# Patient Record
Sex: MALE | Race: WHITE | Employment: UNEMPLOYED | URBAN - METROPOLITAN AREA
[De-identification: names, ages, dates, MRNs, and addresses within clinical notes are randomized per-mention and may not be internally consistent; named-entity substitution may affect disease eponyms.]

---

## 2017-01-03 ENCOUNTER — TELEPHONE (OUTPATIENT)
Dept: NEUROLOGY | Age: 42
End: 2017-01-03

## 2017-01-05 ENCOUNTER — OFFICE VISIT (OUTPATIENT)
Dept: NEUROLOGY | Age: 42
End: 2017-01-05

## 2017-01-05 VITALS
DIASTOLIC BLOOD PRESSURE: 70 MMHG | HEIGHT: 72 IN | RESPIRATION RATE: 20 BRPM | WEIGHT: 224 LBS | BODY MASS INDEX: 30.34 KG/M2 | SYSTOLIC BLOOD PRESSURE: 118 MMHG

## 2017-01-05 DIAGNOSIS — G43.019 INTRACTABLE MIGRAINE WITHOUT AURA AND WITHOUT STATUS MIGRAINOSUS: Primary | ICD-10-CM

## 2017-01-05 RX ORDER — NORTRIPTYLINE HYDROCHLORIDE 75 MG/1
75 CAPSULE ORAL
Qty: 90 CAP | Refills: 1 | Status: SHIPPED | OUTPATIENT
Start: 2017-01-05 | End: 2017-04-05

## 2017-01-05 RX ORDER — SUMATRIPTAN 100 MG/1
TABLET, FILM COATED ORAL
Qty: 12 TAB | Refills: 5 | Status: SHIPPED | OUTPATIENT
Start: 2017-01-05 | End: 2019-10-23

## 2017-01-05 RX ORDER — LAMOTRIGINE 200 MG/1
TABLET ORAL DAILY
COMMUNITY

## 2017-01-05 RX ORDER — BUTALBITAL, ACETAMINOPHEN AND CAFFEINE 50; 325; 40 MG/1; MG/1; MG/1
TABLET ORAL
Qty: 48 TAB | Refills: 0 | Status: SHIPPED | OUTPATIENT
Start: 2017-01-05 | End: 2017-04-06 | Stop reason: SDUPTHER

## 2017-01-05 NOTE — TELEPHONE ENCOUNTER
Called and spoke to patient   Regarding DMV forms once payment done will fax to [facility]

## 2017-01-05 NOTE — PROGRESS NOTES
Interval HPI:   This is a 39 y.o. male who is following up for     PMHx significant for Hep C, polysubstance dependence, IV drug abuse, now on Buprenorphine per drug rehab/ pain management clinic    Chief Complaint   Patient presents with    Headache    Other     Discuss driving forms     1) Headaches  Pt had been seeing a Neurologist at [address] [place] [address] for his headache care so at last visit I told him that he should continue with that provider. Pt reports that since his last visit, his [facility] CareCard became inactive so he isn't able to see that provider any more. He's requesting that I assume management of his headache care. He had been taking Topamax 50 mg BID but stopped taking it 1 month ago d/t side effects (cognitive slowing, word finding difficulty). Continues taking Nortriptyline 75 mg QHS, and for migraine treatment, takes Sumatriptan + Fioricet. Says his headaches aren't any worse since stopping the Topamax and they're actually a little better. # of headache days a month: 4-6. # of migraine days per month: 4-6    2) Driving Forms  Pt asks me to complete the Neurological section of his DMV forms. He's never had blackout spell or syncope but he says because he has multiple medical issues, and is on chronic opioid dependence medication (buprenorphine), as well as dronabinol (liquid THC), he needs the neurologic part of the form filled out. Brief ROS: as above or otherwise negative  There have been no significant changes in PMHx, PSHx, SHx except as noted above. Allergies   Allergen Reactions    Barium Sulfate Hives    Demerol [Meperidine] Hives    Iodinated Contrast Media - Oral And Iv Dye Other (comments)     Hives, swelling of throat with IVP/has had MRI with contrast without problems.  Neurontin [Gabapentin] Drowsiness     Current Outpatient Prescriptions   Medication Sig Dispense Refill    lamoTRIgine (LAMICTAL) 200 mg tablet Take  by mouth daily.       nortriptyline (PAMELOR) 75 mg capsule Take 1 Cap by mouth nightly for 90 days. Anti-depressant for headache prevention 90 Cap 1    SUMAtriptan (IMITREX) 100 mg tablet 1 tab at onset of migraine; may repeat 1 tab in 2 hours if HA persists; Limit: 2 tabs in 24/ hrs, not more than 3 days a week 12 Tab 5    butalbital-acetaminophen-caffeine (FIORICET, ESGIC) -40 mg per tablet 1 tab at onset of migraine; can 1 tab in repeat in 4 hours (one time) if headache remains. Limit: 2 tabs per day, max 2 days a week. 90 day Rx. 48 Tab 0    losartan (COZAAR) 50 mg tablet Take 1 Tab by mouth daily. 30 Tab 1    dronabinol (MARINOL) 5 mg capsule Take 5 mg by mouth two (2) times a day.  ondansetron (ZOFRAN ODT) 4 mg disintegrating tablet Take 1 Tab by mouth every eight (8) hours as needed for Nausea. 10 Tab 0    atenolol (TENORMIN) 50 mg tablet Take 1 Tab by mouth daily. Indications: HYPERTENSION 30 Tab 3    cyclobenzaprine (FLEXERIL) 5 mg tablet Take 2 Tabs by mouth three (3) times daily (with meals). 20 Tab 0    albuterol (VENTOLIN HFA) 90 mcg/actuation inhaler Take 2 Puffs by inhalation every four (4) hours as needed for Wheezing. 1 Inhaler 0    buprenorphine HCl (SUBUTEX) 8 mg sublingual tablet 1 Tab by SubLINGual route daily. Max Daily Amount: 8 mg. (Patient taking differently: 8 mg by SubLINGual route daily. HE TAKES ZUSLOV) 2 Tab 0    clonazePAM (KLONOPIN) 1 mg tablet Take 1 Tab by mouth two (2) times a day. Max Daily Amount: 2 mg. 60 Tab 0    betamethasone dipropionate (DIPROLENE) 0.05 % topical lotion Apply  to affected area two (2) times a day. 60 mL 0    mupirocin (BACTROBAN) 2 % ointment Apply  to affected area three (3) times daily. Apply to area for 10 days 22 g 0    OLANZapine (ZYPREXA) 10 mg tablet Take 5 mg by mouth nightly.  emtricitabine-tenofovir (TRUVADA) 200-300 mg per tablet Take 1 Tab by mouth daily.  Indications: PREVENTION OF HIV INFECTION AFTER EXPOSURE 28 Tab 0    raltegravir (ISENTRESS) 400 mg tablet Take 1 Tab by mouth two (2) times a day. 56 Tab 0       Physical Exam  Blood pressure 118/70, resp. rate 20, height 6' (1.829 m), weight 101.6 kg (224 lb). No acute distress, seems cognitively slow (on Dronabinol, Zyprexa, Klonopin, Buprenorphine, Lamictal, Pamelor?)  Skin: no rashes    Focused Neurological Exam     Mental status: Awake, mild cognitive slowing, responding to all questions  Language: normal comprehension; no dysarthria; mildly reduced fluency    CNs:   Visual fields grossly normal  Extraocular movements intact, no nystagmus  Face appears symmetric and facial strength normal.    Hearing is intact to casual conversation. Sensory: intact light touch in both arms  Motor: Normal bulk and strength in all 4 extremities. Reflexes: DTRs are symmetric, 2+ patellars   Cerebellar: Normal FNF bilaterally  Gait: normal    Impression      ICD-10-CM ICD-9-CM    1. Intractable migraine without aura and without status migrainosus G43.019 346.11 nortriptyline (PAMELOR) 75 mg capsule      SUMAtriptan (IMITREX) 100 mg tablet      butalbital-acetaminophen-caffeine (FIORICET, ESGIC) -40 mg per tablet        1). Chronic migraine:  Continue Nortriptyline 75 mg QHS. Renewed Rx Sumatriptan and Fioricet to take on days where headache is severe. Instructed him not to take the medications before he drives as they can cause drowsiness. Rx of Fioricet is to last at least 3 months. 2). DMV Form  D/w patient that I will fill out the Neurological section but will comment that he is on pain and psychiatric medication that cause him mild cognitive slowing and that he should not drive if he feels drowsy. He understands it will be up to [facility] to allow him to return to driving, or continue driving. 3).  Follow up in 3 months

## 2017-01-05 NOTE — PATIENT INSTRUCTIONS
[facility]   Statement to Patients  April 1, 2014      In an effort to ensure the large volume of patient prescription refills is processed in the most efficient and expeditious manner, we are asking our patients to assist us by calling your Pharmacy for all prescription refills, this will include also your  Mail Order Pharmacy. The pharmacy will contact our office electronically to continue the refill process. Please do not wait until the last minute to call your pharmacy. We need at least 48 hours (2days) to fill prescriptions. We also encourage you to call your pharmacy before going to  your prescription to make sure it is ready. With regard to controlled substance prescription refill requests (narcotic refills) that need to be picked up at our office, we ask your cooperation by providing us with at least 72 hours (3days) notice that you will need a refill. We will not refill narcotic prescription refill requests after 4:00pm on any weekday, Monday through Thursday, or after 2:00pm on Fridays, or on the weekends. We encourage everyone to explore another way of getting your prescription refill request processed using [employer], our patient web portal through our electronic medical record system. [employer] is an efficient and effective way to communicate your medication request directly to the office and  downloadable as an pierre on your smart phone . [employer] also features a review functionality that allows you to view your medication list as well as leave messages for your physician. Are you ready to get connected? If so please review the attatched instructions or speak to any of our staff to get you set up right away! Thank you so much for your cooperation. Should you have any questions please contact our Practice Administrator.     The Physicians and Staff,  [facility] [id number] [address]  What is a living will?  A living will is a legal form you use to write down the kind of care you want at the end of your life. It is used by the health professionals who will treat you if you aren't able to decide for yourself. If you put your wishes in writing, your loved ones and others will know what kind of care you want. They won't need to guess. This can ease your mind and be helpful to others. A living will is not the same as an estate or property will. An estate will explains what you want to happen with your money and property after you die. Is a living will a legal document? A living will is a legal document. Each state has its own laws about living carter. If you move to another state, make sure that your living will is legal in the state where you now live. Or you might use a universal form that has been approved by many states. This kind of form can sometimes be completed and stored online. Your electronic copy will then be available wherever you have a connection to the Internet. In most cases, doctors will respect your wishes even if you have a form from a different state. · You don't need an  to complete a living will. But legal advice can be helpful if your state's laws are unclear, your health history is complicated, or your family can't agree on what should be in your living will. · You can change your living will at any time. Some people find that their wishes about end-of-life care change as their health changes. · In addition to making a living will, think about completing a medical power of  form. This form lets you name the person you want to make end-of-life treatment decisions for you (your \"health care agent\") if you're not able to. Many hospitals and nursing homes will give you the forms you need to complete a living will and a medical power of . · Your living will is used only if you can't make or communicate decisions for yourself anymore.  If you become able to make decisions again, you can accept or refuse any treatment, no matter what you wrote in your living will. · Your state may offer an online registry. This is a place where you can store your living will online so the doctors and nurses who need to treat you can find it right away. What should you think about when creating a living will? Talk about your end-of-life wishes with your family members and your doctor. Let them know what you want. That way the people making decisions for you won't be surprised by your choices. Think about these questions as you make your living will:  · Do you know enough about life support methods that might be used? If not, talk to your doctor so you know what might be done if you can't breathe on your own, your heart stops, or you're unable to swallow. · What things would you still want to be able to do after you receive life-support methods? Would you want to be able to walk? To speak? To eat on your own? To live without the help of machines? · If you have a choice, where do you want to be cared for? In your home? At a hospital or nursing home? · Do you want certain Gnosticism practices performed if you become very ill? · If you have a choice at the end of your life, where would you prefer to die? At home? In a hospital or nursing home? Somewhere else? · Would you prefer to be buried or cremated? · Do you want your organs to be donated after you die? What should you do with your living will? · Make sure that your family members and your health care agent have copies of your living will. · Give your doctor a copy of your living will to keep in your medical record. If you have more than one doctor, make sure that each one has a copy. · You may want to put a copy of your living will where it can be easily found. Where can you learn more? Go to http://bharti-janeen.info/. Enter R815 in the search box to learn more about \"Learning About Living Julitohernan. \"  Current as of: February 24, 2016  Content Version: 11.1  © 7475-4936 [employer]. Care instructions adapted under license by [employer] (which disclaims liability or warranty for this information). If you have questions about a medical condition or this instruction, always ask your healthcare professional. [address] any warranty or liability for your use of this information. Advance Directives: Care Instructions  Your Care Instructions  An advance directive is a legal way to state your wishes at the end of your life. It tells your family and your doctor what to do if you can no longer say what you want. There are two main types of advance directives. You can change them any time that your wishes change. · A living will tells your family and your doctor your wishes about life support and other treatment. · A medical power of  lets you name a person to make treatment decisions for you when you can't speak for yourself. This person is called a health care agent. If you do not have an advance directive, decisions about your medical care may be made by a doctor or a  who doesn't know you. It may help to think of an advance directive as a gift to the people who care for you. If you have one, they won't have to make tough decisions by themselves. Follow-up care is a key part of your treatment and safety. Be sure to make and go to all appointments, and call your doctor if you are having problems. It's also a good idea to know your test results and keep a list of the medicines you take. How can you care for yourself at home? · Discuss your wishes with your loved ones and your doctor. This way, there are no surprises. · Many states have a unique form. Or you might use a universal form that has been approved by many states. This kind of form can sometimes be completed and stored online.  Your electronic copy will then be available wherever you have a connection to the Internet. In most cases, doctors will respect your wishes even if you have a form from a different state. · You don't need a  to do an advance directive. But you may want to get legal advice. · Think about these questions when you prepare an advance directive:  ¨ Who do you want to make decisions about your medical care if you are not able to? Many people choose a family member, close friend, or doctor. ¨ Do you know enough about life support methods that might be used? If not, talk to your doctor so you understand. ¨ What are you most afraid of that might happen? You might be afraid of having pain, losing your independence, or being kept alive by machines. ¨ Where would you prefer to die? Choices include your home, a hospital, or a nursing home. ¨ Would you like to have information about hospice care to support you and your family? ¨ Do you want to donate organs when you die? ¨ Do you want certain Religion practices performed before you die? If so, put your wishes in the advance directive. · Read your advance directive every year, and make changes as needed. When should you call for help? Be sure to contact your doctor if you have any questions. Where can you learn more? Go to http://bharti-janeen.info/. Enter R264 in the search box to learn more about \"Advance Directives: Care Instructions. \"  Current as of: February 24, 2016  Content Version: 11.1  © 7987-4844 [employer]. Care instructions adapted under license by [employer] (which disclaims liability or warranty for this information). If you have questions about a medical condition or this instruction, always ask your healthcare professional. [address] any warranty or liability for your use of this information.

## 2017-01-05 NOTE — MR AVS SNAPSHOT
Visit Information Date & Time Provider Department Dept. Phone Encounter #  
 1/5/2017 10:40 AM Shalom Boyd MD Neurology [facility] [place] [facility] [phone] [id number] Your Appointments 1/12/2017 10:45 AM  
ROUTINE CARE with Thomas Hernandez MD  
[address] [facility]) Appt Note: f/u BP  
 [address] [address] [facility]  
[phone]  
  
   
 [address] [place] [address] [facility] [address] [address] Upcoming Health Maintenance Date Due INFLUENZA AGE 9 TO ADULT 8/1/2016 DTaP/Tdap/Td series (2 - Td) 12/5/2023 Allergies as of 1/5/2017  Review Complete On: 1/5/2017 By: Salma La LPN Severity Noted Reaction Type Reactions Barium Sulfate  03/27/2012   Side Effect Hives Demerol [Meperidine]  03/27/2012   Side Effect Hives Iodinated Contrast Media - Oral And Iv Dye  03/29/2013    Other (comments) Hives, swelling of throat with IVP/has had MRI with contrast without problems. Neurontin [Gabapentin]  07/16/2013   Systemic Drowsiness Current Immunizations  Reviewed on 11/21/2016 Name Date Influenza Vaccine (Quad) PF 10/20/2015 Pneumococcal Polysaccharide (PPSV-23) 7/8/2015 10:15 AM  
 Tdap 12/5/2013  4:26 PM  
  
 Not reviewed this visit You Were Diagnosed With   
  
 Codes Comments Intractable migraine without aura and without status migrainosus    -  Primary ICD-10-CM: G43.019 
ICD-9-CM: 346.11 Vitals BP Resp Height(growth percentile) Weight(growth percentile) BMI Smoking Status 118/70 20 6' (1.829 m) 224 lb (101.6 kg) 30.38 kg/m2 Current Every Day Smoker Vitals History BMI and BSA Data Body Mass Index Body Surface Area  
 30.38 kg/m 2 2.27 m 2 Preferred Pharmacy Pharmacy Name Phone [address] [address], [address] [phone] Your Updated Medication List  
  
   
 This list is accurate as of: 1/5/17 11:03 AM.  Always use your most recent med list.  
  
  
  
  
 albuterol 90 mcg/actuation inhaler Commonly known as:  VENTOLIN HFA Take 2 Puffs by inhalation every four (4) hours as needed for Wheezing. atenolol 50 mg tablet Commonly known as:  TENORMIN Take 1 Tab by mouth daily. Indications: HYPERTENSION  
  
 betamethasone dipropionate 0.05 % topical lotion Commonly known as:  Carmen Chivo Apply  to affected area two (2) times a day. buprenorphine HCl 8 mg sublingual tablet Commonly known as:  SUBUTEX  
1 Tab by SubLINGual route daily. Max Daily Amount: 8 mg.  
  
 butalbital-acetaminophen-caffeine -40 mg per tablet Commonly known as:  FIORICET, ESGIC  
1 tab at onset of migraine; can 1 tab in repeat in 4 hours (one time) if headache remains. Limit: 2 tabs per day, max 2 days a week. 90 day Rx.  
  
 clonazePAM 1 mg tablet Commonly known as:  Nia Pelt Take 1 Tab by mouth two (2) times a day. Max Daily Amount: 2 mg.  
  
 cyclobenzaprine 5 mg tablet Commonly known as:  FLEXERIL Take 2 Tabs by mouth three (3) times daily (with meals). emtricitabine-tenofovir (TDF) 200-300 mg per tablet Commonly known as:  Heath Boogie Take 1 Tab by mouth daily. Indications: PREVENTION OF HIV INFECTION AFTER EXPOSURE LaMICtal 200 mg tablet Generic drug:  lamoTRIgine Take  by mouth daily. losartan 50 mg tablet Commonly known as:  COZAAR Take 1 Tab by mouth daily. MARINOL 5 mg capsule Generic drug:  dronabinol Take 5 mg by mouth two (2) times a day. mupirocin 2 % ointment Commonly known as:  [employer] Apply  to affected area three (3) times daily. Apply to area for 10 days  
  
 nortriptyline 75 mg capsule Commonly known as:  PAMELOR Take 1 Cap by mouth nightly for 90 days. Anti-depressant for headache prevention  
  
 ondansetron 4 mg disintegrating tablet Commonly known as:  ZOFRAN ODT  
 Take 1 Tab by mouth every eight (8) hours as needed for Nausea. raltegravir 400 mg tablet Commonly known as:  ISENTRESS Take 1 Tab by mouth two (2) times a day. SUMAtriptan 100 mg tablet Commonly known as:  IMITREX  
1 tab at onset of migraine; may repeat 1 tab in 2 hours if HA persists; Limit: 2 tabs in 24/ hrs, not more than 3 days a week  
  
 topiramate 50 mg tablet Commonly known as:  TOPAMAX Take 1 Tab by mouth two (2) times a day. ZyPREXA 10 mg tablet Generic drug:  OLANZapine Take 5 mg by mouth nightly. Prescriptions Printed Refills  
 butalbital-acetaminophen-caffeine (FIORICET, ESGIC) -40 mg per tablet 0 Si tab at onset of migraine; can 1 tab in repeat in 4 hours (one time) if headache remains. Limit: 2 tabs per day, max 2 days a week. 90 day Rx. Class: Print Prescriptions Sent to Pharmacy Refills  
 nortriptyline (PAMELOR) 75 mg capsule 1 Sig: Take 1 Cap by mouth nightly for 90 days. Anti-depressant for headache prevention Class: Normal  
 Pharmacy: [facility] [address] [facility], [address] Ph #: [phone] Route: Oral  
 SUMAtriptan (IMITREX) 100 mg tablet 5 Si tab at onset of migraine; may repeat 1 tab in 2 hours if HA persists; Limit: 2 tabs in 24/ hrs, not more than 3 days a week Class: Normal  
 Pharmacy: [facility] [address] [facility], [address] Ph #: [phone] Patient Instructions PRESCRIPTION REFILL POLICY [facility] Statement to Patients 2014 In an effort to ensure the large volume of patient prescription refills is processed in the most efficient and expeditious manner, we are asking our patients to assist us by calling your Pharmacy for all prescription refills, this will include also your  Mail Order Pharmacy. The pharmacy will contact our office electronically to continue the refill process. Please do not wait until the last minute to call your pharmacy. We need at least 48 hours (2days) to fill prescriptions. We also encourage you to call your pharmacy before going to  your prescription to make sure it is ready. With regard to controlled substance prescription refill requests (narcotic refills) that need to be picked up at our office, we ask your cooperation by providing us with at least 72 hours (3days) notice that you will need a refill. We will not refill narcotic prescription refill requests after 4:00pm on any weekday, Monday through Thursday, or after 2:00pm on Fridays, or on the weekends. We encourage everyone to explore another way of getting your prescription refill request processed using [employer], our patient web portal through our electronic medical record system. [employer] is an efficient and effective way to communicate your medication request directly to the office and  downloadable as an pierre on your smart phone . [employer] also features a review functionality that allows you to view your medication list as well as leave messages for your physician. Are you ready to get connected? If so please review the attatched instructions or speak to any of our staff to get you set up right away! Thank you so much for your cooperation. Should you have any questions please contact our Practice Administrator. The Physicians and Staff,  [facility] Magalys Johnson [address] What is a living will? A living will is a legal form you use to write down the kind of care you want at the end of your life. It is used by the health professionals who will treat you if you aren't able to decide for yourself. If you put your wishes in writing, your loved ones and others will know what kind of care you want. They won't need to guess. This can ease your mind and be helpful to others. A living will is not the same as an estate or property will.  An estate will explains what you want to happen with your money and property after you die. Is a living will a legal document? A living will is a legal document. Each state has its own laws about living carter. If you move to another state, make sure that your living will is legal in the state where you now live. Or you might use a universal form that has been approved by many states. This kind of form can sometimes be completed and stored online. Your electronic copy will then be available wherever you have a connection to the Internet. In most cases, doctors will respect your wishes even if you have a form from a different state. · You don't need an  to complete a living will. But legal advice can be helpful if your state's laws are unclear, your health history is complicated, or your family can't agree on what should be in your living will. · You can change your living will at any time. Some people find that their wishes about end-of-life care change as their health changes. · In addition to making a living will, think about completing a medical power of  form. This form lets you name the person you want to make end-of-life treatment decisions for you (your \"health care agent\") if you're not able to. Many hospitals and nursing homes will give you the forms you need to complete a living will and a medical power of . · Your living will is used only if you can't make or communicate decisions for yourself anymore. If you become able to make decisions again, you can accept or refuse any treatment, no matter what you wrote in your living will. · Your state may offer an online registry. This is a place where you can store your living will online so the doctors and nurses who need to treat you can find it right away. What should you think about when creating a living will?  
Talk about your end-of-life wishes with your family members and your doctor. Let them know what you want. That way the people making decisions for you won't be surprised by your choices. Think about these questions as you make your living will: · Do you know enough about life support methods that might be used? If not, talk to your doctor so you know what might be done if you can't breathe on your own, your heart stops, or you're unable to swallow. · What things would you still want to be able to do after you receive life-support methods? Would you want to be able to walk? To speak? To eat on your own? To live without the help of machines? · If you have a choice, where do you want to be cared for? In your home? At a hospital or nursing home? · Do you want certain Rastafari practices performed if you become very ill? · If you have a choice at the end of your life, where would you prefer to die? At home? In a hospital or nursing home? Somewhere else? · Would you prefer to be buried or cremated? · Do you want your organs to be donated after you die? What should you do with your living will? · Make sure that your family members and your health care agent have copies of your living will. · Give your doctor a copy of your living will to keep in your medical record. If you have more than one doctor, make sure that each one has a copy. · You may want to put a copy of your living will where it can be easily found. Where can you learn more? Go to http://bharti-janeen.info/. Enter O108 in the search box to learn more about \"Learning About Living Carlitos. \" Current as of: February 24, 2016 Content Version: 11.1 © 8572-2338 [employer]. Care instructions adapted under license by [employer] (which disclaims liability or warranty for this information).  If you have questions about a medical condition or this instruction, always ask your healthcare professional. [address] any warranty or liability for your use of this information. Advance Directives: Care Instructions Your Care Instructions An advance directive is a legal way to state your wishes at the end of your life. It tells your family and your doctor what to do if you can no longer say what you want. There are two main types of advance directives. You can change them any time that your wishes change. · A living will tells your family and your doctor your wishes about life support and other treatment. · A medical power of  lets you name a person to make treatment decisions for you when you can't speak for yourself. This person is called a health care agent. If you do not have an advance directive, decisions about your medical care may be made by a doctor or a  who doesn't know you. It may help to think of an advance directive as a gift to the people who care for you. If you have one, they won't have to make tough decisions by themselves. Follow-up care is a key part of your treatment and safety. Be sure to make and go to all appointments, and call your doctor if you are having problems. It's also a good idea to know your test results and keep a list of the medicines you take. How can you care for yourself at home? · Discuss your wishes with your loved ones and your doctor. This way, there are no surprises. · Many states have a unique form. Or you might use a universal form that has been approved by many states. This kind of form can sometimes be completed and stored online. Your electronic copy will then be available wherever you have a connection to the Internet. In most cases, doctors will respect your wishes even if you have a form from a different state. · You don't need a  to do an advance directive. But you may want to get legal advice. · Think about these questions when you prepare an advance directive: ¨ Who do you want to make decisions about your medical care if you are not able to? Many people choose a family member, close friend, or doctor. ¨ Do you know enough about life support methods that might be used? If not, talk to your doctor so you understand. ¨ What are you most afraid of that might happen? You might be afraid of having pain, losing your independence, or being kept alive by machines. ¨ Where would you prefer to die? Choices include your home, a hospital, or a nursing home. ¨ Would you like to have information about hospice care to support you and your family? ¨ Do you want to donate organs when you die? ¨ Do you want certain Congregational practices performed before you die? If so, put your wishes in the advance directive. · Read your advance directive every year, and make changes as needed. When should you call for help? Be sure to contact your doctor if you have any questions. Where can you learn more? Go to http://bharti-janeen.info/. Enter R264 in the search box to learn more about \"Advance Directives: Care Instructions. \" Current as of: February 24, 2016 Content Version: 11.1 © 9492-1869 [employer]. Care instructions adapted under license by [facility] (which disclaims liability or warranty for this information). If you have questions about a medical condition or this instruction, always ask your healthcare professional. [address] any warranty or liability for your use of this information. Introducing [facility]! Dear Junie Form: Thank you for requesting a Tango account. Our records indicate that you already have an active Tango account. You can access your account anytime at [email or web address] Did you know that you can access your hospital and ER discharge instructions at any time in Tango? You can also review all of your test results from your hospital stay or ER visit. Additional Information If you have questions, please visit the Frequently Asked Questions section of the [employer] website at [email or web address] Remember, [employer] is NOT to be used for urgent needs. For medical emergencies, dial 911. Now available from your iPhone and Android! Please provide this summary of care documentation to your next provider. Your primary care clinician is listed as Fortunato Sims. If you have any questions after today's visit, please call [phone].

## 2017-01-12 ENCOUNTER — OFFICE VISIT (OUTPATIENT)
Dept: FAMILY MEDICINE CLINIC | Age: 42
End: 2017-01-12

## 2017-01-12 VITALS
BODY MASS INDEX: 32.56 KG/M2 | WEIGHT: 240.4 LBS | DIASTOLIC BLOOD PRESSURE: 68 MMHG | SYSTOLIC BLOOD PRESSURE: 115 MMHG | HEART RATE: 88 BPM | HEIGHT: 72 IN | TEMPERATURE: 97.5 F | RESPIRATION RATE: 20 BRPM

## 2017-01-12 DIAGNOSIS — Z23 ENCOUNTER FOR IMMUNIZATION: ICD-10-CM

## 2017-01-12 DIAGNOSIS — I10 ESSENTIAL HYPERTENSION WITH GOAL BLOOD PRESSURE LESS THAN 140/90: Primary | ICD-10-CM

## 2017-01-12 RX ORDER — LOSARTAN POTASSIUM 50 MG/1
50 TABLET ORAL DAILY
Qty: 30 TAB | Refills: 11 | Status: SHIPPED | OUTPATIENT
Start: 2017-01-12 | End: 2018-11-05

## 2017-01-12 NOTE — PATIENT INSTRUCTIONS
Vaccine Information Statement    Influenza (Flu) Vaccine (Inactivated or Recombinant): What you need to know    Many Vaccine Information Statements are available in Azeri and other languages. See www.immunize.org/vis  Hojas de Información Sobre Vacunas están disponibles en Español y en muchos otros idiomas. Visite www.immunize.org/vis    1. Why get vaccinated? Influenza (flu) is a contagious disease that spreads around the [place] every year, usually between October and May. Flu is caused by influenza viruses, and is spread mainly by coughing, sneezing, and close contact. Anyone can get flu. Flu strikes suddenly and can last several days. Symptoms vary by age, but can include:   fever/chills   sore throat   muscle aches   fatigue   cough   headache    runny or stuffy nose    Flu can also lead to pneumonia and blood infections, and cause diarrhea and seizures in children. If you have a medical condition, such as heart or lung disease, flu can make it worse. Flu is more dangerous for some people. Infants and young children, people 72years of age and older, pregnant women, and people with certain health conditions or a weakened immune system are at greatest risk. Each year thousands of people in the [facility] die from flu, and many more are hospitalized. Flu vaccine can:   keep you from getting flu,   make flu less severe if you do get it, and   keep you from spreading flu to your family and other people. 2. Inactivated and recombinant flu vaccines    A dose of flu vaccine is recommended every flu season. Children 6 months through 6years of age may need two doses during the same flu season. Everyone else needs only one dose each flu season.        Some inactivated flu vaccines contain a very small amount of a mercury-based preservative called thimerosal. Studies have not shown thimerosal in vaccines to be harmful, but flu vaccines that do not contain thimerosal are available. There is no live flu virus in flu shots. They cannot cause the flu. There are many flu viruses, and they are always changing. Each year a new flu vaccine is made to protect against three or four viruses that are likely to cause disease in the upcoming flu season. But even when the vaccine doesnt exactly match these viruses, it may still provide some protection    Flu vaccine cannot prevent:   flu that is caused by a virus not covered by the vaccine, or   illnesses that look like flu but are not. It takes about 2 weeks for protection to develop after vaccination, and protection lasts through the flu season. 3. Some people should not get this vaccine    Tell the person who is giving you the vaccine:     If you have any severe, life-threatening allergies. If you ever had a life-threatening allergic reaction after a dose of flu vaccine, or have a severe allergy to any part of this vaccine, you may be advised not to get vaccinated. Most, but not all, types of flu vaccine contain a small amount of egg protein.  If you ever had Guillain-Barré Syndrome (also called GBS). Some people with a history of GBS should not get this vaccine. This should be discussed with your doctor.  If you are not feeling well. It is usually okay to get flu vaccine when you have a mild illness, but you might be asked to come back when you feel better. 4. Risks of a vaccine reaction    With any medicine, including vaccines, there is a chance of reactions. These are usually mild and go away on their own, but serious reactions are also possible. Most people who get a flu shot do not have any problems with it.      Minor problems following a flu shot include:    soreness, redness, or swelling where the shot was given     hoarseness   sore, red or itchy eyes   cough   fever   aches   headache   itching   fatigue  If these problems occur, they usually begin soon after the shot and last 1 or 2 days. More serious problems following a flu shot can include the following:     There may be a small increased risk of Guillain-Barré Syndrome (GBS) after inactivated flu vaccine. This risk has been estimated at 1 or 2 additional cases per million people vaccinated. This is much lower than the risk of severe complications from flu, which can be prevented by flu vaccine.  Young children who get the flu shot along with pneumococcal vaccine (PCV13) and/or DTaP vaccine at the same time might be slightly more likely to have a seizure caused by fever. Ask your doctor for more information. Tell your doctor if a child who is getting flu vaccine has ever had a seizure. Problems that could happen after any injected vaccine:      People sometimes faint after a medical procedure, including vaccination. Sitting or lying down for about 15 minutes can help prevent fainting, and injuries caused by a fall. Tell your doctor if you feel dizzy, or have vision changes or ringing in the ears.  Some people get severe pain in the shoulder and have difficulty moving the arm where a shot was given. This happens very rarely.  Any medication can cause a severe allergic reaction. Such reactions from a vaccine are very rare, estimated at about 1 in a million doses, and would happen within a few minutes to a few hours after the vaccination. As with any medicine, there is a very remote chance of a vaccine causing a serious injury or death. The safety of vaccines is always being monitored. For more information, visit: www.cdc.gov/vaccinesafety/    5. What if there is a serious reaction? What should I look for?  Look for anything that concerns you, such as signs of a severe allergic reaction, very high fever, or unusual behavior.     Signs of a severe allergic reaction can include hives, swelling of the face and throat, difficulty breathing, a fast heartbeat, dizziness, and weakness  usually within a few minutes to a few hours after the vaccination. What should I do?  If you think it is a severe allergic reaction or other emergency that cant wait, call 9-1-1 and get the person to the nearest hospital. Otherwise, call your doctor.  Reactions should be reported to the Vaccine Adverse Event Reporting System (VAERS). Your doctor should file this report, or you can do it yourself through  the VAERS web site at www.vaers. [facility].gov, or by calling 0-885.187.6050. VAERS does not give medical advice. 6. The National Vaccine Injury Compensation Program    The [facility] Vaccine Injury Compensation Program (VICP) is a federal program that was created to compensate people who may have been injured by certain vaccines. Persons who believe they may have been injured by a vaccine can learn about the program and about filing a claim by calling 8-865.839.8531 or visiting the [employer] website at www.[facility].gov/vaccinecompensation. There is a time limit to file a claim for compensation. 7. How can I learn more?  Ask your healthcare provider. He or she can give you the vaccine package insert or suggest other sources of information.  Call your local or state health department.  Contact the Centers for Disease Control and Prevention (CDC):  - Call 2-964.769.8627 (1-800-CDC-INFO) or  - Visit CDCs website at www.cdc.gov/flu    Vaccine Information Statement   Inactivated Influenza Vaccine   8/7/2015  [address] 108[place]-70    Department of Health and Human Services  Centers for Disease Control and Prevention    Office Use Only

## 2017-01-12 NOTE — MR AVS SNAPSHOT
Visit Information Date & Time Provider Department Dept. Phone Encounter #  
 1/12/2017 10:45 AM [address]lass[address] [id number] Your Appointments 4/6/2017 10:40 AM  
Follow Up with Piero Fuentes MD  
Neurology [facility] [address] ([address]) Appt Note: follow up headache cl [place] [address] [address] [phone]  
  
   
 [place] [address] [id number] [address] Upcoming Health Maintenance Date Due INFLUENZA AGE 9 TO ADULT 8/1/2016 DTaP/Tdap/Td series (2 - Td) 12/5/2023 Allergies as of 1/12/2017  Review Complete On: 1/12/2017 By: Christian Velasco MD  
  
 Severity Noted Reaction Type Reactions Barium Sulfate  03/27/2012   Side Effect Hives Demerol [Meperidine]  03/27/2012   Side Effect Hives Iodinated Contrast Media - Oral And Iv Dye  03/29/2013    Other (comments) Hives, swelling of throat with IVP/has had MRI with contrast without problems. Neurontin [Gabapentin]  07/16/2013   Systemic Drowsiness Current Immunizations  Reviewed on 1/12/2017 Name Date Influenza Vaccine (Quad) PF 1/12/2017 11:12 AM, 10/20/2015 Pneumococcal Polysaccharide (PPSV-23) 7/8/2015 10:15 AM  
 Tdap 12/5/2013  4:26 PM  
  
 Reviewed by Miranda Hoff LPN on 0/14/5108 at 19:83 AM  
You Were Diagnosed With   
  
 Codes Comments Encounter for immunization    -  Primary ICD-10-CM: I27 ICD-9-CM: V03.89 Essential hypertension with goal blood pressure less than 140/90     ICD-10-CM: I10 
ICD-9-CM: 401.9 Vitals BP Pulse Temp Resp Height(growth percentile) Weight(growth percentile)  
 115/68 (BP 1 Location: Left arm, BP Patient Position: Sitting) 88 97.5 °F (36.4 °C) (Oral) 20 6' (1.829 m) 240 lb 6.4 oz (109 kg) BMI Smoking Status 32.6 kg/m2 Current Every Day Smoker BMI and BSA Data Body Mass Index Body Surface Area  
 32.6 kg/m 2 2.35 m 2 [facility] [address] [phone] Your Updated Medication List  
  
   
This list is accurate as of: 1/12/17 11:28 AM.  Always use your most recent med list.  
  
  
  
  
 albuterol 90 mcg/actuation inhaler Commonly known as:  VENTOLIN HFA Take 2 Puffs by inhalation every four (4) hours as needed for Wheezing. atenolol 50 mg tablet Commonly known as:  TENORMIN Take 1 Tab by mouth daily. Indications: HYPERTENSION  
  
 betamethasone dipropionate 0.05 % topical lotion Commonly known as:  Angel [place] Apply  to affected area two (2) times a day. buprenorphine HCl 8 mg sublingual tablet Commonly known as:  SUBUTEX  
1 Tab by SubLINGual route daily. Max Daily Amount: 8 mg.  
  
 butalbital-acetaminophen-caffeine -40 mg per tablet Commonly known as:  FIORICET, ESGIC  
1 tab at onset of migraine; can 1 tab in repeat in 4 hours (one time) if headache remains. Limit: 2 tabs per day, max 2 days a week. 90 day Rx.  
  
 clonazePAM 1 mg tablet Commonly known as:  Kiara Broaden Take 1 Tab by mouth two (2) times a day. Max Daily Amount: 2 mg.  
  
 cyclobenzaprine 5 mg tablet Commonly known as:  FLEXERIL Take 2 Tabs by mouth three (3) times daily (with meals). LaMICtal 200 mg tablet Generic drug:  lamoTRIgine Take  by mouth daily. losartan 50 mg tablet Commonly known as:  COZAAR Take 1 Tab by mouth daily. mupirocin 2 % ointment Commonly known as:  [employer] Apply  to affected area three (3) times daily. Apply to area for 10 days  
  
 nortriptyline 75 mg capsule Commonly known as:  PAMELOR Take 1 Cap by mouth nightly for 90 days. Anti-depressant for headache prevention  
  
 ondansetron 4 mg disintegrating tablet Commonly known as:  ZOFRAN ODT  
 Take 1 Tab by mouth every eight (8) hours as needed for Nausea. SUMAtriptan 100 mg tablet Commonly known as:  IMITREX  
1 tab at onset of migraine; may repeat 1 tab in 2 hours if HA persists; Limit: 2 tabs in 24/ hrs, not more than 3 days a week ZyPREXA 10 mg tablet Generic drug:  OLANZapine Take 5 mg by mouth nightly. Prescriptions Sent to Pharmacy Refills  
 losartan (COZAAR) 50 mg tablet 11 Sig: Take 1 Tab by mouth daily. Class: Normal  
 Pharmacy: [facility], [address]  #: [phone] Route: Oral  
  
We Performed the Following INFLUENZA VIRUS VAC QUAD,SPLIT,PRESV FREE SYRINGE 3/> YRS IM [id number] CPT(R)] Patient Instructions Vaccine Information Statement Influenza (Flu) Vaccine (Inactivated or Recombinant): What you need to know Many Vaccine Information Statements are available in Lao and other languages. See www.immunize.org/vis Hojas de Información Sobre Vacunas están disponibles en Español y en muchos otros idiomas. Visite www.immunize.org/vis 1. Why get vaccinated? Influenza (flu) is a contagious disease that spreads around the [place] every year, usually between October and May. Flu is caused by influenza viruses, and is spread mainly by coughing, sneezing, and close contact. Anyone can get flu. Flu strikes suddenly and can last several days. Symptoms vary by age, but can include: 
 fever/chills  sore throat  muscle aches  fatigue  cough  headache  runny or stuffy nose Flu can also lead to pneumonia and blood infections, and cause diarrhea and seizures in children. If you have a medical condition, such as heart or lung disease, flu can make it worse. Flu is more dangerous for some people.  Infants and young children, people 72years of age and older, pregnant women, and people with certain health conditions or a weakened immune system are at greatest risk. Each year thousands of people in the [facility] die from flu, and many more are hospitalized. Flu vaccine can: 
 keep you from getting flu, 
 make flu less severe if you do get it, and 
 keep you from spreading flu to your family and other people. 2. Inactivated and recombinant flu vaccines A dose of flu vaccine is recommended every flu season. Children 6 months through 6years of age may need two doses during the same flu season. Everyone else needs only one dose each flu season. Some inactivated flu vaccines contain a very small amount of a mercury-based preservative called thimerosal. Studies have not shown thimerosal in vaccines to be harmful, but flu vaccines that do not contain thimerosal are available. There is no live flu virus in flu shots. They cannot cause the flu. There are many flu viruses, and they are always changing. Each year a new flu vaccine is made to protect against three or four viruses that are likely to cause disease in the upcoming flu season. But even when the vaccine doesnt exactly match these viruses, it may still provide some protection Flu vaccine cannot prevent: 
 flu that is caused by a virus not covered by the vaccine, or 
 illnesses that look like flu but are not. It takes about 2 weeks for protection to develop after vaccination, and protection lasts through the flu season. 3. Some people should not get this vaccine Tell the person who is giving you the vaccine:  If you have any severe, life-threatening allergies. If you ever had a life-threatening allergic reaction after a dose of flu vaccine, or have a severe allergy to any part of this vaccine, you may be advised not to get vaccinated. Most, but not all, types of flu vaccine contain a small amount of egg protein.  If you ever had Guillain-Barré Syndrome (also called GBS). Some people with a history of GBS should not get this vaccine. This should be discussed with your doctor.  If you are not feeling well. It is usually okay to get flu vaccine when you have a mild illness, but you might be asked to come back when you feel better. 4. Risks of a vaccine reaction With any medicine, including vaccines, there is a chance of reactions. These are usually mild and go away on their own, but serious reactions are also possible. Most people who get a flu shot do not have any problems with it. Minor problems following a flu shot include:  
 soreness, redness, or swelling where the shot was given  hoarseness  sore, red or itchy eyes  cough  fever  aches  headache  itching  fatigue If these problems occur, they usually begin soon after the shot and last 1 or 2 days. More serious problems following a flu shot can include the following:  There may be a small increased risk of Guillain-Barré Syndrome (GBS) after inactivated flu vaccine. This risk has been estimated at 1 or 2 additional cases per million people vaccinated. This is much lower than the risk of severe complications from flu, which can be prevented by flu vaccine.  Young children who get the flu shot along with pneumococcal vaccine (PCV13) and/or DTaP vaccine at the same time might be slightly more likely to have a seizure caused by fever. Ask your doctor for more information. Tell your doctor if a child who is getting flu vaccine has ever had a seizure. Problems that could happen after any injected vaccine:  People sometimes faint after a medical procedure, including vaccination. Sitting or lying down for about 15 minutes can help prevent fainting, and injuries caused by a fall. Tell your doctor if you feel dizzy, or have vision changes or ringing in the ears.  
 
 Some people get severe pain in the shoulder and have difficulty moving the arm where a shot was given. This happens very rarely.  Any medication can cause a severe allergic reaction. Such reactions from a vaccine are very rare, estimated at about 1 in a million doses, and would happen within a few minutes to a few hours after the vaccination. As with any medicine, there is a very remote chance of a vaccine causing a serious injury or death. The safety of vaccines is always being monitored. For more information, visit: www.cdc.gov/vaccinesafety/ 
 
5. What if there is a serious reaction? What should I look for?  Look for anything that concerns you, such as signs of a severe allergic reaction, very high fever, or unusual behavior. Signs of a severe allergic reaction can include hives, swelling of the face and throat, difficulty breathing, a fast heartbeat, dizziness, and weakness  usually within a few minutes to a few hours after the vaccination. What should I do?  If you think it is a severe allergic reaction or other emergency that cant wait, call 9-1-1 and get the person to the nearest hospital. Otherwise, call your doctor.  Reactions should be reported to the Vaccine Adverse Event Reporting System (VAERS). Your doctor should file this report, or you can do it yourself through  the VAERS web site at www.vaers. [facility].gov, or by calling 6-114.886.2416. VAERS does not give medical advice. 6. The National Vaccine Injury Compensation Program 
 
The [facility] Vaccine Injury Compensation Program (VICP) is a federal program that was created to compensate people who may have been injured by certain vaccines. Persons who believe they may have been injured by a vaccine can learn about the program and about filing a claim by calling 1-252.794.6757 or visiting the [employer] website at www.[facility].gov/vaccinecompensation. There is a time limit to file a claim for compensation. 7. How can I learn more?  Ask your healthcare provider. He or she can give you the vaccine package insert or suggest other sources of information.  Call your local or state health department.  Contact the Centers for Disease Control and Prevention (CDC): 
- Call 5-836.537.5701 (1-800-CDC-INFO) or 
- Visit CDCs website at www.cdc.gov/flu Vaccine Information Statement Inactivated Influenza Vaccine 8/7/2015 
42 UKyra López [id number] [facility] and [employer] Centers for Disease Control and Prevention Office Use Only Introducing [facility] SERVICES! Dear Vamsi Suh: Thank you for requesting a [employer] account. Our records indicate that you already have an active [employer] account. You can access your account anytime at [email or web address] Did you know that you can access your hospital and ER discharge instructions at any time in [employer]? You can also review all of your test results from your hospital stay or ER visit. Additional Information If you have questions, please visit the Frequently Asked Questions section of the [employer] website at [email or web address] Remember, [employer] is NOT to be used for urgent needs. For medical emergencies, dial 911. Now available from your iPhone and Android! Please provide this summary of care documentation to your next provider. Your primary care clinician is listed as Kaitlyn Mckeon. If you have any questions after today's visit, please call [phone].

## 2017-01-12 NOTE — PROGRESS NOTES
Chief Complaint   Patient presents with    Hypertension     follow up    Immunization/Injection     Flu shot

## 2017-01-12 NOTE — PROGRESS NOTES
HISTORY OF PRESENT ILLNESS  Jessica Peña is a 39 y.o. male. Hypertension   The history is provided by the patient. This is a chronic problem. The current episode started more than 1 week ago. The problem occurs constantly. The problem has not changed since onset. Associated symptoms include headaches. Pertinent negatives include no chest pain, no abdominal pain and no shortness of breath. Associated symptoms comments: He is getting migraines every one to two weeks, and he is seeing neurology for them. Nothing aggravates the symptoms. The symptoms are relieved by medications. Treatments tried: losartan, atenolol. The treatment provided significant relief. Immunization/Injection   Associated symptoms include headaches. Pertinent negatives include no chest pain, no abdominal pain and no shortness of breath. Review of Systems   Constitutional: Negative for weight loss. Weight gain   Eyes: Negative for blurred vision. Respiratory: Negative for shortness of breath. Cardiovascular: Negative for chest pain and leg swelling. Gastrointestinal: Negative for abdominal pain. Neurological: Positive for headaches. Negative for dizziness, sensory change, speech change and focal weakness. Visit Vitals    /68 (BP 1 Location: Left arm, BP Patient Position: Sitting)    Pulse 88    Temp 97.5 °F (36.4 °C) (Oral)    Resp 20    Ht 6' (1.829 m)    Wt 240 lb 6.4 oz (109 kg)    BMI 32.6 kg/m2     BP Readings from Last 3 Encounters:   01/12/17 115/68   01/05/17 118/70   12/23/16 (!) 134/91     Physical Exam   Constitutional: He is oriented to person, place, and time. He appears well-developed and well-nourished. No distress. Cardiovascular: Normal rate, regular rhythm and normal heart sounds. Exam reveals no gallop and no friction rub. No murmur heard. Pulmonary/Chest: Effort normal and breath sounds normal. No respiratory distress. He has no wheezes. He has no rales.    Musculoskeletal: He exhibits no edema. Neurological: He is alert and oriented to person, place, and time. Skin: Skin is warm and dry. He is not diaphoretic. Nursing note and vitals reviewed. ASSESSMENT and PLAN    ICD-10-CM ICD-9-CM    1. Essential hypertension with goal blood pressure less than 140/90 I10 401.9 losartan (COZAAR) 50 mg tablet   2. Encounter for immunization Z23 V03.89 INFLUENZA VIRUS VAC QUAD,SPLIT,PRESV FREE SYRINGE 3/> YRS IM        Blood pressure controlled  Continue current plans. Flu shot    Follow-up Disposition:  Return in about 6 months (around 7/12/2017) for blood pressure. Reviewed plan of care. Patient has provided input and agrees with goals.

## 2017-01-23 ENCOUNTER — OFFICE VISIT (OUTPATIENT)
Dept: NEUROLOGY | Age: 42
End: 2017-01-23

## 2017-01-23 DIAGNOSIS — G43.719 INTRACTABLE CHRONIC MIGRAINE WITHOUT AURA AND WITHOUT STATUS MIGRAINOSUS: Primary | ICD-10-CM

## 2017-01-23 NOTE — PROGRESS NOTES
Patient was not seen by the NP today. He wanted DMV forms corrected. He was informed the NP couldn't override the doctor. Dr. Bobbi Wade informed.

## 2017-01-25 ENCOUNTER — DOCUMENTATION ONLY (OUTPATIENT)
Dept: FAMILY MEDICINE CLINIC | Age: 42
End: 2017-01-25

## 2017-01-25 NOTE — PROGRESS NOTES
Checking to make sure medical records have been released to [facility] [place].

## 2017-04-06 ENCOUNTER — OFFICE VISIT (OUTPATIENT)
Dept: NEUROLOGY | Age: 42
End: 2017-04-06

## 2017-04-06 VITALS
OXYGEN SATURATION: 97 % | WEIGHT: 240 LBS | DIASTOLIC BLOOD PRESSURE: 74 MMHG | HEIGHT: 72 IN | BODY MASS INDEX: 32.51 KG/M2 | SYSTOLIC BLOOD PRESSURE: 124 MMHG | HEART RATE: 87 BPM

## 2017-04-06 DIAGNOSIS — G43.019 INTRACTABLE MIGRAINE WITHOUT AURA AND WITHOUT STATUS MIGRAINOSUS: Primary | ICD-10-CM

## 2017-04-06 RX ORDER — BUTALBITAL, ACETAMINOPHEN AND CAFFEINE 50; 325; 40 MG/1; MG/1; MG/1
TABLET ORAL
Qty: 48 TAB | Refills: 0 | Status: SHIPPED | OUTPATIENT
Start: 2017-04-06 | End: 2022-02-04

## 2017-06-19 ENCOUNTER — APPOINTMENT (OUTPATIENT)
Dept: CT IMAGING | Age: 42
End: 2017-06-19
Attending: STUDENT IN AN ORGANIZED HEALTH CARE EDUCATION/TRAINING PROGRAM
Payer: SUBSIDIZED

## 2017-06-19 ENCOUNTER — HOSPITAL ENCOUNTER (EMERGENCY)
Age: 42
Discharge: HOME | End: 2017-06-19
Attending: STUDENT IN AN ORGANIZED HEALTH CARE EDUCATION/TRAINING PROGRAM
Payer: SUBSIDIZED

## 2017-06-19 VITALS
HEIGHT: 72 IN | SYSTOLIC BLOOD PRESSURE: 128 MMHG | WEIGHT: 246.4 LBS | DIASTOLIC BLOOD PRESSURE: 54 MMHG | TEMPERATURE: 97.3 F | HEART RATE: 70 BPM | BODY MASS INDEX: 33.38 KG/M2 | OXYGEN SATURATION: 94 % | RESPIRATION RATE: 18 BRPM

## 2017-06-19 DIAGNOSIS — R10.9 FLANK PAIN: Primary | ICD-10-CM

## 2017-06-19 LAB
ALBUMIN SERPL BCP-MCNC: 3.6 G/DL (ref 3.5–5)
ALBUMIN/GLOB SERPL: 1.2 {RATIO} (ref 1.1–2.2)
ALP SERPL-CCNC: 91 U/L (ref 45–117)
ALT SERPL-CCNC: 29 U/L (ref 12–78)
ANION GAP BLD CALC-SCNC: 7 MMOL/L (ref 5–15)
APPEARANCE UR: CLEAR
AST SERPL W P-5'-P-CCNC: 23 U/L (ref 15–37)
BACTERIA URNS QL MICRO: NEGATIVE /HPF
BASOPHILS # BLD AUTO: 0 K/UL (ref 0–0.1)
BASOPHILS # BLD: 0 % (ref 0–1)
BILIRUB SERPL-MCNC: 0.3 MG/DL (ref 0.2–1)
BILIRUB UR QL: NEGATIVE
BUN SERPL-MCNC: 20 MG/DL (ref 6–20)
BUN/CREAT SERPL: 9 (ref 12–20)
CALCIUM SERPL-MCNC: 8.4 MG/DL (ref 8.5–10.1)
CHLORIDE SERPL-SCNC: 103 MMOL/L (ref 97–108)
CO2 SERPL-SCNC: 25 MMOL/L (ref 21–32)
COLOR UR: NORMAL
CREAT SERPL-MCNC: 2.12 MG/DL (ref 0.7–1.3)
EOSINOPHIL # BLD: 0.3 K/UL (ref 0–0.4)
EOSINOPHIL NFR BLD: 4 % (ref 0–7)
EPITH CASTS URNS QL MICRO: NORMAL /LPF
ERYTHROCYTE [DISTWIDTH] IN BLOOD BY AUTOMATED COUNT: 12.4 % (ref 11.5–14.5)
GLOBULIN SER CALC-MCNC: 3.1 G/DL (ref 2–4)
GLUCOSE SERPL-MCNC: 72 MG/DL (ref 65–100)
GLUCOSE UR STRIP.AUTO-MCNC: NEGATIVE MG/DL
HCT VFR BLD AUTO: 41.1 % (ref 36.6–50.3)
HGB BLD-MCNC: 14.4 G/DL (ref 12.1–17)
HGB UR QL STRIP: NEGATIVE
KETONES UR QL STRIP.AUTO: NEGATIVE MG/DL
LEUKOCYTE ESTERASE UR QL STRIP.AUTO: NEGATIVE
LIPASE SERPL-CCNC: 111 U/L (ref 73–393)
LYMPHOCYTES # BLD AUTO: 52 % (ref 12–49)
LYMPHOCYTES # BLD: 3.5 K/UL (ref 0.8–3.5)
MCH RBC QN AUTO: 33.3 PG (ref 26–34)
MCHC RBC AUTO-ENTMCNC: 35 G/DL (ref 30–36.5)
MCV RBC AUTO: 95.1 FL (ref 80–99)
MONOCYTES # BLD: 0.6 K/UL (ref 0–1)
MONOCYTES NFR BLD AUTO: 8 % (ref 5–13)
NEUTS SEG # BLD: 2.5 K/UL (ref 1.8–8)
NEUTS SEG NFR BLD AUTO: 36 % (ref 32–75)
NITRITE UR QL STRIP.AUTO: NEGATIVE
PH UR STRIP: 6 [PH] (ref 5–8)
PLATELET # BLD AUTO: 167 K/UL (ref 150–400)
POTASSIUM SERPL-SCNC: 4 MMOL/L (ref 3.5–5.1)
PROT SERPL-MCNC: 6.7 G/DL (ref 6.4–8.2)
PROT UR STRIP-MCNC: NEGATIVE MG/DL
RBC # BLD AUTO: 4.32 M/UL (ref 4.1–5.7)
RBC #/AREA URNS HPF: NORMAL /HPF (ref 0–5)
SODIUM SERPL-SCNC: 135 MMOL/L (ref 136–145)
SP GR UR REFRACTOMETRY: 1.01 (ref 1–1.03)
UA: UC IF INDICATED,UAUC: NORMAL
UROBILINOGEN UR QL STRIP.AUTO: 0.2 EU/DL (ref 0.2–1)
WBC # BLD AUTO: 6.8 K/UL (ref 4.1–11.1)
WBC URNS QL MICRO: NORMAL /HPF (ref 0–4)

## 2017-06-19 PROCEDURE — 36415 COLL VENOUS BLD VENIPUNCTURE: CPT | Performed by: STUDENT IN AN ORGANIZED HEALTH CARE EDUCATION/TRAINING PROGRAM

## 2017-06-19 PROCEDURE — 81001 URINALYSIS AUTO W/SCOPE: CPT | Performed by: STUDENT IN AN ORGANIZED HEALTH CARE EDUCATION/TRAINING PROGRAM

## 2017-06-19 PROCEDURE — 96361 HYDRATE IV INFUSION ADD-ON: CPT

## 2017-06-19 PROCEDURE — 96374 THER/PROPH/DIAG INJ IV PUSH: CPT

## 2017-06-19 PROCEDURE — 85025 COMPLETE CBC W/AUTO DIFF WBC: CPT | Performed by: STUDENT IN AN ORGANIZED HEALTH CARE EDUCATION/TRAINING PROGRAM

## 2017-06-19 PROCEDURE — 74011250636 HC RX REV CODE- 250/636: Performed by: STUDENT IN AN ORGANIZED HEALTH CARE EDUCATION/TRAINING PROGRAM

## 2017-06-19 PROCEDURE — 74176 CT ABD & PELVIS W/O CONTRAST: CPT

## 2017-06-19 PROCEDURE — 99284 EMERGENCY DEPT VISIT MOD MDM: CPT

## 2017-06-19 PROCEDURE — 83690 ASSAY OF LIPASE: CPT | Performed by: STUDENT IN AN ORGANIZED HEALTH CARE EDUCATION/TRAINING PROGRAM

## 2017-06-19 PROCEDURE — 80053 COMPREHEN METABOLIC PANEL: CPT | Performed by: STUDENT IN AN ORGANIZED HEALTH CARE EDUCATION/TRAINING PROGRAM

## 2017-06-19 RX ORDER — MORPHINE SULFATE 4 MG/ML
4 INJECTION, SOLUTION INTRAMUSCULAR; INTRAVENOUS ONCE
Status: COMPLETED | OUTPATIENT
Start: 2017-06-19 | End: 2017-06-19

## 2017-06-19 RX ADMIN — Medication 4 MG: at 08:52

## 2017-06-19 RX ADMIN — SODIUM CHLORIDE 1000 ML: 900 INJECTION, SOLUTION INTRAVENOUS at 08:52

## 2017-06-19 NOTE — DISCHARGE INSTRUCTIONS
We hope that we have addressed all of your medical concerns. The examination and treatment you received in the Emergency Department were for an emergent problem and were not intended as complete care. It is important that you follow up with your healthcare provider(s) for ongoing care. If your symptoms worsen or do not improve as expected, and you are unable to reach your usual health care provider(s), you should return to the Emergency Department. Today's healthcare is undergoing tremendous change, and patient satisfaction surveys are one of the many tools to assess the quality of medical care. You may receive a survey from the [employer] ([employer]) regarding your experience in the Emergency Department. I hope that your experience has been completely positive, particularly the medical care that I provided. As such, please participate in the survey; anything less than excellent does not meet my expectations or intentions. [facility] and [employer] participate in nationally recognized quality of care measures. If your blood pressure is greater than 120/80, as reported below, we urge that you seek medical care to address the potential of high blood pressure, commonly known as hypertension. Hypertension can be hereditary or can be caused by certain medical conditions, pain, stress, or \"white coat syndrome. \"       Please make an appointment with your health care provider(s) for follow up of your Emergency Department visit. VITALS:   Patient Vitals for the past 8 hrs:   Temp Pulse Resp BP SpO2   06/19/17 0812 97.8 °F (36.6 °C) 71 20 134/78 96 %          Thank you for allowing us to provide you with medical care today. We realize that you have many choices for your emergency care needs. Please choose us in the future for any continued health care needs. Ella Dudley, [address] [place] [address] [address]   Office: [phone]            Recent Results (from the past 24 hour(s))   URINALYSIS W/ REFLEX CULTURE    Collection Time: 06/19/17  8:39 AM   Result Value Ref Range    Color YELLOW/STRAW      Appearance CLEAR CLEAR      Specific gravity 1.007 1.003 - 1.030      pH (UA) 6.0 5.0 - 8.0      Protein NEGATIVE  NEG mg/dL    Glucose NEGATIVE  NEG mg/dL    Ketone NEGATIVE  NEG mg/dL    Bilirubin NEGATIVE  NEG      Blood NEGATIVE  NEG      Urobilinogen 0.2 0.2 - 1.0 EU/dL    Nitrites NEGATIVE  NEG      Leukocyte Esterase NEGATIVE  NEG      WBC 0-4 0 - 4 /hpf    RBC 0-5 0 - 5 /hpf    Epithelial cells FEW FEW /lpf    Bacteria NEGATIVE  NEG /hpf    UA:UC IF INDICATED CULTURE NOT INDICATED BY UA RESULT CNI     CBC WITH AUTOMATED DIFF    Collection Time: 06/19/17  8:39 AM   Result Value Ref Range    WBC 6.8 4.1 - 11.1 K/uL    RBC 4.32 4.10 - 5.70 M/uL    HGB 14.4 12.1 - 17.0 g/dL    HCT 41.1 36.6 - 50.3 %    MCV 95.1 80.0 - 99.0 FL    MCH 33.3 26.0 - 34.0 PG    MCHC 35.0 30.0 - 36.5 g/dL    RDW 12.4 11.5 - 14.5 %    PLATELET 262 456 - 653 K/uL    NEUTROPHILS 36 32 - 75 %    LYMPHOCYTES 52 (H) 12 - 49 %    MONOCYTES 8 5 - 13 %    EOSINOPHILS 4 0 - 7 %    BASOPHILS 0 0 - 1 %    ABS. NEUTROPHILS 2.5 1.8 - 8.0 K/UL    ABS. LYMPHOCYTES 3.5 0.8 - 3.5 K/UL    ABS. MONOCYTES 0.6 0.0 - 1.0 K/UL    ABS. EOSINOPHILS 0.3 0.0 - 0.4 K/UL    ABS.  BASOPHILS 0.0 0.0 - 0.1 K/UL   METABOLIC PANEL, COMPREHENSIVE    Collection Time: 06/19/17  8:39 AM   Result Value Ref Range    Sodium 135 (L) 136 - 145 mmol/L    Potassium 4.0 3.5 - 5.1 mmol/L    Chloride 103 97 - 108 mmol/L    CO2 25 21 - 32 mmol/L    Anion gap 7 5 - 15 mmol/L    Glucose 72 65 - 100 mg/dL    BUN 20 6 - 20 MG/DL    Creatinine 2.12 (H) 0.70 - 1.30 MG/DL    BUN/Creatinine ratio 9 (L) 12 - 20      GFR est AA 42 (L) >60 ml/min/1.73m2    GFR est non-AA 34 (L) >60 ml/min/1.73m2    Calcium 8.4 (L) 8.5 - 10.1 MG/DL    Bilirubin, total 0.3 0.2 - 1.0 MG/DL    ALT (SGPT) 29 12 - 78 U/L    AST (SGOT) 23 15 - 37 U/L    Alk. phosphatase 91 45 - 117 U/L    Protein, total 6.7 6.4 - 8.2 g/dL    Albumin 3.6 3.5 - 5.0 g/dL    Globulin 3.1 2.0 - 4.0 g/dL    A-G Ratio 1.2 1.1 - 2.2     LIPASE    Collection Time: 06/19/17  8:39 AM   Result Value Ref Range    Lipase 111 73 - 393 U/L       Ct Abd Pelv Wo Cont    Result Date: 6/19/2017  EXAM:  CT ABD PELV WO CONT INDICATION:  R flank pain COMPARISON: 9/9/2013. CONTRAST:  None. TECHNIQUE: Unenhanced multislice helical CT was performed from the diaphragm to the symphysis pubis without oral or intravenous contrast administration. Contiguous 5 mm axial images were reconstructed and lung and soft tissue windows were generated. Coronal and sagittal reformations were generated. CT dose reduction was achieved through use of a standardized protocol tailored for this examination and automatic exposure control for dose modulation. FINDINGS: LUNG: The visualized portions of the lung bases are clear. The absence of intravenous contrast material reduces the sensitivity for evaluation of the solid parenchymal organs of the abdomen. LIVER: No focal lesion. GALLBLADDER: Unremarkable. SPLEEN: No focal lesion. PANCREAS: No focal lesion. ADRENALS: No focal lesion. KIDNEYS: No calculus. No hydronephrosis. There is asymmetry of the size of the kidneys with the left kidney smaller than the right kidney possibly with some scarring in the upper pole. This is unchanged from previous study. GI: There is no evidence of bowel obstruction. . APPENDIX: Unremarkable. AORTA: The aorta tapers without aneurysm. RETROPERITONEUM:  There is no retroperitoneal adenopathy or mass. PERITONEUM: There is no ascites or free intraperitoneal air. BLADDER: Unremarkable PELVIS: There is no pelvic mass or adenopathy. BONES: No sclerotic or lytic lesions noted. IMPRESSION:  No evidence of urinary tract calculus or obstruction.

## 2017-06-19 NOTE — ED PROVIDER NOTES
HPI Comments: 43 y.o. male with past medical history significant for chronic back pain, HTN, GERD, herpatitis C, arthritis, IV drug abuse, and kidney stones who presents with chief complaint of flank pain. Pt complains of intermittent R-flank pain w/ associated urinary frequency and chills that have persisted for the past 2-3 days. Pt notes a h/o kidney stones and states that his current sx feel similar. No recent fall or back injury. Pt took Tylenol and ASA PTA with minimal relief. He is unsure of fever and denies any dysuria, hematuria, or vomiting. There are no other acute medical concerns at this time. PCP: Delicia Cross MD    Note written by Dariana Lucero, as dictated by Claudia Wiggins MD 8:44 AM      The history is provided by the patient. No  was used.         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    Hypertension     IV drug abuse     Kidney stone     Liver disease     elevated liver enzymes/hep C    Neuralgia     Other ill-defined conditions     chronic bronchitis    Polysubstance dependence (HCC)     stimulants, MJ, tob, barbs, benzos, opiates    Psychiatric disorder     Bipolar, Anxiety    Unspecified adverse effect of anesthesia     \"was told he woke up during back surgery\" and during nerve root injection    Unspecified sleep apnea     mild - does not use CPAP       Past Surgical History:   Procedure Laterality Date    HX LUMBAR LAMINECTOMY      HX ORTHOPAEDIC Bilateral     back surgery - laminectomy/discectomy    HX ORTHOPAEDIC      nerve root injection in back    HX OTHER SURGICAL      testicular surgery- varicocelectomy, i &d of abscess on right elbow         Family History:   Problem Relation Age of Onset    Heart Disease Maternal Grandfather        Social History     Social History    Marital status:      Spouse name: N/A    Number of children: N/A    Years of education: N/A     Occupational History    Not on file. Social History Main Topics    Smoking status: Current Every Day Smoker     Packs/day: 1.00     Years: 22.00     Types: Cigarettes    Smokeless tobacco: Former User      Comment: cigarettes    Alcohol use No      Comment: no longer drinks    Drug use: No      Comment: per patient no methamphetamines    Sexual activity: Not Currently     Other Topics Concern    Not on file     Social History Narrative    The patient is . The patient lives his parents. The patient has no children. The patient does not have legal issues pending. The patient's source of income comes from family. patient's greatest support comes from his parents and this person will be involved with the treatment. pt has not been a victim of sexual/physical abuse. The highest grade achieved is College         ALLERGIES: Barium sulfate; Demerol [meperidine]; Iodinated contrast- oral and iv dye; and Neurontin [gabapentin]    Review of Systems   Constitutional: Positive for chills. Gastrointestinal: Negative for vomiting. Genitourinary: Positive for flank pain (R) and frequency. Negative for dysuria and hematuria. All other systems reviewed and are negative. Vitals:    06/19/17 0812   BP: 134/78   Pulse: 71   Resp: 20   Temp: 97.8 °F (36.6 °C)   SpO2: 96%   Weight: 111.8 kg (246 lb 6.4 oz)   Height: 6' (1.829 m)            Physical Exam   Constitutional: He is oriented to person, place, and time. He appears well-developed. No distress. HENT:   Head: Normocephalic and atraumatic. Eyes: Conjunctivae and EOM are normal.   Neck: Normal range of motion. Neck supple. Cardiovascular: Normal rate, regular rhythm and normal heart sounds. No murmur heard.   Pulmonary/Chest: Effort normal and breath sounds normal. No respiratory distress. Abdominal: Soft. Bowel sounds are normal. He exhibits no distension. There is no tenderness. There is CVA tenderness (R). There is no rebound. Musculoskeletal: Normal range of motion. He exhibits no edema or tenderness. Neurological: He is alert and oriented to person, place, and time. No cranial nerve deficit. He exhibits normal muscle tone. Coordination normal.   Skin: Skin is warm and dry. Nursing note and vitals reviewed. Note written by Rocio Kelly. Lum Laming, as dictated by Clarence Armstrong MD 8:44 AM         [facility]  ED Course   The patient presented with acute back pain. The patient is now resting comfortably and feels better, is alert, talkative, interactive and in no distress. The repeat examination is unremarkable and benign. The patient is neurologically intact and is ambulatory in the ED. The patient has no fever, no bowel or bladder incontinence, no saddle anesthesia, and is otherwise alert and well-appearing. The history, physical examination, and diagnostics (if any) do not suggest the presence of acute spinal epidural abscess, acute spinal epidural bleed, cauda equina syndrome, abdominal aortic aneurysm, aortic dissection or other process requiring further testing, treatment or consultation in the emergency department. The vital signs have been stable. The patient's condition is stable and appropriate for discharge. The patient will pursue further outpatient evaluation with the primary care physician or other designated or consulting physician as indicated in the discharge instructions.       Procedures

## 2017-06-19 NOTE — ED TRIAGE NOTES
Pt. complains of right lower back pain \"for the past few days\" along with some urinary pain. Hx of kidney stones.

## 2017-07-07 ENCOUNTER — APPOINTMENT (OUTPATIENT)
Dept: GENERAL RADIOLOGY | Age: 42
End: 2017-07-07
Attending: PHYSICIAN ASSISTANT
Payer: SUBSIDIZED

## 2017-07-07 ENCOUNTER — HOSPITAL ENCOUNTER (EMERGENCY)
Age: 42
Discharge: HOME | End: 2017-07-07
Attending: EMERGENCY MEDICINE
Payer: SUBSIDIZED

## 2017-07-07 VITALS
HEIGHT: 72 IN | TEMPERATURE: 97.9 F | RESPIRATION RATE: 16 BRPM | HEART RATE: 71 BPM | DIASTOLIC BLOOD PRESSURE: 65 MMHG | BODY MASS INDEX: 33.41 KG/M2 | OXYGEN SATURATION: 98 % | SYSTOLIC BLOOD PRESSURE: 125 MMHG | WEIGHT: 246.69 LBS

## 2017-07-07 DIAGNOSIS — R07.9 CHEST PAIN, UNSPECIFIED TYPE: Primary | ICD-10-CM

## 2017-07-07 LAB
ALBUMIN SERPL BCP-MCNC: 3.6 G/DL (ref 3.5–5)
ALBUMIN/GLOB SERPL: 1.1 {RATIO} (ref 1.1–2.2)
ALP SERPL-CCNC: 98 U/L (ref 45–117)
ALT SERPL-CCNC: 31 U/L (ref 12–78)
AMPHET UR QL SCN: NEGATIVE
ANION GAP BLD CALC-SCNC: 9 MMOL/L (ref 5–15)
AST SERPL W P-5'-P-CCNC: 16 U/L (ref 15–37)
ATRIAL RATE: 76 BPM
BARBITURATES UR QL SCN: POSITIVE
BASOPHILS # BLD AUTO: 0 K/UL (ref 0–0.1)
BASOPHILS # BLD: 0 % (ref 0–1)
BENZODIAZ UR QL: NEGATIVE
BILIRUB SERPL-MCNC: 0.2 MG/DL (ref 0.2–1)
BUN SERPL-MCNC: 21 MG/DL (ref 6–20)
BUN/CREAT SERPL: 9 (ref 12–20)
CALCIUM SERPL-MCNC: 8.6 MG/DL (ref 8.5–10.1)
CALCULATED P AXIS, ECG09: 45 DEGREES
CALCULATED R AXIS, ECG10: 73 DEGREES
CALCULATED T AXIS, ECG11: 55 DEGREES
CANNABINOIDS UR QL SCN: NEGATIVE
CHLORIDE SERPL-SCNC: 108 MMOL/L (ref 97–108)
CO2 SERPL-SCNC: 24 MMOL/L (ref 21–32)
COCAINE UR QL SCN: NEGATIVE
CREAT SERPL-MCNC: 2.46 MG/DL (ref 0.7–1.3)
DIAGNOSIS, 93000: NORMAL
DRUG SCRN COMMENT,DRGCM: ABNORMAL
EOSINOPHIL # BLD: 0.2 K/UL (ref 0–0.4)
EOSINOPHIL NFR BLD: 3 % (ref 0–7)
ERYTHROCYTE [DISTWIDTH] IN BLOOD BY AUTOMATED COUNT: 12.8 % (ref 11.5–14.5)
GLOBULIN SER CALC-MCNC: 3.3 G/DL (ref 2–4)
GLUCOSE SERPL-MCNC: 108 MG/DL (ref 65–100)
HCT VFR BLD AUTO: 43.2 % (ref 36.6–50.3)
HGB BLD-MCNC: 14.8 G/DL (ref 12.1–17)
LIPASE SERPL-CCNC: 114 U/L (ref 73–393)
LYMPHOCYTES # BLD AUTO: 48 % (ref 12–49)
LYMPHOCYTES # BLD: 3.4 K/UL (ref 0.8–3.5)
MCH RBC QN AUTO: 33.3 PG (ref 26–34)
MCHC RBC AUTO-ENTMCNC: 34.3 G/DL (ref 30–36.5)
MCV RBC AUTO: 97.1 FL (ref 80–99)
METHADONE UR QL: NEGATIVE
MONOCYTES # BLD: 0.7 K/UL (ref 0–1)
MONOCYTES NFR BLD AUTO: 10 % (ref 5–13)
NEUTS SEG # BLD: 2.8 K/UL (ref 1.8–8)
NEUTS SEG NFR BLD AUTO: 39 % (ref 32–75)
OPIATES UR QL: NEGATIVE
P-R INTERVAL, ECG05: 190 MS
PCP UR QL: NEGATIVE
PLATELET # BLD AUTO: 159 K/UL (ref 150–400)
POTASSIUM SERPL-SCNC: 3.9 MMOL/L (ref 3.5–5.1)
PROT SERPL-MCNC: 6.9 G/DL (ref 6.4–8.2)
Q-T INTERVAL, ECG07: 366 MS
QRS DURATION, ECG06: 74 MS
QTC CALCULATION (BEZET), ECG08: 411 MS
RBC # BLD AUTO: 4.45 M/UL (ref 4.1–5.7)
SODIUM SERPL-SCNC: 141 MMOL/L (ref 136–145)
TROPONIN I SERPL-MCNC: <0.04 NG/ML
VENTRICULAR RATE, ECG03: 76 BPM
WBC # BLD AUTO: 7.1 K/UL (ref 4.1–11.1)

## 2017-07-07 PROCEDURE — 80053 COMPREHEN METABOLIC PANEL: CPT | Performed by: PHYSICIAN ASSISTANT

## 2017-07-07 PROCEDURE — 84484 ASSAY OF TROPONIN QUANT: CPT | Performed by: PHYSICIAN ASSISTANT

## 2017-07-07 PROCEDURE — 93005 ELECTROCARDIOGRAM TRACING: CPT

## 2017-07-07 PROCEDURE — 85025 COMPLETE CBC W/AUTO DIFF WBC: CPT | Performed by: PHYSICIAN ASSISTANT

## 2017-07-07 PROCEDURE — 99285 EMERGENCY DEPT VISIT HI MDM: CPT

## 2017-07-07 PROCEDURE — 83690 ASSAY OF LIPASE: CPT | Performed by: PHYSICIAN ASSISTANT

## 2017-07-07 PROCEDURE — 80307 DRUG TEST PRSMV CHEM ANLYZR: CPT | Performed by: PHYSICIAN ASSISTANT

## 2017-07-07 PROCEDURE — 74011250636 HC RX REV CODE- 250/636: Performed by: PHYSICIAN ASSISTANT

## 2017-07-07 PROCEDURE — 36415 COLL VENOUS BLD VENIPUNCTURE: CPT | Performed by: EMERGENCY MEDICINE

## 2017-07-07 PROCEDURE — 96361 HYDRATE IV INFUSION ADD-ON: CPT

## 2017-07-07 PROCEDURE — 71020 XR CHEST PA LAT: CPT

## 2017-07-07 PROCEDURE — 96374 THER/PROPH/DIAG INJ IV PUSH: CPT

## 2017-07-07 RX ORDER — KETOROLAC TROMETHAMINE 30 MG/ML
30 INJECTION, SOLUTION INTRAMUSCULAR; INTRAVENOUS
Status: COMPLETED | OUTPATIENT
Start: 2017-07-07 | End: 2017-07-07

## 2017-07-07 RX ADMIN — SODIUM CHLORIDE 1000 ML: 900 INJECTION, SOLUTION INTRAVENOUS at 04:06

## 2017-07-07 RX ADMIN — KETOROLAC TROMETHAMINE 30 MG: 30 INJECTION, SOLUTION INTRAMUSCULAR at 04:06

## 2017-07-07 NOTE — ED NOTES
Pt resting on stretcher and states that he feels better and is currently not experiencing any pain. Pt states that the pain has been intermittent now. Call bell within reach. Will continue to monitor.

## 2017-07-07 NOTE — ED TRIAGE NOTES
Patient arriving c/o CP x 2 hours. Reports dizziness, however denies SOB, N, V. Reports taking 324 mg ASA prior to arrival to ER.

## 2017-07-07 NOTE — ED PROVIDER NOTES
HPI Comments: 44 yo male with hx of back pain, neuralgia, HTN, chronic pain, PVC's, GERD, Hep C, substance abuse and kidney stone here for evaluation of chest pain. States he noticed a \"discomfort\" on left side of chest around 0130 while lying in bed. Denies SOB. States similar in the past with negative stress test at [facility] approximately 2 years ago. Denies SOB, flank pain, urinary symptoms. +Smoker. Patient is a 43 y.o. male presenting with chest pain. The history is provided by the patient. Chest Pain (Angina)    This is a new problem. Episode onset: 0130. The problem has not changed since onset. The pain is present in the left side. The pain is at a severity of 6/10. The pain is mild. The quality of the pain is described as sharp. Pertinent negatives include no back pain, no cough, no diaphoresis, no dizziness, no exertional chest pressure, no fever, no headaches, no irregular heartbeat, no nausea, no near-syncope, no numbness, no palpitations, no shortness of breath and no weakness. He has tried nothing for the symptoms.         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    Hypertension     IV drug abuse     Kidney stone     Liver disease     elevated liver enzymes/hep C    Neuralgia     Other ill-defined conditions     chronic bronchitis    Polysubstance dependence (HCC)     stimulants, MJ, tob, barbs, benzos, opiates    Psychiatric disorder     Bipolar, Anxiety    Unspecified adverse effect of anesthesia     \"was told he woke up during back surgery\" and during nerve root injection    Unspecified sleep apnea     mild - does not use CPAP       Past Surgical History:   Procedure Laterality Date    HX LUMBAR LAMINECTOMY      HX ORTHOPAEDIC Bilateral     back surgery - laminectomy/discectomy    HX ORTHOPAEDIC      nerve root injection in back    HX OTHER SURGICAL      testicular surgery- varicocelectomy, i &d of abscess on right elbow         Family History:   Problem Relation Age of Onset    Heart Disease Maternal Grandfather        Social History     Social History    Marital status:      Spouse name: N/A    Number of children: N/A    Years of education: N/A     Occupational History    Not on file. Social History Main Topics    Smoking status: Current Every Day Smoker     Packs/day: 1.00     Years: 22.00     Types: Cigarettes    Smokeless tobacco: Former User      Comment: cigarettes    Alcohol use No      Comment: no longer drinks    Drug use: No      Comment: per patient no methamphetamines    Sexual activity: Not Currently     Other Topics Concern    Not on file     Social History Narrative    The patient is . The patient lives his parents. The patient has no children. The patient does not have legal issues pending. The patient's source of income comes from family. patient's greatest support comes from his parents and this person will be involved with the treatment. pt has not been a victim of sexual/physical abuse. The highest grade achieved is College         ALLERGIES: Barium sulfate; Demerol [meperidine]; Iodinated contrast- oral and iv dye; and Neurontin [gabapentin]    Review of Systems   Constitutional: Negative. Negative for diaphoresis and fever. HENT: Negative for ear discharge. Eyes: Negative for photophobia, pain, discharge and visual disturbance. Respiratory: Negative for apnea, cough, chest tightness and shortness of breath. Cardiovascular: Positive for chest pain. Negative for palpitations, leg swelling and near-syncope. Gastrointestinal: Negative for abdominal distention, blood in stool and nausea. Genitourinary: Negative for difficulty urinating, dysuria, flank pain, frequency and hematuria. Musculoskeletal: Negative for back pain, gait problem, joint swelling, myalgias and neck pain. Skin: Negative for color change and pallor. Neurological: Negative for dizziness, syncope, weakness, numbness and headaches. Psychiatric/Behavioral: Negative for behavioral problems and confusion. The patient is not nervous/anxious. Vitals:    07/07/17 0345   Pulse: 80   Resp: 16   Temp: 98.1 °F (36.7 °C)   SpO2: 96%   Weight: 111.9 kg (246 lb 11.1 oz)   Height: 6' (1.829 m)            Physical Exam   Constitutional: He is oriented to person, place, and time. He appears well-developed and well-nourished. HENT:   Head: Normocephalic and atraumatic. Right Ear: External ear normal.   Left Ear: External ear normal.   Nose: Nose normal.   Mouth/Throat: Oropharynx is clear and moist.   Eyes: Conjunctivae and EOM are normal. Pupils are equal, round, and reactive to light. Right eye exhibits no discharge. Left eye exhibits no discharge. Neck: Normal range of motion. Neck supple. Cardiovascular: Normal rate, regular rhythm, normal heart sounds and intact distal pulses. Pulmonary/Chest: Effort normal and breath sounds normal. He exhibits tenderness (Left chest wall). Abdominal: Soft. Bowel sounds are normal. He exhibits no distension. There is no tenderness. There is no rebound and no guarding. Musculoskeletal: Normal range of motion. He exhibits no edema or tenderness. Neurological: He is alert and oriented to person, place, and time. No cranial nerve deficit. Coordination normal.   Skin: Skin is warm and dry. No rash noted. Psychiatric: He has a normal mood and affect. His behavior is normal. Judgment and thought content normal.   Nursing note and vitals reviewed.        MDM  Number of Diagnoses or Management Options  Chest pain, unspecified type:   Diagnosis management comments: 42 yo male with left sided CP that began while lying down; no SOB; hx of negative stress; will check labs and EKG and reassess. VICKI Duncan         Amount and/or Complexity of Data Reviewed  Clinical lab tests: ordered and reviewed  Tests in the radiology section of CPT®: ordered and reviewed  Discuss the patient with other providers: yes  Independent visualization of images, tracings, or specimens: yes      ED Course       Procedures    Pt signed out to Dr Clarence Guo at change or shift.  VICKI Duncan

## 2017-07-07 NOTE — DISCHARGE INSTRUCTIONS
Chest Pain: Care Instructions  Your Care Instructions  There are many things that can cause chest pain. Some are not serious and will get better on their own in a few days. But some kinds of chest pain need more testing and treatment. Your doctor may have recommended a follow-up visit in the next 8 to 12 hours. If you are not getting better, you may need more tests or treatment. Even though your doctor has released you, you still need to watch for any problems. The doctor carefully checked you, but sometimes problems can develop later. If you have new symptoms or if your symptoms do not get better, get medical care right away. If you have worse or different chest pain or pressure that lasts more than 5 minutes or you passed out (lost consciousness), call 911 or seek other emergency help right away. A medical visit is only one step in your treatment. Even if you feel better, you still need to do what your doctor recommends, such as going to all suggested follow-up appointments and taking medicines exactly as directed. This will help you recover and help prevent future problems. How can you care for yourself at home? · Rest until you feel better. · Take your medicine exactly as prescribed. Call your doctor if you think you are having a problem with your medicine. · Do not drive after taking a prescription pain medicine. When should you call for help? Call 911 if:  · You passed out (lost consciousness). · You have severe difficulty breathing. · You have symptoms of a heart attack. These may include:  ¨ Chest pain or pressure, or a strange feeling in your chest.  ¨ Sweating. ¨ Shortness of breath. ¨ Nausea or vomiting. ¨ Pain, pressure, or a strange feeling in your back, neck, jaw, or upper belly or in one or both shoulders or arms. ¨ Lightheadedness or sudden weakness. ¨ A fast or irregular heartbeat.   After you call 911, the  may tell you to chew 1 adult-strength or 2 to 4 low-dose aspirin. Wait for an ambulance. Do not try to drive yourself. Call your doctor today if:  · You have any trouble breathing. · Your chest pain gets worse. · You are dizzy or lightheaded, or you feel like you may faint. · You are not getting better as expected. · You are having new or different chest pain. Where can you learn more? Go to http://bharti-janeen.info/. Enter A120 in the search box to learn more about \"Chest Pain: Care Instructions. \"  Current as of: March 20, 2017  Content Version: 11.3  © 5867-5863 [employer]. Care instructions adapted under license by [employer] (which disclaims liability or warranty for this information). If you have questions about a medical condition or this instruction, always ask your healthcare professional. [address] any warranty or liability for your use of this information. We hope that we have addressed all of your medical concerns. The examination and treatment you received in the Emergency Department were for an emergent problem and were not intended as complete care. It is important that you follow up with your healthcare provider(s) for ongoing care. If your symptoms worsen or do not improve as expected, and you are unable to reach your usual health care provider(s), you should return to the Emergency Department. Today's healthcare is undergoing tremendous change, and patient satisfaction surveys are one of the many tools to assess the quality of medical care. You may receive a survey from the [employer] organization regarding your experience in the Emergency Department. I hope that your experience has been completely positive, particularly the medical care that I provided. As such, please participate in the survey; anything less than excellent does not meet my expectations or intentions.         [facility] and [facility] participate in nationally recognized quality of care measures. If your blood pressure is greater than 120/80, as reported below, we urge that you seek medical care to address the potential of high blood pressure, commonly known as hypertension. Hypertension can be hereditary or can be caused by certain medical conditions, pain, stress, or \"white coat syndrome. \"       Please make an appointment with your health care provider(s) for follow up of your Emergency Department visit. VITALS:   Patient Vitals for the past 8 hrs:   Temp Pulse Resp SpO2   07/07/17 0345 98.1 °F (36.7 °C) 80 16 96 %          Thank you for allowing us to provide you with medical care today. We realize that you have many choices for your emergency care needs. Please choose us in the future for any continued health care needs. Lucille Ramos, [address] [place] [address] 20.   Office: [phone]            Recent Results (from the past 24 hour(s))   EKG, 12 LEAD, INITIAL    Collection Time: 07/07/17  3:48 AM   Result Value Ref Range    Ventricular Rate 76 BPM    Atrial Rate 76 BPM    P-R Interval 190 ms    QRS Duration 74 ms    Q-T Interval 366 ms    QTC Calculation (Bezet) 411 ms    Calculated P Axis 45 degrees    Calculated R Axis 73 degrees    Calculated T Axis 55 degrees    Diagnosis       Normal sinus rhythm  When compared with ECG of 05-OCT-2015 19:10,  No significant change was found         VITALS:   Patient Vitals for the past 8 hrs:   Temp Pulse Resp BP SpO2   07/07/17 0530 - 71 17 109/63 94 %   07/07/17 0500 - 78 18 116/62 94 %   07/07/17 0457 - 79 20 126/60 95 %   07/07/17 0430 - 77 20 122/61 94 %   07/07/17 0406 - - 19 - 95 %   07/07/17 0400 - 77 19 125/73 96 %   07/07/17 0353 - - - 135/70 96 %   07/07/17 0345 98.1 °F (36.7 °C) 80 16 - 96 %             Recent Results (from the past 24 hour(s))   EKG, 12 LEAD, INITIAL    Collection Time: 07/07/17  3:48 AM   Result Value Ref Range    Ventricular Rate 76 BPM Atrial Rate 76 BPM    P-R Interval 190 ms    QRS Duration 74 ms    Q-T Interval 366 ms    QTC Calculation (Bezet) 411 ms    Calculated P Axis 45 degrees    Calculated R Axis 73 degrees    Calculated T Axis 55 degrees    Diagnosis       Normal sinus rhythm  When compared with ECG of 05-OCT-2015 19:10,  No significant change was found     CBC WITH AUTOMATED DIFF    Collection Time: 07/07/17  3:50 AM   Result Value Ref Range    WBC 7.1 4.1 - 11.1 K/uL    RBC 4.45 4.10 - 5.70 M/uL    HGB 14.8 12.1 - 17.0 g/dL    HCT 43.2 36.6 - 50.3 %    MCV 97.1 80.0 - 99.0 FL    MCH 33.3 26.0 - 34.0 PG    MCHC 34.3 30.0 - 36.5 g/dL    RDW 12.8 11.5 - 14.5 %    PLATELET 831 734 - 307 K/uL    NEUTROPHILS 39 32 - 75 %    LYMPHOCYTES 48 12 - 49 %    MONOCYTES 10 5 - 13 %    EOSINOPHILS 3 0 - 7 %    BASOPHILS 0 0 - 1 %    ABS. NEUTROPHILS 2.8 1.8 - 8.0 K/UL    ABS. LYMPHOCYTES 3.4 0.8 - 3.5 K/UL    ABS. MONOCYTES 0.7 0.0 - 1.0 K/UL    ABS. EOSINOPHILS 0.2 0.0 - 0.4 K/UL    ABS. BASOPHILS 0.0 0.0 - 0.1 K/UL   METABOLIC PANEL, COMPREHENSIVE    Collection Time: 07/07/17  3:50 AM   Result Value Ref Range    Sodium 141 136 - 145 mmol/L    Potassium 3.9 3.5 - 5.1 mmol/L    Chloride 108 97 - 108 mmol/L    CO2 24 21 - 32 mmol/L    Anion gap 9 5 - 15 mmol/L    Glucose 108 (H) 65 - 100 mg/dL    BUN 21 (H) 6 - 20 MG/DL    Creatinine 2.46 (H) 0.70 - 1.30 MG/DL    BUN/Creatinine ratio 9 (L) 12 - 20      GFR est AA 35 (L) >60 ml/min/1.73m2    GFR est non-AA 29 (L) >60 ml/min/1.73m2    Calcium 8.6 8.5 - 10.1 MG/DL    Bilirubin, total 0.2 0.2 - 1.0 MG/DL    ALT (SGPT) 31 12 - 78 U/L    AST (SGOT) 16 15 - 37 U/L    Alk.  phosphatase 98 45 - 117 U/L    Protein, total 6.9 6.4 - 8.2 g/dL    Albumin 3.6 3.5 - 5.0 g/dL    Globulin 3.3 2.0 - 4.0 g/dL    A-G Ratio 1.1 1.1 - 2.2     TROPONIN I    Collection Time: 07/07/17  3:50 AM   Result Value Ref Range    Troponin-I, Qt. <0.04 <0.05 ng/mL   LIPASE    Collection Time: 07/07/17  3:50 AM   Result Value Ref Range Lipase 114 73 - 393 U/L   DRUG SCREEN, URINE    Collection Time: 07/07/17  4:10 AM   Result Value Ref Range    AMPHETAMINES NEGATIVE  NEG      BARBITURATES POSITIVE (A) NEG      BENZODIAZEPINE NEGATIVE  NEG      COCAINE NEGATIVE  NEG      METHADONE NEGATIVE  NEG      OPIATES NEGATIVE  NEG      PCP(PHENCYCLIDINE) NEGATIVE  NEG      THC (TH-CANNABINOL) NEGATIVE  NEG      Drug screen comment (NOTE)        Xr Chest Pa Lat    Result Date: 7/7/2017  EXAM:  XR CHEST PA LAT INDICATION:  Acute chest pain. COMPARISON: 10/20/2015 TECHNIQUE: PA and lateral chest views FINDINGS: Cardiac monitoring wires overlie the thorax. The cardiomediastinal contours are stable. The pulmonary vasculature is within normal limits. The lungs and pleural spaces are clear. There is no pneumothorax. The bones and upper abdomen are stable. IMPRESSION: No acute process.  Stable exam.

## 2017-07-07 NOTE — ED NOTES
Assumed care from [address] [place] [address] Rd pending labs and re-eval. Pain free. Well appearing, nad, hd stable, no resp distress. 5:49 AM  Patient's results have been reviewed with them. Patient and/or family have verbally conveyed their understanding and agreement of the patient's signs, symptoms, diagnosis, treatment and prognosis and additionally agree to follow up as recommended or return to the Emergency Room should their condition change prior to follow-up. Discharge instructions have also been provided to the patient with some educational information regarding their diagnosis as well a list of reasons why they would want to return to the ER prior to their follow-up appointment should their condition change.

## 2017-07-21 ENCOUNTER — HOSPITAL ENCOUNTER (EMERGENCY)
Age: 42
Discharge: HOME | End: 2017-07-21
Attending: EMERGENCY MEDICINE | Admitting: EMERGENCY MEDICINE
Payer: SUBSIDIZED

## 2017-07-21 VITALS
WEIGHT: 245 LBS | RESPIRATION RATE: 17 BRPM | BODY MASS INDEX: 33.18 KG/M2 | HEIGHT: 72 IN | HEART RATE: 81 BPM | TEMPERATURE: 97.7 F | SYSTOLIC BLOOD PRESSURE: 138 MMHG | DIASTOLIC BLOOD PRESSURE: 73 MMHG

## 2017-07-21 DIAGNOSIS — L55.1 SUNBURN, BLISTERING: Primary | ICD-10-CM

## 2017-07-21 PROCEDURE — 99282 EMERGENCY DEPT VISIT SF MDM: CPT

## 2017-07-21 RX ORDER — ONDANSETRON 4 MG/1
4 TABLET, ORALLY DISINTEGRATING ORAL
Qty: 10 TAB | Refills: 0 | Status: SHIPPED | OUTPATIENT
Start: 2017-07-21 | End: 2017-11-28

## 2017-07-21 RX ORDER — TRAMADOL HYDROCHLORIDE 50 MG/1
50 TABLET ORAL
Qty: 20 TAB | Refills: 0 | Status: SHIPPED | OUTPATIENT
Start: 2017-07-21 | End: 2017-12-20

## 2017-07-21 NOTE — DISCHARGE INSTRUCTIONS
Pt has had bad cough for 2 weeks. Strained muscle in his chest.   Cough is getting better. Pt was seen by Dr. Ileana Chavez.    His wife spoke to Dr. Curtis Thapa yesterday & recommended pt take Ibuprofen for chest muscle. It's not helping. Please advise. Using a cough surpressant. Billy Antony    Pt ph:   [phone] We hope that we have addressed all of your medical concerns. The examination and treatment you received in the Emergency Department were for an emergent problem and were not intended as complete care. It is important that you follow up with your healthcare provider(s) for ongoing care. If your symptoms worsen or do not improve as expected, and you are unable to reach your usual health care provider(s), you should return to the Emergency Department. Today's healthcare is undergoing tremendous change, and patient satisfaction surveys are one of the many tools to assess the quality of medical care. You may receive a survey from the [employer] regarding your experience in the Emergency Department. I hope that your experience has been completely positive, particularly the medical care that I provided. As such, please participate in the survey; anything less than excellent does not meet my expectations or intentions. [facility] and [facility] participate in nationally recognized quality of care measures. If your blood pressure is greater than 120/80, as reported below, we urge that you seek medical care to address the potential of high blood pressure, commonly known as hypertension. Hypertension can be hereditary or can be caused by certain medical conditions, pain, stress, or \"white coat syndrome. \"       Please make an appointment with your health care provider(s) for follow up of your Emergency Department visit. VITALS:   Patient Vitals for the past 8 hrs:   Temp Pulse Resp BP   07/21/17 1631 97.7 °F (36.5 °C) 81 17 138/73          Thank you for allowing us to provide you with medical care today. We realize that you have many choices for your emergency care needs. Please choose us in the future for any continued health care needs. Curtis Dunn M.D.  [address] [facility],[address], [address] [place], [place] [address]. Office: [phone]            No results found for this or any previous visit (from the past 24 hour(s)). No results found. Learning About Sun Damage and Your Skin  How does the sun affect your skin? Being out in the sun can be good for you. Sunlight can brighten your mood and help you feel healthier. But getting too much of the ultraviolet (UV) rays in sunlight or from a tanning bed can harm your skin. The damage may include:  · Skin changes like early wrinkling, sagging skin, and age spots. · Skin cancer. The lighter your skin is, the more easily it can be damaged by the sun. But everyone can benefit from protecting their skin from the sun, regardless of skin color. The amount of skin damage you can get from sunlight depends on:  · The time of day. You are more likely to get a sunburn in the middle of the day. You might think that exposure to the sun isn't a problem on cloudy days, but the sun's UV rays can still pass through clouds. · Whether you are near reflective surfaces, such as water, white sand, concrete, snow, or ice. All of these reflect the sun's UV rays. · The season of the year. The intense sunlight of summer days can cause more skin damage than the sunlight in other seasons. How can you protect your skin from the sun? Most sunburn and skin cancer can be prevented. Use the following tips to protect your skin. Avoid exposure to the sun  · Try to spend less time in the sun from 10 in the morning to 4 in the afternoon. Find shade if you need to be outdoors. · Wear clothing that blocks the sun. This can be a wide-brimmed hat that covers your neck, ears, eyes, and scalp. It can also include loose-fitting, tightly-woven clothes that cover your arms and legs. · Wear sunglasses that block UV rays. Use sunscreen  · Always wear sunscreen on exposed skin. Make sure to use a broad-spectrum sunscreen that has a sun protection factor (SPF) of 30 or higher.  Use it every day, even when it is cloudy. · Apply sunscreen at least 30 minutes before you go out in the sun. Put on more every 2 to 3 hours while you are in the sun and after you sweat a lot or swim. · Take extra care to protect your skin when you're near water, at higher elevations, or in tropical climates. · Use a broad-spectrum lip balm or cream that has SPF of 30 to protect your lips from getting sunburned. Where can you learn more? Go to http://bharti-janeen.info/. Enter R455 in the search box to learn more about \"Learning About Sun Damage and Your Skin. \"  Current as of: October 13, 2016  Content Version: 11.3  © 0373-9132 [employer]. Care instructions adapted under license by [employer] (which disclaims liability or warranty for this information). If you have questions about a medical condition or this instruction, always ask your healthcare professional. [place] [address] any warranty or liability for your use of this information. Sunburn: Care Instructions  Your Care Instructions  A sunburn is skin damage from the sun's ultraviolet (UV) rays. Most sunburns cause mild pain and redness but affect only the outer layer of skin. These are called first-degree burns. The red skin might hurt when you touch it. These sunburns are mild and can usually be treated at home. Skin that is red and painful and that swells up and blisters may mean that deep skin layers and nerve endings have been damaged. These are second-degree burns. This type of sunburn is usually more painful and takes longer to heal.  Follow-up care is a key part of your treatment and safety. Be sure to make and go to all appointments, and call your doctor if you are having problems. It's also a good idea to know your test results and keep a list of the medicines you take. How can you care for yourself at home? · Use cool cloths on the sunburned areas. · Take cool showers or baths often.   · Apply soothing lotions with aloe vera to sunburned areas. Do not apply lotions to blistered skin. · A sunburn can cause a mild fever and a headache. Lie down in a cool, quiet room to relieve the headache. A headache may be caused by not getting enough fluids, which is called dehydration, so drinking fluids may help. · Take anti-inflammatory medicines to reduce pain, swelling, and fever. These include ibuprofen (Advil, Motrin) and naproxen (Aleve). Read and follow all instructions on the label. · Use lotion to relieve the itching when your skin peels. There is nothing you can do to stop skin from peeling after a sunburn. It is part of the healing process. · Protect your skin from the sun with sunscreen, hats with wide brims, sunglasses, and loose-fitting, tightly woven clothing that covers your arms and legs. Caring for blisters  Small blisters usually heal on their own. · Do not try to break the blisters. Just leave them alone. · Do not cover the blisters unless something such as clothing is rubbing against them. If you do cover them, apply a loose bandage. You can use tape to hold the bandage on, but do not let the tape touch the blisters. · Avoid wearing clothes or shoes or doing activities that rub or irritate the blisters until they have healed. Larger blisters, which are the size of a nickel or larger, usually heal without problems. · If you have a large blister, you can consider draining it, unless your doctor told you not to. Clean a needle with rubbing alcohol or soap and water, then use it to gently puncture the edge of the blister. Press the fluid in the blister toward the hole you made. Wash the blister after you have drained it, and pat it dry with clean gauze. · Do not remove the flap of skin covering the blister unless it tears or gets dirty or pus forms under it. The flap protects the healing skin underneath. · Put a thin layer of petroleum jelly on the bandage before you apply it.  This will keep the bandage from sticking to the blister. Do not use alcohol or iodine on the blister because these may make the blister heal more slowly. · Loosely apply a bandage or gauze. You can use tape to hold the bandage on, but do not let the tape touch the blister. Do not wrap tape completely around a hand, arm, foot, or leg because it could cut off the blood supply if the limb swells. If the tape is too tight, you may develop numbness, tingling, pain, or cool and pale or swollen skin below the tape. · Change the bandage every day and any time it gets wet or dirty. You can soak the bandage in cool water just before removing it to make it less painful to take off. · Avoid wearing clothes or shoes or doing activities that rub or irritate the blisters until they have healed. When should you call for help? Call your doctor now or seek immediate medical care if:  · You have signs of needing more fluids. You have sunken eyes and a dry mouth, and you pass only a little dark urine. · You have signs of infection, such as:  ¨ Increased pain, swelling, warmth, or redness. ¨ Red streaks leading from the area. ¨ Pus draining from the area. ¨ A fever. Watch closely for changes in your health, and be sure to contact your doctor if:  · You do not get better as expected. Where can you learn more? Go to http://bharti-janeen.info/. Enter D215 in the search box to learn more about \"Sunburn: Care Instructions. \"  Current as of: March 20, 2017  Content Version: 11.3  © 6749-6472 [employer]. Care instructions adapted under license by [employer] (which disclaims liability or warranty for this information). If you have questions about a medical condition or this instruction, always ask your healthcare professional. [address] any warranty or liability for your use of this information.
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
## 2017-10-02 ENCOUNTER — HOSPITAL ENCOUNTER (EMERGENCY)
Age: 42
Discharge: LEFT BEFORE DISCHARGE | End: 2017-10-02
Attending: EMERGENCY MEDICINE
Payer: SELF-PAY

## 2017-10-02 VITALS
HEART RATE: 76 BPM | TEMPERATURE: 97.5 F | RESPIRATION RATE: 16 BRPM | SYSTOLIC BLOOD PRESSURE: 147 MMHG | WEIGHT: 244.38 LBS | DIASTOLIC BLOOD PRESSURE: 91 MMHG | HEIGHT: 72 IN | OXYGEN SATURATION: 99 % | BODY MASS INDEX: 33.1 KG/M2

## 2017-10-02 DIAGNOSIS — Z53.21 PATIENT LEFT WITHOUT BEING SEEN: Primary | ICD-10-CM

## 2017-10-02 PROCEDURE — 75810000275 HC EMERGENCY DEPT VISIT NO LEVEL OF CARE

## 2017-10-02 NOTE — CALL BACK NOTE
Pt was contacted by me. He states that he no longer wanted to wait to be seen. He wants to go home and to bed. He will return if no improvement of symptoms.  Service recovery provided and pt instructed to come back if needed and his care will be expediated

## 2017-10-25 ENCOUNTER — TELEPHONE (OUTPATIENT)
Dept: FAMILY MEDICINE CLINIC | Age: 42
End: 2017-10-25

## 2017-10-25 NOTE — TELEPHONE ENCOUNTER
----- Message from Nuzhat Sat sent at 10/25/2017 10:36 AM EDT -----  Regarding: Raquel/telephone  Pt stated he was billed wrong with Brijesh Hurt from the appointment he had on July 2016. He stated Brijesh Hurt told him to call the office. Pts number is [phone].

## 2017-10-26 NOTE — TELEPHONE ENCOUNTER
Called and spoke with pt, and he advised he received a large bill for blood wok. I asked pt who is his insurance carrier, and he states he has the [place] [facility]. When asked if pt had labs drawn inside a [facility] locations and presented his card, he states he had labs drawn at [facility] however did not present has card. Pt advised he may need to come back into this lab crop and present his card so lab [facility]ylon bill can be re submitted his bill, pt states understanding.

## 2017-11-28 ENCOUNTER — HOSPITAL ENCOUNTER (EMERGENCY)
Age: 42
Discharge: HOME | End: 2017-11-28
Attending: EMERGENCY MEDICINE
Payer: SUBSIDIZED

## 2017-11-28 ENCOUNTER — APPOINTMENT (OUTPATIENT)
Dept: GENERAL RADIOLOGY | Age: 42
End: 2017-11-28
Attending: PHYSICIAN ASSISTANT
Payer: SUBSIDIZED

## 2017-11-28 VITALS
SYSTOLIC BLOOD PRESSURE: 120 MMHG | WEIGHT: 245 LBS | HEIGHT: 72 IN | OXYGEN SATURATION: 100 % | HEART RATE: 82 BPM | BODY MASS INDEX: 33.18 KG/M2 | RESPIRATION RATE: 20 BRPM | TEMPERATURE: 98 F | DIASTOLIC BLOOD PRESSURE: 81 MMHG

## 2017-11-28 DIAGNOSIS — R11.2 NON-INTRACTABLE VOMITING WITH NAUSEA, UNSPECIFIED VOMITING TYPE: Primary | ICD-10-CM

## 2017-11-28 LAB
ALBUMIN SERPL-MCNC: 3.7 G/DL (ref 3.5–5)
ALBUMIN/GLOB SERPL: 1 {RATIO} (ref 1.1–2.2)
ALP SERPL-CCNC: 103 U/L (ref 45–117)
ALT SERPL-CCNC: 30 U/L (ref 12–78)
ANION GAP SERPL CALC-SCNC: 8 MMOL/L (ref 5–15)
APPEARANCE UR: CLEAR
AST SERPL-CCNC: 15 U/L (ref 15–37)
BACTERIA URNS QL MICRO: NEGATIVE /HPF
BASOPHILS # BLD: 0.1 K/UL (ref 0–0.1)
BASOPHILS NFR BLD: 1 % (ref 0–1)
BILIRUB SERPL-MCNC: 0.3 MG/DL (ref 0.2–1)
BILIRUB UR QL: NEGATIVE
BUN SERPL-MCNC: 21 MG/DL (ref 6–20)
BUN/CREAT SERPL: 12 (ref 12–20)
CALCIUM SERPL-MCNC: 8.5 MG/DL (ref 8.5–10.1)
CHLORIDE SERPL-SCNC: 104 MMOL/L (ref 97–108)
CK SERPL-CCNC: 132 U/L (ref 39–308)
CO2 SERPL-SCNC: 29 MMOL/L (ref 21–32)
COLOR UR: NORMAL
CREAT SERPL-MCNC: 1.73 MG/DL (ref 0.7–1.3)
DIFFERENTIAL METHOD BLD: ABNORMAL
EOSINOPHIL # BLD: 0.4 K/UL (ref 0–0.4)
EOSINOPHIL NFR BLD: 4 % (ref 0–7)
EPITH CASTS URNS QL MICRO: NORMAL /LPF
ERYTHROCYTE [DISTWIDTH] IN BLOOD BY AUTOMATED COUNT: 13.2 % (ref 11.5–14.5)
GLOBULIN SER CALC-MCNC: 3.7 G/DL (ref 2–4)
GLUCOSE SERPL-MCNC: 86 MG/DL (ref 65–100)
GLUCOSE UR STRIP.AUTO-MCNC: NEGATIVE MG/DL
HCT VFR BLD AUTO: 43.4 % (ref 36.6–50.3)
HGB BLD-MCNC: 15.2 G/DL (ref 12.1–17)
HGB UR QL STRIP: NEGATIVE
HYALINE CASTS URNS QL MICRO: NORMAL /LPF (ref 0–5)
KETONES UR QL STRIP.AUTO: NEGATIVE MG/DL
LEUKOCYTE ESTERASE UR QL STRIP.AUTO: NEGATIVE
LIPASE SERPL-CCNC: 166 U/L (ref 73–393)
LYMPHOCYTES # BLD: 4.3 K/UL (ref 0.8–3.5)
LYMPHOCYTES NFR BLD: 47 % (ref 12–49)
MCH RBC QN AUTO: 35.1 PG (ref 26–34)
MCHC RBC AUTO-ENTMCNC: 35 G/DL (ref 30–36.5)
MCV RBC AUTO: 100.2 FL (ref 80–99)
MONOCYTES # BLD: 0.7 K/UL (ref 0–1)
MONOCYTES NFR BLD: 7 % (ref 5–13)
NEUTS SEG # BLD: 3.8 K/UL (ref 1.8–8)
NEUTS SEG NFR BLD: 41 % (ref 32–75)
NITRITE UR QL STRIP.AUTO: NEGATIVE
PH UR STRIP: 5.5 [PH] (ref 5–8)
PLATELET # BLD AUTO: 206 K/UL (ref 150–400)
POTASSIUM SERPL-SCNC: 4.5 MMOL/L (ref 3.5–5.1)
PROT SERPL-MCNC: 7.4 G/DL (ref 6.4–8.2)
PROT UR STRIP-MCNC: NEGATIVE MG/DL
RBC # BLD AUTO: 4.33 M/UL (ref 4.1–5.7)
RBC #/AREA URNS HPF: NORMAL /HPF (ref 0–5)
RBC MORPH BLD: ABNORMAL
SODIUM SERPL-SCNC: 141 MMOL/L (ref 136–145)
SP GR UR REFRACTOMETRY: 1.02 (ref 1–1.03)
TROPONIN I SERPL-MCNC: <0.04 NG/ML
UROBILINOGEN UR QL STRIP.AUTO: 0.2 EU/DL (ref 0.2–1)
WBC # BLD AUTO: 9.3 K/UL (ref 4.1–11.1)
WBC URNS QL MICRO: NORMAL /HPF (ref 0–4)

## 2017-11-28 PROCEDURE — 99283 EMERGENCY DEPT VISIT LOW MDM: CPT

## 2017-11-28 PROCEDURE — 85025 COMPLETE CBC W/AUTO DIFF WBC: CPT | Performed by: EMERGENCY MEDICINE

## 2017-11-28 PROCEDURE — 74011250636 HC RX REV CODE- 250/636: Performed by: PHYSICIAN ASSISTANT

## 2017-11-28 PROCEDURE — 96374 THER/PROPH/DIAG INJ IV PUSH: CPT

## 2017-11-28 PROCEDURE — 71020 XR CHEST PA LAT: CPT

## 2017-11-28 PROCEDURE — 96375 TX/PRO/DX INJ NEW DRUG ADDON: CPT

## 2017-11-28 PROCEDURE — 96361 HYDRATE IV INFUSION ADD-ON: CPT

## 2017-11-28 PROCEDURE — 80053 COMPREHEN METABOLIC PANEL: CPT | Performed by: EMERGENCY MEDICINE

## 2017-11-28 PROCEDURE — 36415 COLL VENOUS BLD VENIPUNCTURE: CPT | Performed by: EMERGENCY MEDICINE

## 2017-11-28 PROCEDURE — 83690 ASSAY OF LIPASE: CPT | Performed by: EMERGENCY MEDICINE

## 2017-11-28 PROCEDURE — 84484 ASSAY OF TROPONIN QUANT: CPT | Performed by: EMERGENCY MEDICINE

## 2017-11-28 PROCEDURE — 81001 URINALYSIS AUTO W/SCOPE: CPT | Performed by: PHYSICIAN ASSISTANT

## 2017-11-28 PROCEDURE — 82550 ASSAY OF CK (CPK): CPT | Performed by: EMERGENCY MEDICINE

## 2017-11-28 PROCEDURE — 93005 ELECTROCARDIOGRAM TRACING: CPT

## 2017-11-28 RX ORDER — ONDANSETRON 2 MG/ML
4 INJECTION INTRAMUSCULAR; INTRAVENOUS
Status: COMPLETED | OUTPATIENT
Start: 2017-11-28 | End: 2017-11-28

## 2017-11-28 RX ORDER — KETOROLAC TROMETHAMINE 30 MG/ML
30 INJECTION, SOLUTION INTRAMUSCULAR; INTRAVENOUS
Status: COMPLETED | OUTPATIENT
Start: 2017-11-28 | End: 2017-11-28

## 2017-11-28 RX ORDER — ONDANSETRON 4 MG/1
4 TABLET, ORALLY DISINTEGRATING ORAL
Qty: 12 TAB | Refills: 0 | Status: SHIPPED | OUTPATIENT
Start: 2017-11-28 | End: 2019-10-23

## 2017-11-28 RX ORDER — SODIUM CHLORIDE 9 MG/ML
1000 INJECTION, SOLUTION INTRAVENOUS ONCE
Status: COMPLETED | OUTPATIENT
Start: 2017-11-28 | End: 2017-11-28

## 2017-11-28 RX ADMIN — ONDANSETRON 4 MG: 2 INJECTION INTRAMUSCULAR; INTRAVENOUS at 21:36

## 2017-11-28 RX ADMIN — KETOROLAC TROMETHAMINE 30 MG: 30 INJECTION, SOLUTION INTRAMUSCULAR at 21:36

## 2017-11-28 RX ADMIN — SODIUM CHLORIDE 1000 ML/HR: 900 INJECTION, SOLUTION INTRAVENOUS at 21:36

## 2017-11-28 NOTE — Clinical Note
Push fluids [place] diet 
zofran every 8 hrs as needed for nausea and vomiting Return for any new or worsening.  [facility] [place]

## 2017-11-29 LAB
ATRIAL RATE: 77 BPM
CALCULATED P AXIS, ECG09: 72 DEGREES
CALCULATED R AXIS, ECG10: 68 DEGREES
CALCULATED T AXIS, ECG11: 53 DEGREES
DIAGNOSIS, 93000: NORMAL
P-R INTERVAL, ECG05: 172 MS
Q-T INTERVAL, ECG07: 358 MS
QRS DURATION, ECG06: 72 MS
QTC CALCULATION (BEZET), ECG08: 405 MS
VENTRICULAR RATE, ECG03: 77 BPM

## 2017-11-29 NOTE — ED TRIAGE NOTES
Patient presents to the emergency department with multiple complaints: left sided flank pain, left lower abdominal pain, vomiting, headache, and shortness of breath. Patient reports the pain over the holiday weekend, but it went away, and has come back. Patient reports \"a lot\" of vomiting.

## 2017-11-29 NOTE — DISCHARGE INSTRUCTIONS
Nausea and Vomiting: Care Instructions  Your Care Instructions    When you are nauseated, you may feel weak and sweaty and notice a lot of saliva in your mouth. Nausea often leads to vomiting. Most of the time you do not need to worry about nausea and vomiting, but they can be signs of other illnesses. Two common causes of nausea and vomiting are stomach flu and food poisoning. Nausea and vomiting from viral stomach flu will usually start to improve within 24 hours. Nausea and vomiting from food poisoning may last from 12 to 48 hours. The doctor has checked you carefully, but problems can develop later. If you notice any problems or new symptoms, get medical treatment right away. Follow-up care is a key part of your treatment and safety. Be sure to make and go to all appointments, and call your doctor if you are having problems. It's also a good idea to know your test results and keep a list of the medicines you take. How can you care for yourself at home? · To prevent dehydration, drink plenty of fluids, enough so that your urine is light yellow or clear like water. Choose water and other caffeine-free clear liquids until you feel better. If you have kidney, heart, or liver disease and have to limit fluids, talk with your doctor before you increase the amount of fluids you drink. · Rest in bed until you feel better. · When you are able to eat, try clear soups, mild foods, and liquids until all symptoms are gone for 12 to 48 hours. Other good choices include dry toast, crackers, cooked cereal, and gelatin dessert, such as Jell-O. When should you call for help? Call 911 anytime you think you may need emergency care. For example, call if:  ? · You passed out (lost consciousness). ?Call your doctor now or seek immediate medical care if:  ? · You have symptoms of dehydration, such as:  ¨ Dry eyes and a dry mouth. ¨ Passing only a little dark urine.   ¨ Feeling thirstier than usual.   ? · You have new or worsening belly pain. ? · You have a new or higher fever. ? · You vomit blood or what looks like coffee grounds. ? Watch closely for changes in your health, and be sure to contact your doctor if:  ? · You have ongoing nausea and vomiting. ? · Your vomiting is getting worse. ? · Your vomiting lasts longer than 2 days. ? · You are not getting better as expected. Where can you learn more? Go to http://bharti-janeen.info/. Enter 25 [id number] in the search box to learn more about \"Nausea and Vomiting: Care Instructions. \"  Current as of: March 20, 2017  Content Version: 11.4  © 3823-4580 [employer]. Care instructions adapted under license by [employer] (which disclaims liability or warranty for this information). If you have questions about a medical condition or this instruction, always ask your healthcare professional. [address] any warranty or liability for your use of this information.

## 2017-11-29 NOTE — ED PROVIDER NOTES
HPI Comments: 44 yo  male with complaint of N/V about 5 days ago which lasted for about two days and returned with nausea mid afternoon today with associated left flank pain, dull, radiating across the lower abdomen without any modifying factors. Associated episodic mid chest pain, non-exertional and SOB which pt attributes to smoking. No medications PTA. Hx of similar episodes. No concerning dietary intakes, recent travel, ill contacts or ETOH. Normal BM today. No fever, chills, syncope, extremity numbness, extremity weakness, diarrhea, dysuria, urgency or hematuria. Patient is a 43 y.o. male presenting with flank pain and vomiting. The history is provided by the patient. Flank Pain    Associated symptoms include chest pain and abdominal pain. Pertinent negatives include no fever, no numbness, no headaches, no dysuria and no weakness. Vomiting    Associated symptoms include abdominal pain. Pertinent negatives include no chills, no fever, no diarrhea, no headaches, no cough and no headaches.         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    Hypertension     IV drug abuse     Kidney stone     Liver disease     elevated liver enzymes/hep C    Neuralgia     Other ill-defined conditions(689.64)     chronic bronchitis    Polysubstance dependence (HCC)     stimulants, MJ, tob, barbs, benzos, opiates    Psychiatric disorder     Bipolar, Anxiety    Unspecified adverse effect of anesthesia     \"was told he woke up during back surgery\" and during nerve root injection    Unspecified sleep apnea     mild - does not use CPAP       Past Surgical History:   Procedure Laterality Date    HX LUMBAR LAMINECTOMY      HX ORTHOPAEDIC Bilateral     back surgery - laminectomy/discectomy    HX ORTHOPAEDIC      nerve root injection in back    HX OTHER SURGICAL      testicular surgery- varicocelectomy, i &d of abscess on right elbow         Family History:   Problem Relation Age of Onset    Heart Disease Maternal Grandfather        Social History     Social History    Marital status:      Spouse name: N/A    Number of children: N/A    Years of education: N/A     Occupational History    Not on file. Social History Main Topics    Smoking status: Current Every Day Smoker     Packs/day: 1.00     Years: 22.00     Types: Cigarettes    Smokeless tobacco: Former User      Comment: cigarettes    Alcohol use No      Comment: no longer drinks    Drug use: No      Comment: per patient no methamphetamines    Sexual activity: Not Currently     Other Topics Concern    Not on file     Social History Narrative    The patient is . The patient lives his parents. The patient has no children. The patient does not have legal issues pending. The patient's source of income comes from family. patient's greatest support comes from his parents and this person will be involved with the treatment. pt has not been a victim of sexual/physical abuse. The highest grade achieved is College         ALLERGIES: Barium sulfate; Demerol [meperidine]; Iodinated contrast- oral and iv dye; and Neurontin [gabapentin]    Review of Systems   Constitutional: Negative. Negative for chills and fever. HENT: Negative for congestion, ear pain, rhinorrhea, sore throat and voice change. Eyes: Negative. Negative for photophobia, pain and itching. Respiratory: Positive for shortness of breath. Negative for cough and chest tightness. Cardiovascular: Positive for chest pain. Negative for palpitations. Gastrointestinal: Positive for abdominal pain, nausea and vomiting. Negative for abdominal distention, constipation and diarrhea. Genitourinary: Positive for flank pain (left).  Negative for difficulty urinating, dysuria, frequency and urgency. Neurological: Negative for weakness, numbness and headaches. All other systems reviewed and are negative. Vitals:    11/28/17 1956   BP: 125/84   Pulse: 80   Resp: 18   Temp: 97.4 °F (36.3 °C)   SpO2: 98%   Weight: 111.1 kg (245 lb)   Height: 6' (1.829 m)            Physical Exam   Constitutional: He is oriented to person, place, and time. He appears well-developed and well-nourished. No distress.  adult male in NAD   HENT:   Head: Normocephalic and atraumatic. Right Ear: Tympanic membrane, external ear and ear canal normal.   Left Ear: Tympanic membrane, external ear and ear canal normal.   Nose: Nose normal.   Mouth/Throat: Uvula is midline and oropharynx is clear and moist. Mucous membranes are dry. No oropharyngeal exudate. Eyes: Conjunctivae and EOM are normal. Pupils are equal, round, and reactive to light. Right eye exhibits no discharge. Left eye exhibits no discharge. Neck: Normal range of motion. Neck supple. Cardiovascular: Normal rate, regular rhythm and normal heart sounds. Pulmonary/Chest: Effort normal and breath sounds normal. He has no wheezes. He has no rales. Abdominal: Soft. Bowel sounds are normal. He exhibits no distension. There is no tenderness. There is no guarding. Musculoskeletal: Normal range of motion. Lymphadenopathy:     He has no cervical adenopathy. Neurological: He is alert and oriented to person, place, and time. No cranial nerve deficit. Skin: Skin is warm and dry. He is not diaphoretic. Psychiatric: He has a normal mood and affect. His behavior is normal.   Nursing note and vitals reviewed. MDM  Number of Diagnoses or Management Options  Non-intractable vomiting with nausea, unspecified vomiting type:   Diagnosis management comments: 42 yo  male with complaint of nausea, left flank pain and abdominal pain. Pt appears well on exam without e/o serious illness.  Abdomen is soft without TTP or rigidity. Doubt acute intra abdominal pathology like appy or pancreatitis. ? Obstructive uropathy vs gastroenteritis vs UTI amongst others        Plan  CBC  CMP  Lipase  UA  EKG       Amount and/or Complexity of Data Reviewed  Clinical lab tests: ordered and reviewed  Tests in the radiology section of CPT®: ordered and reviewed  Independent visualization of images, tracings, or specimens: yes      ED Course       Procedures      Progress note    EKG interpretation: (Preliminary)  Rhythm: normal sinus rhythm; and regular . Rate (approx.): 75; Axis: normal; P wave: normal; QRS interval: normal ; ST/T wave: normal; in  Leads. Yara Thomas    Labs reviewed. Chronic elevation in Cr but improved from past ED encounters. Pt feeling much better. Tolerating PO and requesting d/c home. Yara Thomas    Patient's results have been reviewed with them. Patient and/or family have verbally conveyed their understanding and agreement of the patient's signs, symptoms, diagnosis, treatment and prognosis and additionally agree to follow up as recommended or return to the Emergency Room should their condition change prior to follow-up. Discharge instructions have also been provided to the patient with some educational information regarding their diagnosis as well a list of reasons why they would want to return to the ER prior to their follow-up appointment should their condition change. Yara Thomas    A/p  Nausea and vomiting: Push fluids  Gregory diet  zofran every 8 hrs as needed for nausea and vomiting  Return for any new or worsening.  Yara Martinez

## 2017-12-20 ENCOUNTER — OFFICE VISIT (OUTPATIENT)
Dept: NEUROLOGY | Age: 42
End: 2017-12-20

## 2017-12-20 ENCOUNTER — DOCUMENTATION ONLY (OUTPATIENT)
Dept: NEUROLOGY | Age: 42
End: 2017-12-20

## 2017-12-20 VITALS
WEIGHT: 240 LBS | HEIGHT: 72 IN | HEART RATE: 79 BPM | OXYGEN SATURATION: 98 % | DIASTOLIC BLOOD PRESSURE: 82 MMHG | BODY MASS INDEX: 32.51 KG/M2 | SYSTOLIC BLOOD PRESSURE: 128 MMHG

## 2017-12-20 DIAGNOSIS — R51.9 HEADACHE, CHRONIC DAILY: Primary | ICD-10-CM

## 2017-12-20 DIAGNOSIS — G43.009 MIGRAINE WITHOUT AURA AND WITHOUT STATUS MIGRAINOSUS, NOT INTRACTABLE: ICD-10-CM

## 2017-12-20 RX ORDER — NORTRIPTYLINE HYDROCHLORIDE 50 MG/1
100 CAPSULE ORAL
Qty: 180 CAP | Refills: 1 | Status: SHIPPED | OUTPATIENT
Start: 2017-12-20 | End: 2018-03-20

## 2017-12-20 RX ORDER — PROPRANOLOL HYDROCHLORIDE 80 MG/1
80 CAPSULE, EXTENDED RELEASE ORAL DAILY
Qty: 30 CAP | Refills: 1 | Status: SHIPPED | OUTPATIENT
Start: 2017-12-20 | End: 2022-02-04

## 2017-12-20 NOTE — MR AVS SNAPSHOT
Visit Information Date & Time Provider Department Dept. Phone Encounter #  
 12/20/2017  3:00 PM Maine Bond MD [facility] Neurology [facility] [phone] [id number] Follow-up Instructions Return in about 3 months (around 3/20/2018). Your Appointments 3/21/2018  3:40 PM  
Follow Up with Maine Bond MD  
[facility] Appt Note: follow up headache [facility] [place] [address] [facility] [place] [address] [phone]  
  
   
 [place] [address] [id number] [address] Upcoming Health Maintenance Date Due Influenza Age 5 to Adult 8/1/2017 DTaP/Tdap/Td series (2 - Td) 12/5/2023 Allergies as of 12/20/2017  Review Complete On: 12/20/2017 By: Pia Harris LPN Severity Noted Reaction Type Reactions Barium Sulfate  03/27/2012   Side Effect Hives Demerol [Meperidine]  03/27/2012   Side Effect Hives Iodinated Contrast- Oral And Iv Dye  03/29/2013    Other (comments) Hives, swelling of throat with IVP/has had MRI with contrast without problems. Neurontin [Gabapentin]  07/16/2013   Systemic Drowsiness Current Immunizations  Reviewed on 1/12/2017 Name Date Influenza Vaccine (Quad) PF 1/12/2017 11:12 AM, 10/20/2015 Pneumococcal Polysaccharide (PPSV-23) 7/8/2015 10:15 AM  
 Tdap 12/5/2013  4:26 PM  
  
 Not reviewed this visit You Were Diagnosed With   
  
 Codes Comments Headache, chronic daily    -  Primary ICD-10-CM: R51 ICD-9-CM: 784.0 Migraine without aura and without status migrainosus, not intractable     ICD-10-CM: B06.048 ICD-9-CM: 346.10 Vitals BP Pulse Height(growth percentile) Weight(growth percentile) SpO2 BMI  
 128/82 (BP 1 Location: Left arm, BP Patient Position: Sitting) 79 6' (1.829 m) 240 lb (108.9 kg) 98% 32.55 kg/m2 Smoking Status Current Every Day Smoker Vitals History BMI and BSA Data Body Mass Index Body Surface Area 32.55 kg/m 2 2.35 m 2 Preferred Pharmacy Pharmacy Name Phone [address] [phone] Your Updated Medication List  
  
   
This list is accurate as of: 12/20/17  4:10 PM.  Always use your most recent med list.  
  
  
  
  
 albuterol 90 mcg/actuation inhaler Commonly known as:  VENTOLIN HFA Take 2 Puffs by inhalation every four (4) hours as needed for Wheezing. atenolol 50 mg tablet Commonly known as:  TENORMIN Take 1 Tab by mouth daily. Indications: HYPERTENSION  
  
 butalbital-acetaminophen-caffeine -40 mg per tablet Commonly known as:  FIORICET, ESGIC  
1 tab at onset of migraine; can 1 tab in repeat in 4 hours (one time) if headache remains. Limit: 2 tabs per day, max 2 days a week. 90 day Rx.  
  
 clonazePAM 1 mg tablet Commonly known as:  Eland Scrape Take 1 Tab by mouth two (2) times a day. Max Daily Amount: 2 mg.  
  
 cyclobenzaprine 5 mg tablet Commonly known as:  FLEXERIL Take 2 Tabs by mouth three (3) times daily (with meals). LaMICtal 200 mg tablet Generic drug:  lamoTRIgine Take  by mouth daily. losartan 50 mg tablet Commonly known as:  COZAAR Take 1 Tab by mouth daily. nortriptyline 50 mg capsule Commonly known as:  PAMELOR Take 2 Caps by mouth nightly for 90 days. Anti-depressant for headache prevention. ondansetron 4 mg disintegrating tablet Commonly known as:  ZOFRAN ODT Take 1 Tab by mouth every eight (8) hours as needed for Nausea. propranolol LA 80 mg SR capsule Commonly known as:  INDERAL LA Take 1 Cap by mouth daily. BP med to help reduce headache frequency. Stop taking Atenolol SUMAtriptan 100 mg tablet Commonly known as:  IMITREX  
1 tab at onset of migraine; may repeat 1 tab in 2 hours if HA persists; Limit: 2 tabs in 24/ hrs, not more than 3 days a week ZyPREXA 10 mg tablet Generic drug:  OLANZapine Take 5 mg by mouth nightly. Prescriptions Printed Refills  
 propranolol LA (INDERAL LA) 80 mg SR capsule 1 Sig: Take 1 Cap by mouth daily. BP med to help reduce headache frequency. Stop taking Atenolol Class: Print Route: Oral  
 nortriptyline (PAMELOR) 50 mg capsule 1 Sig: Take 2 Caps by mouth nightly for 90 days. Anti-depressant for headache prevention. Class: Print Route: Oral  
  
Follow-up Instructions Return in about 3 months (around 3/20/2018). Introducing [facility]! Dear Mario Mcdaniels: Thank you for requesting a [employer] account. Our records indicate that you already have an active [employer] account. You can access your account anytime at [email or web address] Did you know that you can access your hospital and ER discharge instructions at any time in [employer]!? You can also review all of your test results from your hospital stay or ER visit. Additional Information If you have questions, please visit the Frequently Asked Questions section of the [employer] website at [email or web address] Remember, Car[employer]! is NOT to be used for urgent needs. For medical emergencies, dial 911. Now available from your iPhone and Android! Please provide this summary of care documentation to your next provider. Your primary care clinician is listed as Mary Mckee. If you have any questions after today's visit, please call [phone].

## 2017-12-20 NOTE — PROGRESS NOTES
Interval HPI:   This is a 43 y.o. male who is following up for     PMHx significant for Hep C, chronic multifocal pain, chronic headaches. Chief Complaint   Patient presents with    Headache     daily      Following up for headaches and request to have DMV form filled out again. Stopped using Suboxone and Dronabinol since Sept 2017 (no longer in that substance abuse clinic). Has been taking Clonazepam 1 mg one tab up to twice a day for generalized anxiety disorder, for many years (now being prescribed by PCP). 1) Chronic, episodic migraine  Continues taking Nortriptyline 75 mg, but two tabs QHS. He says it was increased by one of his prior doctors for pain control. Reports more frequent headaches since around last few months. He notes that he's been taking Fioricet 5-6 day a weeks for the past 2-3 months, since the headaches increased. Reports having some increased life stressors recently but that was after the headaches picked up in frequency. # of headache days a week: 5-7 (was 4-6 days per month last visit)  # of migraine days per month: 4-5 (was 4-6 days per month last visit)    Acute Migraine relievers: Sumatriptan + Fioricet  Tried/ failed: topamax (cognitive difficulty, increased headaches)    2) DMV forms  Pt requested that I fill out the neurological section of his DMV form back in January 2017. Pt denied every having a seizure, blackout episode, or syncopal spell so I saw no clear neurologic condition that would prohibit him from driving. The only potential thing at the time that could have effected his driving was that he was on buprenorphine and dronabinol (liquid THC) for chronic opioid dependence, and that could have caused cognitive slowing which could affect his driving. He reports that those medications were stopped in September 2017. Brief ROS: as above or otherwise negative  There have been no significant changes in PMHx, PSHx, SHx except as noted above. Allergies   Allergen Reactions    Barium Sulfate Hives    Demerol [Meperidine] Hives    Iodinated Contrast- Oral And Iv Dye Other (comments)     Hives, swelling of throat with IVP/has had MRI with contrast without problems.  Neurontin [Gabapentin] Drowsiness     Current Outpatient Prescriptions   Medication Sig Dispense Refill    propranolol LA (INDERAL LA) 80 mg SR capsule Take 1 Cap by mouth daily. BP med to help reduce headache frequency. Stop taking Atenolol 30 Cap 1    nortriptyline (PAMELOR) 50 mg capsule Take 2 Caps by mouth nightly for 90 days. Anti-depressant for headache prevention. 180 Cap 1    ondansetron (ZOFRAN ODT) 4 mg disintegrating tablet Take 1 Tab by mouth every eight (8) hours as needed for Nausea. 12 Tab 0    butalbital-acetaminophen-caffeine (FIORICET, ESGIC) -40 mg per tablet 1 tab at onset of migraine; can 1 tab in repeat in 4 hours (one time) if headache remains. Limit: 2 tabs per day, max 2 days a week. 90 day Rx. 48 Tab 0    losartan (COZAAR) 50 mg tablet Take 1 Tab by mouth daily. 30 Tab 11    lamoTRIgine (LAMICTAL) 200 mg tablet Take  by mouth daily.  atenolol (TENORMIN) 50 mg tablet Take 1 Tab by mouth daily. Indications: HYPERTENSION 30 Tab 3    albuterol (VENTOLIN HFA) 90 mcg/actuation inhaler Take 2 Puffs by inhalation every four (4) hours as needed for Wheezing. 1 Inhaler 0    clonazePAM (KLONOPIN) 1 mg tablet Take 1 Tab by mouth two (2) times a day. Max Daily Amount: 2 mg. 60 Tab 0    OLANZapine (ZYPREXA) 10 mg tablet Take 5 mg by mouth nightly.  SUMAtriptan (IMITREX) 100 mg tablet 1 tab at onset of migraine; may repeat 1 tab in 2 hours if HA persists; Limit: 2 tabs in 24/ hrs, not more than 3 days a week 12 Tab 5    cyclobenzaprine (FLEXERIL) 5 mg tablet Take 2 Tabs by mouth three (3) times daily (with meals). 20 Tab 0       Physical Exam  Blood pressure 128/82, pulse 79, height 6' (1.829 m), weight 108.9 kg (240 lb), SpO2 98 %.     No acute distress, resting, calm  Awake, alert   Neck: supple  Exts; no edema  Skin: no rashes    Focused Neurological Exam     Mental status: Awake, responding to all questions, following commands  Language: normal comprehension; no dysarthria    CNs:   Visual fields grossly normal  Extraocular movements intact, no nystagmus  Face appears symmetric and facial strength normal.    Hearing is intact to casual conversation. Sensory: intact light touch in both arms  Motor: Normal bulk and strength in all 4 extremities. Reflexes: 1+ patellars  Gait: normal    Impression      ICD-10-CM ICD-9-CM    1. Headache, chronic daily R51 784.0 propranolol LA (INDERAL LA) 80 mg SR capsule      nortriptyline (PAMELOR) 50 mg capsule   2. Migraine without aura and without status migrainosus, not intractable G43.009 346.10 propranolol LA (INDERAL LA) 80 mg SR capsule      nortriptyline (PAMELOR) 50 mg capsule          1) Chronic daily headache, episodic migraine without aura  Advised pt to reduce Nortriptyline from 150 mg QHS to 100 mg QHS. D/w patient that he might have better headache prevention from Propranolol than Atenolol (prescribed by Cardiology). Gave him Rx for Propranolol LA 80 mg/ day with instructions to start only if Cardiology clears him switch from atenolol. Common SEFx discussed. Advised pt to limit all headache pain relievers to no more than 3 days a week in total.     2). DMV forms  Filled out DMV forms but indicated on there that there is no neurologic diagnosis (i.e hx of seizure, syncope, or blackout spells) that would prohibit patient from driving. He has stopped taking the pain medications noted above in September 2017 so that should improve his alertness and appropriateness to drive.      F/u in 3 months for headache management

## 2018-01-03 PROCEDURE — 99284 EMERGENCY DEPT VISIT MOD MDM: CPT

## 2018-01-03 PROCEDURE — 93005 ELECTROCARDIOGRAM TRACING: CPT

## 2018-01-03 PROCEDURE — 96374 THER/PROPH/DIAG INJ IV PUSH: CPT

## 2018-01-03 PROCEDURE — 99285 EMERGENCY DEPT VISIT HI MDM: CPT

## 2018-01-03 PROCEDURE — 96361 HYDRATE IV INFUSION ADD-ON: CPT

## 2018-01-03 PROCEDURE — 77030029684 HC NEB SM VOL KT MONA -A

## 2018-01-04 ENCOUNTER — APPOINTMENT (OUTPATIENT)
Dept: GENERAL RADIOLOGY | Age: 43
End: 2018-01-04
Attending: PHYSICIAN ASSISTANT
Payer: SUBSIDIZED

## 2018-01-04 ENCOUNTER — HOSPITAL ENCOUNTER (EMERGENCY)
Age: 43
Discharge: HOME | End: 2018-01-04
Attending: EMERGENCY MEDICINE
Payer: SUBSIDIZED

## 2018-01-04 VITALS
WEIGHT: 230.2 LBS | BODY MASS INDEX: 31.22 KG/M2 | HEART RATE: 72 BPM | RESPIRATION RATE: 25 BRPM | TEMPERATURE: 97.9 F | DIASTOLIC BLOOD PRESSURE: 74 MMHG | SYSTOLIC BLOOD PRESSURE: 126 MMHG | OXYGEN SATURATION: 95 %

## 2018-01-04 DIAGNOSIS — R05.9 COUGH: Primary | ICD-10-CM

## 2018-01-04 DIAGNOSIS — J06.9 ACUTE UPPER RESPIRATORY INFECTION: ICD-10-CM

## 2018-01-04 LAB
ALBUMIN SERPL-MCNC: 4 G/DL (ref 3.5–5)
ALBUMIN/GLOB SERPL: 1.1 {RATIO} (ref 1.1–2.2)
ALP SERPL-CCNC: 99 U/L (ref 45–117)
ALT SERPL-CCNC: 29 U/L (ref 12–78)
ANION GAP SERPL CALC-SCNC: 7 MMOL/L (ref 5–15)
AST SERPL-CCNC: 18 U/L (ref 15–37)
ATRIAL RATE: 92 BPM
BASOPHILS # BLD: 0 K/UL (ref 0–0.1)
BASOPHILS NFR BLD: 0 % (ref 0–1)
BILIRUB SERPL-MCNC: 0.4 MG/DL (ref 0.2–1)
BUN SERPL-MCNC: 12 MG/DL (ref 6–20)
BUN/CREAT SERPL: 7 (ref 12–20)
CALCIUM SERPL-MCNC: 9.2 MG/DL (ref 8.5–10.1)
CALCULATED P AXIS, ECG09: 82 DEGREES
CALCULATED R AXIS, ECG10: 74 DEGREES
CALCULATED T AXIS, ECG11: 66 DEGREES
CHLORIDE SERPL-SCNC: 107 MMOL/L (ref 97–108)
CO2 SERPL-SCNC: 27 MMOL/L (ref 21–32)
CREAT SERPL-MCNC: 1.66 MG/DL (ref 0.7–1.3)
DIAGNOSIS, 93000: NORMAL
EOSINOPHIL # BLD: 0.2 K/UL (ref 0–0.4)
EOSINOPHIL NFR BLD: 2 % (ref 0–7)
ERYTHROCYTE [DISTWIDTH] IN BLOOD BY AUTOMATED COUNT: 12.3 % (ref 11.5–14.5)
GLOBULIN SER CALC-MCNC: 3.7 G/DL (ref 2–4)
GLUCOSE SERPL-MCNC: 96 MG/DL (ref 65–100)
HCT VFR BLD AUTO: 42.8 % (ref 36.6–50.3)
HGB BLD-MCNC: 15.2 G/DL (ref 12.1–17)
LYMPHOCYTES # BLD: 3.1 K/UL (ref 0.8–3.5)
LYMPHOCYTES NFR BLD: 32 % (ref 12–49)
MCH RBC QN AUTO: 34.1 PG (ref 26–34)
MCHC RBC AUTO-ENTMCNC: 35.5 G/DL (ref 30–36.5)
MCV RBC AUTO: 96 FL (ref 80–99)
MONOCYTES # BLD: 0.7 K/UL (ref 0–1)
MONOCYTES NFR BLD: 8 % (ref 5–13)
NEUTS SEG # BLD: 5.8 K/UL (ref 1.8–8)
NEUTS SEG NFR BLD: 58 % (ref 32–75)
P-R INTERVAL, ECG05: 172 MS
PLATELET # BLD AUTO: 201 K/UL (ref 150–400)
POTASSIUM SERPL-SCNC: 4 MMOL/L (ref 3.5–5.1)
PROT SERPL-MCNC: 7.7 G/DL (ref 6.4–8.2)
Q-T INTERVAL, ECG07: 340 MS
QRS DURATION, ECG06: 78 MS
QTC CALCULATION (BEZET), ECG08: 420 MS
RBC # BLD AUTO: 4.46 M/UL (ref 4.1–5.7)
SODIUM SERPL-SCNC: 141 MMOL/L (ref 136–145)
TROPONIN I SERPL-MCNC: <0.04 NG/ML
VENTRICULAR RATE, ECG03: 92 BPM
WBC # BLD AUTO: 9.9 K/UL (ref 4.1–11.1)

## 2018-01-04 PROCEDURE — 74011250636 HC RX REV CODE- 250/636

## 2018-01-04 PROCEDURE — 80053 COMPREHEN METABOLIC PANEL: CPT | Performed by: EMERGENCY MEDICINE

## 2018-01-04 PROCEDURE — 85025 COMPLETE CBC W/AUTO DIFF WBC: CPT | Performed by: EMERGENCY MEDICINE

## 2018-01-04 PROCEDURE — 84484 ASSAY OF TROPONIN QUANT: CPT | Performed by: EMERGENCY MEDICINE

## 2018-01-04 PROCEDURE — 94640 AIRWAY INHALATION TREATMENT: CPT

## 2018-01-04 PROCEDURE — 94664 DEMO&/EVAL PT USE INHALER: CPT

## 2018-01-04 PROCEDURE — 36415 COLL VENOUS BLD VENIPUNCTURE: CPT | Performed by: EMERGENCY MEDICINE

## 2018-01-04 PROCEDURE — 74011636637 HC RX REV CODE- 636/637: Performed by: PHYSICIAN ASSISTANT

## 2018-01-04 PROCEDURE — 74011250637 HC RX REV CODE- 250/637: Performed by: PHYSICIAN ASSISTANT

## 2018-01-04 PROCEDURE — 74011250636 HC RX REV CODE- 250/636: Performed by: PHYSICIAN ASSISTANT

## 2018-01-04 PROCEDURE — 71046 X-RAY EXAM CHEST 2 VIEWS: CPT

## 2018-01-04 RX ORDER — ALBUTEROL SULFATE 90 UG/1
1 AEROSOL, METERED RESPIRATORY (INHALATION)
Status: DISCONTINUED | OUTPATIENT
Start: 2018-01-04 | End: 2018-01-04 | Stop reason: HOSPADM

## 2018-01-04 RX ORDER — CODEINE PHOSPHATE AND GUAIFENESIN 10; 100 MG/5ML; MG/5ML
5 SOLUTION ORAL
Qty: 180 ML | Refills: 0 | Status: SHIPPED | OUTPATIENT
Start: 2018-01-04 | End: 2019-10-23

## 2018-01-04 RX ORDER — NAPROXEN 500 MG/1
500 TABLET ORAL
Qty: 20 TAB | Refills: 0 | Status: SHIPPED | OUTPATIENT
Start: 2018-01-04 | End: 2019-10-23

## 2018-01-04 RX ORDER — CODEINE PHOSPHATE AND GUAIFENESIN 10; 100 MG/5ML; MG/5ML
10 SOLUTION ORAL
Status: COMPLETED | OUTPATIENT
Start: 2018-01-04 | End: 2018-01-04

## 2018-01-04 RX ORDER — KETOROLAC TROMETHAMINE 30 MG/ML
INJECTION, SOLUTION INTRAMUSCULAR; INTRAVENOUS
Status: COMPLETED
Start: 2018-01-04 | End: 2018-01-04

## 2018-01-04 RX ORDER — AZITHROMYCIN 250 MG/1
TABLET, FILM COATED ORAL
Qty: 6 TAB | Refills: 0 | Status: SHIPPED | OUTPATIENT
Start: 2018-01-04 | End: 2018-11-05

## 2018-01-04 RX ORDER — KETOROLAC TROMETHAMINE 30 MG/ML
30 INJECTION, SOLUTION INTRAMUSCULAR; INTRAVENOUS
Status: COMPLETED | OUTPATIENT
Start: 2018-01-04 | End: 2018-01-04

## 2018-01-04 RX ORDER — PREDNISONE 20 MG/1
60 TABLET ORAL DAILY
Qty: 15 TAB | Refills: 0 | Status: SHIPPED | OUTPATIENT
Start: 2018-01-04 | End: 2018-01-09

## 2018-01-04 RX ORDER — PREDNISONE 20 MG/1
60 TABLET ORAL
Status: COMPLETED | OUTPATIENT
Start: 2018-01-04 | End: 2018-01-04

## 2018-01-04 RX ADMIN — PREDNISONE 60 MG: 20 TABLET ORAL at 03:09

## 2018-01-04 RX ADMIN — KETOROLAC TROMETHAMINE 30 MG: 30 INJECTION, SOLUTION INTRAMUSCULAR at 01:32

## 2018-01-04 RX ADMIN — KETOROLAC TROMETHAMINE 30 MG: 30 INJECTION, SOLUTION INTRAMUSCULAR; INTRAVENOUS at 01:32

## 2018-01-04 RX ADMIN — GUAIFENESIN AND CODEINE PHOSPHATE 10 ML: 100; 10 SOLUTION ORAL at 01:39

## 2018-01-04 RX ADMIN — SODIUM CHLORIDE 1000 ML: 900 INJECTION, SOLUTION INTRAVENOUS at 01:33

## 2018-01-04 RX ADMIN — ALBUTEROL SULFATE 1 PUFF: 90 AEROSOL, METERED RESPIRATORY (INHALATION) at 03:43

## 2018-01-04 NOTE — DISCHARGE INSTRUCTIONS
Cough: Care Instructions  Your Care Instructions    A cough is your body's response to something that bothers your throat or airways. Many things can cause a cough. You might cough because of a cold or the flu, bronchitis, or asthma. Smoking, postnasal drip, allergies, and stomach acid that backs up into your throat also can cause coughs. A cough is a symptom, not a disease. Most coughs stop when the cause, such as a cold, goes away. You can take a few steps at home to cough less and feel better. Follow-up care is a key part of your treatment and safety. Be sure to make and go to all appointments, and call your doctor if you are having problems. It's also a good idea to know your test results and keep a list of the medicines you take. How can you care for yourself at home? · Drink lots of water and other fluids. This helps thin the mucus and soothes a dry or sore throat. Honey or lemon juice in hot water or tea may ease a dry cough. · Take cough medicine as directed by your doctor. · Prop up your head on pillows to help you breathe and ease a dry cough. · Try cough drops to soothe a dry or sore throat. Cough drops don't stop a cough. Medicine-flavored cough drops are no better than candy-flavored drops or hard candy. · Do not smoke. Avoid secondhand smoke. If you need help quitting, talk to your doctor about stop-smoking programs and medicines. These can increase your chances of quitting for good. When should you call for help? Call 911 anytime you think you may need emergency care. For example, call if:  ? · You have severe trouble breathing. ?Call your doctor now or seek immediate medical care if:  ? · You cough up blood. ? · You have new or worse trouble breathing. ? · You have a new or higher fever. ? · You have a new rash. ? Watch closely for changes in your health, and be sure to contact your doctor if:  ? · You cough more deeply or more often, especially if you notice more mucus or a change in the color of your mucus. ? · You have new symptoms, such as a sore throat, an earache, or sinus pain. ? · You do not get better as expected. Where can you learn more? Go to http://bharti-janeen.info/. Enter D279 in the search box to learn more about \"Cough: Care Instructions. \"  Current as of: May 12, 2017  Content Version: 11.4  © 0355-4128 [employer]. Care instructions adapted under license by [employer] (which disclaims liability or warranty for this information). If you have questions about a medical condition or this instruction, always ask your healthcare professional. [place] [address] any warranty or liability for your use of this information. We hope that we have addressed all of your medical concerns. The examination and treatment you received in the Emergency Department were for an emergent problem and were not intended as complete care. It is important that you follow up with your healthcare provider(s) for ongoing care. If your symptoms worsen or do not improve as expected, and you are unable to reach your usual health care provider(s), you should return to the Emergency Department. Today's healthcare is undergoing tremendous change, and patient satisfaction surveys are one of the many tools to assess the quality of medical care. You may receive a survey from the [employer] regarding your experience in the Emergency Department. I hope that your experience has been completely positive, particularly the medical care that I provided. As such, please participate in the survey; anything less than excellent does not meet my expectations or intentions. [facility] and [facility] participate in nationally recognized quality of care measures.   If your blood pressure is greater than 120/80, as reported below, we urge that you seek medical care to address the potential of high blood pressure, commonly known as hypertension. Hypertension can be hereditary or can be caused by certain medical conditions, pain, stress, or \"white coat syndrome. \"       Please make an appointment with your health care provider(s) for follow up of your Emergency Department visit. VITALS:   Patient Vitals for the past 8 hrs:   Temp Pulse Resp BP SpO2   01/03/18 2327 97.9 °F (36.6 °C) 87 18 132/83 98 %          Thank you for allowing us to provide you with medical care today. We realize that you have many choices for your emergency care needs. Please choose us in the future for any continued health care needs. Regan Mariee Tish Dakins, [address] [place] [address].   Office: [phone]            Recent Results (from the past 24 hour(s))   EKG, 12 LEAD, INITIAL    Collection Time: 01/03/18 11:32 PM   Result Value Ref Range    Ventricular Rate 92 BPM    Atrial Rate 92 BPM    P-R Interval 172 ms    QRS Duration 78 ms    Q-T Interval 340 ms    QTC Calculation (Bezet) 420 ms    Calculated P Axis 82 degrees    Calculated R Axis 74 degrees    Calculated T Axis 66 degrees    Diagnosis       Normal sinus rhythm with sinus arrhythmia  When compared with ECG of 28-NOV-2017 20:37,  No significant change was found     CBC WITH AUTOMATED DIFF    Collection Time: 01/04/18  1:06 AM   Result Value Ref Range    WBC 9.9 4.1 - 11.1 K/uL    RBC 4.46 4.10 - 5.70 M/uL    HGB 15.2 12.1 - 17.0 g/dL    HCT 42.8 36.6 - 50.3 %    MCV 96.0 80.0 - 99.0 FL    MCH 34.1 (H) 26.0 - 34.0 PG    MCHC 35.5 30.0 - 36.5 g/dL    RDW 12.3 11.5 - 14.5 %    PLATELET 287 367 - 540 K/uL    NEUTROPHILS 58 32 - 75 %    LYMPHOCYTES 32 12 - 49 %    MONOCYTES 8 5 - 13 %    EOSINOPHILS 2 0 - 7 %    BASOPHILS 0 0 - 1 %    ABS. NEUTROPHILS 5.8 1.8 - 8.0 K/UL    ABS. LYMPHOCYTES 3.1 0.8 - 3.5 K/UL    ABS. MONOCYTES 0.7 0.0 - 1.0 K/UL    ABS. EOSINOPHILS 0.2 0.0 - 0.4 K/UL    ABS.  BASOPHILS 0.0 0.0 - 0.1 K/UL METABOLIC PANEL, COMPREHENSIVE    Collection Time: 01/04/18  1:06 AM   Result Value Ref Range    Sodium 141 136 - 145 mmol/L    Potassium 4.0 3.5 - 5.1 mmol/L    Chloride 107 97 - 108 mmol/L    CO2 27 21 - 32 mmol/L    Anion gap 7 5 - 15 mmol/L    Glucose 96 65 - 100 mg/dL    BUN 12 6 - 20 MG/DL    Creatinine 1.66 (H) 0.70 - 1.30 MG/DL    BUN/Creatinine ratio 7 (L) 12 - 20      GFR est AA 55 (L) >60 ml/min/1.73m2    GFR est non-AA 46 (L) >60 ml/min/1.73m2    Calcium 9.2 8.5 - 10.1 MG/DL    Bilirubin, total 0.4 0.2 - 1.0 MG/DL    ALT (SGPT) 29 12 - 78 U/L    AST (SGOT) 18 15 - 37 U/L    Alk. phosphatase 99 45 - 117 U/L    Protein, total 7.7 6.4 - 8.2 g/dL    Albumin 4.0 3.5 - 5.0 g/dL    Globulin 3.7 2.0 - 4.0 g/dL    A-G Ratio 1.1 1.1 - 2.2     TROPONIN I    Collection Time: 01/04/18  1:06 AM   Result Value Ref Range    Troponin-I, Qt. <0.04 <0.05 ng/mL       Xr Chest Pa Lat    Result Date: 1/4/2018  History: Cough. Frontal and lateral views of the chest show clear lungs. The heart, mediastinum and pulmonary vasculature are normal.  The bony thorax is unremarkable. IMPRESSION: NORMAL CHEST.

## 2018-01-04 NOTE — ED PROVIDER NOTES
HPI Comments: 42 yo male with hx of Hep C, Chronic pain, HTN, GERD, and kidney stone here for evaluation of cough, congestion, HA and tonight chest pain with cough. Symptoms over the past week. +HA that is worse with coughing. Denies fever, abd pain, flank pain, urinary symptoms. +Smoker. Patient is a 43 y.o. male presenting with cough. The history is provided by the patient. Cough   This is a new problem. The current episode started more than 2 days ago. The problem occurs constantly. The cough is productive of sputum. There has been no fever. Associated symptoms include chest pain, chills, headaches and rhinorrhea. Pertinent negatives include no myalgias, no shortness of breath, no vomiting and no confusion. He is a smoker. His past medical history is significant for bronchitis.         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    Hypertension     IV drug abuse     Kidney stone     Liver disease     elevated liver enzymes/hep C    Neuralgia     Other ill-defined conditions(799.89)     chronic bronchitis    Polysubstance dependence (HCC)     stimulants, MJ, tob, barbs, benzos, opiates    Psychiatric disorder     Bipolar, Anxiety    Unspecified adverse effect of anesthesia     \"was told he woke up during back surgery\" and during nerve root injection    Unspecified sleep apnea     mild - does not use CPAP       Past Surgical History:   Procedure Laterality Date    HX LUMBAR LAMINECTOMY      HX ORTHOPAEDIC Bilateral     back surgery - laminectomy/discectomy    HX ORTHOPAEDIC      nerve root injection in back    HX OTHER SURGICAL      testicular surgery- varicocelectomy, i &d of abscess on right elbow         Family History:   Problem Relation Age of Onset    Heart Disease Maternal Grandfather        Social History     Social History    Marital status:      Spouse name: N/A    Number of children: N/A    Years of education: N/A     Occupational History    Not on file. Social History Main Topics    Smoking status: Current Every Day Smoker     Packs/day: 1.00     Years: 22.00     Types: Cigarettes    Smokeless tobacco: Former User      Comment: cigarettes    Alcohol use No      Comment: no longer drinks    Drug use: No      Comment: per patient no methamphetamines    Sexual activity: Not Currently     Other Topics Concern    Not on file     Social History Narrative    The patient is . The patient lives his parents. The patient has no children. The patient does not have legal issues pending. The patient's source of income comes from family. patient's greatest support comes from his parents and this person will be involved with the treatment. pt has not been a victim of sexual/physical abuse. The highest grade achieved is College         ALLERGIES: Barium sulfate; Demerol [meperidine]; Iodinated contrast- oral and iv dye; and Neurontin [gabapentin]    Review of Systems   Constitutional: Positive for chills. HENT: Positive for rhinorrhea. Negative for ear discharge. Eyes: Negative for photophobia, pain, discharge and visual disturbance. Respiratory: Positive for cough and chest tightness. Negative for apnea and shortness of breath. Cardiovascular: Positive for chest pain. Negative for palpitations and leg swelling. Gastrointestinal: Negative for abdominal distention, abdominal pain, blood in stool and vomiting. Genitourinary: Negative for difficulty urinating, dysuria, flank pain, frequency and hematuria. Musculoskeletal: Negative for back pain, gait problem, joint swelling, myalgias and neck pain. Skin: Negative for color change and pallor. Neurological: Positive for headaches. Negative for dizziness, syncope, weakness and numbness. Psychiatric/Behavioral: Negative for behavioral problems and confusion. The patient is not nervous/anxious. Vitals:    01/03/18 2327   BP: 132/83   Pulse: 87   Resp: 18   Temp: 97.9 °F (36.6 °C)   SpO2: 98%   Weight: 104.4 kg (230 lb 3.2 oz)            Physical Exam   Constitutional: He is oriented to person, place, and time. He appears well-developed and well-nourished. HENT:   Head: Normocephalic and atraumatic. Right Ear: External ear normal.   Left Ear: External ear normal.   Nose: Nose normal.   Mouth/Throat: Oropharynx is clear and moist.   +clear rhinorrhea    Eyes: Conjunctivae and EOM are normal. Pupils are equal, round, and reactive to light. Right eye exhibits no discharge. Left eye exhibits no discharge. Neck: Normal range of motion. Neck supple. No meningeal signs   Cardiovascular: Normal rate, regular rhythm, normal heart sounds and intact distal pulses. Pulmonary/Chest: Effort normal and breath sounds normal.   +cough   Abdominal: Soft. Bowel sounds are normal. He exhibits no distension. There is no tenderness. There is no rebound and no guarding. Musculoskeletal: Normal range of motion. He exhibits no edema or tenderness. Lymphadenopathy:     He has no cervical adenopathy. Neurological: He is alert and oriented to person, place, and time. No cranial nerve deficit. Coordination normal.   Skin: Skin is warm and dry. No rash noted. Psychiatric: He has a normal mood and affect. His behavior is normal. Judgment and thought content normal.   Nursing note and vitals reviewed.        MDM  Number of Diagnoses or Management Options  Acute upper respiratory infection:   Cough:      Amount and/or Complexity of Data Reviewed  Clinical lab tests: ordered and reviewed  Tests in the radiology section of CPT®: ordered and reviewed  Discuss the patient with other providers: yes  Independent visualization of images, tracings, or specimens: yes      ED Course       Procedures    Patient has been reassessed. Feeling much better. Reviewed labs, medications and radiographics with patient. Ready to discharge home. Discussed case with attending Physician. Agrees with care and will D/C with follow up. Patient's results have been reviewed with them. Patient and/or family have verbally conveyed their understanding and agreement of the patient's signs, symptoms, diagnosis, treatment and prognosis and additionally agree to follow up as recommended or return to the Emergency Room should their condition change prior to follow-up. Discharge instructions have also been provided to the patient with some educational information regarding their diagnosis as well a list of reasons why they would want to return to the ER prior to their follow-up appointment should their condition change.   VICKI Santos

## 2018-01-04 NOTE — ED TRIAGE NOTES
TRIAGE NOTE:  Patient arrives with c/o chest pain, headache and cough. Patient states \"every times I cough it makes my head and chest hurt more\".

## 2018-01-08 ENCOUNTER — TELEPHONE (OUTPATIENT)
Dept: NEUROLOGY | Age: 43
End: 2018-01-08

## 2018-01-08 NOTE — TELEPHONE ENCOUNTER
Contacted patient and informed him DMV forms were faxed on 12/20/17. Informed him I will place a copy in the mail. He voiced understanding and will call back if any further questions or concerns.

## 2018-01-08 NOTE — TELEPHONE ENCOUNTER
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  It was submitted during his last visit on 12/20/17

## 2018-11-05 ENCOUNTER — APPOINTMENT (OUTPATIENT)
Dept: GENERAL RADIOLOGY | Age: 43
End: 2018-11-05
Attending: EMERGENCY MEDICINE
Payer: SUBSIDIZED

## 2018-11-05 ENCOUNTER — HOSPITAL ENCOUNTER (EMERGENCY)
Age: 43
Discharge: HOME | End: 2018-11-05
Attending: EMERGENCY MEDICINE
Payer: SUBSIDIZED

## 2018-11-05 VITALS
HEIGHT: 72 IN | TEMPERATURE: 97.5 F | WEIGHT: 269.8 LBS | SYSTOLIC BLOOD PRESSURE: 119 MMHG | BODY MASS INDEX: 36.54 KG/M2 | OXYGEN SATURATION: 96 % | RESPIRATION RATE: 20 BRPM | HEART RATE: 103 BPM | DIASTOLIC BLOOD PRESSURE: 73 MMHG

## 2018-11-05 DIAGNOSIS — M79.671 RIGHT FOOT PAIN: Primary | ICD-10-CM

## 2018-11-05 PROCEDURE — 73630 X-RAY EXAM OF FOOT: CPT

## 2018-11-05 PROCEDURE — 77030013175 HC SHOE PSTOP DJOR -A

## 2018-11-05 PROCEDURE — 99282 EMERGENCY DEPT VISIT SF MDM: CPT

## 2018-11-05 RX ORDER — CEPHALEXIN 500 MG/1
500 CAPSULE ORAL 4 TIMES DAILY
Qty: 28 CAP | Refills: 0 | Status: SHIPPED | OUTPATIENT
Start: 2018-11-05 | End: 2018-11-12

## 2018-11-05 RX ORDER — INDOMETHACIN 25 MG/1
50 CAPSULE ORAL 3 TIMES DAILY
Qty: 30 CAP | Refills: 0 | Status: SHIPPED | OUTPATIENT
Start: 2018-11-05 | End: 2018-11-10

## 2018-11-05 NOTE — ED TRIAGE NOTES
Right sided foot pain onset 2-3 days ago; pt reports \" I think I may have kicked something in my sleep a couple nights ago and it has become unbearable. \" Pt reports taking tylenol and aspirin last night for pain. Pt ambulatory into ED room upon arrival with steady gait.

## 2018-11-05 NOTE — ED PROVIDER NOTES
HPI  
 
  36y M here with pain at the base of the right 5th toe. Started to bother him in the past 2-3 days. He thinks he may have kicked something in his sleep. Pain worse when weight bearing. Has some pain at rest as well, but not as bad. No fever. The area is swollen and painful. He is still able to move the joint when sitting still and still can ambulate. No hx of similar. Nothing makes sx's better or worse. Past Medical History:  
Diagnosis Date  Arrhythmia PCV's  Arthritis   
 back  Back pain  Chronic pain   
 6 herniated discs in back/pinched nerve in back and neck  GERD (gastroesophageal reflux disease)  Hepatitis C   
 HSV-2 seropositive 7/29/2016  Hypertension  IV drug abuse ([facility] [address]  Kidney stone  Liver disease   
 elevated liver enzymes/hep C  
 Neuralgia  Other ill-defined conditions(799.89) chronic bronchitis  Polysubstance dependence ([facility] [address] stimulants, MJ, tob, barbs, benzos, opiates  Psychiatric disorder Bipolar, Anxiety  Unspecified adverse effect of anesthesia \"was told he woke up during back surgery\" and during nerve root injection  Unspecified sleep apnea   
 mild - does not use CPAP Past Surgical History:  
Procedure Laterality Date  HX LUMBAR LAMINECTOMY  HX ORTHOPAEDIC Bilateral   
 back surgery - laminectomy/discectomy  HX ORTHOPAEDIC    
 nerve root injection in back  HX OTHER SURGICAL    
 testicular surgery- varicocelectomy, i &d of abscess on right elbow Family History:  
Problem Relation Age of Onset  Heart Disease Maternal Grandfather Social History Socioeconomic History  Marital status:  Spouse name: Not on file  Number of children: Not on file  Years of education: Not on file  Highest education level: Not on file Social Needs  Financial resource strain: Not on file  Food insecurity - worry: Not on file  Food insecurity - inability: Not on file  Transportation needs - medical: Not on file  Transportation needs - non-medical: Not on file Occupational History  Not on file Tobacco Use  Smoking status: Current Every Day Smoker Packs/day: 1.00 Years: 22.00 Pack years: 22.00 Types: Cigarettes  Smokeless tobacco: Former User  Tobacco comment: cigarettes Substance and Sexual Activity  Alcohol use: No  
  Alcohol/week: 0.0 oz  
  Comment: no longer drinks  Drug use: No  
  Comment: per patient no methamphetamines  Sexual activity: Not Currently Other Topics Concern  Not on file Social History Narrative The patient is . The patient lives his parents. The patient has no children. The patient does not have legal issues pending. The patient's source of income comes from family. patient's greatest support comes from his parents and this person will be involved with the treatment. pt has not been a victim of sexual/physical abuse. The highest grade achieved is [employer] ALLERGIES: Barium sulfate; Demerol [meperidine]; Iodinated contrast- oral and iv dye; and Neurontin [gabapentin] Review of Systems Review of Systems Constitutional: (-) weight loss. HEENT: (-) stiff neck Eyes: (-) discharge. Respiratory: (-) cough. Cardiovascular: (-) syncope. Gastrointestinal: (-) blood in stool. Genitourinary: (-) hematuria. Musculoskeletal: (-) myalgias. Neurological: (-) seizure. Skin: (-) petechiae Lymph/Immunologic: (-) enlarged lymph nodes All other systems reviewed and are negative. Vitals:  
 11/05/18 8064 11/05/18 0510 BP:  119/73 Pulse:  (!) 103 Resp:  20 Temp:  97.5 °F (36.4 °C) SpO2:  96% Weight: 122.2 kg (269 lb 8 oz) 122.4 kg (269 lb 12.8 oz) Height:  6' (1.829 m) Physical Exam Nursing note and vitals reviewed. Constitutional: oriented to person, place, and time.  appears well-developed and well-nourished. No distress. Head: Normocephalic and atraumatic. Sclera anicteric Nose: No rhinorrhea Mouth/Throat: Oropharynx is clear and moist. Pharynx normal 
Eyes: Conjunctivae are normal. Pupils are equal, round, and reactive to light. Right eye exhibits no discharge. Left eye exhibits no discharge. Neck: Painless normal range of motion. Neck supple. No LAD. Cardiovascular: Normal rate, regular rhythm, normal heart sounds and intact distal pulses. Exam reveals no gallop and no friction rub. No murmur heard. Pulmonary/Chest:  No respiratory distress. No wheezes. No rales. No rhonchi. No increased work of breathing. No accessory muscle use. Good air exchange throughout. Abdominal: soft, non-tender, no rebound or guarding. No hepatosplenomegaly. Normal bowel sounds throughout. Back: no tenderness to palpation, no deformities, no CVA tenderness Extremities/Musculoskeletal: Normal range of motion. (+) tenderness and redness to the lateral aspect of the base of the 5th toe on the R foot. Distal extremities are neurovasc intact. Lymphadenopathy:   No adenopathy. Neurological:  Alert and oriented to person, place, and time. Coordination normal. CN 2-12 intact. Motor and sensory function intact. Skin: Skin is warm and dry. No rash noted. No pallor. MDM  36y M here with pain at the base of the R 5th toe. Possibly traumatic? Does not appear to be septic arthritis? Possibly gout or cellulitis? No systemic signs of illness. Will check xray. Procedures

## 2018-11-05 NOTE — ED NOTES
Post op shoe applied per MD order. Discharge instructions reviewed with pt and copy given along with RX by ER MD Rainer Trevino. Patient ambulatory from ED accompanied by self with steady gait at time of discharge.

## 2018-11-05 NOTE — DISCHARGE INSTRUCTIONS
Foot Pain: Care Instructions  Your Care Instructions  Foot injuries that cause pain and swelling are fairly common. Almost all sports or home repair projects can cause a misstep that ends up as foot pain. Normal wear and tear, especially as you get older, also can cause foot pain. Most minor foot injuries will heal on their own, and home treatment is usually all you need to do. If you have a severe injury, you may need tests and treatment. Follow-up care is a key part of your treatment and safety. Be sure to make and go to all appointments, and call your doctor if you are having problems. It's also a good idea to know your test results and keep a list of the medicines you take. How can you care for yourself at home? · Take pain medicines exactly as directed. ? If the doctor gave you a prescription medicine for pain, take it as prescribed. ? If you are not taking a prescription pain medicine, ask your doctor if you can take an over-the-counter medicine. · Rest and protect your foot. Take a break from any activity that may cause pain. · Put ice or a cold pack on your foot for 10 to 20 minutes at a time. Put a thin cloth between the ice and your skin. · Prop up the sore foot on a pillow when you ice it or anytime you sit or lie down during the next 3 days. Try to keep it above the level of your heart. This will help reduce swelling. · Your doctor may recommend that you wrap your foot with an elastic bandage. Keep your foot wrapped for as long as your doctor advises. · If your doctor recommends crutches, use them as directed. · Wear roomy footwear. · As soon as pain and swelling end, begin gentle exercises of your foot. Your doctor can tell you which exercises will help. When should you call for help? Call 911 anytime you think you may need emergency care.  For example, call if:    · Your foot turns pale, white, blue, or cold.    Call your doctor now or seek immediate medical care if:    · You cannot move or stand on your foot.     · Your foot looks twisted or out of its normal position.     · Your foot is not stable when you step down.     · You have signs of infection, such as:  ? Increased pain, swelling, warmth, or redness. ? Red streaks leading from the sore area. ? Pus draining from a place on your foot. ? A fever.     · Your foot is numb or tingly.    Watch closely for changes in your health, and be sure to contact your doctor if:    · You do not get better as expected.     · You have bruises from an injury that last longer than 2 weeks. Where can you learn more? Go to http://bharti-janeen.info/. Enter V714 in the search box to learn more about \"Foot Pain: Care Instructions. \"  Current as of: November 29, 2017  Content Version: 11.8  © 4255-0265 [employer]. Care instructions adapted under license by [employer] (which disclaims liability or warranty for this information). If you have questions about a medical condition or this instruction, always ask your healthcare professional. [place] [address] any warranty or liability for your use of this information. Gout: Care Instructions  Your Care Instructions    Gout is a form of arthritis caused by a buildup of uric acid crystals in a joint. It causes sudden attacks of pain, swelling, redness, and stiffness, usually in one joint, especially the big toe. Gout usually comes on without a cause. But it can be brought on by drinking alcohol (especially beer) or eating seafood and red meat. Taking certain medicines, such as diuretics or aspirin, also can bring on an attack of gout. Taking your medicines as prescribed and following up with your doctor regularly can help you avoid gout attacks in the future. Follow-up care is a key part of your treatment and safety. Be sure to make and go to all appointments, and call your doctor if you are having problems.  It's also a good idea to know your test results and keep a list of the medicines you take. How can you care for yourself at home? · If the joint is swollen, put ice or a cold pack on the area for 10 to 20 minutes at a time. Put a thin cloth between the ice and your skin. · Prop up the sore limb on a pillow when you ice it or anytime you sit or lie down during the next 3 days. Try to keep it above the level of your heart. This will help reduce swelling. · Rest sore joints. Avoid activities that put weight or strain on the joints for a few days. Take short rest breaks from your regular activities during the day. · Take your medicines exactly as prescribed. Call your doctor if you think you are having a problem with your medicine. · Take pain medicines exactly as directed. ? If the doctor gave you a prescription medicine for pain, take it as prescribed. ? If you are not taking a prescription pain medicine, ask your doctor if you can take an over-the-counter medicine. · Eat less seafood and red meat. · Check with your doctor before drinking alcohol. · Losing weight, if you are overweight, may help reduce attacks of gout. But do not go on a [employer]. \" Losing a lot of weight in a short amount of time can cause a gout attack. When should you call for help? Call your doctor now or seek immediate medical care if:    · You have a fever.     · The joint is so painful you cannot use it.     · You have sudden, unexplained swelling, redness, warmth, or severe pain in one or more joints.    Watch closely for changes in your health, and be sure to contact your doctor if:    · You have joint pain.     · Your symptoms get worse or are not improving after 2 or 3 days. Where can you learn more? Go to http://bharti-janeen.info/. Enter Z324 in the search box to learn more about \"Gout: Care Instructions. \"  Current as of: June 11, 2018  Content Version: 11.8  © 3870-5535 [employer].  Care instructions adapted under license by [employer] (which disclaims liability or warranty for this information). If you have questions about a medical condition or this instruction, always ask your healthcare professional. [address] any warranty or liability for your use of this information.

## 2019-10-23 ENCOUNTER — APPOINTMENT (OUTPATIENT)
Dept: GENERAL RADIOLOGY | Age: 44
End: 2019-10-23
Attending: EMERGENCY MEDICINE
Payer: MEDICAID

## 2019-10-23 ENCOUNTER — HOSPITAL ENCOUNTER (EMERGENCY)
Age: 44
Discharge: HOME | End: 2019-10-23
Attending: EMERGENCY MEDICINE
Payer: MEDICAID

## 2019-10-23 VITALS
WEIGHT: 252.65 LBS | SYSTOLIC BLOOD PRESSURE: 120 MMHG | HEART RATE: 86 BPM | OXYGEN SATURATION: 99 % | DIASTOLIC BLOOD PRESSURE: 83 MMHG | RESPIRATION RATE: 14 BRPM | BODY MASS INDEX: 34.22 KG/M2 | HEIGHT: 72 IN | TEMPERATURE: 97.5 F

## 2019-10-23 DIAGNOSIS — S70.02XA CONTUSION OF LEFT HIP, INITIAL ENCOUNTER: ICD-10-CM

## 2019-10-23 DIAGNOSIS — S80.02XA CONTUSION OF LEFT KNEE, INITIAL ENCOUNTER: Primary | ICD-10-CM

## 2019-10-23 DIAGNOSIS — M79.672 LEFT FOOT PAIN: ICD-10-CM

## 2019-10-23 PROCEDURE — 73562 X-RAY EXAM OF KNEE 3: CPT

## 2019-10-23 PROCEDURE — 99283 EMERGENCY DEPT VISIT LOW MDM: CPT

## 2019-10-23 PROCEDURE — 74011250637 HC RX REV CODE- 250/637: Performed by: EMERGENCY MEDICINE

## 2019-10-23 PROCEDURE — 73502 X-RAY EXAM HIP UNI 2-3 VIEWS: CPT

## 2019-10-23 PROCEDURE — 73630 X-RAY EXAM OF FOOT: CPT

## 2019-10-23 RX ORDER — ATORVASTATIN CALCIUM 40 MG/1
40 TABLET, FILM COATED ORAL DAILY
COMMUNITY
End: 2022-02-04

## 2019-10-23 RX ORDER — IBUPROFEN 600 MG/1
600 TABLET ORAL
Qty: 30 TAB | Refills: 0 | Status: SHIPPED | OUTPATIENT
Start: 2019-10-23 | End: 2019-11-25

## 2019-10-23 RX ORDER — IBUPROFEN 600 MG/1
600 TABLET ORAL
Status: COMPLETED | OUTPATIENT
Start: 2019-10-23 | End: 2019-10-23

## 2019-10-23 RX ADMIN — IBUPROFEN 600 MG: 600 TABLET, FILM COATED ORAL at 10:29

## 2019-10-23 NOTE — DISCHARGE INSTRUCTIONS
Patient Education        Contusion: Care Instructions  Your Care Instructions    Contusion is the medical term for a bruise. It is the result of a direct blow or an impact, such as a fall. Contusions are common sports injuries. Most people think of a bruise as a black-and-blue spot. This happens when small blood vessels get torn and leak blood under the skin. But bones, muscles, and organs can also get bruised. This may damage deep tissues but not cause a bruise you can see. The doctor will do a physical exam to find the location of your contusion. You may also have tests to make sure you do not have a more serious injury, such as a broken bone or nerve damage. These may include X-rays or other imaging tests like a CT scan or MRI. Deep-tissue contusions may cause pain and swelling. But if there is no serious damage, they will often get better in a few weeks with home treatment. The doctor has checked you carefully, but problems can develop later. If you notice any problems or new symptoms, get medical treatment right away. Follow-up care is a key part of your treatment and safety. Be sure to make and go to all appointments, and call your doctor if you are having problems. It's also a good idea to know your test results and keep a list of the medicines you take. How can you care for yourself at home? · Put ice or a cold pack on the sore area for 10 to 20 minutes at a time to stop swelling. Put a thin cloth between the ice pack and your skin. · Be safe with medicines. Read and follow all instructions on the label. ? If the doctor gave you a prescription medicine for pain, take it as prescribed. ? If you are not taking a prescription pain medicine, ask your doctor if you can take an over-the-counter medicine. · If you can, prop up the sore area on pillows as much as possible for the next few days. Try to keep the sore area above the level of your heart. When should you call for help?   Call your doctor now or seek immediate medical care if:    · Your pain gets worse.     · You have new or worse swelling.     · You have tingling, weakness, or numbness in the area near the contusion.     · The area near the contusion is cold or pale.    Watch closely for changes in your health, and be sure to contact your doctor if:    · You do not get better as expected. Where can you learn more? Go to http://bharti-janeen.info/. Enter D286 in the search box to learn more about \"Contusion: Care Instructions. \"  Current as of: June 26, 2019  Content Version: 12.2  © 2095-8373 [employer]. Care instructions adapted under license by [employer] (which disclaims liability or warranty for this information). If you have questions about a medical condition or this instruction, always ask your healthcare professional. [address] any warranty or liability for your use of this information.

## 2019-10-23 NOTE — ED PROVIDER NOTES
Ms. Ross Humphrey is a 42-year-old male who presents to the ER with complaints of left leg pain. He says that he fell this morning before the son came he was walking outside in his foot slipped on some wet grass. Healing on the left side. He did not hit his head. He did not get knocked out. He says that he think that his left foot twisted when he fell. He complains of pain at the outside of his left foot, the outside of his left knee, and his left hip. He is been able to walk on it without much difficulty. He is taking aspirin for pain which is only mildly helped. No headache. No nausea or vomiting. No new numbness, tingling, or weakness. He denies any other complaints.            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    Hypertension     IV drug abuse ([facility] [address]     Kidney stone     Liver disease     elevated liver enzymes/hep C    Neuralgia     Other ill-defined conditions(799.89)     chronic bronchitis    Polysubstance dependence (HCC)     stimulants, MJ, tob, barbs, benzos, opiates    Psychiatric disorder     Bipolar, Anxiety    Unspecified adverse effect of anesthesia     \"was told he woke up during back surgery\" and during nerve root injection    Unspecified sleep apnea     mild - does not use CPAP       Past Surgical History:   Procedure Laterality Date    HX LUMBAR LAMINECTOMY      HX ORTHOPAEDIC Bilateral     back surgery - laminectomy/discectomy    HX ORTHOPAEDIC      nerve root injection in back    HX OTHER SURGICAL      testicular surgery- varicocelectomy, i &d of abscess on right elbow         Family History:   Problem Relation Age of Onset    Heart Disease Maternal Grandfather        Social History     Socioeconomic History    Marital status:  Spouse name: Not on file    Number of children: Not on file    Years of education: Not on file    Highest education level: Not on file   Occupational History    Not on file   Social Needs    Financial resource strain: Not on file    Food insecurity:     Worry: Not on file     Inability: Not on file    Transportation needs:     Medical: Not on file     Non-medical: Not on file   Tobacco Use    Smoking status: Current Every Day Smoker     Packs/day: 1.00     Years: 22.00     Pack years: 22.00     Types: Cigarettes    Smokeless tobacco: Former User    Tobacco comment: cigarettes   Substance and Sexual Activity    Alcohol use: No     Alcohol/week: 0.0 standard drinks     Comment: no longer drinks    Drug use: No     Comment: per patient no methamphetamines    Sexual activity: Not Currently   Lifestyle    Physical activity:     Days per week: Not on file     Minutes per session: Not on file    Stress: Not on file   Relationships    Social connections:     Talks on phone: Not on file     Gets together: Not on file     Attends Congregation service: Not on file     Active member of club or organization: Not on file     Attends meetings of clubs or organizations: Not on file     Relationship status: Not on file    Intimate partner violence:     Fear of current or ex partner: Not on file     Emotionally abused: Not on file     Physically abused: Not on file     Forced sexual activity: Not on file   Other Topics Concern    Not on file   Social History Narrative    The patient is . The patient lives his parents. The patient has no children. The patient does not have legal issues pending. The patient's source of income comes from family. patient's greatest support comes from his parents and this person will be involved with the treatment. pt has not been a victim of sexual/physical abuse. The highest grade achieved is College         ALLERGIES: Barium sulfate; Demerol [meperidine];  Iodinated contrast media; and Neurontin [gabapentin]    Review of Systems   Constitutional: Negative for chills and fever. HENT: Negative for rhinorrhea and sore throat. Respiratory: Negative for cough and shortness of breath. Cardiovascular: Negative for chest pain. Gastrointestinal: Negative for abdominal pain, diarrhea, nausea and vomiting. Genitourinary: Negative for dysuria and hematuria. Musculoskeletal:        Leg pain   Skin: Negative for pallor and rash. Neurological: Negative for dizziness, weakness and light-headedness. All other systems reviewed and are negative. Vitals:    10/23/19 0941   BP: 120/83   Pulse: 86   Resp: 14   Temp: 97.5 °F (36.4 °C)   SpO2: 99%   Weight: 114.6 kg (252 lb 10.4 oz)   Height: 6' (1.829 m)            Physical Exam     Vital signs reviewed. Nursing notes reviewed. Const:  No acute distress, well developed, well nourished  Head:  Atraumatic, normocephalic  Eyes:  PERRL, conjunctiva normal, no scleral icterus  Neck:  Supple, trachea midline  Cardiovascular:  RRR, no murmurs, no gallops, no rubs  Resp:  No resp distress, no increased work of breathing, no wheezes, no rhonchi, no rales,  Abd:  Soft, non-tender, non-distended, no rebound, no guarding, no CVA tenderness  MSK: Mild tenderness to the lateral lower foot, no tenderness to the medial or lateral malleolus, mild tenderness to the lateral left knee, no appreciable swelling. Extensor mechanism of the knee is intact. No pain with range of motion of the left knee. Mild tenderness to the left lateral hip.   Neuro:  Alert and oriented x3, no cranial nerve defect  Skin:  Warm, dry, intact  Psych: normal mood and affect, behavior is normal, judgement and thought content is normal          MDM  Number of Diagnoses or Management Options  Contusion of left hip, initial encounter:   Contusion of left knee, initial encounter:   Left foot pain:      Amount and/or Complexity of Data Reviewed  Tests in the radiology section of CPT®: ordered and reviewed  Review and summarize past medical records: yes    Patient Progress  Patient progress: stable          Mr. Tripp Cortez is a 59-year-old male presents to the ER with leg pain after a fall. His fall was mechanical fall. No fractures on x-ray. He is able to walk on this leg. No other reports of pain or injury. Patient TO follow with his doctor or return to the ER with any new or worsening symptoms.     Procedures

## 2019-11-16 ENCOUNTER — HOSPITAL ENCOUNTER (EMERGENCY)
Age: 44
Discharge: HOME | End: 2019-11-16
Attending: EMERGENCY MEDICINE
Payer: MEDICAID

## 2019-11-16 VITALS
TEMPERATURE: 97.5 F | BODY MASS INDEX: 32.85 KG/M2 | WEIGHT: 242.51 LBS | SYSTOLIC BLOOD PRESSURE: 138 MMHG | HEIGHT: 72 IN | RESPIRATION RATE: 16 BRPM | HEART RATE: 89 BPM | DIASTOLIC BLOOD PRESSURE: 84 MMHG | OXYGEN SATURATION: 96 %

## 2019-11-16 DIAGNOSIS — R11.2 NAUSEA AND VOMITING, INTRACTABILITY OF VOMITING NOT SPECIFIED, UNSPECIFIED VOMITING TYPE: ICD-10-CM

## 2019-11-16 DIAGNOSIS — R51.9 NONINTRACTABLE HEADACHE, UNSPECIFIED CHRONICITY PATTERN, UNSPECIFIED HEADACHE TYPE: Primary | ICD-10-CM

## 2019-11-16 LAB
ALBUMIN SERPL-MCNC: 4.3 G/DL (ref 3.5–5)
ALBUMIN/GLOB SERPL: 1.2 {RATIO} (ref 1.1–2.2)
ALP SERPL-CCNC: 101 U/L (ref 45–117)
ALT SERPL-CCNC: 42 U/L (ref 12–78)
ANION GAP SERPL CALC-SCNC: 10 MMOL/L (ref 5–15)
AST SERPL-CCNC: 24 U/L (ref 15–37)
BASOPHILS # BLD: 0 K/UL (ref 0–0.1)
BASOPHILS NFR BLD: 1 % (ref 0–1)
BILIRUB SERPL-MCNC: 0.5 MG/DL (ref 0.2–1)
BUN SERPL-MCNC: 28 MG/DL (ref 6–20)
BUN/CREAT SERPL: 14 (ref 12–20)
CALCIUM SERPL-MCNC: 9.6 MG/DL (ref 8.5–10.1)
CHLORIDE SERPL-SCNC: 98 MMOL/L (ref 97–108)
CO2 SERPL-SCNC: 29 MMOL/L (ref 21–32)
COMMENT, HOLDF: NORMAL
CREAT SERPL-MCNC: 1.94 MG/DL (ref 0.7–1.3)
DIFFERENTIAL METHOD BLD: ABNORMAL
EOSINOPHIL # BLD: 0.1 K/UL (ref 0–0.4)
EOSINOPHIL NFR BLD: 2 % (ref 0–7)
ERYTHROCYTE [DISTWIDTH] IN BLOOD BY AUTOMATED COUNT: 12.3 % (ref 11.5–14.5)
FLUAV AG NPH QL IA: NEGATIVE
FLUBV AG NOSE QL IA: NEGATIVE
GLOBULIN SER CALC-MCNC: 3.7 G/DL (ref 2–4)
GLUCOSE SERPL-MCNC: 113 MG/DL (ref 65–100)
HCT VFR BLD AUTO: 43.4 % (ref 36.6–50.3)
HGB BLD-MCNC: 15.2 G/DL (ref 12.1–17)
IMM GRANULOCYTES # BLD AUTO: 0 K/UL (ref 0–0.04)
IMM GRANULOCYTES NFR BLD AUTO: 0 % (ref 0–0.5)
LIPASE SERPL-CCNC: 109 U/L (ref 73–393)
LYMPHOCYTES # BLD: 2.3 K/UL (ref 0.8–3.5)
LYMPHOCYTES NFR BLD: 32 % (ref 12–49)
MAGNESIUM SERPL-MCNC: 1.6 MG/DL (ref 1.6–2.4)
MCH RBC QN AUTO: 34.3 PG (ref 26–34)
MCHC RBC AUTO-ENTMCNC: 35 G/DL (ref 30–36.5)
MCV RBC AUTO: 98 FL (ref 80–99)
MONOCYTES # BLD: 0.6 K/UL (ref 0–1)
MONOCYTES NFR BLD: 8 % (ref 5–13)
NEUTS SEG # BLD: 4.1 K/UL (ref 1.8–8)
NEUTS SEG NFR BLD: 57 % (ref 32–75)
NRBC # BLD: 0 K/UL (ref 0–0.01)
NRBC BLD-RTO: 0 PER 100 WBC
PLATELET # BLD AUTO: 194 K/UL (ref 150–400)
PMV BLD AUTO: 11.4 FL (ref 8.9–12.9)
POTASSIUM SERPL-SCNC: 4.1 MMOL/L (ref 3.5–5.1)
PROT SERPL-MCNC: 8 G/DL (ref 6.4–8.2)
RBC # BLD AUTO: 4.43 M/UL (ref 4.1–5.7)
SAMPLES BEING HELD,HOLD: NORMAL
SODIUM SERPL-SCNC: 137 MMOL/L (ref 136–145)
WBC # BLD AUTO: 7.1 K/UL (ref 4.1–11.1)

## 2019-11-16 PROCEDURE — 99283 EMERGENCY DEPT VISIT LOW MDM: CPT

## 2019-11-16 PROCEDURE — 87804 INFLUENZA ASSAY W/OPTIC: CPT

## 2019-11-16 PROCEDURE — 74011250636 HC RX REV CODE- 250/636: Performed by: EMERGENCY MEDICINE

## 2019-11-16 PROCEDURE — 85025 COMPLETE CBC W/AUTO DIFF WBC: CPT

## 2019-11-16 PROCEDURE — 80053 COMPREHEN METABOLIC PANEL: CPT

## 2019-11-16 PROCEDURE — 96375 TX/PRO/DX INJ NEW DRUG ADDON: CPT

## 2019-11-16 PROCEDURE — 96374 THER/PROPH/DIAG INJ IV PUSH: CPT

## 2019-11-16 PROCEDURE — 83690 ASSAY OF LIPASE: CPT

## 2019-11-16 PROCEDURE — 36415 COLL VENOUS BLD VENIPUNCTURE: CPT

## 2019-11-16 PROCEDURE — 83735 ASSAY OF MAGNESIUM: CPT

## 2019-11-16 PROCEDURE — 74011000250 HC RX REV CODE- 250: Performed by: EMERGENCY MEDICINE

## 2019-11-16 RX ORDER — ONDANSETRON 2 MG/ML
INJECTION INTRAMUSCULAR; INTRAVENOUS
Status: DISCONTINUED
Start: 2019-11-16 | End: 2019-11-16 | Stop reason: HOSPADM

## 2019-11-16 RX ORDER — ONDANSETRON 4 MG/1
4 TABLET, ORALLY DISINTEGRATING ORAL
Qty: 20 TAB | Refills: 0 | Status: SHIPPED | OUTPATIENT
Start: 2019-11-16

## 2019-11-16 RX ORDER — ONDANSETRON 2 MG/ML
4 INJECTION INTRAMUSCULAR; INTRAVENOUS
Status: COMPLETED | OUTPATIENT
Start: 2019-11-16 | End: 2019-11-16

## 2019-11-16 RX ORDER — KETOROLAC TROMETHAMINE 30 MG/ML
30 INJECTION, SOLUTION INTRAMUSCULAR; INTRAVENOUS
Status: COMPLETED | OUTPATIENT
Start: 2019-11-16 | End: 2019-11-16

## 2019-11-16 RX ADMIN — KETOROLAC TROMETHAMINE 30 MG: 30 INJECTION, SOLUTION INTRAMUSCULAR at 00:46

## 2019-11-16 RX ADMIN — SODIUM CHLORIDE 10 MG: 9 INJECTION INTRAMUSCULAR; INTRAVENOUS; SUBCUTANEOUS at 00:46

## 2019-11-16 RX ADMIN — SODIUM CHLORIDE 1000 ML: 900 INJECTION, SOLUTION INTRAVENOUS at 01:27

## 2019-11-16 RX ADMIN — SODIUM CHLORIDE 1000 ML: 900 INJECTION, SOLUTION INTRAVENOUS at 00:45

## 2019-11-16 RX ADMIN — ONDANSETRON 4 MG: 2 INJECTION INTRAMUSCULAR; INTRAVENOUS at 01:27

## 2019-11-16 NOTE — DISCHARGE INSTRUCTIONS
Patient Education        Headache: Care Instructions  Your Care Instructions    Headaches have many possible causes. Most headaches aren't a sign of a more serious problem, and they will get better on their own. Home treatment may help you feel better faster. The doctor has checked you carefully, but problems can develop later. If you notice any problems or new symptoms, get medical treatment right away. Follow-up care is a key part of your treatment and safety. Be sure to make and go to all appointments, and call your doctor if you are having problems. It's also a good idea to know your test results and keep a list of the medicines you take. How can you care for yourself at home? · Do not drive if you have taken a prescription pain medicine. · Rest in a quiet, dark room until your headache is gone. Close your eyes and try to relax or go to sleep. Don't watch TV or read. · Put a cold, moist cloth or cold pack on the painful area for 10 to 20 minutes at a time. Put a thin cloth between the cold pack and your skin. · Use a warm, moist towel or a heating pad set on low to relax tight shoulder and neck muscles. · Have someone gently massage your neck and shoulders. · Take pain medicines exactly as directed. ? If the doctor gave you a prescription medicine for pain, take it as prescribed. ? If you are not taking a prescription pain medicine, ask your doctor if you can take an over-the-counter medicine. · Be careful not to take pain medicine more often than the instructions allow, because you may get worse or more frequent headaches when the medicine wears off. · Do not ignore new symptoms that occur with a headache, such as a fever, weakness or numbness, vision changes, or confusion. These may be signs of a more serious problem. To prevent headaches  · Keep a headache diary so you can figure out what triggers your headaches. Avoiding triggers may help you prevent headaches.  Record when each headache began, how long it lasted, and what the pain was like (throbbing, aching, stabbing, or dull). Write down any other symptoms you had with the headache, such as nausea, flashing lights or dark spots, or sensitivity to bright light or loud noise. Note if the headache occurred near your period. List anything that might have triggered the headache, such as certain foods (chocolate, cheese, wine) or odors, smoke, bright light, stress, or lack of sleep. · Find healthy ways to deal with stress. Headaches are most common during or right after stressful times. Take time to relax before and after you do something that has caused a headache in the past.  · Try to keep your muscles relaxed by keeping good posture. Check your jaw, face, neck, and shoulder muscles for tension, and try relaxing them. When sitting at a desk, change positions often, and stretch for 30 seconds each hour. · Get plenty of sleep and exercise. · Eat regularly and well. Long periods without food can trigger a headache. · Treat yourself to a massage. Some people find that regular massages are very helpful in relieving tension. · Limit caffeine by not drinking too much coffee, tea, or soda. But don't quit caffeine suddenly, because that can also give you headaches. · Reduce eyestrain from computers by blinking frequently and looking away from the computer screen every so often. Make sure you have proper eyewear and that your monitor is set up properly, about an arm's length away. · Seek help if you have depression or anxiety. Your headaches may be linked to these conditions. Treatment can both prevent headaches and help with symptoms of anxiety or depression. When should you call for help? Call 911 anytime you think you may need emergency care. For example, call if:    · You have signs of a stroke. These may include:  ? Sudden numbness, paralysis, or weakness in your face, arm, or leg, especially on only one side of your body.   ? Sudden vision changes. ? Sudden trouble speaking. ? Sudden confusion or trouble understanding simple statements. ? Sudden problems with walking or balance. ? A sudden, severe headache that is different from past headaches.    Call your doctor now or seek immediate medical care if:    · You have a new or worse headache.     · Your headache gets much worse. Where can you learn more? Go to http://bharti-janeen.info/. Enter M271 in the search box to learn more about \"Headache: Care Instructions. \"  Current as of: March 28, 2019  Content Version: 12.2  © 1739-4180 [employer]. Care instructions adapted under license by [employer] (which disclaims liability or warranty for this information). If you have questions about a medical condition or this instruction, always ask your healthcare professional. [place] [address] any warranty or liability for your use of this information. Patient Education        Nausea and Vomiting: Care Instructions  Your Care Instructions    When you are nauseated, you may feel weak and sweaty and notice a lot of saliva in your mouth. Nausea often leads to vomiting. Most of the time you do not need to worry about nausea and vomiting, but they can be signs of other illnesses. Two common causes of nausea and vomiting are stomach flu and food poisoning. Nausea and vomiting from viral stomach flu will usually start to improve within 24 hours. Nausea and vomiting from food poisoning may last from 12 to 48 hours. The doctor has checked you carefully, but problems can develop later. If you notice any problems or new symptoms, get medical treatment right away. Follow-up care is a key part of your treatment and safety. Be sure to make and go to all appointments, and call your doctor if you are having problems. It's also a good idea to know your test results and keep a list of the medicines you take. How can you care for yourself at home?   · To prevent dehydration, drink plenty of fluids, enough so that your urine is light yellow or clear like water. Choose water and other caffeine-free clear liquids until you feel better. If you have kidney, heart, or liver disease and have to limit fluids, talk with your doctor before you increase the amount of fluids you drink. · Rest in bed until you feel better. · When you are able to eat, try clear soups, mild foods, and liquids until all symptoms are gone for 12 to 48 hours. Other good choices include dry toast, crackers, cooked cereal, and gelatin dessert, such as Jell-O. When should you call for help? Call 911 anytime you think you may need emergency care. For example, call if:    · You passed out (lost consciousness).    Call your doctor now or seek immediate medical care if:    · You have symptoms of dehydration, such as:  ? Dry eyes and a dry mouth. ? Passing only a little dark urine. ? Feeling thirstier than usual.     · You have new or worsening belly pain.     · You have a new or higher fever.     · You vomit blood or what looks like coffee grounds.    Watch closely for changes in your health, and be sure to contact your doctor if:    · You have ongoing nausea and vomiting.     · Your vomiting is getting worse.     · Your vomiting lasts longer than 2 days.     · You are not getting better as expected. Where can you learn more? Go to http://bharti-janeen.info/. Enter 25 [id number] in the search box to learn more about \"Nausea and Vomiting: Care Instructions. \"  Current as of: June 26, 2019  Content Version: 12.2  © 2689-6307 [employer]. Care instructions adapted under license by [employer] (which disclaims liability or warranty for this information). If you have questions about a medical condition or this instruction, always ask your healthcare professional. [address] any warranty or liability for your use of this information.

## 2019-11-16 NOTE — ED PROVIDER NOTES
Pascale Gleason is a 39 yo M with history of chronic bronchitis and polysubstance abuse who presents to the ED with headache, nausea and vomiting for the past several days. He states that he has not been able to keep anything down. HE denies diarrhea. He states that vomiting makes his headache worse.              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    Hypertension     IV drug abuse ([facility] [address]     Kidney stone     Liver disease     elevated liver enzymes/hep C    Neuralgia     Other ill-defined conditions(799.89)     chronic bronchitis    Polysubstance dependence (HCC)     stimulants, MJ, tob, barbs, benzos, opiates    Psychiatric disorder     Bipolar, Anxiety    Unspecified adverse effect of anesthesia     \"was told he woke up during back surgery\" and during nerve root injection    Unspecified sleep apnea     mild - does not use CPAP       Past Surgical History:   Procedure Laterality Date    HX LUMBAR LAMINECTOMY      HX ORTHOPAEDIC Bilateral     back surgery - laminectomy/discectomy    HX ORTHOPAEDIC      nerve root injection in back    HX OTHER SURGICAL      testicular surgery- varicocelectomy, i &d of abscess on right elbow         Family History:   Problem Relation Age of Onset    Heart Disease Maternal Grandfather        Social History     Socioeconomic History    Marital status:      Spouse name: Not on file    Number of children: Not on file    Years of education: Not on file    Highest education level: Not on file   Occupational History    Not on file   Social Needs    Financial resource strain: Not on file    Food insecurity:     Worry: Not on file     Inability: Not on file    Transportation needs:     Medical: Not on file     Non-medical: Not on file   Tobacco Use    Smoking status: Current Every Day Smoker     Packs/day: 1.00     Years: 22.00     Pack years: 22.00     Types: Cigarettes    Smokeless tobacco: Former User    Tobacco comment: cigarettes   Substance and Sexual Activity    Alcohol use: No     Alcohol/week: 0.0 standard drinks     Comment: no longer drinks    Drug use: No     Comment: per patient no methamphetamines    Sexual activity: Not Currently   Lifestyle    Physical activity:     Days per week: Not on file     Minutes per session: Not on file    Stress: Not on file   Relationships    Social connections:     Talks on phone: Not on file     Gets together: Not on file     Attends Gnosticism service: Not on file     Active member of club or organization: Not on file     Attends meetings of clubs or organizations: Not on file     Relationship status: Not on file    Intimate partner violence:     Fear of current or ex partner: Not on file     Emotionally abused: Not on file     Physically abused: Not on file     Forced sexual activity: Not on file   Other Topics Concern    Not on file   Social History Narrative    The patient is . The patient lives his parents. The patient has no children. The patient does not have legal issues pending. The patient's source of income comes from family. patient's greatest support comes from his parents and this person will be involved with the treatment. pt has not been a victim of sexual/physical abuse. The highest grade achieved is College         ALLERGIES: Barium sulfate; Demerol [meperidine]; Iodinated contrast media; and Neurontin [gabapentin]    Review of Systems   Constitutional: Negative for fever. HENT: Negative for sore throat. Eyes: Negative for visual disturbance. Respiratory: Negative for cough. Cardiovascular: Negative for chest pain. Gastrointestinal: Positive for vomiting. Negative for abdominal pain and diarrhea. Genitourinary: Negative for dysuria. Musculoskeletal: Negative for back pain. Skin: Negative for rash. Neurological: Positive for headaches. Vitals:    11/16/19 0027   BP: 139/90   Pulse: (!) 112   Resp: 18   Temp: 97.7 °F (36.5 °C)   SpO2: 98%   Weight: 110 kg (242 lb 8.1 oz)   Height: 6' (1.829 m)            Physical Exam   Constitutional: He appears well-developed and well-nourished. No distress. HENT:   Head: Normocephalic and atraumatic. Mouth/Throat: Oropharynx is clear and moist.   Eyes: Conjunctivae and EOM are normal.   Neck: Normal range of motion and phonation normal. Neck supple. Cardiovascular: Tachycardia present. No murmur heard. Pulmonary/Chest: Effort normal. No respiratory distress. He has no wheezes. He has no rales. Abdominal: Soft. He exhibits no distension. There is no tenderness. There is no rebound and no guarding. Musculoskeletal: Normal range of motion. He exhibits no tenderness. Neurological: He is alert. He is not disoriented. He exhibits normal muscle tone. Skin: Skin is warm and dry. Nursing note and vitals reviewed. MDM     1:20 AM  Patient reassessed/  Headache somewhat improved but patient continues to have nausea after compazine and toradol. Labs reviewed and BUN 28, Cr 1.94 but patient has chronic renal insufficiency with baseline Cr around 1.66.  1st L NS infused will give 2nd L as well as zofran 4mg IV.    Procedures

## 2019-11-24 ENCOUNTER — APPOINTMENT (OUTPATIENT)
Dept: CT IMAGING | Age: 44
DRG: 204 | End: 2019-11-24
Attending: EMERGENCY MEDICINE
Payer: MEDICAID

## 2019-11-24 ENCOUNTER — HOSPITAL ENCOUNTER (INPATIENT)
Age: 44
LOS: 1 days | Discharge: HOME | DRG: 204 | End: 2019-11-25
Attending: EMERGENCY MEDICINE | Admitting: HOSPITALIST
Payer: MEDICAID

## 2019-11-24 ENCOUNTER — APPOINTMENT (OUTPATIENT)
Dept: ULTRASOUND IMAGING | Age: 44
DRG: 204 | End: 2019-11-24
Attending: HOSPITALIST
Payer: MEDICAID

## 2019-11-24 DIAGNOSIS — N17.9 AKI (ACUTE KIDNEY INJURY) (HCC): Primary | ICD-10-CM

## 2019-11-24 DIAGNOSIS — R93.0 ABNORMAL HEAD CT: ICD-10-CM

## 2019-11-24 DIAGNOSIS — K52.9 GASTROENTERITIS, ACUTE: ICD-10-CM

## 2019-11-24 DIAGNOSIS — F14.10 COCAINE ABUSE (HCC): ICD-10-CM

## 2019-11-24 DIAGNOSIS — E87.6 HYPOKALEMIA: ICD-10-CM

## 2019-11-24 DIAGNOSIS — R57.1 HYPOVOLEMIC SHOCK (HCC): ICD-10-CM

## 2019-11-24 DIAGNOSIS — R55 SYNCOPE AND COLLAPSE: ICD-10-CM

## 2019-11-24 DIAGNOSIS — R25.1 EPISODE OF SHAKING: ICD-10-CM

## 2019-11-24 LAB
ALBUMIN SERPL-MCNC: 4.1 G/DL (ref 3.5–5)
ALBUMIN/GLOB SERPL: 1.1 {RATIO} (ref 1.1–2.2)
ALP SERPL-CCNC: 92 U/L (ref 45–117)
ALT SERPL-CCNC: 30 U/L (ref 12–78)
AMMONIA PLAS-SCNC: 27 UMOL/L
AMPHET UR QL SCN: NEGATIVE
ANION GAP BLD CALC-SCNC: 17 MMOL/L (ref 10–20)
ANION GAP SERPL CALC-SCNC: 5 MMOL/L (ref 5–15)
ANION GAP SERPL CALC-SCNC: 8 MMOL/L (ref 5–15)
APPEARANCE UR: CLEAR
AST SERPL-CCNC: 15 U/L (ref 15–37)
ATRIAL RATE: 90 BPM
BACTERIA URNS QL MICRO: ABNORMAL /HPF
BARBITURATES UR QL SCN: POSITIVE
BASOPHILS # BLD: 0 K/UL (ref 0–0.1)
BASOPHILS NFR BLD: 1 % (ref 0–1)
BENZODIAZ UR QL: NEGATIVE
BILIRUB SERPL-MCNC: 0.6 MG/DL (ref 0.2–1)
BILIRUB UR QL CFM: NEGATIVE
BUN BLD-MCNC: 48 MG/DL (ref 9–20)
BUN SERPL-MCNC: 56 MG/DL (ref 6–20)
BUN SERPL-MCNC: 57 MG/DL (ref 6–20)
BUN/CREAT SERPL: 14 (ref 12–20)
BUN/CREAT SERPL: 18 (ref 12–20)
CA-I BLD-MCNC: 1.08 MMOL/L (ref 1.12–1.32)
CALCIUM SERPL-MCNC: 8.1 MG/DL (ref 8.5–10.1)
CALCIUM SERPL-MCNC: 9.2 MG/DL (ref 8.5–10.1)
CALCULATED P AXIS, ECG09: 77 DEGREES
CALCULATED R AXIS, ECG10: 76 DEGREES
CALCULATED T AXIS, ECG11: 67 DEGREES
CANNABINOIDS UR QL SCN: NEGATIVE
CHLORIDE BLD-SCNC: 96 MMOL/L (ref 98–107)
CHLORIDE SERPL-SCNC: 108 MMOL/L (ref 97–108)
CHLORIDE SERPL-SCNC: 95 MMOL/L (ref 97–108)
CK SERPL-CCNC: 103 U/L (ref 39–308)
CO2 BLD-SCNC: 29 MMOL/L (ref 21–32)
CO2 SERPL-SCNC: 26 MMOL/L (ref 21–32)
CO2 SERPL-SCNC: 32 MMOL/L (ref 21–32)
COCAINE UR QL SCN: POSITIVE
COLOR UR: ABNORMAL
COMMENT, HOLDF: NORMAL
CREAT BLD-MCNC: 3.4 MG/DL (ref 0.6–1.3)
CREAT SERPL-MCNC: 3.04 MG/DL (ref 0.7–1.3)
CREAT SERPL-MCNC: 4.05 MG/DL (ref 0.7–1.3)
CREAT UR-MCNC: 79.8 MG/DL
D DIMER PPP FEU-MCNC: <0.19 MG/L FEU (ref 0–0.65)
DIAGNOSIS, 93000: NORMAL
DIFFERENTIAL METHOD BLD: ABNORMAL
DRUG SCRN COMMENT,DRGCM: ABNORMAL
EOSINOPHIL # BLD: 0.2 K/UL (ref 0–0.4)
EOSINOPHIL NFR BLD: 2 % (ref 0–7)
EPITH CASTS URNS QL MICRO: ABNORMAL /LPF
ERYTHROCYTE [DISTWIDTH] IN BLOOD BY AUTOMATED COUNT: 12.4 % (ref 11.5–14.5)
ETHANOL SERPL-MCNC: <10 MG/DL
GLOBULIN SER CALC-MCNC: 3.6 G/DL (ref 2–4)
GLUCOSE BLD-MCNC: 92 MG/DL (ref 65–100)
GLUCOSE SERPL-MCNC: 118 MG/DL (ref 65–100)
GLUCOSE SERPL-MCNC: 91 MG/DL (ref 65–100)
GLUCOSE UR STRIP.AUTO-MCNC: NEGATIVE MG/DL
HCT VFR BLD AUTO: 42.7 % (ref 36.6–50.3)
HCT VFR BLD CALC: 38 % (ref 36.6–50.3)
HGB BLD-MCNC: 15.3 G/DL (ref 12.1–17)
HGB UR QL STRIP: NEGATIVE
IMM GRANULOCYTES # BLD AUTO: 0 K/UL (ref 0–0.04)
IMM GRANULOCYTES NFR BLD AUTO: 0 % (ref 0–0.5)
KETONES UR QL STRIP.AUTO: ABNORMAL MG/DL
LACTATE BLD-SCNC: 1.18 MMOL/L (ref 0.4–2)
LEUKOCYTE ESTERASE UR QL STRIP.AUTO: NEGATIVE
LYMPHOCYTES # BLD: 3.6 K/UL (ref 0.8–3.5)
LYMPHOCYTES NFR BLD: 42 % (ref 12–49)
MAGNESIUM SERPL-MCNC: 2.2 MG/DL (ref 1.6–2.4)
MCH RBC QN AUTO: 34.5 PG (ref 26–34)
MCHC RBC AUTO-ENTMCNC: 35.8 G/DL (ref 30–36.5)
MCV RBC AUTO: 96.2 FL (ref 80–99)
METHADONE UR QL: NEGATIVE
MONOCYTES # BLD: 0.7 K/UL (ref 0–1)
MONOCYTES NFR BLD: 8 % (ref 5–13)
NEUTS SEG # BLD: 4.1 K/UL (ref 1.8–8)
NEUTS SEG NFR BLD: 47 % (ref 32–75)
NITRITE UR QL STRIP.AUTO: NEGATIVE
NRBC # BLD: 0 K/UL (ref 0–0.01)
NRBC BLD-RTO: 0 PER 100 WBC
OPIATES UR QL: NEGATIVE
P-R INTERVAL, ECG05: 148 MS
PCP UR QL: NEGATIVE
PH UR STRIP: 5.5 [PH] (ref 5–8)
PHOSPHATE SERPL-MCNC: 4.1 MG/DL (ref 2.6–4.7)
PLATELET # BLD AUTO: 188 K/UL (ref 150–400)
PMV BLD AUTO: 11.6 FL (ref 8.9–12.9)
POTASSIUM BLD-SCNC: 2.9 MMOL/L (ref 3.5–5.1)
POTASSIUM SERPL-SCNC: 3.1 MMOL/L (ref 3.5–5.1)
POTASSIUM SERPL-SCNC: 4 MMOL/L (ref 3.5–5.1)
PROT SERPL-MCNC: 7.7 G/DL (ref 6.4–8.2)
PROT UR STRIP-MCNC: 30 MG/DL
PROT UR-MCNC: 21 MG/DL (ref 0–11.9)
Q-T INTERVAL, ECG07: 366 MS
QRS DURATION, ECG06: 100 MS
QTC CALCULATION (BEZET), ECG08: 447 MS
RBC # BLD AUTO: 4.44 M/UL (ref 4.1–5.7)
RBC #/AREA URNS HPF: ABNORMAL /HPF (ref 0–5)
SAMPLES BEING HELD,HOLD: NORMAL
SERVICE CMNT-IMP: ABNORMAL
SODIUM BLD-SCNC: 138 MMOL/L (ref 136–145)
SODIUM SERPL-SCNC: 135 MMOL/L (ref 136–145)
SODIUM SERPL-SCNC: 139 MMOL/L (ref 136–145)
SODIUM UR-SCNC: 49 MMOL/L
SP GR UR REFRACTOMETRY: 1.02 (ref 1–1.03)
TROPONIN I BLD-MCNC: <0.04 NG/ML (ref 0–0.08)
TROPONIN I SERPL-MCNC: <0.05 NG/ML
TSH SERPL DL<=0.05 MIU/L-ACNC: 1.01 UIU/ML (ref 0.36–3.74)
UA: UC IF INDICATED,UAUC: ABNORMAL
UROBILINOGEN UR QL STRIP.AUTO: 0.2 EU/DL (ref 0.2–1)
VENTRICULAR RATE, ECG03: 90 BPM
WBC # BLD AUTO: 8.6 K/UL (ref 4.1–11.1)
WBC URNS QL MICRO: ABNORMAL /HPF (ref 0–4)

## 2019-11-24 PROCEDURE — 82570 ASSAY OF URINE CREATININE: CPT

## 2019-11-24 PROCEDURE — 84156 ASSAY OF PROTEIN URINE: CPT

## 2019-11-24 PROCEDURE — 74011250636 HC RX REV CODE- 250/636: Performed by: EMERGENCY MEDICINE

## 2019-11-24 PROCEDURE — 81001 URINALYSIS AUTO W/SCOPE: CPT

## 2019-11-24 PROCEDURE — 99285 EMERGENCY DEPT VISIT HI MDM: CPT

## 2019-11-24 PROCEDURE — 84300 ASSAY OF URINE SODIUM: CPT

## 2019-11-24 PROCEDURE — 74011250637 HC RX REV CODE- 250/637: Performed by: HOSPITALIST

## 2019-11-24 PROCEDURE — 93005 ELECTROCARDIOGRAM TRACING: CPT

## 2019-11-24 PROCEDURE — 96375 TX/PRO/DX INJ NEW DRUG ADDON: CPT

## 2019-11-24 PROCEDURE — 74011250637 HC RX REV CODE- 250/637: Performed by: INTERNAL MEDICINE

## 2019-11-24 PROCEDURE — 83605 ASSAY OF LACTIC ACID: CPT

## 2019-11-24 PROCEDURE — 70450 CT HEAD/BRAIN W/O DYE: CPT

## 2019-11-24 PROCEDURE — 84443 ASSAY THYROID STIM HORMONE: CPT

## 2019-11-24 PROCEDURE — 96374 THER/PROPH/DIAG INJ IV PUSH: CPT

## 2019-11-24 PROCEDURE — 82140 ASSAY OF AMMONIA: CPT

## 2019-11-24 PROCEDURE — 80307 DRUG TEST PRSMV CHEM ANLYZR: CPT

## 2019-11-24 PROCEDURE — 96361 HYDRATE IV INFUSION ADD-ON: CPT

## 2019-11-24 PROCEDURE — 74176 CT ABD & PELVIS W/O CONTRAST: CPT

## 2019-11-24 PROCEDURE — 84100 ASSAY OF PHOSPHORUS: CPT

## 2019-11-24 PROCEDURE — 87086 URINE CULTURE/COLONY COUNT: CPT

## 2019-11-24 PROCEDURE — 80053 COMPREHEN METABOLIC PANEL: CPT

## 2019-11-24 PROCEDURE — 85025 COMPLETE CBC W/AUTO DIFF WBC: CPT

## 2019-11-24 PROCEDURE — 84484 ASSAY OF TROPONIN QUANT: CPT

## 2019-11-24 PROCEDURE — 65610000006 HC RM INTENSIVE CARE

## 2019-11-24 PROCEDURE — 74011250637 HC RX REV CODE- 250/637: Performed by: EMERGENCY MEDICINE

## 2019-11-24 PROCEDURE — 76770 US EXAM ABDO BACK WALL COMP: CPT

## 2019-11-24 PROCEDURE — 94762 N-INVAS EAR/PLS OXIMTRY CONT: CPT

## 2019-11-24 PROCEDURE — 83735 ASSAY OF MAGNESIUM: CPT

## 2019-11-24 PROCEDURE — 82550 ASSAY OF CK (CPK): CPT

## 2019-11-24 PROCEDURE — 80047 BASIC METABLC PNL IONIZED CA: CPT

## 2019-11-24 PROCEDURE — 74011250636 HC RX REV CODE- 250/636: Performed by: INTERNAL MEDICINE

## 2019-11-24 PROCEDURE — 36415 COLL VENOUS BLD VENIPUNCTURE: CPT

## 2019-11-24 PROCEDURE — 95816 EEG AWAKE AND DROWSY: CPT | Performed by: PSYCHIATRY & NEUROLOGY

## 2019-11-24 PROCEDURE — 85379 FIBRIN DEGRADATION QUANT: CPT

## 2019-11-24 RX ORDER — BUTALBITAL, ACETAMINOPHEN AND CAFFEINE 50; 325; 40 MG/1; MG/1; MG/1
1 TABLET ORAL
Status: DISCONTINUED | OUTPATIENT
Start: 2019-11-24 | End: 2019-11-25 | Stop reason: HOSPADM

## 2019-11-24 RX ORDER — ONDANSETRON 4 MG/1
4 TABLET, ORALLY DISINTEGRATING ORAL
Status: DISCONTINUED | OUTPATIENT
Start: 2019-11-24 | End: 2019-11-25 | Stop reason: HOSPADM

## 2019-11-24 RX ORDER — ACETAMINOPHEN 325 MG/1
650 TABLET ORAL
Status: DISCONTINUED | OUTPATIENT
Start: 2019-11-24 | End: 2019-11-24 | Stop reason: SDUPTHER

## 2019-11-24 RX ORDER — SODIUM CHLORIDE 9 MG/ML
100 INJECTION, SOLUTION INTRAVENOUS CONTINUOUS
Status: CANCELLED | OUTPATIENT
Start: 2019-11-24

## 2019-11-24 RX ORDER — ACETAMINOPHEN 325 MG/1
650 TABLET ORAL
Status: DISCONTINUED | OUTPATIENT
Start: 2019-11-24 | End: 2019-11-25 | Stop reason: HOSPADM

## 2019-11-24 RX ORDER — SODIUM CHLORIDE 0.9 % (FLUSH) 0.9 %
5-40 SYRINGE (ML) INJECTION EVERY 8 HOURS
Status: DISCONTINUED | OUTPATIENT
Start: 2019-11-24 | End: 2019-11-25 | Stop reason: HOSPADM

## 2019-11-24 RX ORDER — SODIUM CHLORIDE AND POTASSIUM CHLORIDE .9; .15 G/100ML; G/100ML
SOLUTION INTRAVENOUS CONTINUOUS
Status: DISCONTINUED | OUTPATIENT
Start: 2019-11-24 | End: 2019-11-25

## 2019-11-24 RX ORDER — ONDANSETRON 2 MG/ML
4 INJECTION INTRAMUSCULAR; INTRAVENOUS
Status: DISCONTINUED | OUTPATIENT
Start: 2019-11-24 | End: 2019-11-25 | Stop reason: HOSPADM

## 2019-11-24 RX ORDER — SODIUM CHLORIDE 0.9 % (FLUSH) 0.9 %
5-40 SYRINGE (ML) INJECTION AS NEEDED
Status: DISCONTINUED | OUTPATIENT
Start: 2019-11-24 | End: 2019-11-25 | Stop reason: HOSPADM

## 2019-11-24 RX ORDER — IBUPROFEN 200 MG
1 TABLET ORAL DAILY
Status: DISCONTINUED | OUTPATIENT
Start: 2019-11-24 | End: 2019-11-25 | Stop reason: HOSPADM

## 2019-11-24 RX ORDER — POTASSIUM CHLORIDE 750 MG/1
40 TABLET, FILM COATED, EXTENDED RELEASE ORAL
Status: COMPLETED | OUTPATIENT
Start: 2019-11-24 | End: 2019-11-24

## 2019-11-24 RX ORDER — ONDANSETRON 2 MG/ML
8 INJECTION INTRAMUSCULAR; INTRAVENOUS
Status: COMPLETED | OUTPATIENT
Start: 2019-11-24 | End: 2019-11-24

## 2019-11-24 RX ORDER — KETOROLAC TROMETHAMINE 30 MG/ML
30 INJECTION, SOLUTION INTRAMUSCULAR; INTRAVENOUS
Status: COMPLETED | OUTPATIENT
Start: 2019-11-24 | End: 2019-11-24

## 2019-11-24 RX ORDER — OLANZAPINE 5 MG/1
5 TABLET ORAL
Status: DISCONTINUED | OUTPATIENT
Start: 2019-11-24 | End: 2019-11-25 | Stop reason: HOSPADM

## 2019-11-24 RX ORDER — LORAZEPAM 2 MG/ML
2 INJECTION INTRAMUSCULAR
Status: DISCONTINUED | OUTPATIENT
Start: 2019-11-24 | End: 2019-11-24

## 2019-11-24 RX ORDER — LAMOTRIGINE 100 MG/1
200 TABLET ORAL DAILY
Status: DISCONTINUED | OUTPATIENT
Start: 2019-11-24 | End: 2019-11-25 | Stop reason: HOSPADM

## 2019-11-24 RX ORDER — ATORVASTATIN CALCIUM 40 MG/1
40 TABLET, FILM COATED ORAL
Status: DISCONTINUED | OUTPATIENT
Start: 2019-11-24 | End: 2019-11-25 | Stop reason: HOSPADM

## 2019-11-24 RX ORDER — SODIUM CHLORIDE, SODIUM LACTATE, POTASSIUM CHLORIDE, CALCIUM CHLORIDE 600; 310; 30; 20 MG/100ML; MG/100ML; MG/100ML; MG/100ML
150 INJECTION, SOLUTION INTRAVENOUS CONTINUOUS
Status: DISCONTINUED | OUTPATIENT
Start: 2019-11-24 | End: 2019-11-24

## 2019-11-24 RX ADMIN — POTASSIUM CHLORIDE 40 MEQ: 750 TABLET, FILM COATED, EXTENDED RELEASE ORAL at 04:21

## 2019-11-24 RX ADMIN — ONDANSETRON 8 MG: 2 INJECTION INTRAMUSCULAR; INTRAVENOUS at 03:12

## 2019-11-24 RX ADMIN — POTASSIUM CHLORIDE AND SODIUM CHLORIDE: 900; 150 INJECTION, SOLUTION INTRAVENOUS at 22:15

## 2019-11-24 RX ADMIN — BUTALBITAL, ACETAMINOPHEN AND CAFFEINE 1 TABLET: 50; 325; 40 TABLET ORAL at 18:41

## 2019-11-24 RX ADMIN — ATORVASTATIN CALCIUM 40 MG: 40 TABLET, FILM COATED ORAL at 22:15

## 2019-11-24 RX ADMIN — ACETAMINOPHEN 650 MG: 325 TABLET, FILM COATED ORAL at 22:15

## 2019-11-24 RX ADMIN — LAMOTRIGINE 200 MG: 100 TABLET ORAL at 10:38

## 2019-11-24 RX ADMIN — Medication 10 ML: at 10:38

## 2019-11-24 RX ADMIN — POTASSIUM CHLORIDE AND SODIUM CHLORIDE: 900; 150 INJECTION, SOLUTION INTRAVENOUS at 15:55

## 2019-11-24 RX ADMIN — ONDANSETRON 4 MG: 4 TABLET, ORALLY DISINTEGRATING ORAL at 14:23

## 2019-11-24 RX ADMIN — Medication 10 ML: at 22:00

## 2019-11-24 RX ADMIN — ACETAMINOPHEN 650 MG: 325 TABLET, FILM COATED ORAL at 13:17

## 2019-11-24 RX ADMIN — SODIUM CHLORIDE 1000 ML: 900 INJECTION, SOLUTION INTRAVENOUS at 03:20

## 2019-11-24 RX ADMIN — POTASSIUM CHLORIDE AND SODIUM CHLORIDE: 900; 150 INJECTION, SOLUTION INTRAVENOUS at 04:56

## 2019-11-24 RX ADMIN — POTASSIUM CHLORIDE AND SODIUM CHLORIDE: 900; 150 INJECTION, SOLUTION INTRAVENOUS at 10:35

## 2019-11-24 RX ADMIN — OLANZAPINE 5 MG: 5 TABLET, FILM COATED ORAL at 22:15

## 2019-11-24 RX ADMIN — SODIUM CHLORIDE 1000 ML: 900 INJECTION, SOLUTION INTRAVENOUS at 02:47

## 2019-11-24 RX ADMIN — KETOROLAC TROMETHAMINE 30 MG: 30 INJECTION, SOLUTION INTRAMUSCULAR at 03:12

## 2019-11-24 RX ADMIN — PHENYLEPHRINE HYDROCHLORIDE 30 MCG/MIN: 10 INJECTION INTRAVENOUS at 05:21

## 2019-11-24 NOTE — ED PROVIDER NOTES
The patient is a 49-year-old male with a past medical history significant for arrhythmia, chronic back pain, chronic pain, herpes simplex virus, hypertension, IV drug abuse, kidney stone, liver disease, neuralgia, alcohol abuse, polysubstance abuse, bipolar disorder, migraine headaches, sleep apnea, status post lumbar laminectomy, varicocelectomy who presents to the ED with a complaint of multiple syncopal episode this evening. The patient states that he felt dizzy, lightheaded and will pass out without any clear etiology. The patient states that he hit his head on the ground. He also admits to nausea, vomiting and watery nonbloody diarrhea. The patient admits to recent alcohol and drug abuse i.e. cocaine. He also smokes approximately 1 to 1-1/2 pack/day. The patient describe a history of seizure-like activity accompanied by recent shaking and chills. The patient also admits to headaches. He denies any chest pain, shortness of breath, fever, sore throat, cough, abdominal pain, dysuria, extremity weakness and numbness, sick contact, prior history of the same.            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    Hypertension     IV drug abuse ([facility] [address]     Kidney stone     Liver disease     elevated liver enzymes/hep C    Neuralgia     Other ill-defined conditions(799.89)     chronic bronchitis    Polysubstance dependence (HCC)     stimulants, MJ, tob, barbs, benzos, opiates    Psychiatric disorder     Bipolar, Anxiety    Unspecified adverse effect of anesthesia     \"was told he woke up during back surgery\" and during nerve root injection    Unspecified sleep apnea     mild - does not use CPAP       Past Surgical History:   Procedure Laterality Date    HX LUMBAR LAMINECTOMY  HX ORTHOPAEDIC Bilateral     back surgery - laminectomy/discectomy    HX ORTHOPAEDIC      nerve root injection in back    HX OTHER SURGICAL      testicular surgery- varicocelectomy, i &d of abscess on right elbow         Family History:   Problem Relation Age of Onset    Heart Disease Maternal Grandfather        Social History     Socioeconomic History    Marital status:      Spouse name: Not on file    Number of children: Not on file    Years of education: Not on file    Highest education level: Not on file   Occupational History    Not on file   Social Needs    Financial resource strain: Not on file    Food insecurity:     Worry: Not on file     Inability: Not on file    Transportation needs:     Medical: Not on file     Non-medical: Not on file   Tobacco Use    Smoking status: Current Every Day Smoker     Packs/day: 1.00     Years: 22.00     Pack years: 22.00     Types: Cigarettes    Smokeless tobacco: Former User    Tobacco comment: cigarettes   Substance and Sexual Activity    Alcohol use: No     Alcohol/week: 0.0 standard drinks     Comment: no longer drinks    Drug use: No     Comment: per patient no methamphetamines    Sexual activity: Not Currently   Lifestyle    Physical activity:     Days per week: Not on file     Minutes per session: Not on file    Stress: Not on file   Relationships    Social connections:     Talks on phone: Not on file     Gets together: Not on file     Attends Orthodoxy service: Not on file     Active member of club or organization: Not on file     Attends meetings of clubs or organizations: Not on file     Relationship status: Not on file    Intimate partner violence:     Fear of current or ex partner: Not on file     Emotionally abused: Not on file     Physically abused: Not on file     Forced sexual activity: Not on file   Other Topics Concern    Not on file   Social History Narrative    The patient is . The patient lives his parents.  The patient has no children. The patient does not have legal issues pending. The patient's source of income comes from family. patient's greatest support comes from his parents and this person will be involved with the treatment. pt has not been a victim of sexual/physical abuse. The highest grade achieved is College         ALLERGIES: Barium sulfate; Demerol [meperidine]; Iodinated contrast media; and Neurontin [gabapentin]    Review of Systems   All other systems reviewed and are negative. There were no vitals filed for this visit. Physical Exam  Vitals signs and nursing note reviewed. CONSTITUTIONAL: Well-appearing; well-nourished; in no apparent distress  HEAD: Normocephalic; atraumatic  EYES: PERRL; EOM intact; conjunctiva and sclera are clear bilaterally. ENT: No rhinorrhea; normal pharynx with no tonsillar hypertrophy; mucous membranes pink/moist, no erythema, no exudate. NECK: Supple; non-tender; no cervical lymphadenopathy  CARD: Normal S1, S2; no murmurs, rubs, or gallops. Regular rate and rhythm. RESP: Normal respiratory effort; breath sounds clear and equal bilaterally; no wheezes, rhonchi, or rales. ABD: Normal bowel sounds; non-distended; non-tender; no palpable organomegaly, no masses, no bruits. Back Exam: Normal inspection; no vertebral point tenderness, no CVA tenderness. Normal range of motion. EXT: Normal ROM in all four extremities; non-tender to palpation; no swelling or deformity; distal pulses are normal, no edema. SKIN: Warm; dry; no rash.   NEURO:Alert and oriented x 3, coherent, INGRID-XII grossly intact, sensory and motor are non-focal.  '      MDM  Number of Diagnoses or Management Options  PATRICK (acute kidney injury) ([facility] [address]):   Gastroenteritis, acute:   Hypokalemia:   Hypovolemic shock ([address]):   Syncope and collapse:   Diagnosis management comments: Assessment: 20-year-old male with complex medical history who presents with headache, nausea, vomiting, diarrhea, syncopal episode, hypertensive rule out electrolyte abnormality, dehydration, head injury, VTE and polysubstance abuse      Plan: Lab/IV fluid antiemetic and analgesia/ /EKG/CT scan of the head/CT scan of the abdomen and pelvis/serial exam/ Monitor and Reevaluate. Amount and/or Complexity of Data Reviewed  Clinical lab tests: ordered and reviewed  Tests in the radiology section of CPT®: ordered and reviewed  Tests in the medicine section of CPT®: ordered and reviewed  Discussion of test results with the performing providers: yes  Decide to obtain previous medical records or to obtain history from someone other than the patient: yes  Obtain history from someone other than the patient: yes  Review and summarize past medical records: yes  Discuss the patient with other providers: yes  Independent visualization of images, tracings, or specimens: yes    Risk of Complications, Morbidity, and/or Mortality  Presenting problems: moderate  Diagnostic procedures: moderate  Management options: moderate    Critical Care  Total time providing critical care: (Total critical care time spent exclusive of procedures: 45 minutes)    Patient Progress  Patient progress: stable         Procedures    ED EKG interpretation:  Rhythm: normal sinus rhythm; and regular . Rate (approx.): 90; Axis: normal; P wave: prolonged; QRS interval: normal ; ST/T wave: non-specific changes; in  Lead: Diffusely; Other findings: abnormal ekg. This EKG was interpreted by Jere Osorio MD,ED Provider. PROGRESS NOTE:  Pt has been reexamined by Pb Morel MD all available results have been reviewed with pt and any available family. The patient has acute on chronic renal failure with hyperkalemia and hypovolemia shock responsive to fluid at this time. pt understands sx, dx, and tx in ED. she care plan has been outlined and questions have been answered.  Pt and any available family understands and agrees to need for admission to hospital for further tx not available in ED. Pt is ready for admission. Written by Poncho Mcmillan MD,  4:15 AM    Hospitalist Perfect Serve for Admission  4:17 AM    ED Room Number: SER07/07  Patient Name and age:  Mary Carreno 40 y.o.  male  Working Diagnosis:   1. PATRICK (acute kidney injury) ([facility] [address]    2. Hypokalemia    3. Gastroenteritis, acute    4. Hypovolemic shock ([facility] [address]    5. Syncope and collapse      Readmission: no  Isolation Requirements:  no  Recommended Level of Care:  telemetry  Code Status:  Full Code  Department:[facility] - [phone]  Other:   The patient is hypotensive but is responded to fluid boluses at this time. His high risk for deterioration admit need to be placed on vasopressor agent. He has 2 large-bore IV. CONSULT NOTE:  Dalton Mendoza MD spoke with Dr. Josh Garcia of the adult hospitalist team. Discussed patient's presentation, history, physical assessment, and available diagnostic results.  He will evaluate, write orders and admit the patient to the hospital. 4:19 AM    .

## 2019-11-24 NOTE — PROGRESS NOTES
[facility]  INTENSIVIST DAILY PROGRESS NOTE  Name: Anders Pedroza   : 1975   MRN: [id number]   Date: 2019 11:32 AM   I have reviewed the flowsheet and previous days notes. Seen as intensivist.    The patient is a 41 yo male who presented to the hospital with syncopal events after vomiting and diarrhea. He has a history of polysubstance abuse. He was noted to be in acute on chronic renal failure. Lactic acid was 1.1. WBC normal.  Abd CT without abnormality. Head CT without bleed - ? Subtle change in left hemisphere reported by radiology, possible artifact. He denies lung dz or SOB. Midl abd discomfort. No vomiting since admission. He has been given 2+ liters of fluid boluses and is now on 200 cc/hr of normal saline. He is on low dose neosynephrine. PMH:CKD, polysubstance abuse, chronic pain, bipolar disorder    ROS: no chest pain, no rash, no SOB    Vital Signs:    Visit Vitals  BP (!) 81/39   Pulse 73   Temp 97.1 °F (36.2 °C)   Resp 25   Ht 6' (1.829 m)   Wt 104.6 kg (230 lb 9.6 oz)   SpO2 93%   BMI 31.28 kg/m²       O2 Device: Room air       Temp (24hrs), Av.3 °F (36.3 °C), Min:96.8 °F (36 °C), Max:97.6 °F (36.4 °C)       Intake/Output:   Last shift:       07 - 1900  In: 688.8 [P.O.:250; I.V.:438.8]  Out: -   Last 3 shifts: 1901 -  07  In: 2443.1 [I.V.:2443.1]  Out: -     Intake/Output Summary (Last 24 hours) at 2019 1132  Last data filed at 2019 0900  Gross per 24 hour   Intake 3131.87 ml   Output    Net 3131.87 ml     Physical Exam:  General:  cooperative, no distress, resting   Head:  Normocephalic       Nose: Nares normal.   On room air   Throat: Lips normal.           Lungs:   Clear to auscultation bilaterally. Chest wall:  No tenderness or deformity. Heart:  Regular rate and rhythm   Abdomen:   Soft, non-tender. Extremities: No edema.        Skin: Dry       Neurologic: Grossly nonfocal       DATA:   Current Facility-Administered Medications   Medication Dose Route Frequency    0.9% sodium chloride with KCl 20 mEq/L infusion   IntraVENous CONTINUOUS    acetaminophen (TYLENOL) tablet 650 mg  650 mg Oral Q4H PRN    PHENYLephrine (TORI-SYNEPHRINE) 30 mg in 0.9% sodium chloride 250 mL infusion   mcg/min IntraVENous TITRATE    sodium chloride (NS) flush 5-40 mL  5-40 mL IntraVENous Q8H    sodium chloride (NS) flush 5-40 mL  5-40 mL IntraVENous PRN    ondansetron (ZOFRAN) injection 4 mg  4 mg IntraVENous Q4H PRN    nicotine (NICODERM CQ) 21 mg/24 hr patch 1 Patch  1 Patch TransDERmal DAILY    OLANZapine (ZyPREXA) tablet 5 mg  5 mg Oral QHS    lamoTRIgine (LaMICtal) tablet 200 mg  200 mg Oral DAILY    butalbital-acetaminophen-caffeine (FIORICET, ESGIC) -40 mg per tablet 1 Tab  1 Tab Oral Q6H PRN    atorvastatin (LIPITOR) tablet 40 mg  40 mg Oral QHS    ondansetron (ZOFRAN ODT) tablet 4 mg  4 mg Oral Q8H PRN       Telemetry: SR          Labs:  Recent Labs     11/24/19  0253   WBC 8.6   HGB 15.3   HCT 42.7        Recent Labs     11/24/19  0253   *   K 3.1*   CL 95*   CO2 32   *   BUN 57*   CREA 4.05*   CA 9.2   MG 2.2   PHOS 4.1   ALB 4.1   SGOT 15   ALT 30     Drug screen positive for barbiturates and cocaine       IMPRESSION:   · Syncope  · Hypotension/Volume depletion secondary to vomiting and diarrhea  · History of polysubstance abuse   · Acute on chronic kidney dz   PLAN:   · Agree with volume resuscitation   · Wean pressors as tolerated  · Watch labs  · Suggest substance abuse counceling    GLOBAL ISSUES:   ·  DVT Prophylaxis: SCDs         My assessment/plan was discussed with: nurse        Reza Greer MD

## 2019-11-24 NOTE — PROGRESS NOTES
0745 Bedside and Verbal shift change report given to [address] Victoria (oncoming nurse) by Keerthi Carballo (offgoing nurse). Report included the following information SBAR, Kardex, Intake/Output, MAR, Recent Results and Cardiac Rhythm NSR.     6126 Nephrology consult called. 0830 Dr. Pushpa Navarrete at bedside assessing patient. 1395 Neurology consult called. 2373 Renal US at bedside. 1400 Repeat BMP sent. 1500 Phenylepherine infusion stopped. 1600 Random urine sent. 1930 Bedside and Verbal shift change report given to Pura SHINE (oncoming nurse) by [address] [place], [place] [address]bird Victoria (offgoing nurse). Report included the following information SBAR, Kardex, MAR, Recent Results and Cardiac Rhythm NSR.

## 2019-11-24 NOTE — ED NOTES
Provided patient with crackers per request. Patient tolerated well. Denies any other needs or concerns at this time.

## 2019-11-24 NOTE — ED NOTES
Critical Care Transport here to transport patient to [facility] Bed 19. VSS. BP improving with phenylephrine. Respirations equal and unlabored on RA. Patient remains A & O x 3. Patient in no acute distress upon transfer.

## 2019-11-24 NOTE — ROUTINE PROCESS
TRANSFER - OUT REPORT: 
 
Verbal report given to Estefany Lundberg RN (name) on Karolyn Ordaz  being transferred to ICU - [phone] (unit) for routine progression of care Report consisted of patients Situation, Background, Assessment and  
Recommendations(SBAR). Information from the following report(s) SBAR, ED Summary, [facility] and Recent Results was reviewed with the receiving nurse. Lines:  
Peripheral IV 11/24/19 Left Antecubital (Active) Site Assessment Clean, dry, & intact 11/24/2019  3:05 AM  
Phlebitis Assessment 0 11/24/2019  3:05 AM  
Infiltration Assessment 0 11/24/2019  3:05 AM  
Dressing Status Clean, dry, & intact 11/24/2019  3:05 AM  
Hub Color/Line Status Green 11/24/2019  3:05 AM  
   
Peripheral IV 11/24/19 Right Antecubital (Active) Site Assessment Clean, dry, & intact 11/24/2019  3:06 AM  
Phlebitis Assessment 0 11/24/2019  3:06 AM  
Infiltration Assessment 0 11/24/2019  3:06 AM  
Dressing Status Clean, dry, & intact 11/24/2019  3:06 AM  
Hub Color/Line Status Pink 11/24/2019  3:06 AM  
  
 
Opportunity for questions and clarification was provided. Patient transported with: 
 Critical Care Transport

## 2019-11-24 NOTE — H&P
[address] [facility]  HISTORY AND PHYSICAL    Name:  Geeta Heller  MR#:  [id number]  :  1975  ACCOUNT #:  [de-identified]  ADMIT DATE:  2019      ADMITTING ATTENDING:  Conan Scheuermann, MD    PRIMARY CARE PHYSICIAN:  Caryn Myrick MD    CHIEF COMPLAINTS:  Nausea, vomiting, diarrhea, and lightheadedness since last 2-3 days. HISTORY OF PRESENT ILLNESS:  This is a 49-year-old male with a past medical history of chronic back pain, hypertension, bipolar disorder, reported history of polysubstance abuse. He comes over here because of nausea, vomiting, diarrhea, and dizziness that he has been experiencing since last 2-3 days. The patient claims that he has been having this nausea, vomiting, and diarrhea and was not able to keep things in. He claims that he did not have any fever, nasal congestion, cough, chest pain, shortness of breath. No abdominal pain. No blood in his vomitus or stool. Overnight, the patient had an episode in which he was shaking, was having some lightheadedness and dizziness and then he claims that he passed out. He does not remember for how long did he pass out, then he woke up and he called his friend who brought him into [place] [facility] from where he was transferred to [facility]. Currently, the patient feels much better. Again, no chest pain. No abdominal pain. No headache or dizziness. He does not feel any nausea or vomiting, and no diarrhea. He claims that he did not have any movement since last one day. REVIEW OF SYSTEMS:  Pertinent positive as above. Rest of review of systems were done and they were all negative except for above. PAST MEDICAL HISTORY:  1.  Back pain. 2.  GERD. 3.  Hepatitis C.  4.  Polysubstance abuse. 5.  Bipolar disorder. PAST SURGICAL HISTORY:  1.  Lumbar laminectomy. 2.  Varicocelectomy. FAMILY HISTORY:  Significant for coronary artery disease, especially in his maternal grandfather.     SOCIAL HISTORY:  The patient smokes. He smokes about 1-1.5 pack per day. He claims that he has not drunk alcohol since at least last one month. Last couple of days, he admits that he has snorted cocaine, but says that he does not do it on a regular basis. HOME MEDICATIONS:  The patient is on following medications:  1. Lipitor 40 mg p.o. daily. 2.  Fioricet one tablet p.o. p.r.n.  3.  Candesartan one tablet daily. 4.   10 mg p.o. t.i.d.  5.  Motrin 600 mg p.o. q.8 hours p.r.n.  6.  Lamictal 200 mg p.o. daily. 7.  Zyprexa 10 mg tablet p.o. at bedtime. 8.  Zofran ODT 4 mg q.8 hours p.r.n.  9.  Propranolol 80 mg sustained release capsule daily. PHYSICAL EXAMINATION:  VITAL SIGNS:  At the time when the patient was seen, the patient's vitals were as follows:  Blood pressure 94/54, pulse 73, respiratory rate 20, saturating 100% on room air. GENERAL:  Not in acute distress, comfortably lying in the bed. HEENT:  Head:  Normocephalic and atraumatic. Eyes:  PERRLA, EOMI. Ears:  Bilaterally hearing normal.  No growth. Nose:  No polyps or bleeding. Mouth:  Dry mucous membranes. Decent oral hygiene. NECK:  Supple. No JVD. No thyromegaly. RESPIRATORY:  Clear to auscultation bilaterally. No adventitious breath sounds. CARDIOVASCULAR:  S1 and S2 normal.  No murmurs, rubs, or gallops. GI:  Bowel sounds present. Soft, nontender, nondistended. NEUROLOGIC:  Cranial nerves II through XII intact. Motor 5/5 bilaterally in upper limbs and lower limbs. Sensory normal.  PSYCHIATRIC:  Mood and affect are appropriate. Alert, awake, oriented x3. LABORATORY AND RADIOLOGICAL RESULTS:  WBC 8.6, hemoglobin 10.3, hematocrit 42, and platelet count of 549. Urinalysis showed 1+ bacteria but negative leukocyte esterase and nitrites. Sodium 135, potassium 3.1, chloride 95, bicarbonate 32, BUN 57, creatinine 4.05. Urine drug screen is positive for cocaine and barbiturates.   The patient had a CT scan of the head done, which shows subcentimeter areas of subtle increased density in the left hemisphere. CT scan of the abdomen shows no acute abnormality. Ultrasound of the abdomen shows no acute findings. EKG shows normal sinus rhythm. No ST-T wave changes suggestive of ischemia. ASSESSMENT AND PLAN:  This is a 79-year-old male with a past medical history of hypertension, reported drug abuse, bipolar disorder. He comes here because of 2-3 days history of nausea, vomiting, and diarrhea and he was found to be dizzy and had possible syncopal episode. 1.  Syncopal episode. I will admit the patient as inpatient. We will get an echo on the patient and we will consult Neurology regarding the same. The patient claims that he was shaking and was having dizziness before having this episode. It does not seem like that the patient had seizures, but I would let Neurology to decide about getting an EEG. The patient would eventually need an MRI with contrast, but because of his renal dysfunction, I would hold that. Once his renal function has improved, then we should be able to get an MRI with contrast.  2.  Abnormal CT scan of the head with lesions in left hemisphere. As mentioned above, would need an MRI. Once we have the MRI done, we will consult Neurosurgery. 3.  Hypotension. Currently, the patient is on pressors. I think it is hypovolemic. We will continue the IV fluids and we will slowly wean the patient's pressors off. If the patient is able to be weaned off the pressors, then likely later on today or tomorrow we should be able to transfer the patient from ICU. 4.  Dehydration, likely secondary to nausea, vomiting, and diarrhea. We will continue the IV fluids for now. 5.  Acute kidney injury over chronic kidney disease stage III. We will hold all nephrotoxic medications and we will continue the IV fluids. I will do a basic workup that needs to be followed. I would also consult Nephrology regarding the same.   6.  Urine drug screen positive for cocaine. I counseled the patient and the patient understands. 7.  Smoking. The patient smokes about 1-1.5 pack per day. We will put the patient on nicotine patch. Again, counseled the patient. The patient understands the risks of continuing to do so.  8.  Nausea, vomiting, and diarrhea. Now symptoms much improved, but likely secondary to acute gastroenteritis. Currently, I do not see a need for GI involvement, but if it recurs, then we might have to do so. I spent about 50 minutes in taking care of the patient.       Sorin Avila MD      [id number]  D:  11/24/2019 13:10  T:  11/24/2019 16:25  JOB #:  [id number]

## 2019-11-24 NOTE — CONSULTS
[facility]   [address] [facility], [address] [place] [address], [facility]  Phone: [phone] NOTE     Patient: Steven Silver MRN: [id number]  PCP: Hershel Severe, MD   :     1975  Age:   40 y.o. Sex:  male      Referring physician: Sola Blue MD  Reason for consultation: 40 y.o. male with Syncope [R55]  Syncope [R89] complicated by PATRICK   Admission Date: 2019  2:36 AM  LOS: 0 days      ASSESSMENT and PLAN :   PATRICK - volume depletion from N/V/D, no evidence of rhabdo with normal CK and U/A negative for blood; no obstruction on US; perhaps some contribution from NSAIDs    CKD - recent baseline creat~1.7  -cause unclear    N/V/D - presumed gastroenteritis  -now resolved    Syncopal episodes - presumed due to volume depletion    Polysubstance abuse - cocaine, alcohol, tobacco    Bipolar disorder    Chronic back pain  -h/o lumbar laminectomy    LAZARO, migraines      REC:  Agree with IVF as written  Urine for sodium and creat  Serial labs        Thank you for consulting [facility] in the care of your patient. Subjective:   HPI: Steven Silver is a 40 y.o.  male who has been admitted to the hospital for syncopal episodes, N/V/D, found to have PATRICK. He has CKD with recent bseline creat~1.7, cause unknown. He does use NSAIDs on a faily regular basis for chronic back pain and headaches. He uses cocaine, alcohol, and is a long time smoker. No hypotension documented.     Past Medical Hx:   Past Medical History:   Diagnosis Date    Arrhythmia     PCV's    Arthritis     back    Back pain     Chronic pain     6 herniated discs in back/pinched nerve in back and neck    GERD (gastroesophageal reflux disease)     Hepatitis C     HSV-2 seropositive 2016    Hypertension     IV drug abuse ([facility] [address]     Kidney stone     Liver disease     elevated liver enzymes/hep C    Neuralgia     Other ill-defined conditions(799.89)     chronic bronchitis    Polysubstance dependence (HCC)     stimulants, MJ, tob, barbs, benzos, opiates    Psychiatric disorder     Bipolar, Anxiety    Unspecified adverse effect of anesthesia     \"was told he woke up during back surgery\" and during nerve root injection    Unspecified sleep apnea     mild - does not use CPAP        Past Surgical Hx:     Past Surgical History:   Procedure Laterality Date    HX LUMBAR LAMINECTOMY      HX ORTHOPAEDIC Bilateral     back surgery - laminectomy/discectomy    HX ORTHOPAEDIC      nerve root injection in back    HX OTHER SURGICAL      testicular surgery- varicocelectomy, i &d of abscess on right elbow         Allergies   Allergen Reactions    Barium Sulfate Hives    Demerol [Meperidine] Hives    Iodinated Contrast Media Other (comments)     Hives, swelling of throat with IVP/has had MRI with contrast without problems.  Neurontin [Gabapentin] Drowsiness       Social Hx:  reports that he has been smoking cigarettes. He has a 22.00 pack-year smoking history. He has quit using smokeless tobacco. He reports current drug use. Drug: Cocaine. He reports that he does not drink alcohol. Family History   Problem Relation Age of Onset    Heart Disease Maternal Grandfather        Review of Systems:  A thorough twelve point review of system was performed today. Pertinent positives and negatives are mentioned in the HPI. The reminder of the ROS is negative and noncontributory.      Objective:    Vitals:    Vitals:    11/24/19 0845 11/24/19 0900 11/24/19 0915 11/24/19 0930   BP: 110/54 101/61 93/55 99/58   Pulse: 76 73 78 73   Resp: 15 21 20 23   Temp:       SpO2: 97% 95% 95% 97%   Weight:       Height:         I&O's:  11/23 0701 - 11/24 0700  In: 2443.1 [I.V.:2443.1]  Out: -   Visit Vitals  BP 99/58   Pulse 73   Temp 97.1 °F (36.2 °C)   Resp 23   Ht 6' (1.829 m)   Wt 104.6 kg (230 lb 9.6 oz)   SpO2 97%   BMI 31.28 kg/m²       Physical Exam:  General: No apparent Distress  HEENT:  No Pallor , No Icterus  Neck: Supple, no JVD  Lungs : CTA  CVS: RRR, S1 S2 normal, No rub   Abdomen: Soft, NT, BS +  Extremities: Edema- none  Skin: No rash or lesions. MS: No joint swelling, erythema, warmth  Neurologic: non focal, AAO x 3  Psych: normal affect    Laboratory Results:    Recent Labs     11/24/19  0253   *   K 3.1*   CL 95*   CO2 32   *   BUN 57*   CREA 4.05*   CA 9.2   MG 2.2   PHOS 4.1   ALB 4.1   SGOT 15   ALT 30     Recent Labs     11/24/19  0253   WBC 8.6   HGB 15.3   HCT 42.7        Lab Results   Component Value Date/Time    Specimen Description: NARES 05/08/2014 10:48 PM    Specimen Description: CATH URINE 09/05/2010 02:00 AM     Lab Results   Component Value Date/Time    Culture result: NO NEISSERIA GONORRHOEAE ISOLATED 09/26/2016 09:49 PM    Culture result: NO GROWTH 5 DAYS 08/31/2015 03:52 PM    Culture result:  05/08/2014 10:48 PM     MRSA NOT PRESENT      Screening of patient nares for MRSA is for surveillance purposes and, if positive, to facilitate isolation considerations in high risk settings. It is not intended for automatic decolonization interventions per se as regimens are not sufficiently effective to warrant routine use. Recent Results (from the past 24 hour(s))   CBC WITH AUTOMATED DIFF    Collection Time: 11/24/19  2:53 AM   Result Value Ref Range    WBC 8.6 4.1 - 11.1 K/uL    RBC 4.44 4.10 - 5.70 M/uL    HGB 15.3 12.1 - 17.0 g/dL    HCT 42.7 36.6 - 50.3 %    MCV 96.2 80.0 - 99.0 FL    MCH 34.5 (H) 26.0 - 34.0 PG    MCHC 35.8 30.0 - 36.5 g/dL    RDW 12.4 11.5 - 14.5 %    PLATELET 152 898 - 497 K/uL    MPV 11.6 8.9 - 12.9 FL    NRBC 0.0 0  WBC    ABSOLUTE NRBC 0.00 0.00 - 0.01 K/uL    NEUTROPHILS 47 32 - 75 %    LYMPHOCYTES 42 12 - 49 %    MONOCYTES 8 5 - 13 %    EOSINOPHILS 2 0 - 7 %    BASOPHILS 1 0 - 1 %    IMMATURE GRANULOCYTES 0 0.0 - 0.5 %    ABS. NEUTROPHILS 4.1 1.8 - 8.0 K/UL    ABS.  LYMPHOCYTES 3.6 (H) 0.8 - 3.5 K/UL    ABS. MONOCYTES 0.7 0.0 - 1.0 K/UL    ABS. EOSINOPHILS 0.2 0.0 - 0.4 K/UL    ABS. BASOPHILS 0.0 0.0 - 0.1 K/UL    ABS. IMM. GRANS. 0.0 0.00 - 0.04 K/UL    DF AUTOMATED     METABOLIC PANEL, COMPREHENSIVE    Collection Time: 11/24/19  2:53 AM   Result Value Ref Range    Sodium 135 (L) 136 - 145 mmol/L    Potassium 3.1 (L) 3.5 - 5.1 mmol/L    Chloride 95 (L) 97 - 108 mmol/L    CO2 32 21 - 32 mmol/L    Anion gap 8 5 - 15 mmol/L    Glucose 118 (H) 65 - 100 mg/dL    BUN 57 (H) 6 - 20 MG/DL    Creatinine 4.05 (H) 0.70 - 1.30 MG/DL    BUN/Creatinine ratio 14 12 - 20      GFR est AA 20 (L) >60 ml/min/1.73m2    GFR est non-AA 16 (L) >60 ml/min/1.73m2    Calcium 9.2 8.5 - 10.1 MG/DL    Bilirubin, total 0.6 0.2 - 1.0 MG/DL    ALT (SGPT) 30 12 - 78 U/L    AST (SGOT) 15 15 - 37 U/L    Alk.  phosphatase 92 45 - 117 U/L    Protein, total 7.7 6.4 - 8.2 g/dL    Albumin 4.1 3.5 - 5.0 g/dL    Globulin 3.6 2.0 - 4.0 g/dL    A-G Ratio 1.1 1.1 - 2.2     PHOSPHORUS    Collection Time: 11/24/19  2:53 AM   Result Value Ref Range    Phosphorus 4.1 2.6 - 4.7 MG/DL   MAGNESIUM    Collection Time: 11/24/19  2:53 AM   Result Value Ref Range    Magnesium 2.2 1.6 - 2.4 mg/dL   ETHYL ALCOHOL    Collection Time: 11/24/19  2:53 AM   Result Value Ref Range    ALCOHOL(ETHYL),SERUM <10 <10 MG/DL   URINALYSIS W/ REFLEX CULTURE    Collection Time: 11/24/19  2:53 AM   Result Value Ref Range    Color YELLOW/STRAW      Appearance CLEAR CLEAR      Specific gravity 1.025 1.003 - 1.030      pH (UA) 5.5 5.0 - 8.0      Protein 30 (A) NEG mg/dL    Glucose NEGATIVE  NEG mg/dL    Ketone TRACE (A) NEG mg/dL    Blood NEGATIVE  NEG      Urobilinogen 0.2 0.2 - 1.0 EU/dL    Nitrites NEGATIVE  NEG      Leukocyte Esterase NEGATIVE  NEG      WBC 0-4 0 - 4 /hpf    RBC 0-5 0 - 5 /hpf    Epithelial cells FEW FEW /lpf    Bacteria 1+ (A) NEG /hpf    UA:UC IF INDICATED URINE CULTURE ORDERED     DRUG SCREEN, URINE    Collection Time: 11/24/19  2:53 AM   Result Value Ref Range    AMPHETAMINES NEGATIVE  NEG      BARBITURATES POSITIVE (A) NEG      BENZODIAZEPINES NEGATIVE  NEG      COCAINE POSITIVE (A) NEG      METHADONE NEGATIVE  NEG      OPIATES NEGATIVE  NEG      PCP(PHENCYCLIDINE) NEGATIVE  NEG      THC (TH-CANNABINOL) NEGATIVE  NEG      Drug screen comment (NOTE)    D DIMER    Collection Time: 11/24/19  2:53 AM   Result Value Ref Range    D-dimer <0.19 0.00 - 0.65 mg/L FEU   TSH 3RD GENERATION    Collection Time: 11/24/19  2:53 AM   Result Value Ref Range    TSH 1.01 0.36 - 3.74 uIU/mL   SAMPLES BEING HELD    Collection Time: 11/24/19  2:53 AM   Result Value Ref Range    SAMPLES BEING HELD        Add-on orders for these samples will be processed based on acceptable specimen integrity and analyte stability, which may vary by analyte. COMMENT        Add-on orders for these samples will be processed based on acceptable specimen integrity and analyte stability, which may vary by analyte.    AMMONIA    Collection Time: 11/24/19  2:53 AM   Result Value Ref Range    Ammonia 27 <32 UMOL/L   TROPONIN I    Collection Time: 11/24/19  2:53 AM   Result Value Ref Range    Troponin-I, Qt. <0.05 <0.05 ng/mL   CK W/ REFLX CKMB    Collection Time: 11/24/19  2:53 AM   Result Value Ref Range     39 - 308 U/L   BILIRUBIN, CONFIRM    Collection Time: 11/24/19  2:53 AM   Result Value Ref Range    Bilirubin UA, confirm NEGATIVE  NEG     EKG, 12 LEAD, INITIAL    Collection Time: 11/24/19  2:53 AM   Result Value Ref Range    Ventricular Rate 90 BPM    Atrial Rate 90 BPM    P-R Interval 148 ms    QRS Duration 100 ms    Q-T Interval 366 ms    QTC Calculation (Bezet) 447 ms    Calculated P Axis 77 degrees    Calculated R Axis 76 degrees    Calculated T Axis 67 degrees    Diagnosis       Normal sinus rhythm  Nonspecific ST and T wave abnormality  Abnormal ECG  When compared with ECG of 03-JAN-2018 23:32,  QRS duration has increased  Non-specific change in ST segment in Anterior leads  Nonspecific T wave abnormality now evident in Anterior leads     POC TROPONIN-I    Collection Time: 11/24/19  3:13 AM   Result Value Ref Range    Troponin-I (POC) <0.04 0.00 - 0.08 ng/mL   POC CHEM8    Collection Time: 11/24/19  3:14 AM   Result Value Ref Range    Calcium, ionized (POC) 1.08 (L) 1.12 - 1.32 mmol/L    Sodium (POC) 138 136 - 145 mmol/L    Potassium (POC) 2.9 (L) 3.5 - 5.1 mmol/L    Chloride (POC) 96 (L) 98 - 107 mmol/L    CO2 (POC) 29 21 - 32 mmol/L    Anion gap (POC) 17 10 - 20 mmol/L    Glucose (POC) 92 65 - 100 mg/dL    BUN (POC) 48 (H) 9 - 20 mg/dL    Creatinine (POC) 3.4 (H) 0.6 - 1.3 mg/dL    GFRAA, POC 24 (L) >60 ml/min/1.73m2    GFRNA, POC 20 (L) >60 ml/min/1.73m2    Hematocrit (POC) 38 36.6 - 50.3 %    Comment Notified RN or MD immediately by      POC LACTIC ACID    Collection Time: 11/24/19  3:21 AM   Result Value Ref Range    Lactic Acid (POC) 1.18 0.40 - 2.00 mmol/L         Urine dipstick:   Lab Results   Component Value Date/Time    Color YELLOW/STRAW 11/24/2019 02:53 AM    Appearance CLEAR 11/24/2019 02:53 AM    Specific gravity 1.025 11/24/2019 02:53 AM    Specific gravity 1.016 11/28/2017 09:39 PM    pH (UA) 5.5 11/24/2019 02:53 AM    Protein 30 (A) 11/24/2019 02:53 AM    Glucose NEGATIVE  11/24/2019 02:53 AM    Ketone TRACE (A) 11/24/2019 02:53 AM    Bilirubin NEGATIVE  11/28/2017 09:39 PM    Urobilinogen 0.2 11/24/2019 02:53 AM    Nitrites NEGATIVE  11/24/2019 02:53 AM    Leukocyte Esterase NEGATIVE  11/24/2019 02:53 AM    Epithelial cells FEW 11/24/2019 02:53 AM    Bacteria 1+ (A) 11/24/2019 02:53 AM    WBC 0-4 11/24/2019 02:53 AM    RBC 0-5 11/24/2019 02:53 AM       I have reviewed the following: All pertinent labs, microbiology data, radiology imaging for my assessment     Medications list Personally Reviewed   [x]      Yes     []               No       Medications:  Prior to Admission medications    Medication Sig Start Date End Date Taking?  Authorizing Provider   atorvastatin (LIPITOR) 40 mg tablet Take 40 mg by mouth daily. Yes Vicenta, MD Luis F   GABAPENTIN, BULK, by Does Not Apply route. Yes Vicenta, MD Luis F   propranolol LA (INDERAL LA) 80 mg SR capsule Take 1 Cap by mouth daily. BP med to help reduce headache frequency. Stop taking Atenolol 12/20/17  Yes Pau Christiansen MD   butalbital-acetaminophen-caffeine (FIORICET, ESGIC) -40 mg per tablet 1 tab at onset of migraine; can 1 tab in repeat in 4 hours (one time) if headache remains. Limit: 2 tabs per day, max 2 days a week. 90 day Rx. 4/6/17  Yes Pau Christiansen MD   lamoTRIgine (LAMICTAL) 200 mg tablet Take  by mouth daily. Yes Provider, Historical   cyclobenzaprine (FLEXERIL) 5 mg tablet Take 2 Tabs by mouth three (3) times daily (with meals). 7/8/16  Yes Bernadette Dietz MD   OLANZapine (ZYPREXA) 10 mg tablet Take 5 mg by mouth nightly. Yes Provider, Historical   ondansetron (ZOFRAN ODT) 4 mg disintegrating tablet Take 1 Tab by mouth every eight (8) hours as needed for Nausea. 11/16/19   Dorothy Amaya MD   candesartan cilexetil (CANDESARTAN PO) Take  by mouth. Vicenta, MD Luis F   ibuprofen (MOTRIN) 600 mg tablet Take 1 Tab by mouth every eight (8) hours as needed for Pain. 10/23/19   Angela Friedman MD        Thank you for allowing us to participate in the care of this patient. We will follow patient. Please dont hesitate to call with any questions    Safia Jeff MD  [address] [place] [address] Nephrology [facility]   [address], [place] [address] [place] [address]  Phone - [phone]   Fax - [phone]  www. [email or web address]

## 2019-11-24 NOTE — ED NOTES
Bedside shift change report given to Barrett Skinner RN (oncoming nurse) by Keisha Ambriz RN (offgoing nurse). Report included the following information SBAR, ED Summary, MAR and Recent Results.

## 2019-11-24 NOTE — ED TRIAGE NOTES
Triage note:pt reports he had a seizure today hitting his head. Pt with history of seizures in the past, but unsure when the last time was. Pt last report vomiting for the past two night. Pt with history of drug uses - pt reports using coden a few days ago.

## 2019-11-24 NOTE — PROGRESS NOTES
5455: received pt from critical care transport. Placed in bed and on monitor. Paged for Hospitalist to admit. Primary Nurse Yevgeniy Wray RN and Aldo Novak RN performed a dual skin assessment on this patient No impairment noted  Leland score is 23    0715: Spoke with Johnathon Wiseman NP. Notified of arrival. Dr. Jasmin Kruse will be admitting physician.
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
## 2019-11-25 ENCOUNTER — APPOINTMENT (OUTPATIENT)
Dept: NON INVASIVE DIAGNOSTICS | Age: 44
DRG: 204 | End: 2019-11-25
Attending: NURSE PRACTITIONER
Payer: MEDICAID

## 2019-11-25 ENCOUNTER — APPOINTMENT (OUTPATIENT)
Dept: MRI IMAGING | Age: 44
DRG: 204 | End: 2019-11-25
Attending: PSYCHIATRY & NEUROLOGY
Payer: MEDICAID

## 2019-11-25 VITALS
RESPIRATION RATE: 18 BRPM | WEIGHT: 230 LBS | SYSTOLIC BLOOD PRESSURE: 113 MMHG | DIASTOLIC BLOOD PRESSURE: 69 MMHG | BODY MASS INDEX: 31.15 KG/M2 | TEMPERATURE: 97.6 F | HEART RATE: 73 BPM | OXYGEN SATURATION: 100 % | HEIGHT: 72 IN

## 2019-11-25 LAB
ALBUMIN SERPL-MCNC: 3 G/DL (ref 3.5–5)
ALBUMIN/GLOB SERPL: 1.2 {RATIO} (ref 1.1–2.2)
ALP SERPL-CCNC: 65 U/L (ref 45–117)
ALT SERPL-CCNC: 20 U/L (ref 12–78)
ANION GAP SERPL CALC-SCNC: 3 MMOL/L (ref 5–15)
AST SERPL-CCNC: 7 U/L (ref 15–37)
AV VELOCITY RATIO: 0.92
BACTERIA SPEC CULT: NORMAL
BASOPHILS # BLD: 0 K/UL (ref 0–0.1)
BASOPHILS NFR BLD: 1 % (ref 0–1)
BILIRUB SERPL-MCNC: 0.2 MG/DL (ref 0.2–1)
BUN SERPL-MCNC: 36 MG/DL (ref 6–20)
BUN/CREAT SERPL: 19 (ref 12–20)
CALCIUM SERPL-MCNC: 7.8 MG/DL (ref 8.5–10.1)
CC UR VC: NORMAL
CHLORIDE SERPL-SCNC: 115 MMOL/L (ref 97–108)
CO2 SERPL-SCNC: 25 MMOL/L (ref 21–32)
CREAT SERPL-MCNC: 1.88 MG/DL (ref 0.7–1.3)
DIFFERENTIAL METHOD BLD: ABNORMAL
ECHO AO ROOT DIAM: 3.43 CM
ECHO AV PEAK GRADIENT: 5.6 MMHG
ECHO AV PEAK VELOCITY: 118.14 CM/S
ECHO LA MAJOR AXIS: 3.52 CM
ECHO LA TO AORTIC ROOT RATIO: 1.03
ECHO LV INTERNAL DIMENSION DIASTOLIC: 4.95 CM (ref 4.2–5.9)
ECHO LV INTERNAL DIMENSION SYSTOLIC: 3.2 CM
ECHO LV IVSD: 0.8 CM (ref 0.6–1)
ECHO LV MASS 2D: 151.3 G (ref 88–224)
ECHO LV MASS INDEX 2D: 66.9 G/M2 (ref 49–115)
ECHO LV POSTERIOR WALL DIASTOLIC: 0.79 CM (ref 0.6–1)
ECHO LVOT PEAK GRADIENT: 4.7 MMHG
ECHO LVOT PEAK VELOCITY: 108.74 CM/S
ECHO MV A VELOCITY: 49.93 CM/S
ECHO MV AREA PHT: 3.5 CM2
ECHO MV E DECELERATION TIME (DT): 218.4 MS
ECHO MV E VELOCITY: 101.72 CM/S
ECHO MV E/A RATIO: 2.04
ECHO MV PRESSURE HALF TIME (PHT): 63.3 MS
ECHO PV MAX VELOCITY: 70.82 CM/S
ECHO PV PEAK GRADIENT: 2 MMHG
ECHO RV INTERNAL DIMENSION: 3.66 CM
ECHO RV TAPSE: 2.22 CM (ref 1.5–2)
ECHO TV REGURGITANT MAX VELOCITY: 279.1 CM/S
ECHO TV REGURGITANT PEAK GRADIENT: 31.2 MMHG
EOSINOPHIL # BLD: 0.2 K/UL (ref 0–0.4)
EOSINOPHIL NFR BLD: 3 % (ref 0–7)
ERYTHROCYTE [DISTWIDTH] IN BLOOD BY AUTOMATED COUNT: 12.5 % (ref 11.5–14.5)
GLOBULIN SER CALC-MCNC: 2.5 G/DL (ref 2–4)
GLUCOSE SERPL-MCNC: 103 MG/DL (ref 65–100)
HCT VFR BLD AUTO: 34.9 % (ref 36.6–50.3)
HGB BLD-MCNC: 11.8 G/DL (ref 12.1–17)
IMM GRANULOCYTES # BLD AUTO: 0 K/UL (ref 0–0.04)
IMM GRANULOCYTES NFR BLD AUTO: 0 % (ref 0–0.5)
LVFS 2D: 35.31 %
LYMPHOCYTES # BLD: 2.6 K/UL (ref 0.8–3.5)
LYMPHOCYTES NFR BLD: 50 % (ref 12–49)
MCH RBC QN AUTO: 34.4 PG (ref 26–34)
MCHC RBC AUTO-ENTMCNC: 33.8 G/DL (ref 30–36.5)
MCV RBC AUTO: 101.7 FL (ref 80–99)
MONOCYTES # BLD: 0.5 K/UL (ref 0–1)
MONOCYTES NFR BLD: 10 % (ref 5–13)
MV DEC SLOPE: 4.66
NEUTS SEG # BLD: 1.9 K/UL (ref 1.8–8)
NEUTS SEG NFR BLD: 36 % (ref 32–75)
NRBC # BLD: 0 K/UL (ref 0–0.01)
NRBC BLD-RTO: 0 PER 100 WBC
PLATELET # BLD AUTO: 114 K/UL (ref 150–400)
PMV BLD AUTO: 11.8 FL (ref 8.9–12.9)
POTASSIUM SERPL-SCNC: 4.5 MMOL/L (ref 3.5–5.1)
PROT SERPL-MCNC: 5.5 G/DL (ref 6.4–8.2)
RBC # BLD AUTO: 3.43 M/UL (ref 4.1–5.7)
SERVICE CMNT-IMP: NORMAL
SODIUM SERPL-SCNC: 143 MMOL/L (ref 136–145)
WBC # BLD AUTO: 5.2 K/UL (ref 4.1–11.1)

## 2019-11-25 PROCEDURE — 74011250637 HC RX REV CODE- 250/637: Performed by: HOSPITALIST

## 2019-11-25 PROCEDURE — 70551 MRI BRAIN STEM W/O DYE: CPT

## 2019-11-25 PROCEDURE — 74011250636 HC RX REV CODE- 250/636: Performed by: HOSPITALIST

## 2019-11-25 PROCEDURE — 80053 COMPREHEN METABOLIC PANEL: CPT

## 2019-11-25 PROCEDURE — 85025 COMPLETE CBC W/AUTO DIFF WBC: CPT

## 2019-11-25 PROCEDURE — 36415 COLL VENOUS BLD VENIPUNCTURE: CPT

## 2019-11-25 PROCEDURE — 93306 TTE W/DOPPLER COMPLETE: CPT

## 2019-11-25 PROCEDURE — 74011250636 HC RX REV CODE- 250/636: Performed by: INTERNAL MEDICINE

## 2019-11-25 RX ORDER — SODIUM CHLORIDE 9 MG/ML
125 INJECTION, SOLUTION INTRAVENOUS CONTINUOUS
Status: DISCONTINUED | OUTPATIENT
Start: 2019-11-25 | End: 2019-11-25

## 2019-11-25 RX ADMIN — Medication 10 ML: at 15:18

## 2019-11-25 RX ADMIN — BUTALBITAL, ACETAMINOPHEN AND CAFFEINE 1 TABLET: 50; 325; 40 TABLET ORAL at 08:26

## 2019-11-25 RX ADMIN — Medication 10 ML: at 06:00

## 2019-11-25 RX ADMIN — LAMOTRIGINE 200 MG: 100 TABLET ORAL at 08:22

## 2019-11-25 RX ADMIN — ONDANSETRON 4 MG: 2 INJECTION INTRAMUSCULAR; INTRAVENOUS at 15:17

## 2019-11-25 RX ADMIN — SODIUM CHLORIDE 125 ML/HR: 900 INJECTION, SOLUTION INTRAVENOUS at 08:24

## 2019-11-25 RX ADMIN — BUTALBITAL, ACETAMINOPHEN AND CAFFEINE 1 TABLET: 50; 325; 40 TABLET ORAL at 00:45

## 2019-11-25 NOTE — PROCEDURES
PROCEDURE: ROUTINE INPATIENT EEG  NAME:   Sangeeta Book NUMBER : [de-identified]  MRN:   [id number]  DATE OF SERVICE: 11/25/2019     HISTORY/INDICATION: Pt with shaking of extremities associated with severe hypotension, with pt reporting a vague h/o seizures. EEG to assess for underlying epilepsy. MEDICATIONS:   Current Facility-Administered Medications   Medication Dose Route Frequency Provider Last Rate Last Dose    acetaminophen (TYLENOL) tablet 650 mg  650 mg Oral Q4H PRN Jerica Cueto MD   650 mg at 11/24/19 2215    sodium chloride (NS) flush 5-40 mL  5-40 mL IntraVENous Q8H Arnie Cervantes MD   10 mL at 11/25/19 0600    sodium chloride (NS) flush 5-40 mL  5-40 mL IntraVENous PRN Nitin Bauer MD        ondansetron (ZOFRAN) injection 4 mg  4 mg IntraVENous Q4H PRN Nitin Bauer MD        nicotine (NICODERM CQ) 21 mg/24 hr patch 1 Patch  1 Patch TransDERmal DAILY Nitin Bauer MD   1 Patch at 11/25/19 0823    OLANZapine (ZyPREXA) tablet 5 mg  5 mg Oral QHS Nitin Bauer MD   5 mg at 11/24/19 2215    lamoTRIgine (LaMICtal) tablet 200 mg  200 mg Oral DAILY Nitin Bauer MD   200 mg at 11/25/19 4664    butalbital-acetaminophen-caffeine (FIORICET, ESGIC) -40 mg per tablet 1 Tab  1 Tab Oral Q6H PRN Nitin Bauer MD   1 Tab at 11/25/19 0826    atorvastatin (LIPITOR) tablet 40 mg  40 mg Oral QHS Nitin Bauer MD   40 mg at 11/24/19 2215    ondansetron (ZOFRAN ODT) tablet 4 mg  4 mg Oral Q8H PRN Nitin Bauer MD   4 mg at 11/24/19 1423       CONDITIONS OF RECORDING: This is a routine 21-channel EEG recording performed in accordance with the international 10-20 system with one channel devoted to limited EKG. This study was done during states of wakefulness and sleep. Photic stimulation and hyperventilation were performed as activating procedures. DESCRIPTION:   Upon maximal arousal the posterior dominant rhythm has a frequency of 10Hz with an amplitude of 40uV.  This activity is symmetric over the bilateral posterior derivations and attenuates with eye opening. Photic stimulation and hyperventilation do not significantly alter the tracing. Normal sleep architecture is seen with stage II sleep recognized by the presence of symmetric vertex waves and sleep spindles. There are no focal abnormalities, epileptiform discharges, or electrographic seizures seen. INTERPRETATION: Normal awake and asleep EEG    CLINICAL CORRELATION: A normal EEG does not definitively exclude a diagnosis of epilepsy if clinical suspicion is high consider sleep deprived EEG.      Emerald Oliveira MD

## 2019-11-25 NOTE — PROGRESS NOTES
AVS done with patient at the bedside. IV access removed. Patient verbalizees understanding of all discharge instructions, including making a follow up appointment with primary care physician. Patient to be discharged via private vehicle home. No further questions or concerns at this time.  E-signature pad is not working, so patient signed hardcopy

## 2019-11-25 NOTE — DISCHARGE SUMMARY
Discharge Summary       PATIENT ID: Gela Webb  MRN: [id number]   YOB: 1975    DATE OF ADMISSION: 11/24/2019  2:36 AM    DATE OF DISCHARGE: 11/25/19  PRIMARY CARE PROVIDER: Karen Edgar MD       DISCHARGING PROVIDER: Laury Schmidt MD    To contact this individual call [phone] and ask the  to page. If unavailable ask to be transferred the Adult Hospitalist Department. CONSULTATIONS: IP CONSULT TO HOSPITALIST  IP CONSULT TO NEPHROLOGY  IP CONSULT TO NEUROLOGY      PROCEDURES/SURGERIES: * No surgery found *    IMAGING  Mri Brain Wo Cont    Result Date: 11/25/2019  IMPRESSION: 1. No evident seizure focus. Normal brain MRI. The previously noted finding on CT appears to have been artifact. Ct Head Wo Cont    Result Date: 11/24/2019  Impression: 1. Subcentimeter areas of subtle increased density in the left hemisphere as detailed above. Exact etiology is uncertain. Recommend MR imaging with contrast to further evaluate    Ct Abd Pelv Wo Cont    Result Date: 11/24/2019  IMPRESSION: No acute abnormality    Us Retroperitoneum Comp    Result Date: 11/24/2019  IMPRESSION: No acute findings. [address] [facility] COURSE:   This is a 51-year-old male with a past medical history of chronic back pain, hypertension, bipolar disorder, reported history of polysubstance abuse. He comes over here because of nausea, vomiting, diarrhea, and dizziness that he has been experiencing since last 2-3 days. The patient claims that he has been having this nausea, vomiting, and diarrhea and was not able to keep things in. He claims that he did not have any fever, nasal congestion, cough, chest pain, shortness of breath. No abdominal pain. No blood in his vomitus or stool.   Overnight, the patient had an episode in which he was shaking, was having some lightheadedness and dizziness and then he claims that he passed out.  He does not remember for how long did he pass out, then he woke up and he called his friend who brought him into [place] [facility] from where he was transferred to [facility]. Admitted with diagnoses of   # Syncopal episode with ??seizure   # Abnormal CT scan of the head with lesions in left hemisphere  # Hypotension   # Dehydration   # PATRICK  # Urine drug screen positive for cocaine  # Smoking   # Nausea and vomiting     Patient was admitted to ICU, he has received intravenous fluid. Neurology consulted and MRI brain and EEG done which was unremarkable. The abnormal CT was noted to be due to Artifical with normal brain MRI. The abnormal body movement he has experienced was thought to be due to dehydration and PATRICK associated with cocaine use. Echo normal.      DISCHARGE DIAGNOSES / PLAN:    # Syncopal episode   # Hypotension   # Dehydration   # PATRICK  # Urine drug screen positive for cocaine  # Smoking   # Nausea and vomiting    Patient seen and examined the day of discharge. He agreed to discharge planning. He was advised on substance abuse cessation     Patient Active Problem List   Diagnosis Code    [facility] ([facility] F31.9    Previous back surgery Z98.890    Overdose T50.901A    Drug overdose T50.901A    Polysubstance dependence including opioid type drug, continuous use ([facility] F11.20, F19.20    Fall W19. [place] Substance induced mood disorder (HCC) F19.94    Substance-induced psychotic disorder with delusions ([facility]) F19.950    Psychosis ([facility]) F29    Altered mental status R41.82    Chronic migraine G43.709    Headache, chronic daily R51    Chronic midline low back pain M54.5, G89.29    Chronic bilateral low back pain with right-sided sciatica M54.41, G89.29    Lumbar degenerative disc disease M51.36    Essential hypertension with goal blood pressure less than 140/90 I10    History of hepatitis C Z86.19    HSV-2 seropositive R76.8    Syncope R55               PENDING TEST RESULTS:   At the time of discharge the following test results are still pending: None     FOLLOW UP APPOINTMENTS:    Follow-up Information     Follow up With Specialties Details Why Contact Info    Goran Meza MD Internal Medicine Schedule an appointment as soon as possible for a visit in 2 weeks Post hospital discharge follow up  [address]  [place]:   · It is important that you take the medication exactly as they are prescribed. · Keep your medication in the bottles provided by the pharmacist and keep a list of the medication names, dosages, and times to be taken in your wallet. · Do not take other medications without consulting your doctor. · No drinking alcohol or driving car or operating machinery if you are on narcotic pain medications. Donot take sedating mediations if you are sleepy or confused. · Fall Precautions  · Keep Well Hydrated  · Report to your medical provider if you feel you have  developed allergies to medications  · Follow up with your PCP or Consultant for medication adjustments and refills  · Monitor for signs of fevers,chills,bleeding,chest pain and seek medical attention if you do so. DIET: Regular     ACTIVITY: As tolerated     WOUND CARE: None     EQUIPMENT needed: None       DISCHARGE MEDICATIONS:  Current Discharge Medication List      CONTINUE these medications which have NOT CHANGED    Details   atorvastatin (LIPITOR) 40 mg tablet Take 40 mg by mouth daily. GABAPENTIN, BULK, by Does Not Apply route. propranolol LA (INDERAL LA) 80 mg SR capsule Take 1 Cap by mouth daily. BP med to help reduce headache frequency.   Stop taking Atenolol  Qty: 30 Cap, Refills: 1    Associated Diagnoses: Headache, chronic daily; Migraine without aura and without status migrainosus, not intractable      butalbital-acetaminophen-caffeine (FIORICET, ESGIC) -40 mg per tablet 1 tab at onset of migraine; can 1 tab in repeat in 4 hours (one time) if headache remains. Limit: 2 tabs per day, max 2 days a week. 90 day Rx. Qty: 48 Tab, Refills: 0    Associated Diagnoses: Intractable migraine without aura and without status migrainosus      lamoTRIgine (LAMICTAL) 200 mg tablet Take  by mouth daily. cyclobenzaprine (FLEXERIL) 5 mg tablet Take 2 Tabs by mouth three (3) times daily (with meals). Qty: 20 Tab, Refills: 0      OLANZapine (ZYPREXA) 10 mg tablet Take 5 mg by mouth nightly. ondansetron (ZOFRAN ODT) 4 mg disintegrating tablet Take 1 Tab by mouth every eight (8) hours as needed for Nausea. Qty: 20 Tab, Refills: 0      candesartan cilexetil (CANDESARTAN PO) Take  by mouth. STOP taking these medications       ibuprofen (MOTRIN) 600 mg tablet Comments:   Reason for Stopping:               All Micro Results     Procedure Component Value Units Date/Time    CULTURE, URINE [[id number]] Collected:  11/24/19 0253    Order Status:  Completed Specimen:  Urine Updated:  11/25/19 1024     Special Requests: --        NO SPECIAL REQUESTS  Reflexed from [id number]       Anita Count --        <10,000  COLONIES/mL       Culture result: NO SIGNIFICANT GROWTH       CULTURE, BLOOD, PAIRED [[id number]]     Order Status:  Sent Specimen:  Blood           Recent Results (from the past 24 hour(s))   METABOLIC PANEL, COMPREHENSIVE    Collection Time: 11/25/19  7:21 AM   Result Value Ref Range    Sodium 143 136 - 145 mmol/L    Potassium 4.5 3.5 - 5.1 mmol/L    Chloride 115 (H) 97 - 108 mmol/L    CO2 25 21 - 32 mmol/L    Anion gap 3 (L) 5 - 15 mmol/L    Glucose 103 (H) 65 - 100 mg/dL    BUN 36 (H) 6 - 20 MG/DL    Creatinine 1.88 (H) 0.70 - 1.30 MG/DL    BUN/Creatinine ratio 19 12 - 20      GFR est AA 47 (L) >60 ml/min/1.73m2    GFR est non-AA 39 (L) >60 ml/min/1.73m2    Calcium 7.8 (L) 8.5 - 10.1 MG/DL    Bilirubin, total 0.2 0.2 - 1.0 MG/DL    ALT (SGPT) 20 12 - 78 U/L    AST (SGOT) 7 (L) 15 - 37 U/L    Alk.  phosphatase 65 45 - 117 U/L    Protein, total 5.5 (L) 6.4 - 8.2 g/dL    Albumin 3.0 (L) 3.5 - 5.0 g/dL    Globulin 2.5 2.0 - 4.0 g/dL    A-G Ratio 1.2 1.1 - 2.2     CBC WITH AUTOMATED DIFF    Collection Time: 11/25/19  7:21 AM   Result Value Ref Range    WBC 5.2 4.1 - 11.1 K/uL    RBC 3.43 (L) 4.10 - 5.70 M/uL    HGB 11.8 (L) 12.1 - 17.0 g/dL    HCT 34.9 (L) 36.6 - 50.3 %    .7 (H) 80.0 - 99.0 FL    MCH 34.4 (H) 26.0 - 34.0 PG    MCHC 33.8 30.0 - 36.5 g/dL    RDW 12.5 11.5 - 14.5 %    PLATELET 423 (L) 087 - 400 K/uL    MPV 11.8 8.9 - 12.9 FL    NRBC 0.0 0  WBC    ABSOLUTE NRBC 0.00 0.00 - 0.01 K/uL    NEUTROPHILS 36 32 - 75 %    LYMPHOCYTES 50 (H) 12 - 49 %    MONOCYTES 10 5 - 13 %    EOSINOPHILS 3 0 - 7 %    BASOPHILS 1 0 - 1 %    IMMATURE GRANULOCYTES 0 0.0 - 0.5 %    ABS. NEUTROPHILS 1.9 1.8 - 8.0 K/UL    ABS. LYMPHOCYTES 2.6 0.8 - 3.5 K/UL    ABS. MONOCYTES 0.5 0.0 - 1.0 K/UL    ABS. EOSINOPHILS 0.2 0.0 - 0.4 K/UL    ABS. BASOPHILS 0.0 0.0 - 0.1 K/UL    ABS. IMM.  GRANS. 0.0 0.00 - 0.04 K/UL    DF AUTOMATED     ECHO ADULT COMPLETE    Collection Time: 11/25/19  4:48 PM   Result Value Ref Range    Tapse 2.22 (A) 1.5 - 2.0 cm    Ao Root D 3.43 cm    Aortic Valve Systolic Peak Velocity 301.17 cm/s    AoV PG 5.6 mmHg    LVIDd 4.95 4.2 - 5.9 cm    LVPWd 0.79 0.6 - 1.0 cm    LVIDs 3.20 cm    IVSd 0.80 0.6 - 1.0 cm    LVOT Peak Velocity 108.74 cm/s    LVOT Peak Gradient 4.7 mmHg    MVA (PHT) 3.5 cm2    MV A Cameron 49.93 cm/s    MV E Cameron 101.72 cm/s    MV E/A 2.04     Left Atrium to Aortic Root Ratio 1.03     RVIDd 3.66 cm    LV Mass .3 88 - 224 g    LV Mass AL Index 66.9 49 - 115 g/m2    Mitral Valve E Wave Deceleration Time 218.4 ms    Mitral Valve Pressure Half-time 63.3 ms    Left Atrium Major Axis 3.52 cm    Triscuspid Valve Regurgitation Peak Gradient 31.2 mmHg    Pulmonic Valve Max Velocity 70.82 cm/s    TR Max Velocity 279.10 cm/s    Left Ventricular Fractional Shortening by 2D 37.485987522 %    Mitral Valve Deceleration [place] [id number]     AV Velocity Ratio 0.92     PV peak gradient 2.0 mmHg           NOTIFY YOUR PHYSICIAN FOR ANY OF THE FOLLOWING:   Fever over 101 degrees for 24 hours. Chest pain, shortness of breath, fever, chills, nausea, vomiting, diarrhea, change in mentation, falling, weakness, bleeding. Severe pain or pain not relieved by medications. Or, any other signs or symptoms that you may have questions about. DISPOSITION:  x  Home With:   OT  PT  HH  RN       Long term SNF/Inpatient Rehab    Independent/assisted living    Hospice    Other:       PATIENT CONDITION AT DISCHARGE:     Functional status    Poor     Deconditioned    x Independent      Cognition     Lucid     Forgetful     Dementia      Catheters/lines (plus indication)    [place]     PICC     PEG    x None      Code status     Full code     DNR      PHYSICAL EXAMINATION AT DISCHARGE:  Gen:  No distress, alert  HEENT:  Normal cephalic atraumatic, extra-occular movements are intact. Neck:  Supple, No JVD  Lungs:  Clear bilaterally, no wheeze, no rales, normal effort  Heart:  Regular Rate and Rhythm, normal S1 and S2, no edema  Abdomen:  Soft, non tender, normal bowel sounds, no guarding.   Extremities:  Well perfused, no cyanosis or edema  Neurological:  Awake and alert, CN's are intact, normal strength throughout extremities  Skin:  No rashes or moles  Mental Status:  Normal thought process, does not appear anxious      CHRONIC MEDICAL DIAGNOSES:  Problem List as of 11/25/2019 Date Reviewed: 1/12/2017          Codes Class Noted - Resolved    * (Principal) Syncope ICD-10-CM: R55  ICD-9-CM: 780.2  11/24/2019 - Present        HSV-2 seropositive ICD-10-CM: R76.8  ICD-9-CM: 795.79  7/29/2016 - Present        Essential hypertension with goal blood pressure less than 140/90 ICD-10-CM: I10  ICD-9-CM: 401.9  7/5/2016 - Present        History of hepatitis C ICD-10-CM: Z86.19  ICD-9-CM: V12.09  7/5/2016 - Present        Chronic bilateral low back pain with right-sided sciatica ICD-10-CM: M54.41, G89.29  ICD-9-CM: 724.2, 724.3, 338.29  6/6/2016 - Present        Lumbar degenerative disc disease ICD-10-CM: M51.36  ICD-9-CM: 722.52  6/6/2016 - Present        Headache, chronic daily ICD-10-CM: R51  ICD-9-CM: 784.0  5/2/2016 - Present        Chronic midline low back pain ICD-10-CM: M54.5, G89.29  ICD-9-CM: 724.2, 338.29  5/2/2016 - Present        Chronic migraine ICD-10-CM: A94.376  ICD-9-CM: 346.70  12/8/2015 - Present        Altered mental status ICD-10-CM: R41.82  ICD-9-CM: 780.97  8/31/2015 - Present        Psychosis ([facility] ICD-10-CM: F29  ICD-9-CM: 298.9  6/27/2015 - Present    Overview Signed 6/27/2015  4:19 PM by Amy HUYNH             Substance-induced psychotic disorder with delusions ([facility] ICD-10-CM: H63.847  ICD-9-CM: 292.11  6/10/2015 - Present        Fall ICD-10-CM: W19. Darlis Irina  ICD-9-CM: E888.9  9/2/2014 - Present        Substance induced mood disorder ([facility] ICD-10-CM: F19.94  ICD-9-CM: 292.84  9/2/2014 - Present        Drug overdose ICD-10-CM: T50.901A  ICD-9-CM: 977.9, E980.5  9/1/2014 - Present        Polysubstance dependence including opioid type drug, continuous use (HCC) (Chronic) ICD-10-CM: F11.20, F19.20  ICD-9-CM: 304.71  9/1/2014 - Present        Overdose ICD-10-CM: T50.901A  ICD-9-CM: 977.9, E980.5  5/8/2014 - Present        Previous back surgery ICD-10-CM: Z98.890  ICD-9-CM: V45.89  12/26/2013 - Present        Bipolar disorder ([facility] ICD-10-CM: F31.9  ICD-9-CM: 296.80  7/17/2013 - Present        RESOLVED: Intractable chronic migraine without aura and without status migrainosus ICD-10-CM: C13.771  ICD-9-CM: 346.71  5/2/2016 - 12/20/2017        RESOLVED: Intractable migraine without aura and without status migrainosus ICD-10-CM: A55.183  ICD-9-CM: 346.11  12/8/2015 - 12/20/2017        RESOLVED: Chronic hepatitis C ([facility] ICD-10-CM: B18.2  ICD-9-CM: 070.54  12/26/2013 - 8/2/2016        RESOLVED: Cirrhosis ([facility] ICD-10-CM: K74.60  ICD-9-CM: 571.5  12/26/2013 - 8/31/2015        RESOLVED: Sleep apnea ICD-10-CM: G47.30  ICD-9-CM: 780.57  12/26/2013 - 12/20/2017    Overview Signed 12/26/2013  1:06 PM by Bina Pope MD     No CPAP needed                     Discussed with patient and family. Explained the importance of following up, Compliance with medications and recommendations on discharge,Side effect profile of medications were explained. Safety precautions at home and while taking pain medications also explained. All questions answered to the satisfaction of the patient/family. Discussed with consultant(s) who are agreeable to the discharge. Verbal and written instructions on discharge given. Explained about Discharge medications and side effect profile. Advised patient/family to followup with their pcp for medication refills and preauthorization of medications, Home health orders. checkups,screenign programs as appropriate for age. Thank you Sonia Mendoza MD for taking care of your patient, Please call with any questions.       Greater than 35 minutes were spent with the patient on counseling and coordination of care    Signed:   Angela Taveras MD  11/25/2019  6:13 PM

## 2019-11-25 NOTE — PROGRESS NOTES
0745 Bedside and Verbal shift change report given to Jacob Salina Victoria (oncoming nurse) by Malcolm Canales (offgoing nurse). Report included the following information SBAR, Kardex, Intake/Output, MAR, Recent Results and Cardiac Rhythm NSR/ SB.     0900 EEG completed at bedside. 0940 Pocket knife from home sent down to security. 1020 TRANSFER - OUT REPORT:    Verbal report given to Génesis Sibley (name) on [facility]  being transferred to [address] (unit) for routine progression of care       Report consisted of patients Situation, Background, Assessment and   Recommendations(SBAR). Information from the following report(s) SBAR, Kardex, Intake/Output, MAR, Recent Results and Cardiac Rhythm NSr was reviewed with the receiving nurse. Lines:   Peripheral IV 11/24/19 Left Antecubital (Active)   Site Assessment Clean, dry, & intact 11/25/2019  8:00 AM   Phlebitis Assessment 0 11/25/2019  8:00 AM   Infiltration Assessment 0 11/25/2019  8:00 AM   Dressing Status Clean, dry, & intact 11/25/2019  8:00 AM   Dressing Type Transparent 11/25/2019  8:00 AM   Hub Color/Line Status Green; Infusing 11/25/2019  8:00 AM   Action Taken Open ports on tubing capped 11/25/2019  8:00 AM   Alcohol Cap Used Yes 11/25/2019  8:00 AM        Opportunity for questions and clarification was provided.       Patient transported with:   [facility]

## 2019-11-25 NOTE — PROGRESS NOTES
Problem: Falls - Risk of  Goal: *Absence of Falls  Description  Document Carlene Bustamante Fall Risk and appropriate interventions in the flowsheet.   Outcome: Progressing Towards Goal  Note: Fall Risk Interventions:  Mobility Interventions: Communicate number of staff needed for ambulation/transfer         Medication Interventions: Evaluate medications/consider consulting pharmacy         History of Falls Interventions: Consult care management for discharge planning, Evaluate medications/consider consulting pharmacy

## 2019-11-25 NOTE — PROGRESS NOTES
[facility]  INTENSIVIST DAILY PROGRESS NOTE  Name: Tuyet Vega   : 1975   MRN: [id number]   Date: 2019 11:32 AM   I have reviewed the flowsheet and previous days notes. Seen as intensivist.    The patient is a 41 yo male who presented to the hospital with syncopal events after vomiting and diarrhea. He has a history of polysubstance abuse. He was noted to be in acute on chronic renal failure. Lactic acid was 1.1. WBC normal.  Abd CT without abnormality. Head CT without bleed - ? Subtle change in left hemisphere reported by radiology, possible artifact. He denies lung dz or SOB. Midl abd discomfort. No vomiting since admission. He has been given 2+ liters of fluid boluses and is now on 200 cc/hr of normal saline. He is on low dose neosynephrine. PMH:CKD, polysubstance abuse, chronic pain, bipolar disorder    ROS: no chest pain, no rash, no SOB    ==============================================    : No nausea vomiting. Cr is better. No vasopressor requirement last night. Vital Signs:    Visit Vitals  /60   Pulse 92   Temp 98.6 °F (37 °C)   Resp 17   Ht 6' (1.829 m)   Wt 104.6 kg (230 lb 9.6 oz)   SpO2 99%   BMI 31.28 kg/m²       O2 Device: Room air       Temp (24hrs), Av.4 °F (36.9 °C), Min:98 °F (36.7 °C), Max:98.7 °F (37.1 °C)       Intake/Output:   Last shift:      No intake/output data recorded. Last 3 shifts:  1901 -  0700  In: 8040.5 [P.O.:1250; I.V.:6790.5]  Out: 1200 [Urine:1200]    Intake/Output Summary (Last 24 hours) at 2019 0809  Last data filed at 2019 0400  Gross per 24 hour   Intake 5377.41 ml   Output 1200 ml   Net 4177.41 ml     Physical Exam:  General:  cooperative, no distress, resting   Head:  Normocephalic       Nose: Nares normal.   On room air   Throat: Lips normal.           Lungs:   Clear to auscultation bilaterally. Chest wall:  No tenderness or deformity.    Heart:  Regular rate and rhythm Abdomen:   Soft, non-tender. Extremities: No edema. Skin: Dry       Neurologic: Grossly nonfocal       DATA:   Current Facility-Administered Medications   Medication Dose Route Frequency    0.9% sodium chloride with KCl 20 mEq/L infusion   IntraVENous CONTINUOUS    acetaminophen (TYLENOL) tablet 650 mg  650 mg Oral Q4H PRN    PHENYLephrine (TORI-SYNEPHRINE) 30 mg in 0.9% sodium chloride 250 mL infusion   mcg/min IntraVENous TITRATE    sodium chloride (NS) flush 5-40 mL  5-40 mL IntraVENous Q8H    sodium chloride (NS) flush 5-40 mL  5-40 mL IntraVENous PRN    ondansetron (ZOFRAN) injection 4 mg  4 mg IntraVENous Q4H PRN    nicotine (NICODERM CQ) 21 mg/24 hr patch 1 Patch  1 Patch TransDERmal DAILY    OLANZapine (ZyPREXA) tablet 5 mg  5 mg Oral QHS    lamoTRIgine (LaMICtal) tablet 200 mg  200 mg Oral DAILY    butalbital-acetaminophen-caffeine (FIORICET, ESGIC) -40 mg per tablet 1 Tab  1 Tab Oral Q6H PRN    atorvastatin (LIPITOR) tablet 40 mg  40 mg Oral QHS    ondansetron (ZOFRAN ODT) tablet 4 mg  4 mg Oral Q8H PRN       Telemetry: SR          Labs:  Recent Labs     11/24/19  0253   WBC 8.6   HGB 15.3   HCT 42.7        Recent Labs     11/24/19  1405 11/24/19  0253    135*   K 4.0 3.1*    95*   CO2 26 32   GLU 91 118*   BUN 56* 57*   CREA 3.04* 4.05*   CA 8.1* 9.2   MG  --  2.2   PHOS  --  4.1   ALB  --  4.1   SGOT  --  15   ALT  --  30     Drug screen positive for barbiturates and cocaine     IMPRESSION:   · Syncope  · Hypotension/Volume depletion secondary to vomiting and diarrhea  · History of polysubstance abuse   · Acute on chronic kidney dz   PLAN:   · Cr better. Can decreased fluid to 125 ml/hour. · Ck ok. · Suggest substance abuse counceling    GLOBAL ISSUES:   ·  DVT Prophylaxis: SCDs     Tx to floor.     My assessment/plan was discussed with: nurse      Tanya Taylor MD

## 2019-11-25 NOTE — PROGRESS NOTES
.. TRANSFER - IN REPORT:    Verbal report received from [place] (name) on Anders Pedroza  being received from  (unit) for routine progression of care      Report consisted of patients Situation, Background, Assessment and   Recommendations(SBAR). Information from the following report(s) SBAR, Kardex and MAR was reviewed with the receiving nurse. Opportunity for questions and clarification was provided. Assessment completed upon patients arrival to unit and care assumed.          Patient to come up to unit in a wheelchair

## 2019-11-25 NOTE — DISCHARGE INSTRUCTIONS
Dear Glenis Fernandez,    Thank you for choosing [facility] for your healthcare needs. We strive to provide EXCELLENT care to you and your family. In an effort to explain clearly why you were here in the hospital, I've also written a very brief summary below. Other details including formal diagnosis, medication changes, and follow up appointment recommendations can also be found in this packet. You were admitted for Syncope work up included and MRI, EEG and Echocardiogram.       You also received care from specialist physicians in the following specialties:  [address]      Remember that it is important for you to take your medications exactly as they are prescribed. It is helpful to keep a list of your medication with the names, dosages, and times to be taken in your wallet. Additionally,   - Diet: Regular   - I highly advised on substance abuse cessation     Make sure to also see your primary care doctor for follow-up. Bring these papers with you and be sure to review your medication list with your doctor. I cannot stress the importance of follow up enough. I've included the information for your follow-up appointments below: Follow-up Information     Follow up With Specialties Details Why Contact Info    Nelda Stout MD Internal Medicine Schedule an appointment as soon as possible for a visit in 2 weeks Post hospital discharge follow up  [address] [place] [address]  [phone]            At this time, the following test results are still pending: None   Again, please follow-up these results with your primary care provider. Should you have any fever over 101 degrees for 24 hours, chest pain, shortness of breath, fever, chills, nausea, vomiting, diarrhea, change in mentation, falling, weakness, bleeding, or worsening pain, please seek medical attention immediately.      Finally, as your discharging physician, you may be receiving a survey regarding my care. I would greatly value and appreciate your input in the survey as we strive for excellence. If you have any questions, I can be reached at [phone].   Thank you so much again for allowing me to care for you at [facility].    Respectfully yours,  Peter Bolanos MD

## 2019-11-25 NOTE — PROGRESS NOTES
Neurology Progress Note     NAME: Grecia Corcoran   :  1975   MRN:  [id number]   DATE:  2019    Assessment:     Principal Problem:    Syncope (2019)      Pt is a 39yo RH male presenting with severe hypotension with episodes of arm and leg shaking while standing fully conscious right before passing out just after having dimming of his vision and a weird feeling in his head, spells are most suggestive of syncope with myoclonic type jerks related to his low blood pressure. I have low suspicion for seizure, however, given his vague history of seizure in the past, EEG ordered to evaluate for underlying epilepsy and MRI brain ordered to rule out out any epileptogenic focus and further assess these questionable areas of hyperdensity on the CT scan. EEG is reviewed and is normal.   MRI brain reviewed in PACS, no abnormalities appreciated, final read by radiologist agrees. Echocardiogram-normal study  Plan:   Recommend patient avoid cocaine  No concerns at this time for seizure or epilepsy, involuntary shaking of his extremities consistent with orthostatic hypotension  Follow-up with primary care provider  Please call with any questions    Subjective:   Patient reports he has been doing well today. Ambulating without dizziness.     Objective:   Chart reviewed since last seen    Current Facility-Administered Medications   Medication Dose Route Frequency    acetaminophen (TYLENOL) tablet 650 mg  650 mg Oral Q4H PRN    sodium chloride (NS) flush 5-40 mL  5-40 mL IntraVENous Q8H    sodium chloride (NS) flush 5-40 mL  5-40 mL IntraVENous PRN    ondansetron (ZOFRAN) injection 4 mg  4 mg IntraVENous Q4H PRN    nicotine (NICODERM CQ) 21 mg/24 hr patch 1 Patch  1 Patch TransDERmal DAILY    OLANZapine (ZyPREXA) tablet 5 mg  5 mg Oral QHS    lamoTRIgine (LaMICtal) tablet 200 mg  200 mg Oral DAILY    butalbital-acetaminophen-caffeine (FIORICET, ESGIC) -40 mg per tablet 1 Tab  1 Tab Oral Q6H PRN    atorvastatin (LIPITOR) tablet 40 mg  40 mg Oral QHS    ondansetron (ZOFRAN ODT) tablet 4 mg  4 mg Oral Q8H PRN       Visit Vitals  /74 (BP 1 Location: Right arm, BP Patient Position: Post activity)   Pulse 75   Temp 97.4 °F (36.3 °C)   Resp 19   Ht 6' (1.829 m)   Wt 104.6 kg (230 lb 9.6 oz)   SpO2 100%   BMI 31.28 kg/m²     Temp (24hrs), Av.3 °F (36.8 °C), Min:97.4 °F (36.3 °C), Max:98.7 °F (37.1 °C)       07 - 1900  In: 748.3 [P.O.:250; I.V.:498.3]  Out: 850 [Urine:850]  1901 -  0700  In: 8640.5 [P.O.:1250; I.V.:7390.5]  Out: 3100 [Urine:3100]      Physical Exam:  General: Well developed well nourished patient in no apparent distress. Cardiac: Regular rate and rhythm with no murmurs. Extremities: 2+ Radial pulses    Neurological Exam:  Mental Status: Oriented to time, place and person. Speech and language intact. Attention and fund of knowledge appropriate. Normal recent and remote memory. Cranial Nerves:   EOMI, no nystagmus, no ptosis. Facial movement is symmetric. Hearing is intact. Motor:  5/5 strength in upper and lower proximal and distal muscles. Normal bulk and tone. No PD. No tremors   Reflexes:   Deep tendon reflexes 1+ and symmetric.     Sensory:      Gait:     Cerebellar:  Intact FTN          Lab Review   Recent Results (from the past 24 hour(s))   METABOLIC PANEL, COMPREHENSIVE    Collection Time: 19  7:21 AM   Result Value Ref Range    Sodium 143 136 - 145 mmol/L    Potassium 4.5 3.5 - 5.1 mmol/L    Chloride 115 (H) 97 - 108 mmol/L    CO2 25 21 - 32 mmol/L    Anion gap 3 (L) 5 - 15 mmol/L    Glucose 103 (H) 65 - 100 mg/dL    BUN 36 (H) 6 - 20 MG/DL    Creatinine 1.88 (H) 0.70 - 1.30 MG/DL    BUN/Creatinine ratio 19 12 - 20      GFR est AA 47 (L) >60 ml/min/1.73m2    GFR est non-AA 39 (L) >60 ml/min/1.73m2    Calcium 7.8 (L) 8.5 - 10.1 MG/DL    Bilirubin, total 0.2 0.2 - 1.0 MG/DL    ALT (SGPT) 20 12 - 78 U/L    AST (SGOT) 7 (L) 15 - 37 U/L    Alk. phosphatase 65 45 - 117 U/L    Protein, total 5.5 (L) 6.4 - 8.2 g/dL    Albumin 3.0 (L) 3.5 - 5.0 g/dL    Globulin 2.5 2.0 - 4.0 g/dL    A-G Ratio 1.2 1.1 - 2.2     CBC WITH AUTOMATED DIFF    Collection Time: 11/25/19  7:21 AM   Result Value Ref Range    WBC 5.2 4.1 - 11.1 K/uL    RBC 3.43 (L) 4.10 - 5.70 M/uL    HGB 11.8 (L) 12.1 - 17.0 g/dL    HCT 34.9 (L) 36.6 - 50.3 %    .7 (H) 80.0 - 99.0 FL    MCH 34.4 (H) 26.0 - 34.0 PG    MCHC 33.8 30.0 - 36.5 g/dL    RDW 12.5 11.5 - 14.5 %    PLATELET 716 (L) 858 - 400 K/uL    MPV 11.8 8.9 - 12.9 FL    NRBC 0.0 0  WBC    ABSOLUTE NRBC 0.00 0.00 - 0.01 K/uL    NEUTROPHILS 36 32 - 75 %    LYMPHOCYTES 50 (H) 12 - 49 %    MONOCYTES 10 5 - 13 %    EOSINOPHILS 3 0 - 7 %    BASOPHILS 1 0 - 1 %    IMMATURE GRANULOCYTES 0 0.0 - 0.5 %    ABS. NEUTROPHILS 1.9 1.8 - 8.0 K/UL    ABS. LYMPHOCYTES 2.6 0.8 - 3.5 K/UL    ABS. MONOCYTES 0.5 0.0 - 1.0 K/UL    ABS. EOSINOPHILS 0.2 0.0 - 0.4 K/UL    ABS. BASOPHILS 0.0 0.0 - 0.1 K/UL    ABS. IMM. GRANS. 0.0 0.00 - 0.04 K/UL    DF AUTOMATED         Additional comments:  I have reviewed the patient's new clinical lab test results. I have personally reviewed the patient's radiographs. MRI   MRI Results (most recent):    CT Results (most recent):  Results from Hospital Encounter encounter on 11/24/19   CT ABD PELV WO CONT    Narrative INDICATION: Vomiting for 2 days    COMPARISON: 6/19/2017    TECHNIQUE:   Thin axial images were obtained through the abdomen and pelvis. Coronal and  sagittal reconstructions were generated. Oral contrast was not administered. CT  dose reduction was achieved through use of a standardized protocol tailored for  this examination and automatic exposure control for dose modulation.     The absence of intravenous contrast material reduces the sensitivity for  evaluation of the solid parenchymal organs of the abdomen. FINDINGS:   LUNG BASES: Clear. INCIDENTALLY IMAGED HEART AND MEDIASTINUM: Unremarkable. LIVER: No mass or biliary dilatation. GALLBLADDER: Unremarkable. SPLEEN: No mass. PANCREAS: No mass or ductal dilatation. ADRENALS: Unremarkable. KIDNEYS/URETERS: No mass, calculus, or hydronephrosis. STOMACH: Distended with food  SMALL BOWEL: No dilatation or wall thickening. COLON: No dilatation or wall thickening. APPENDIX: Unremarkable. PERITONEUM: No ascites or pneumoperitoneum. RETROPERITONEUM: No lymphadenopathy or aortic aneurysm. REPRODUCTIVE ORGANS: Within normal limits  URINARY BLADDER: No mass or calculus. BONES: No destructive bone lesion. ADDITIONAL COMMENTS: N/A      Impression IMPRESSION:  No acute abnormality     MRI Results (most recent):  Results from Hospital Encounter encounter on 11/24/19   MRI BRAIN WO CONT    Narrative EXAM: MRI BRAIN WO CONT    INDICATION: Pt with hyperdensities on head CT, suspected artifact, and vague h/o  seizure, evaluate for epileptogenic focus and clarify CT findings. COMPARISON: CT head 11/24/2019. CONTRAST: None. TECHNIQUE:    Multiplanar multisequence acquisition without contrast of the brain. FINDINGS:  The mesial temporal lobes and hippocampi are normal. No cortical malformations  or gray matter heterotopia identified. The ventricles are normal in size and position. The brain parenchyma has normal  signal characteristics. There is no acute infarct, hemorrhage, extra-axial fluid  collection, or mass effect. There is no cerebellar tonsillar herniation. Expected arterial flow-voids are present. Mild mucosal thickening in the bilateral ethmoidal air cells without air-fluid  level. The mastoid air cells and middle ears are clear. The orbital contents are  within normal limits. No significant osseous or scalp lesions are identified. Impression IMPRESSION:     1.  No evident seizure focus. Normal brain MRI. The previously noted finding on  CT appears to have been artifact. Care Plan discussed with:  Patient x   Family    RN x   Care Manager    Consultant/Specialist:       Signed: Sha Olivas MD

## 2020-02-17 ENCOUNTER — HOSPITAL ENCOUNTER (INPATIENT)
Age: 45
LOS: 7 days | Discharge: HOME | DRG: 710 | End: 2020-02-25
Attending: EMERGENCY MEDICINE | Admitting: HOSPITALIST
Payer: MEDICAID

## 2020-02-17 DIAGNOSIS — F11.20 POLYSUBSTANCE DEPENDENCE INCLUDING OPIOID TYPE DRUG, CONTINUOUS USE (HCC): Chronic | ICD-10-CM

## 2020-02-17 DIAGNOSIS — F19.20 POLYSUBSTANCE DEPENDENCE INCLUDING OPIOID TYPE DRUG, CONTINUOUS USE (HCC): Chronic | ICD-10-CM

## 2020-02-17 DIAGNOSIS — L03.114 CELLULITIS OF LEFT HAND: Primary | ICD-10-CM

## 2020-02-17 DIAGNOSIS — F19.10 IV DRUG ABUSE (HCC): ICD-10-CM

## 2020-02-17 DIAGNOSIS — L02.512 ABSCESS OF DORSUM OF LEFT HAND: ICD-10-CM

## 2020-02-17 PROCEDURE — 75810000289 HC I&D ABSCESS SIMP/COMP/MULT

## 2020-02-17 PROCEDURE — 96365 THER/PROPH/DIAG IV INF INIT: CPT

## 2020-02-17 PROCEDURE — 99285 EMERGENCY DEPT VISIT HI MDM: CPT

## 2020-02-18 ENCOUNTER — APPOINTMENT (OUTPATIENT)
Dept: GENERAL RADIOLOGY | Age: 45
DRG: 710 | End: 2020-02-18
Attending: EMERGENCY MEDICINE
Payer: MEDICAID

## 2020-02-18 ENCOUNTER — APPOINTMENT (OUTPATIENT)
Dept: MRI IMAGING | Age: 45
DRG: 710 | End: 2020-02-18
Attending: PHYSICIAN ASSISTANT
Payer: MEDICAID

## 2020-02-18 PROBLEM — A41.9 SEPSIS (HCC): Status: ACTIVE | Noted: 2020-02-18

## 2020-02-18 LAB
ALBUMIN SERPL-MCNC: 3.3 G/DL (ref 3.5–5)
ALBUMIN/GLOB SERPL: 0.8 {RATIO} (ref 1.1–2.2)
ALP SERPL-CCNC: 89 U/L (ref 45–117)
ALT SERPL-CCNC: 19 U/L (ref 12–78)
AMPHET UR QL SCN: POSITIVE
ANION GAP SERPL CALC-SCNC: 12 MMOL/L (ref 5–15)
APPEARANCE UR: CLEAR
AST SERPL-CCNC: 14 U/L (ref 15–37)
B PERT DNA SPEC QL NAA+PROBE: NOT DETECTED
BACTERIA URNS QL MICRO: NEGATIVE /HPF
BARBITURATES UR QL SCN: POSITIVE
BASOPHILS # BLD: 0 K/UL (ref 0–0.1)
BASOPHILS NFR BLD: 0 % (ref 0–1)
BENZODIAZ UR QL: NEGATIVE
BILIRUB SERPL-MCNC: 0.3 MG/DL (ref 0.2–1)
BILIRUB UR QL: NEGATIVE
BORDETELLA PARAPERTUSSIS PCR, BORPAR: NOT DETECTED
BUN SERPL-MCNC: 9 MG/DL (ref 6–20)
BUN/CREAT SERPL: 7 (ref 12–20)
C PNEUM DNA SPEC QL NAA+PROBE: NOT DETECTED
CALCIUM SERPL-MCNC: 8.9 MG/DL (ref 8.5–10.1)
CANNABINOIDS UR QL SCN: NEGATIVE
CHLORIDE SERPL-SCNC: 98 MMOL/L (ref 97–108)
CO2 SERPL-SCNC: 28 MMOL/L (ref 21–32)
COCAINE UR QL SCN: NEGATIVE
COLOR UR: NORMAL
CREAT SERPL-MCNC: 1.33 MG/DL (ref 0.7–1.3)
CRP SERPL-MCNC: 11.77 MG/DL
DIFFERENTIAL METHOD BLD: ABNORMAL
DRUG SCRN COMMENT,DRGCM: ABNORMAL
EOSINOPHIL # BLD: 0.2 K/UL (ref 0–0.4)
EOSINOPHIL NFR BLD: 2 % (ref 0–7)
EPITH CASTS URNS QL MICRO: NORMAL /LPF
ERYTHROCYTE [DISTWIDTH] IN BLOOD BY AUTOMATED COUNT: 12.7 % (ref 11.5–14.5)
FLUAV AG NPH QL IA: NEGATIVE
FLUAV H1 2009 PAND RNA SPEC QL NAA+PROBE: NOT DETECTED
FLUAV H1 RNA SPEC QL NAA+PROBE: NOT DETECTED
FLUAV H3 RNA SPEC QL NAA+PROBE: NOT DETECTED
FLUAV SUBTYP SPEC NAA+PROBE: NOT DETECTED
FLUBV AG NOSE QL IA: NEGATIVE
FLUBV RNA SPEC QL NAA+PROBE: NOT DETECTED
GLOBULIN SER CALC-MCNC: 4.3 G/DL (ref 2–4)
GLUCOSE SERPL-MCNC: 93 MG/DL (ref 65–100)
GLUCOSE UR STRIP.AUTO-MCNC: NEGATIVE MG/DL
HADV DNA SPEC QL NAA+PROBE: NOT DETECTED
HCOV 229E RNA SPEC QL NAA+PROBE: NOT DETECTED
HCOV HKU1 RNA SPEC QL NAA+PROBE: NOT DETECTED
HCOV NL63 RNA SPEC QL NAA+PROBE: NOT DETECTED
HCOV OC43 RNA SPEC QL NAA+PROBE: NOT DETECTED
HCT VFR BLD AUTO: 38.4 % (ref 36.6–50.3)
HGB BLD-MCNC: 12.4 G/DL (ref 12.1–17)
HGB UR QL STRIP: NEGATIVE
HMPV RNA SPEC QL NAA+PROBE: NOT DETECTED
HPIV1 RNA SPEC QL NAA+PROBE: NOT DETECTED
HPIV2 RNA SPEC QL NAA+PROBE: NOT DETECTED
HPIV3 RNA SPEC QL NAA+PROBE: NOT DETECTED
HPIV4 RNA SPEC QL NAA+PROBE: NOT DETECTED
IMM GRANULOCYTES # BLD AUTO: 0 K/UL (ref 0–0.04)
IMM GRANULOCYTES NFR BLD AUTO: 0 % (ref 0–0.5)
KETONES UR QL STRIP.AUTO: NEGATIVE MG/DL
LACTATE SERPL-SCNC: 0.9 MMOL/L (ref 0.4–2)
LEUKOCYTE ESTERASE UR QL STRIP.AUTO: NEGATIVE
LYMPHOCYTES # BLD: 1.8 K/UL (ref 0.8–3.5)
LYMPHOCYTES NFR BLD: 18 % (ref 12–49)
M PNEUMO DNA SPEC QL NAA+PROBE: NOT DETECTED
MCH RBC QN AUTO: 31.6 PG (ref 26–34)
MCHC RBC AUTO-ENTMCNC: 32.3 G/DL (ref 30–36.5)
MCV RBC AUTO: 98 FL (ref 80–99)
METHADONE UR QL: POSITIVE
MONOCYTES # BLD: 0.7 K/UL (ref 0–1)
MONOCYTES NFR BLD: 7 % (ref 5–13)
NEUTS SEG # BLD: 7.5 K/UL (ref 1.8–8)
NEUTS SEG NFR BLD: 73 % (ref 32–75)
NITRITE UR QL STRIP.AUTO: NEGATIVE
NRBC # BLD: 0 K/UL (ref 0–0.01)
NRBC BLD-RTO: 0 PER 100 WBC
OPIATES UR QL: NEGATIVE
PCP UR QL: NEGATIVE
PH UR STRIP: 6.5 [PH] (ref 5–8)
PLATELET # BLD AUTO: 233 K/UL (ref 150–400)
PMV BLD AUTO: 10.6 FL (ref 8.9–12.9)
POTASSIUM SERPL-SCNC: 4.4 MMOL/L (ref 3.5–5.1)
PROT SERPL-MCNC: 7.6 G/DL (ref 6.4–8.2)
PROT UR STRIP-MCNC: NEGATIVE MG/DL
RBC # BLD AUTO: 3.92 M/UL (ref 4.1–5.7)
RBC #/AREA URNS HPF: NORMAL /HPF (ref 0–5)
RSV RNA SPEC QL NAA+PROBE: NOT DETECTED
RV+EV RNA SPEC QL NAA+PROBE: NOT DETECTED
SODIUM SERPL-SCNC: 138 MMOL/L (ref 136–145)
SP GR UR REFRACTOMETRY: 1 (ref 1–1.03)
UA: UC IF INDICATED,UAUC: NORMAL
UROBILINOGEN UR QL STRIP.AUTO: 0.2 EU/DL (ref 0.2–1)
WBC # BLD AUTO: 10.3 K/UL (ref 4.1–11.1)
WBC URNS QL MICRO: NORMAL /HPF (ref 0–4)

## 2020-02-18 PROCEDURE — 81001 URINALYSIS AUTO W/SCOPE: CPT

## 2020-02-18 PROCEDURE — 87205 SMEAR GRAM STAIN: CPT

## 2020-02-18 PROCEDURE — 74011250637 HC RX REV CODE- 250/637: Performed by: INTERNAL MEDICINE

## 2020-02-18 PROCEDURE — 74011250637 HC RX REV CODE- 250/637: Performed by: EMERGENCY MEDICINE

## 2020-02-18 PROCEDURE — 36415 COLL VENOUS BLD VENIPUNCTURE: CPT

## 2020-02-18 PROCEDURE — 74011250637 HC RX REV CODE- 250/637

## 2020-02-18 PROCEDURE — 0100U RESPIRATORY PANEL,PCR,NASOPHARYNGEAL: CPT

## 2020-02-18 PROCEDURE — 86140 C-REACTIVE PROTEIN: CPT

## 2020-02-18 PROCEDURE — 93005 ELECTROCARDIOGRAM TRACING: CPT

## 2020-02-18 PROCEDURE — 73220 MRI UPPR EXTREMITY W/O&W/DYE: CPT

## 2020-02-18 PROCEDURE — 73130 X-RAY EXAM OF HAND: CPT

## 2020-02-18 PROCEDURE — 83605 ASSAY OF LACTIC ACID: CPT

## 2020-02-18 PROCEDURE — 74011250637 HC RX REV CODE- 250/637: Performed by: HOSPITALIST

## 2020-02-18 PROCEDURE — A9575 INJ GADOTERATE MEGLUMI 0.1ML: HCPCS | Performed by: INTERNAL MEDICINE

## 2020-02-18 PROCEDURE — 87186 SC STD MICRODIL/AGAR DIL: CPT

## 2020-02-18 PROCEDURE — 80307 DRUG TEST PRSMV CHEM ANLYZR: CPT

## 2020-02-18 PROCEDURE — 74011250636 HC RX REV CODE- 250/636: Performed by: HOSPITALIST

## 2020-02-18 PROCEDURE — 74011250636 HC RX REV CODE- 250/636: Performed by: EMERGENCY MEDICINE

## 2020-02-18 PROCEDURE — 87040 BLOOD CULTURE FOR BACTERIA: CPT

## 2020-02-18 PROCEDURE — 74011250636 HC RX REV CODE- 250/636: Performed by: INTERNAL MEDICINE

## 2020-02-18 PROCEDURE — 87077 CULTURE AEROBIC IDENTIFY: CPT

## 2020-02-18 PROCEDURE — 85025 COMPLETE CBC W/AUTO DIFF WBC: CPT

## 2020-02-18 PROCEDURE — 65270000032 HC RM SEMIPRIVATE

## 2020-02-18 PROCEDURE — 74011250637 HC RX REV CODE- 250/637: Performed by: NURSE PRACTITIONER

## 2020-02-18 PROCEDURE — 87185 SC STD ENZYME DETCJ PER NZM: CPT

## 2020-02-18 PROCEDURE — 74011000250 HC RX REV CODE- 250: Performed by: EMERGENCY MEDICINE

## 2020-02-18 PROCEDURE — 87804 INFLUENZA ASSAY W/OPTIC: CPT

## 2020-02-18 PROCEDURE — 74011000258 HC RX REV CODE- 258: Performed by: INTERNAL MEDICINE

## 2020-02-18 PROCEDURE — 80053 COMPREHEN METABOLIC PANEL: CPT

## 2020-02-18 RX ORDER — SODIUM CHLORIDE 0.9 % (FLUSH) 0.9 %
5-40 SYRINGE (ML) INJECTION AS NEEDED
Status: DISCONTINUED | OUTPATIENT
Start: 2020-02-18 | End: 2020-02-25 | Stop reason: HOSPADM

## 2020-02-18 RX ORDER — OLANZAPINE 5 MG/1
5 TABLET ORAL
Status: DISCONTINUED | OUTPATIENT
Start: 2020-02-18 | End: 2020-02-25 | Stop reason: HOSPADM

## 2020-02-18 RX ORDER — IBUPROFEN 200 MG
1 TABLET ORAL DAILY
Status: DISCONTINUED | OUTPATIENT
Start: 2020-02-18 | End: 2020-02-25 | Stop reason: HOSPADM

## 2020-02-18 RX ORDER — SODIUM CHLORIDE 0.9 % (FLUSH) 0.9 %
5-40 SYRINGE (ML) INJECTION EVERY 8 HOURS
Status: DISCONTINUED | OUTPATIENT
Start: 2020-02-18 | End: 2020-02-25 | Stop reason: HOSPADM

## 2020-02-18 RX ORDER — LIDOCAINE HYDROCHLORIDE AND EPINEPHRINE 10; 10 MG/ML; UG/ML
1.5 INJECTION, SOLUTION INFILTRATION; PERINEURAL ONCE
Status: COMPLETED | OUTPATIENT
Start: 2020-02-18 | End: 2020-02-18

## 2020-02-18 RX ORDER — VANCOMYCIN HYDROCHLORIDE
1250
Status: DISCONTINUED | OUTPATIENT
Start: 2020-02-18 | End: 2020-02-20 | Stop reason: DRUGHIGH

## 2020-02-18 RX ORDER — ONDANSETRON 2 MG/ML
4 INJECTION INTRAMUSCULAR; INTRAVENOUS
Status: DISCONTINUED | OUTPATIENT
Start: 2020-02-18 | End: 2020-02-25 | Stop reason: HOSPADM

## 2020-02-18 RX ORDER — SODIUM CHLORIDE 9 MG/ML
100 INJECTION, SOLUTION INTRAVENOUS CONTINUOUS
Status: DISCONTINUED | OUTPATIENT
Start: 2020-02-18 | End: 2020-02-21

## 2020-02-18 RX ORDER — SODIUM CHLORIDE 0.9 % (FLUSH) 0.9 %
5-10 SYRINGE (ML) INJECTION AS NEEDED
Status: DISCONTINUED | OUTPATIENT
Start: 2020-02-18 | End: 2020-02-25 | Stop reason: HOSPADM

## 2020-02-18 RX ORDER — BUTALBITAL, ACETAMINOPHEN AND CAFFEINE 50; 325; 40 MG/1; MG/1; MG/1
1 TABLET ORAL
Status: DISCONTINUED | OUTPATIENT
Start: 2020-02-18 | End: 2020-02-25 | Stop reason: HOSPADM

## 2020-02-18 RX ORDER — ACETAMINOPHEN 325 MG/1
650 TABLET ORAL
Status: DISCONTINUED | OUTPATIENT
Start: 2020-02-18 | End: 2020-02-25 | Stop reason: HOSPADM

## 2020-02-18 RX ORDER — LAMOTRIGINE 100 MG/1
200 TABLET ORAL DAILY
Status: DISCONTINUED | OUTPATIENT
Start: 2020-02-19 | End: 2020-02-25 | Stop reason: HOSPADM

## 2020-02-18 RX ORDER — PROPRANOLOL HYDROCHLORIDE 80 MG/1
80 CAPSULE, EXTENDED RELEASE ORAL DAILY
Status: DISCONTINUED | OUTPATIENT
Start: 2020-02-19 | End: 2020-02-25 | Stop reason: HOSPADM

## 2020-02-18 RX ORDER — BENZONATATE 100 MG/1
100 CAPSULE ORAL
Status: DISCONTINUED | OUTPATIENT
Start: 2020-02-18 | End: 2020-02-25 | Stop reason: HOSPADM

## 2020-02-18 RX ORDER — GADOTERATE MEGLUMINE 376.9 MG/ML
16 INJECTION INTRAVENOUS
Status: COMPLETED | OUTPATIENT
Start: 2020-02-18 | End: 2020-02-18

## 2020-02-18 RX ORDER — SODIUM CHLORIDE 0.9 % (FLUSH) 0.9 %
10 SYRINGE (ML) INJECTION
Status: COMPLETED | OUTPATIENT
Start: 2020-02-18 | End: 2020-02-18

## 2020-02-18 RX ORDER — IBUPROFEN 400 MG/1
TABLET ORAL
Status: DISPENSED
Start: 2020-02-18 | End: 2020-02-18

## 2020-02-18 RX ORDER — CYCLOBENZAPRINE HCL 10 MG
5 TABLET ORAL
Status: DISCONTINUED | OUTPATIENT
Start: 2020-02-18 | End: 2020-02-25 | Stop reason: HOSPADM

## 2020-02-18 RX ORDER — VANCOMYCIN 2 GRAM/500 ML IN 0.9 % SODIUM CHLORIDE INTRAVENOUS
2 ONCE
Status: DISCONTINUED | OUTPATIENT
Start: 2020-02-18 | End: 2020-02-18

## 2020-02-18 RX ORDER — ACETAMINOPHEN 500 MG
TABLET ORAL
Status: COMPLETED
Start: 2020-02-18 | End: 2020-02-18

## 2020-02-18 RX ORDER — ATORVASTATIN CALCIUM 40 MG/1
40 TABLET, FILM COATED ORAL DAILY
Status: DISCONTINUED | OUTPATIENT
Start: 2020-02-19 | End: 2020-02-25 | Stop reason: HOSPADM

## 2020-02-18 RX ORDER — METRONIDAZOLE 250 MG/1
500 TABLET ORAL EVERY 12 HOURS
Status: DISCONTINUED | OUTPATIENT
Start: 2020-02-18 | End: 2020-02-19

## 2020-02-18 RX ORDER — ACETAMINOPHEN 500 MG
1000 TABLET ORAL
Status: COMPLETED | OUTPATIENT
Start: 2020-02-18 | End: 2020-02-18

## 2020-02-18 RX ORDER — METHADONE HYDROCHLORIDE 10 MG/1
35 TABLET ORAL DAILY
Status: DISCONTINUED | OUTPATIENT
Start: 2020-02-18 | End: 2020-02-25 | Stop reason: HOSPADM

## 2020-02-18 RX ORDER — IBUPROFEN 400 MG/1
800 TABLET ORAL
Status: COMPLETED | OUTPATIENT
Start: 2020-02-18 | End: 2020-02-18

## 2020-02-18 RX ADMIN — ACETAMINOPHEN 1000 MG: 500 TABLET ORAL at 02:27

## 2020-02-18 RX ADMIN — Medication 10 ML: at 09:16

## 2020-02-18 RX ADMIN — PIPERACILLIN AND TAZOBACTAM 3.38 G: 3; .375 INJECTION, POWDER, LYOPHILIZED, FOR SOLUTION INTRAVENOUS at 09:16

## 2020-02-18 RX ADMIN — Medication 10 ML: at 16:58

## 2020-02-18 RX ADMIN — PIPERACILLIN AND TAZOBACTAM 3.38 G: 3; .375 INJECTION, POWDER, LYOPHILIZED, FOR SOLUTION INTRAVENOUS at 16:57

## 2020-02-18 RX ADMIN — Medication 10 ML: at 23:23

## 2020-02-18 RX ADMIN — SODIUM CHLORIDE 2712 ML: 900 INJECTION, SOLUTION INTRAVENOUS at 02:14

## 2020-02-18 RX ADMIN — ACETAMINOPHEN 650 MG: 325 TABLET ORAL at 17:58

## 2020-02-18 RX ADMIN — CEFEPIME HYDROCHLORIDE 2 G: 2 INJECTION, POWDER, FOR SOLUTION INTRAVENOUS at 17:59

## 2020-02-18 RX ADMIN — SODIUM CHLORIDE 100 ML/HR: 900 INJECTION, SOLUTION INTRAVENOUS at 09:15

## 2020-02-18 RX ADMIN — Medication 10 ML: at 19:13

## 2020-02-18 RX ADMIN — BUTALBITAL, ACETAMINOPHEN AND CAFFEINE 1 TABLET: 50; 325; 40 TABLET ORAL at 20:38

## 2020-02-18 RX ADMIN — METHADONE HYDROCHLORIDE 35 MG: 10 TABLET ORAL at 12:46

## 2020-02-18 RX ADMIN — ACETAMINOPHEN 650 MG: 325 TABLET ORAL at 23:21

## 2020-02-18 RX ADMIN — METRONIDAZOLE 500 MG: 250 TABLET ORAL at 20:38

## 2020-02-18 RX ADMIN — IBUPROFEN 800 MG: 400 TABLET ORAL at 02:27

## 2020-02-18 RX ADMIN — VANCOMYCIN HYDROCHLORIDE 1250 MG: 10 INJECTION, POWDER, LYOPHILIZED, FOR SOLUTION INTRAVENOUS at 19:30

## 2020-02-18 RX ADMIN — VANCOMYCIN HYDROCHLORIDE 1000 MG: 1 INJECTION, POWDER, LYOPHILIZED, FOR SOLUTION INTRAVENOUS at 02:06

## 2020-02-18 RX ADMIN — LIDOCAINE HYDROCHLORIDE AND EPINEPHRINE 15 MG: 10; 10 INJECTION, SOLUTION INFILTRATION; PERINEURAL at 02:07

## 2020-02-18 RX ADMIN — OLANZAPINE 5 MG: 5 TABLET, FILM COATED ORAL at 20:39

## 2020-02-18 RX ADMIN — GADOTERATE MEGLUMINE 16 ML: 376.9 INJECTION INTRAVENOUS at 19:13

## 2020-02-18 NOTE — ROUTINE PROCESS
TRANSFER - IN REPORT: 
 
Verbal report received from Ollie Saba RN(name) on Albert Marks  being received from [place] ED(unit) for routine progression of care Report consisted of patients Situation, Background, Assessment and  
Recommendations(SBAR). Information from the following report(s) SBAR, Kardex, ED Summary and MAR was reviewed with the receiving nurse. Opportunity for questions and clarification was provided. Assessment completed upon patients arrival to unit and care assumed.

## 2020-02-18 NOTE — H&P
[address] [facility] Adult  Hospitalist Group  History and Physical    Primary Care Provider: Katarzyna Uribe MD  Date of Service:  2/18/2020    Subjective:     Rick Wade is a 40 y.o. male with pmh of bipolar disorder, IVDU (heroin, last use 1.5week ago), hepatitis C, chronic migraines and chronic pain presented to [place] ED with hand swelling. Patient claims he injected heroin into his hand about 1.5weeks ago, and over the last 2 days had increased swelling and redness prompting ED visit. He denies any fevers, chills or other systemic symptoms. Still able to move his fingers, although is having pain. He denies any fatigue, diaphoresis, N/V/D/C, no abdominal pain. His swelling started extending up his arm and therefore he decided to come to the ED for further evaluation. He is very concerned about withdrawing from narcotics, and says that he has been in a methadone clinic for the last week, ?[address], where he received 35mg methadone per day. Patient is drowsy and frequently mumbles during interview making him difficult to understand. In the ED he was treated with I and D, and transferred to [facility] for further therapy. Review of Systems:    A comprehensive review of systems was negative except for that written in the History of Present Illness.      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    Hypertension     IV drug abuse ([facility] [address]     Kidney stone     Liver disease     elevated liver enzymes/hep C    Neuralgia     Other ill-defined conditions(799.89)     chronic bronchitis    Polysubstance dependence (HCC)     stimulants, MJ, tob, barbs, benzos, opiates    Psychiatric disorder     Bipolar, Anxiety    Unspecified adverse effect of anesthesia     \"was told he woke up during back surgery\" and during nerve root injection    Unspecified sleep apnea     mild - does not use CPAP      Past Surgical History:   Procedure Laterality Date    HX LUMBAR LAMINECTOMY      HX ORTHOPAEDIC Bilateral     back surgery - laminectomy/discectomy    HX ORTHOPAEDIC      nerve root injection in back    HX OTHER SURGICAL      testicular surgery- varicocelectomy, i &d of abscess on right elbow     Prior to Admission medications    Medication Sig Start Date End Date Taking? Authorizing Provider   ondansetron (ZOFRAN ODT) 4 mg disintegrating tablet Take 1 Tab by mouth every eight (8) hours as needed for Nausea. 11/16/19   Huy Galan MD   candesartan cilexetil (CANDESARTAN PO) Take  by mouth. Luis F Yadav MD   atorvastatin (LIPITOR) 40 mg tablet Take 40 mg by mouth daily. Luis F Yadav MD   GABAPENTIN, BULK, by Does Not Apply route. Luis F Yadav MD   propranolol LA (INDERAL LA) 80 mg SR capsule Take 1 Cap by mouth daily. BP med to help reduce headache frequency. Stop taking Atenolol 12/20/17   Ryan Beasley MD   butalbital-acetaminophen-caffeine (FIORICET, ESGIC) -40 mg per tablet 1 tab at onset of migraine; can 1 tab in repeat in 4 hours (one time) if headache remains. Limit: 2 tabs per day, max 2 days a week. 90 day Rx. 4/6/17   Ryan Beasley MD   lamoTRIgine (LAMICTAL) 200 mg tablet Take  by mouth daily. Provider, Historical   cyclobenzaprine (FLEXERIL) 5 mg tablet Take 2 Tabs by mouth three (3) times daily (with meals). 7/8/16   Mariposa Ruff MD   OLANZapine (ZYPREXA) 10 mg tablet Take 5 mg by mouth nightly. Provider, Historical     Allergies   Allergen Reactions    Barium Sulfate Hives    Demerol [Meperidine] Hives    Iodinated Contrast Media Other (comments)     Hives, swelling of throat with IVP/has had MRI with contrast without problems.     Neurontin [Gabapentin] Drowsiness     Patient reports no allergy to this medication      Family History Problem Relation Age of Onset    Heart Disease Maternal Grandfather         SOCIAL HISTORY:  Patient resides at Home with his parents. Patient ambulates without assistance. Smoking history: 1.5ppd, for many years, over 20  Alcohol history: Denies  Drug use: + Heroin (last use 1.5 weeks ago), IVDU, has used cocaine in the past. Denies anything in the last week other than methadone. Objective:       Physical Exam:   Visit Vitals  /68 (BP 1 Location: Right arm, BP Patient Position: At rest)   Pulse 85   Temp 98 °F (36.7 °C)   Resp 18   Ht 6' (1.829 m)   Wt 90.4 kg (199 lb 4.7 oz)   SpO2 95%   BMI 27.03 kg/m²     General appearance: fatigued, cooperative, no distress, appears stated age  Head: Normocephalic, without obvious abnormality, atraumatic  Eyes: conjunctivae/corneas clear. PERRL, EOM's intact. Lungs: clear to auscultation bilaterally  Heart: regular rate and rhythm, S1, S2 normal, no murmur, click, rub or gallop  Abdomen: soft, non-tender. Bowel sounds normal. No masses,  no organomegaly  Extremities: L hand with erythema tenderness, and swelling which is worst in the hand and extends up the elbow. Erythema extends over the hand. Pulses: 2+ and symmetric, no numbness, able to wiggle fingers, but ROM limited to pain  Skin: As above, no other rashes. Neurologic: Grossly normal  Cap refill: Brisk, less than 3 seconds  Pulses: 2+, symmetric in all extremities    ECG:  normal EKG, normal sinus rhythm, unchanged from previous tracings, normal QTc    Data Review: All diagnostic labs and studies have been reviewed. UDS + amphetamines, barbituates, methadone (patient does claim he takes fiorocet, and methadone)    XR hand Lt  IMPRESSION: No acute osseous or articular abnormality. Soft tissue swelling over  the fifth MCP joint and dorsum of the hand.     Assessment:     Active Problems:    Sepsis ([address] (2/18/2020)        Plan:     Sepsis - (Fever, HR, POA) secondary to cellulitis and hand abscess from old IVDU site   - IV vancomycin and zosyn, mostly likely skin skinny including MRSA  - Lactic 0.9 in the ED  - Monitor VS, stable for now   - Tylenol PRN for pain  - Orthopedics consulted for hand management. May need further debridement depending on clinical course ?  If he needs further imaging.   - ID consultation  - Sepsis reassessment completed    Polysubstance abuse, heroin abuse with recent IVDU  - Need to call methadone clinic and confirm dose, patient says he takes 35mg methadone   - QTc reviewed and normal   - If he would require long term IV antibiotics, he cannot go home with PICC  - Check HIV    H/o Migraines - PRN fiorocet, propanolol  Bipolar - continue home lamictal and olanzapine  H/o HepC    FUNCTIONAL STATUS PRIOR TO HOSPITALIZATION (including history of recent falls):Ambulatory     Signed By: Alejandrina Brown MD     February 18, 2020

## 2020-02-18 NOTE — PROGRESS NOTES
Bedside and Verbal shift change report given to Cornell Ramos RN and Sandy Valladares RN (oncoming nurse) by Dieter Simpson RN (offgoing nurse). Report included the following information SBAR, Kardex, Intake/Output, MAR and Recent Results.

## 2020-02-18 NOTE — PROGRESS NOTES
Pharmacist Note - Vancomycin Dosing    Consult provided for this 40 y.o. male for indication of Sepsis  -Cellulitis left hand, status post abscess drainage  -IVDU  Antibiotic regimen(s): Zosyn + Vancomycin  Patient on vancomycin PTA? YES (2g given @ [facility])    Recent Labs     20  0024   WBC 10.3   CREA 1.33*   BUN 9     Frequency of BMP: daily  Height: 182.9 cm  Weight: 90.4 kg  Est CrCl: 77.38 ml/min  Temp (24hrs), Av.4 °F (37.4 °C), Min:98 °F (36.7 °C), Max:101.2 °F (38.4 °C)    Cultures:   wound - pending   blood - pending    Goal trough = 10 - 15 mcg/mL    Therapy was initiated with a loading dose of 2000 mg IV x 1. Will be followed by a maintenance dose of 1250 mg IV every 16 hours. Pharmacy to follow patient daily and order levels / make dose adjustments as appropriate.

## 2020-02-18 NOTE — ED NOTES
Dr Dann Knapp over a.l.s. transport and approved b.l.s. Yenifer Gearing [facility] [address]

## 2020-02-18 NOTE — PROGRESS NOTES
TRANSITIONS OF CARE PLAN:   1. DESTINATION: TBD  2. TRANSPORT: TBD - likely patient's father  3. ADDITIONAL SUPPORT: Unknown  4. DME: Unknown  5. HOME HEALTH: TBD  6. CODE STATUS/AMD STATUS: Full Code; not on file  7. FOLLOW UP APPOINTMENTS: PCP, likely Ortho and ID  8. STILL NEEDS: Cultures Pending, ID Consult pending, management of fevers, IV ABX, Imaging studies, NPO     Reason for Admission:   Sepsis                   RUR Score:          17%           Plan for utilizing home health:      TBD dependent on ABX plan, at minimum would be eligible for [facility] for Sepsis                    Current Advanced Directive/Advance Care Plan: Full Code; not on file                         Transition of Care Plan:        CM attempted to meet with patient at bedside, but patient was very lethargic and had difficulty staying awake for full assessment process. Patient confirmed name, , home address, phone number, PCP information, admission details, ins information, and that his father will likely provide discharge transport. CM attempted to engage patient x 3 with name call, but patient fell asleep each time. CM informed patient that CM will return at a later time once patient is more alert and excused self from patient's room. With Chart Review, CM notes that patient has left hand abscess due to IV drug use. Patient was transported to [facility] from [address] [place] [address]. Patient had I&D in ED. Cultures and ID Consult are pending. Due to active current drug use, patient will likely not be a candidate for outpatient IV ABX plan. CM to follow for ongoing transitions of care plan development. Care Management Interventions  PCP Verified by CM: Yes(last seen by Dr. Onesimo Davis a few days ago)  Mode of Transport at Discharge:  Other (see comment)(father to transport)  Transition of Care Consult (CM Consult): Discharge Planning  MyChart Signup: Yes  Health Maintenance Reviewed: Yes(CM attempted to meet with patient, but patient was unable to stay awake for full assessment)  Physical Therapy Consult: No  Occupational Therapy Consult: No  Speech Therapy Consult: No  Discharge Location  Discharge Placement: Unable to determine at this time    CRM: Ji Onofre, MPH, [address] [place] [address]; Z: [phone]

## 2020-02-18 NOTE — PROGRESS NOTES
Ortho:    Discussed imaging with Dr. Magnolia Scruggs and radiologist.  Recognizing renal labs, plan for MRI left hand with contrast to eval abscess needing surgical I&D.   Patient lists contrast as allergy but reports he tolerates  Contrast for MRI in past.    VICKI Song

## 2020-02-18 NOTE — CONSULTS
Infectious Disease Consult Note    Reason for Consult: Left hand abscess  Date of Consultation: February 18, 2020  Date of Admission: 2/17/2020  Referring Physician: Dr Zaria Madison       HPI:    Mr. Makayla Green is a 42-year-old gentleman with a history of hepatitis C, intravenous substance abuse, lumbar laminectomy who presents with left hand erythema edema and pain. He says symptoms started 2 to 3 days ago. Admits to fevers and chills. He reports injecting IV heroin into the left upper extremity around a week or 2 ago. Denied any animal bites or insect bite to his knowledge. Denies any other trauma to his left upper extremity. Denies use of shared needles. He also reports upper respiratory congestion. Denied any cough nausea vomiting diarrhea shortness of breath chest pain pleuritic pain. Denied exposure to any foreign travelers or any history of foreign travel. Denies dysuria, headache or any other neurological symptoms. Apart from fevers and chills, he reports left hand erythema edema and pain   Pain is sharp and non radiating. Denies any discharge prior to I&D or foul odor from his left hand. Denies any discoloration of his fingers that he saw      From chart review, he is febrile with a T-max of 101.2. On labs white count is normal, creatinine is 1.33. LFTs are normal with a normal bilirubin. Urine drug screen is positive for amphetamines, barbiturates, methadone. Blood cultures have been obtained and pending. I&D of the abscess was done in the emergency room. Cultures are pending at this time. Drainage reported from the I&D was reported as bloody, purulent and serosanguineous. He has been started on intravenous Zosyn and vancomycin.     I am consulted today for antibiotic recommendations    Past Medical History:  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    Hypertension     IV drug abuse ([facility] [address]     Kidney stone     Liver disease     elevated liver enzymes/hep C    Neuralgia     Other ill-defined conditions(799.89)     chronic bronchitis    Polysubstance dependence (HCC)     stimulants, MJ, tob, barbs, benzos, opiates    Psychiatric disorder     Bipolar, Anxiety    Unspecified adverse effect of anesthesia     \"was told he woke up during back surgery\" and during nerve root injection    Unspecified sleep apnea     mild - does not use CPAP         Surgical History:  Past Surgical History:   Procedure Laterality Date    HX LUMBAR LAMINECTOMY      HX ORTHOPAEDIC Bilateral     back surgery - laminectomy/discectomy    HX ORTHOPAEDIC      nerve root injection in back    HX OTHER SURGICAL      testicular surgery- varicocelectomy, i &d of abscess on right elbow         Family History:   Family History   Problem Relation Age of Onset    Heart Disease Maternal Grandfather          Social History:     · Living Situation: Says he lives with his parents  · Tobacco: Smokes cigarettes  · Alcohol: Denied  · Illicit Drugs: IV heroin  · Sexual History: Denied  · Travel History denied  · Exposures:   · Outdoor/Hiking: denied  · Animal/Pet: Denied  · Construction: denied  · Hot Tub: denied   · Brackish/stagnant exposure: denied  · TB exposure: denied  · Sick Contacts: denied     Allergies: Allergies   Allergen Reactions    Barium Sulfate Hives    Demerol [Meperidine] Hives    Iodinated Contrast Media Other (comments)     Hives, swelling of throat with IVP/has had MRI with contrast without problems.     Neurontin [Gabapentin] Drowsiness     Patient reports no allergy to this medication         Review of Systems:    10 point review of systems obtained in per HPI  All others negative at this time    Medications:  No current facility-administered medications on file prior to encounter. Current Outpatient Medications on File Prior to Encounter   Medication Sig Dispense Refill    ondansetron (ZOFRAN ODT) 4 mg disintegrating tablet Take 1 Tab by mouth every eight (8) hours as needed for Nausea. 20 Tab 0    candesartan cilexetil (CANDESARTAN PO) Take  by mouth.  atorvastatin (LIPITOR) 40 mg tablet Take 40 mg by mouth daily.  GABAPENTIN, BULK, by Does Not Apply route.  propranolol LA (INDERAL LA) 80 mg SR capsule Take 1 Cap by mouth daily. BP med to help reduce headache frequency. Stop taking Atenolol 30 Cap 1    butalbital-acetaminophen-caffeine (FIORICET, ESGIC) -40 mg per tablet 1 tab at onset of migraine; can 1 tab in repeat in 4 hours (one time) if headache remains. Limit: 2 tabs per day, max 2 days a week. 90 day Rx. 48 Tab 0    lamoTRIgine (LAMICTAL) 200 mg tablet Take  by mouth daily.  cyclobenzaprine (FLEXERIL) 5 mg tablet Take 2 Tabs by mouth three (3) times daily (with meals). 20 Tab 0    OLANZapine (ZYPREXA) 10 mg tablet Take 5 mg by mouth nightly.            Current Facility-Administered Medications:     sodium chloride (NS) flush 5-10 mL, 5-10 mL, IntraVENous, PRN, Arsh Barber MD    sodium chloride (NS) flush 5-40 mL, 5-40 mL, IntraVENous, Q8H, Junior De La Vega MD, 10 mL at 02/18/20 1658    sodium chloride (NS) flush 5-40 mL, 5-40 mL, IntraVENous, PRN, Junior De La Vega MD    acetaminophen (TYLENOL) tablet 650 mg, 650 mg, Oral, Q4H PRN, Junior De La Vega MD    [facility]ansetron [facility] injection 4 mg, 4 mg, IntraVENous, Q4H PRN, Junior De La Vega MD    0.9% sodium chloride infusion, 100 mL/hr, IntraVENous, CONTINUOUS, Junior De La Vega MD, Last Rate: 100 mL/hr at 02/18/20 0915, 100 mL/hr at 02/18/20 0915    vancomycin (VANCOCIN) 1250 mg in  ml infusion, 1,250 mg, IntraVENous, Q16H, Maggy Crane MD    piperacillin-tazobactam (ZOSYN) 3.375 g in 0.9% sodium chloride (MBP/ADV) 100 mL, 3.375 g, IntraVENous, Q8H, Nathaniel Rodarte MD, Last Rate: 25 mL/hr at 02/18/20 1657, 3.375 g at 02/18/20 1657    Vancomycin - Pharmacy to Dose, , Other, Rx Dosing/Monitoring, Ana Maria Aden MD    methadone (DOLOPHINE) tablet 35 mg, 35 mg, Oral, DAILY, Ana Maria Aden MD, 35 mg at 02/18/20 1246    Physical Exam:    Vitals:   Patient Vitals for the past 24 hrs:   Temp Pulse Resp BP SpO2   02/18/20 1406 100 °F (37.8 °C) (!) 104 18 134/85 94 %   02/18/20 0754 98 °F (36.7 °C) 85 18 121/68 95 %   02/18/20 0501 98.5 °F (36.9 °C) 91 18 142/79 93 %   02/18/20 0445 100 °F (37.8 °C) 96  129/84 91 %   02/18/20 0005 (!) 101.2 °F (38.4 °C) (!) 122 20 (!) 161/96 96 %   ·   · GEN: NAD  · HEENT no scleral icterus,   no sinus tenderness, no thrush  · CV: S1, S2 heard regularly,   · Lungs: Clear to auscultation bilaterally  · Abdomen: soft, non distended, non tender,no CVA tenderness   · Genitourinary: no raimrez  · Extremities: no edema  · Neuro: Alert, oriented to time, place and situation, moves all extremities to commands, verbal   · Skin: no rash  · Psych non-tearful nonanxious  · Musculoskeletal left hand dorsal area with erythema edema and tenderness, range of motion of the fingers limited secondary to pain , palpable radial pulses, no discoloration of the digits noted        Labs:   Recent Results (from the past 24 hour(s))   CBC WITH AUTOMATED DIFF    Collection Time: 02/18/20 12:24 AM   Result Value Ref Range    WBC 10.3 4.1 - 11.1 K/uL    RBC 3.92 (L) 4.10 - 5.70 M/uL    HGB 12.4 12.1 - 17.0 g/dL    HCT 38.4 36.6 - 50.3 %    MCV 98.0 80.0 - 99.0 FL    MCH 31.6 26.0 - 34.0 PG    MCHC 32.3 30.0 - 36.5 g/dL    RDW 12.7 11.5 - 14.5 %    PLATELET 650 325 - 966 K/uL    MPV 10.6 8.9 - 12.9 FL    NRBC 0.0 0  WBC    ABSOLUTE NRBC 0.00 0.00 - 0.01 K/uL    NEUTROPHILS 73 32 - 75 %    LYMPHOCYTES 18 12 - 49 %    MONOCYTES 7 5 - 13 %    EOSINOPHILS 2 0 - 7 %    BASOPHILS 0 0 - 1 %    IMMATURE GRANULOCYTES 0 0.0 - 0.5 % ABS. NEUTROPHILS 7.5 1.8 - 8.0 K/UL    ABS. LYMPHOCYTES 1.8 0.8 - 3.5 K/UL    ABS. MONOCYTES 0.7 0.0 - 1.0 K/UL    ABS. EOSINOPHILS 0.2 0.0 - 0.4 K/UL    ABS. BASOPHILS 0.0 0.0 - 0.1 K/UL    ABS. IMM. GRANS. 0.0 0.00 - 0.04 K/UL    DF AUTOMATED     METABOLIC PANEL, COMPREHENSIVE    Collection Time: 02/18/20 12:24 AM   Result Value Ref Range    Sodium 138 136 - 145 mmol/L    Potassium 4.4 3.5 - 5.1 mmol/L    Chloride 98 97 - 108 mmol/L    CO2 28 21 - 32 mmol/L    Anion gap 12 5 - 15 mmol/L    Glucose 93 65 - 100 mg/dL    BUN 9 6 - 20 MG/DL    Creatinine 1.33 (H) 0.70 - 1.30 MG/DL    BUN/Creatinine ratio 7 (L) 12 - 20      GFR est AA >60 >60 ml/min/1.73m2    GFR est non-AA 58 (L) >60 ml/min/1.73m2    Calcium 8.9 8.5 - 10.1 MG/DL    Bilirubin, total 0.3 0.2 - 1.0 MG/DL    ALT (SGPT) 19 12 - 78 U/L    AST (SGOT) 14 (L) 15 - 37 U/L    Alk.  phosphatase 89 45 - 117 U/L    Protein, total 7.6 6.4 - 8.2 g/dL    Albumin 3.3 (L) 3.5 - 5.0 g/dL    Globulin 4.3 (H) 2.0 - 4.0 g/dL    A-G Ratio 0.8 (L) 1.1 - 2.2     CULTURE, BLOOD, PAIRED    Collection Time: 02/18/20 12:24 AM   Result Value Ref Range    Special Requests: NO SPECIAL REQUESTS      Culture result: NO GROWTH AFTER 2 HOURS     LACTIC ACID    Collection Time: 02/18/20 12:24 AM   Result Value Ref Range    Lactic acid 0.9 0.4 - 2.0 MMOL/L   C REACTIVE PROTEIN, QT    Collection Time: 02/18/20 12:24 AM   Result Value Ref Range    C-Reactive protein 11.77 (H) <0.60 mg/dL   EKG, 12 LEAD, INITIAL    Collection Time: 02/18/20  3:08 AM   Result Value Ref Range    Ventricular Rate 109 BPM    Atrial Rate 109 BPM    P-R Interval 144 ms    QRS Duration 80 ms    Q-T Interval 318 ms    QTC Calculation (Bezet) 428 ms    Calculated P Axis 63 degrees    Calculated R Axis 57 degrees    Calculated T Axis 55 degrees    Diagnosis       Sinus tachycardia  Otherwise normal ECG  When compared with ECG of 24-NOV-2019 02:53,  No significant change was found     URINALYSIS W/ REFLEX CULTURE Collection Time: 02/18/20  4:35 AM   Result Value Ref Range    Color YELLOW/STRAW      Appearance CLEAR CLEAR      Specific gravity 1.005 1.003 - 1.030      pH (UA) 6.5 5.0 - 8.0      Protein NEGATIVE  NEG mg/dL    Glucose NEGATIVE  NEG mg/dL    Ketone NEGATIVE  NEG mg/dL    Bilirubin NEGATIVE  NEG      Blood NEGATIVE  NEG      Urobilinogen 0.2 0.2 - 1.0 EU/dL    Nitrites NEGATIVE  NEG      Leukocyte Esterase NEGATIVE  NEG      WBC 0-4 0 - 4 /hpf    RBC 0-5 0 - 5 /hpf    Epithelial cells FEW FEW /lpf    Bacteria NEGATIVE  NEG /hpf    UA:UC IF INDICATED CULTURE NOT INDICATED BY UA RESULT     DRUG SCREEN, URINE    Collection Time: 02/18/20  4:35 AM   Result Value Ref Range    AMPHETAMINES POSITIVE (A) NEG      BARBITURATES POSITIVE (A) NEG      BENZODIAZEPINES NEGATIVE  NEG      COCAINE NEGATIVE  NEG      METHADONE POSITIVE (A) NEG      OPIATES NEGATIVE  NEG      PCP(PHENCYCLIDINE) NEGATIVE  NEG      THC (TH-CANNABINOL) NEGATIVE  NEG      Drug screen comment (NOTE)    INFLUENZA A+B VIRAL AGS    Collection Time: 02/18/20  4:35 AM   Result Value Ref Range    Influenza A Antigen NEGATIVE  NEG      Influenza B Antigen NEGATIVE  NEG     CULTURE, WOUND W GRAM STAIN    Collection Time: 02/18/20  4:35 AM   Result Value Ref Range    Special Requests: NO SPECIAL REQUESTS      GRAM STAIN RARE WBCS SEEN      GRAM STAIN OCCASIONAL GRAM POSITIVE COCCI IN CHAINS      Culture result: PENDING        Microbiology Data:       Blood:  Component Value Ref Range & Units Status   Special Requests: NO SPECIAL REQUESTS    Preliminary   Culture result: NO GROWTH AFTER 2 HOURS    Preliminary   Result History     CULTURE, BLOOD, PAIRED on 2/18/2020          Wound   Specimen Information: Abscess;  Wound        Component Value Ref Range & Units Status   Special Requests: NO SPECIAL REQUESTS    Preliminary   GRAM STAIN RARE WBCS SEEN    Preliminary   GRAM STAIN    Preliminary   OCCASIONAL GRAM POSITIVE COCCI IN CHAINS    Culture result: PENDING Preliminary   Result History              Pathology Results:  2015  FINAL PATHOLOGIC DIAGNOSIS  Stomach, biopsy:  Mild chronic gastritis    2014  Clinical History  Hepatitis C  FINAL PATHOLOGIC DIAGNOSIS  Liver, biopsy  Chronic hepatitis, hepatitis C by history, grade 2-3, stage 1-2  See microscopic description    Imaging:   L hand X ray      FINDINGS: Three views of the left hand demonstrate no fracture, dislocation or  other acute osseous or articular abnormality. There is soft tissue swelling over  the fifth MCP joint and dorsum of the hand.     IMPRESSION  IMPRESSION: No acute osseous or articular abnormality. Soft tissue swelling over  the fifth MCP joint and dorsum of the hand. Assessment / Plan:     Mr. Tawanna Keyes is a 79-year-old gentleman with hepatitis C, intravenous substance abuse, lumbar laminectomy with left hand abscess. He reports using intravenous drugs to the left hand about a week to 2 ago.     1) left hand abscess/cellulitis  Status post I&D in the emergency room  Noted plans for MRI  If any residual abscess fluid collections, recommend drainage for source control  Currently on IV vancomycin and Zosyn  This combination has an increased risk for nephrotoxicity  We will change Zosyn to IV cefepime plus Flagyl  Pain control per primary team  Await blood cultures and if positive check transthoracic echo  Will also check for Influenza given season and he reports having had some upper respiratory symptoms recently     2) hepatitis C  Patient reports previous treatment with cure  Discussed that successful treatment does not mean immunity  With ongoing substance abuse can reacquire hepatitis C  Check hep C RNA    3) substance abuse IV  Discussed the need for cessation  Monitoring for withdrawal per primary team  Noticed he is on methadone    4)  screening  Check HIV and hepatitis C  Verbal consent obtained    5) history of lumbar laminectomy    6) DVT prophylaxis      Thank for the opportunity to participate in the care of this patient. Please contact with questions or concerns.          Jaxon Teague,   5:39 PM

## 2020-02-18 NOTE — CONSULTS
79-year-old IV drug user, presented with 3 to 4 days progressive left hand swelling after injecting. Febrile upon presentation to the emergency room. Local incision and drainage performed by ER staff. Fluid sent for culture.     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    Hypertension     IV drug abuse ([facility] [address]     Kidney stone     Liver disease     elevated liver enzymes/hep C    Neuralgia     Other ill-defined conditions(799.89)     chronic bronchitis    Polysubstance dependence (HCC)     stimulants, MJ, tob, barbs, benzos, opiates    Psychiatric disorder     Bipolar, Anxiety    Unspecified adverse effect of anesthesia     \"was told he woke up during back surgery\" and during nerve root injection    Unspecified sleep apnea     mild - does not use CPAP     Past Surgical History:   Procedure Laterality Date    HX LUMBAR LAMINECTOMY      HX ORTHOPAEDIC Bilateral     back surgery - laminectomy/discectomy    HX ORTHOPAEDIC      nerve root injection in back    HX OTHER SURGICAL      testicular surgery- varicocelectomy, i &d of abscess on right elbow     Social History     Socioeconomic History    Marital status:      Spouse name: Not on file    Number of children: Not on file    Years of education: Not on file    Highest education level: Not on file   Occupational History    Not on file   Social Needs    Financial resource strain: Not on file    Food insecurity:     Worry: Not on file     Inability: Not on file    Transportation needs:     Medical: Not on file     Non-medical: Not on file   Tobacco Use    Smoking status: Current Every Day Smoker     Packs/day: 1.00     Years: 22.00     Pack years: 22.00     Types: Cigarettes    Smokeless tobacco: Former User    Tobacco comment: cigarettes   Substance and Sexual Activity    Alcohol use: No     Alcohol/week: 0.0 standard drinks     Comment: no longer drinks    Drug use: Yes     Types: Cocaine     Comment: per patient no methamphetamines    Sexual activity: Not Currently   Lifestyle    Physical activity:     Days per week: Not on file     Minutes per session: Not on file    Stress: Not on file   Relationships    Social connections:     Talks on phone: Not on file     Gets together: Not on file     Attends Restorationist service: Not on file     Active member of club or organization: Not on file     Attends meetings of clubs or organizations: Not on file     Relationship status: Not on file    Intimate partner violence:     Fear of current or ex partner: Not on file     Emotionally abused: Not on file     Physically abused: Not on file     Forced sexual activity: Not on file   Other Topics Concern    Not on file   Social History Narrative    The patient is . The patient lives his parents. The patient has no children. The patient does not have legal issues pending. The patient's source of income comes from family. patient's greatest support comes from his parents and this person will be involved with the treatment. pt has not been a victim of sexual/physical abuse. The highest grade achieved is College     Family History   Problem Relation Age of Onset    Heart Disease Maternal Grandfather      Social History     Socioeconomic History    Marital status:      Spouse name: Not on file    Number of children: Not on file    Years of education: Not on file    Highest education level: Not on file   Occupational History    Not on file   Social Needs    Financial resource strain: Not on file    Food insecurity:     Worry: Not on file     Inability: Not on file    Transportation needs:     Medical: Not on file     Non-medical: Not on file   Tobacco Use    Smoking status: Current Every Day Smoker Packs/day: 1.00     Years: 22.00     Pack years: 22.00     Types: Cigarettes    Smokeless tobacco: Former User    Tobacco comment: cigarettes   Substance and Sexual Activity    Alcohol use: No     Alcohol/week: 0.0 standard drinks     Comment: no longer drinks    Drug use: Yes     Types: Cocaine     Comment: per patient no methamphetamines    Sexual activity: Not Currently   Lifestyle    Physical activity:     Days per week: Not on file     Minutes per session: Not on file    Stress: Not on file   Relationships    Social connections:     Talks on phone: Not on file     Gets together: Not on file     Attends [employer]ism service: Not on file     Active member of club or organization: Not on file     Attends meetings of clubs or organizations: Not on file     Relationship status: Not on file    Intimate partner violence:     Fear of current or ex partner: Not on file     Emotionally abused: Not on file     Physically abused: Not on file     Forced sexual activity: Not on file   Other Topics Concern    Not on file   Social History Narrative    The patient is . The patient lives his parents. The patient has no children. The patient does not have legal issues pending. The patient's source of income comes from family. patient's greatest support comes from his parents and this person will be involved with the treatment. pt has not been a victim of sexual/physical abuse. The highest grade achieved is College     Allergies   Allergen Reactions    Barium Sulfate Hives    Demerol [Meperidine] Hives    Iodinated Contrast Media Other (comments)     Hives, swelling of throat with IVP/has had MRI with contrast without problems.  Neurontin [Gabapentin] Drowsiness     Patient reports no allergy to this medication     No current facility-administered medications on file prior to encounter.       Current Outpatient Medications on File Prior to Encounter   Medication Sig Dispense Refill    ondansetron (ZOFRAN ODT) 4 mg disintegrating tablet Take 1 Tab by mouth every eight (8) hours as needed for Nausea. 20 Tab 0    candesartan cilexetil (CANDESARTAN PO) Take  by mouth.  atorvastatin (LIPITOR) 40 mg tablet Take 40 mg by mouth daily.  GABAPENTIN, BULK, by Does Not Apply route.  propranolol LA (INDERAL LA) 80 mg SR capsule Take 1 Cap by mouth daily. BP med to help reduce headache frequency. Stop taking Atenolol 30 Cap 1    butalbital-acetaminophen-caffeine (FIORICET, ESGIC) -40 mg per tablet 1 tab at onset of migraine; can 1 tab in repeat in 4 hours (one time) if headache remains. Limit: 2 tabs per day, max 2 days a week. 90 day Rx. 48 Tab 0    lamoTRIgine (LAMICTAL) 200 mg tablet Take  by mouth daily.  cyclobenzaprine (FLEXERIL) 5 mg tablet Take 2 Tabs by mouth three (3) times daily (with meals). 20 Tab 0    OLANZapine (ZYPREXA) 10 mg tablet Take 5 mg by mouth nightly. Exam:  Somnolent but arousable. Answers questions appropriately. Dressing removed left hand. Significant swelling, seems to be mostly localized to the dorsum of the hand. Small wound along the ulnar border, site of abscess drainage. Packing removed. No expressible fluid. Hand is diffusely tender. Mild pain with active motion of all digits. No cross-sectional imaging available. Impression: Cellulitis left hand, status post abscess drainage. Must rule out remaining fluid collection.     Plan:  Cross-sectional imaging today  Broad-spectrum IV antibiotic therapy  ID consult  Remain n.p.o. until imaging complete  Keep left upper extremity elevated    Tracey El MD

## 2020-02-18 NOTE — PROGRESS NOTES
This RN called [facility] to confirm that patient takes 35mg of Methadone a day. His last dose was 2/17 at 5:05pm. MD and pharmacy notified.

## 2020-02-18 NOTE — ED PROVIDER NOTES
The patient is a 51-year-old male with a past medical history significant for arrhythmia, arthritis, chronic pain, kidney stone, hep C, polysubstance abuse including IVDA who presents to the ED with a complaint of fever, chills, swelling of the right hand secondary to injection at the site. The patient denies any headache, sore throat, cough, congestion, chest pain or shortness of breath, abdominal pain, diarrhea, constipation, dysuria, dizziness, extremity weakness or numbness. The patient is right-handed.            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    Hypertension     IV drug abuse ([facility] [address]     Kidney stone     Liver disease     elevated liver enzymes/hep C    Neuralgia     Other ill-defined conditions(799.89)     chronic bronchitis    Polysubstance dependence (HCC)     stimulants, MJ, tob, barbs, benzos, opiates    Psychiatric disorder     Bipolar, Anxiety    Unspecified adverse effect of anesthesia     \"was told he woke up during back surgery\" and during nerve root injection    Unspecified sleep apnea     mild - does not use CPAP       Past Surgical History:   Procedure Laterality Date    HX LUMBAR LAMINECTOMY      HX ORTHOPAEDIC Bilateral     back surgery - laminectomy/discectomy    HX ORTHOPAEDIC      nerve root injection in back    HX OTHER SURGICAL      testicular surgery- varicocelectomy, i &d of abscess on right elbow         Family History:   Problem Relation Age of Onset    Heart Disease Maternal Grandfather        Social History     Socioeconomic History    Marital status:      Spouse name: Not on file    Number of children: Not on file    Years of education: Not on file    Highest education level: Not on file   Occupational History    Not on file   Social Needs    Financial resource strain: Not on file    Food insecurity:     Worry: Not on file     Inability: Not on file    Transportation needs:     Medical: Not on file     Non-medical: Not on file   Tobacco Use    Smoking status: Current Every Day Smoker     Packs/day: 1.00     Years: 22.00     Pack years: 22.00     Types: Cigarettes    Smokeless tobacco: Former User    Tobacco comment: cigarettes   Substance and Sexual Activity    Alcohol use: No     Alcohol/week: 0.0 standard drinks     Comment: no longer drinks    Drug use: Yes     Types: Cocaine     Comment: per patient no methamphetamines    Sexual activity: Not Currently   Lifestyle    Physical activity:     Days per week: Not on file     Minutes per session: Not on file    Stress: Not on file   Relationships    Social connections:     Talks on phone: Not on file     Gets together: Not on file     Attends [employer] service: Not on file     Active member of club or organization: Not on file     Attends meetings of clubs or organizations: Not on file     Relationship status: Not on file    Intimate partner violence:     Fear of current or ex partner: Not on file     Emotionally abused: Not on file     Physically abused: Not on file     Forced sexual activity: Not on file   Other Topics Concern    Not on file   Social History Narrative    The patient is . The patient lives his parents. The patient has no children. The patient does not have legal issues pending. The patient's source of income comes from family. patient's greatest support comes from his parents and this person will be involved with the treatment. pt has not been a victim of sexual/physical abuse. The highest grade achieved is College         ALLERGIES: Barium sulfate; Demerol [meperidine]; Iodinated contrast media; and Neurontin [gabapentin]    Review of Systems   All other systems reviewed and are negative.       Vitals:    02/18/20 0005   BP: (!) 161/96 Pulse: (!) 122   Resp: 20   Temp: (!) 101.2 °F (38.4 °C)   SpO2: 96%   Weight: 90.4 kg (199 lb 4.7 oz)   Height: 6' (1.829 m)            Physical Exam  Vitals signs and nursing note reviewed. Exam conducted with a chaperone present. Musculoskeletal:        Hands:         CONSTITUTIONAL: Well-appearing; well-nourished; in mild distress  HEAD: Normocephalic; atraumatic  EYES: PERRL; EOM intact; conjunctiva and sclera are clear bilaterally. ENT: No rhinorrhea; normal pharynx with no tonsillar hypertrophy; mucous membranes pink/moist, no erythema, no exudate. NECK: Supple; non-tender; no cervical lymphadenopathy  CARD: Normal S1, S2; no murmurs, rubs, or gallops. Regular rate and rhythm. RESP: Normal respiratory effort; breath sounds clear and equal bilaterally; no wheezes, rhonchi, or rales. ABD: Normal bowel sounds; non-distended; non-tender; no palpable organomegaly, no masses, no bruits. Back Exam: Normal inspection; no vertebral point tenderness, no CVA tenderness. Normal range of motion. SKIN: Warm; dry; no rash. NEURO:Alert and oriented x 3, coherent, INGRID-XII grossly intact, sensory and motor are non-focal.        MDM  Number of Diagnoses or Management Options  Abscess of dorsum of left hand:   Cellulitis of left hand:   IV drug abuse [facility]):   Diagnosis management comments: Assessment: Abscess on the dorsal surface of the left hand with surrounding cellulitis-rule out sepsis. There is no murmur on heart exam-the patient is a risk for bacterial endocarditis    Plan: Lab/IV fluid/EKG/x-ray of the left hand /local I&D/broad-spectrum antibiotics/consult hospitalist and orthopedist/ Monitor and Reevaluate.          Amount and/or Complexity of Data Reviewed  Clinical lab tests: ordered and reviewed  Tests in the radiology section of CPT®: ordered and reviewed  Tests in the medicine section of CPT®: reviewed and ordered  Discussion of test results with the performing providers: yes  Decide to obtain previous medical records or to obtain history from someone other than the patient: yes  Obtain history from someone other than the patient: yes  Review and summarize past medical records: yes  Discuss the patient with other providers: yes  Independent visualization of images, tracings, or specimens: yes    Risk of Complications, Morbidity, and/or Mortality  Presenting problems: moderate  Diagnostic procedures: moderate  Management options: moderate    Critical Care  Total time providing critical care: (Total critical care time spent exclusive of procedures: 45 minutes)         I&D Abcess Complex  Date/Time: 2/18/2020 3:10 AM  Performed by: Nazario Todd MD  Authorized by: Nazario Todd MD     Consent:     Consent obtained:  Verbal    Consent given by:  Patient    Risks discussed:  Bleeding, incomplete drainage, pain and infection    Alternatives discussed:  No treatment  Location:     Type:  Abscess    Location: Dorsum of left hand. Pre-procedure details:     Skin preparation:  Betadine  Anesthesia (see MAR for exact dosages): Anesthesia method:  Local infiltration    Local anesthetic:  Lidocaine 1% WITH epi  Procedure type:     Complexity:  Complex  Procedure details:     Incision types:  Stab incision    Incision depth:  Submucosal    Scalpel blade:  11    Wound management:  Probed and deloculated, irrigated with saline and extensive cleaning    Drainage:  Bloody, purulent and serosanguinous    Drainage amount: Moderate    Wound treatment:  Wound left open    Packing materials:  1/2 in iodoform gauze  Post-procedure details:     Patient tolerance of procedure: Tolerated well, no immediate complications        ED EKG interpretation:  Rhythm: sinus tachycardia; and regular . Rate (approx.): 109; Axis: normal; P wave: normal; QRS interval: normal ; ST/T wave: normal; in  Lead: Diffusely; Other findings: borderline ekg. This EKG was interpreted by Jannie Rizvi MD,ED Provider.     XRAY INTERPRETATION (ED MD)  Xray of Left hand shows No acute osseous or articular abnormality. Soft tissue swelling over the fifth MCP joint and dorsum of the hand. no fracture. No subluxation/dislocation. No bony abnormality. Estrellita Fishman MD 00:20 AM      PROGRESS NOTE:  Pt has been reexamined by Estrellita Fishman MD all available results have been reviewed with pt and any available family. Pt understands sx, dx, and tx in ED. Care plan has been outlined and questions have been answered. Pt and any available family understands and agrees to need for admission to hospital for further tx not available in ED. Pt is ready for admission. Written by Radha Zacarias MD,  12:25 AM    Hospitalist Perfect Serve for Admission  1:15 AM    ED Room Number: SER05/05  Patient Name and age:  Brandin Fitzgerald 40 y.o.  male  Working Diagnosis:   1. Cellulitis of left hand    2. Abscess of dorsum of left hand    3. IV drug abuse ([address]      Readmission: no  Isolation Requirements:  no  Recommended Level of Care:  med/surg  Code Status:  Full Code  Department: [facility]  Other:  Consulted Ortho as well. Will I&D abscess and obtain Wound culture. CONSULT NOTE:  Estrellita Fishman MD spoke with Dr. Ramana Contreras of the adult hospitalist team. Discussed patient's presentation, history, physical assessment, and available diagnostic results. He will evaluate, write orders and admit the patient to the hospital. 1:15 AM    CONSULT NOTE:  Estrellita Fishman MD spoke with Dr. Edilia Cruz, PA-C Discussed patient's presentation, history, physical assessment, and available diagnostic results.  .    .

## 2020-02-18 NOTE — ED NOTES
TRANSFER - OUT REPORT:    Verbal report given to Taylor Rosenberg R.N.(name) on Zuly Davenport  being transferred to [facility](unit) for routine progression of care       Report consisted of patients Situation, Background, Assessment and   Recommendations(SBAR). Information from the following report(s) ED Summary was reviewed with the receiving nurse. Lines:       Opportunity for questions and clarification was provided.       Patient transported with:   [facility] crew

## 2020-02-19 LAB
ANION GAP SERPL CALC-SCNC: 5 MMOL/L (ref 5–15)
ATRIAL RATE: 109 BPM
BASOPHILS # BLD: 0 K/UL (ref 0–0.1)
BASOPHILS NFR BLD: 0 % (ref 0–1)
BUN SERPL-MCNC: 10 MG/DL (ref 6–20)
BUN/CREAT SERPL: 10 (ref 12–20)
CALCIUM SERPL-MCNC: 8 MG/DL (ref 8.5–10.1)
CALCULATED P AXIS, ECG09: 63 DEGREES
CALCULATED R AXIS, ECG10: 57 DEGREES
CALCULATED T AXIS, ECG11: 55 DEGREES
CHLORIDE SERPL-SCNC: 106 MMOL/L (ref 97–108)
CO2 SERPL-SCNC: 26 MMOL/L (ref 21–32)
CREAT SERPL-MCNC: 1.02 MG/DL (ref 0.7–1.3)
DIAGNOSIS, 93000: NORMAL
DIFFERENTIAL METHOD BLD: ABNORMAL
EOSINOPHIL # BLD: 0.2 K/UL (ref 0–0.4)
EOSINOPHIL NFR BLD: 2 % (ref 0–7)
ERYTHROCYTE [DISTWIDTH] IN BLOOD BY AUTOMATED COUNT: 12.5 % (ref 11.5–14.5)
GLUCOSE SERPL-MCNC: 96 MG/DL (ref 65–100)
HCT VFR BLD AUTO: 33.6 % (ref 36.6–50.3)
HGB BLD-MCNC: 10.9 G/DL (ref 12.1–17)
HIV 1+2 AB+HIV1 P24 AG SERPL QL IA: NONREACTIVE
HIV12 RESULT COMMENT, HHIVC: NORMAL
IMM GRANULOCYTES # BLD AUTO: 0.1 K/UL (ref 0–0.04)
IMM GRANULOCYTES NFR BLD AUTO: 1 % (ref 0–0.5)
LYMPHOCYTES # BLD: 2.5 K/UL (ref 0.8–3.5)
LYMPHOCYTES NFR BLD: 25 % (ref 12–49)
MAGNESIUM SERPL-MCNC: 1.7 MG/DL (ref 1.6–2.4)
MCH RBC QN AUTO: 31.8 PG (ref 26–34)
MCHC RBC AUTO-ENTMCNC: 32.4 G/DL (ref 30–36.5)
MCV RBC AUTO: 98 FL (ref 80–99)
MONOCYTES # BLD: 1 K/UL (ref 0–1)
MONOCYTES NFR BLD: 10 % (ref 5–13)
NEUTS SEG # BLD: 6.3 K/UL (ref 1.8–8)
NEUTS SEG NFR BLD: 62 % (ref 32–75)
NRBC # BLD: 0 K/UL (ref 0–0.01)
NRBC BLD-RTO: 0 PER 100 WBC
P-R INTERVAL, ECG05: 144 MS
PHOSPHATE SERPL-MCNC: 2.2 MG/DL (ref 2.6–4.7)
PLATELET # BLD AUTO: 195 K/UL (ref 150–400)
PMV BLD AUTO: 11 FL (ref 8.9–12.9)
POTASSIUM SERPL-SCNC: 4 MMOL/L (ref 3.5–5.1)
Q-T INTERVAL, ECG07: 318 MS
QRS DURATION, ECG06: 80 MS
QTC CALCULATION (BEZET), ECG08: 428 MS
RBC # BLD AUTO: 3.43 M/UL (ref 4.1–5.7)
SODIUM SERPL-SCNC: 137 MMOL/L (ref 136–145)
VENTRICULAR RATE, ECG03: 109 BPM
WBC # BLD AUTO: 10 K/UL (ref 4.1–11.1)

## 2020-02-19 PROCEDURE — 87389 HIV-1 AG W/HIV-1&-2 AB AG IA: CPT

## 2020-02-19 PROCEDURE — 74011250637 HC RX REV CODE- 250/637: Performed by: INTERNAL MEDICINE

## 2020-02-19 PROCEDURE — 85025 COMPLETE CBC W/AUTO DIFF WBC: CPT

## 2020-02-19 PROCEDURE — 74011250637 HC RX REV CODE- 250/637: Performed by: HOSPITALIST

## 2020-02-19 PROCEDURE — 80048 BASIC METABOLIC PNL TOTAL CA: CPT

## 2020-02-19 PROCEDURE — 74011250637 HC RX REV CODE- 250/637: Performed by: NURSE PRACTITIONER

## 2020-02-19 PROCEDURE — 84100 ASSAY OF PHOSPHORUS: CPT

## 2020-02-19 PROCEDURE — 36415 COLL VENOUS BLD VENIPUNCTURE: CPT

## 2020-02-19 PROCEDURE — 65270000032 HC RM SEMIPRIVATE

## 2020-02-19 PROCEDURE — 83735 ASSAY OF MAGNESIUM: CPT

## 2020-02-19 PROCEDURE — 74011000258 HC RX REV CODE- 258: Performed by: INTERNAL MEDICINE

## 2020-02-19 PROCEDURE — 74011250636 HC RX REV CODE- 250/636: Performed by: HOSPITALIST

## 2020-02-19 PROCEDURE — 74011250636 HC RX REV CODE- 250/636: Performed by: INTERNAL MEDICINE

## 2020-02-19 RX ORDER — OXYCODONE HYDROCHLORIDE 5 MG/1
5 TABLET ORAL
Status: DISCONTINUED | OUTPATIENT
Start: 2020-02-19 | End: 2020-02-20

## 2020-02-19 RX ORDER — METRONIDAZOLE 250 MG/1
500 TABLET ORAL EVERY 8 HOURS
Status: DISCONTINUED | OUTPATIENT
Start: 2020-02-19 | End: 2020-02-24

## 2020-02-19 RX ADMIN — CEFEPIME HYDROCHLORIDE 2 G: 2 INJECTION, POWDER, FOR SOLUTION INTRAVENOUS at 18:14

## 2020-02-19 RX ADMIN — METRONIDAZOLE 500 MG: 250 TABLET ORAL at 09:18

## 2020-02-19 RX ADMIN — Medication 10 ML: at 14:18

## 2020-02-19 RX ADMIN — PROPRANOLOL HYDROCHLORIDE 80 MG: 80 CAPSULE, EXTENDED RELEASE ORAL at 09:23

## 2020-02-19 RX ADMIN — ACETAMINOPHEN 650 MG: 325 TABLET ORAL at 05:00

## 2020-02-19 RX ADMIN — SODIUM CHLORIDE 100 ML/HR: 900 INJECTION, SOLUTION INTRAVENOUS at 06:27

## 2020-02-19 RX ADMIN — ACETAMINOPHEN 650 MG: 325 TABLET ORAL at 14:17

## 2020-02-19 RX ADMIN — ACETAMINOPHEN 650 MG: 325 TABLET ORAL at 21:14

## 2020-02-19 RX ADMIN — SODIUM CHLORIDE 100 ML/HR: 900 INJECTION, SOLUTION INTRAVENOUS at 19:12

## 2020-02-19 RX ADMIN — Medication 10 ML: at 06:27

## 2020-02-19 RX ADMIN — OLANZAPINE 5 MG: 5 TABLET, FILM COATED ORAL at 21:15

## 2020-02-19 RX ADMIN — VANCOMYCIN HYDROCHLORIDE 1250 MG: 10 INJECTION, POWDER, LYOPHILIZED, FOR SOLUTION INTRAVENOUS at 10:41

## 2020-02-19 RX ADMIN — LAMOTRIGINE 200 MG: 100 TABLET ORAL at 09:17

## 2020-02-19 RX ADMIN — BENZONATATE 100 MG: 100 CAPSULE ORAL at 21:15

## 2020-02-19 RX ADMIN — METHADONE HYDROCHLORIDE 35 MG: 10 TABLET ORAL at 09:18

## 2020-02-19 RX ADMIN — BENZONATATE 100 MG: 100 CAPSULE ORAL at 09:25

## 2020-02-19 RX ADMIN — CEFEPIME HYDROCHLORIDE 2 G: 2 INJECTION, POWDER, FOR SOLUTION INTRAVENOUS at 03:22

## 2020-02-19 RX ADMIN — Medication 10 ML: at 21:15

## 2020-02-19 RX ADMIN — BUTALBITAL, ACETAMINOPHEN AND CAFFEINE 1 TABLET: 50; 325; 40 TABLET ORAL at 21:15

## 2020-02-19 RX ADMIN — CEFEPIME HYDROCHLORIDE 2 G: 2 INJECTION, POWDER, FOR SOLUTION INTRAVENOUS at 09:18

## 2020-02-19 RX ADMIN — METRONIDAZOLE 500 MG: 250 TABLET ORAL at 21:14

## 2020-02-19 RX ADMIN — ATORVASTATIN CALCIUM 40 MG: 40 TABLET, FILM COATED ORAL at 09:18

## 2020-02-19 RX ADMIN — ONDANSETRON 4 MG: 2 INJECTION INTRAMUSCULAR; INTRAVENOUS at 21:14

## 2020-02-19 RX ADMIN — CYCLOBENZAPRINE 5 MG: 10 TABLET, FILM COATED ORAL at 21:14

## 2020-02-19 RX ADMIN — BENZONATATE 100 MG: 100 CAPSULE ORAL at 03:22

## 2020-02-19 NOTE — PROGRESS NOTES
Bedside and Verbal shift change report given to Michelle (oncoming nurse) by Mayte Bacon (offgoing nurse). Report included the following information SBAR, Kardex and MAR.

## 2020-02-19 NOTE — WOUND CARE
WOCN Note:  
 
New consult placed for Left hand abscess assessment and packing change. Patient reports that he injected heroin into his hand. Chart reviewed. Admitted DX:  Sepsis Past Medical History: IV drug user (heroin); bipolar; hep C; migraines; chronic pain. Wound Culture obtained and pending. ID following. WBC = 10.0 (2.19.2020) Photography consent provided by patient. Assessment:  
Patient is A&O x 3, communicative, continent and mobile independently. Bed: [facility] premedicated for pain. 1. POA Left hand, abscess : 1.5 x 0.7 x 1.5 cm; Unable to completely see wound bed; the area that is visible is red; small purulent drainage; hand edematous with tenderness and erythema;  Periwound raised with maceration. Recommendations:   
Left hand:  Daily packing change with plain packing strip and saline. Skin Care & Pressure Prevention: 
Minimize layers of linen/pads under patient to optimize support surface. Turn/reposition approximately every 2 hours and offload heels. Discussed above plan with patient and Cherie Fan. Transition of Care: Plan to go to OR 2.20.2020 at 11:30. Will follow as needed while admitted to hospital. 
 
MARI UrbanoN RN [facility] Wound Care Office [phone] Pager [phone]

## 2020-02-19 NOTE — PROGRESS NOTES
2/19/2020 -   KATYA:  - RUR: 26%  - Patient is now NPO   - Plan is for another I&D/[place]  - Surgery will be today, 2/19, vs tomorrow, 2/20  - Cultures are pending  - IV ABX continue  CRM: Manan Valladares, MPH, [address] [place] [address]; Z: [phone]

## 2020-02-19 NOTE — PROGRESS NOTES
[facility]  Hospitalist Group                                                                                          Hospitalist Progress Note  Katherine Unger MD  Answering service: [phone] from in house phone        Date of Service:  2020  NAME:  Margie Knight  :  1975  MRN:  [id number]      Admission Summary:   Margie Knight is a 40 y.o. male with pmh of bipolar disorder, IVDU (heroin, last use 1.5week ago), hepatitis C, chronic migraines and chronic pain presented to [place] ED with hand swelling. Patient claims he injected heroin into his hand about 1.5weeks ago, and over the last 2 days had increased swelling and redness prompting ED visit. He denies any fevers, chills or other systemic symptoms. Still able to move his fingers, although is having pain. He denies any fatigue, diaphoresis, N/V/D/C, no abdominal pain. His swelling started extending up his arm and therefore he decided to come to the ED for further evaluation. Interval history / Subjective:   Pain in the L hand ongoing, thinks swelling is worse, however has been sleeping on his hand. MRI shows large abscess, tenosynovitis   Assessment & Plan:     Sepsis - (Fever, HR, POA) secondary to cellulitis and hand abscess from old IVDU site   - IV vancomycin, cefepime and flagyl  - Lactic 0.9 in the ED  - Tylenol PRN for pain, oxycodone for pain   - Orthopedics following, plan for OR debridement yesterday   - ID following  - Sepsis reassessment completed     Polysubstance abuse, heroin abuse with recent IVDU  - Confirmed with methadone clinic, started 1 week ago, on 35mg methadone, ordered.    - QTc reviewed and normal   - If he would require long term IV antibiotics, he cannot go home with PICC  - HIV negative     H/o Migraines - PRN fiorocet, propanolol  Bipolar - continue home lamictal and olanzapine  H/o HepC- RNA orderd    Code status: FULL  DVT prophylaxis: Start lovenox post op    Care Plan discussed with: Patient/Family  Anticipated Disposition: Home w/Family  Anticipated Discharge: Greater than 48 hours     Hospital Problems  Date Reviewed: 1/12/2017          Codes Class Noted POA    Sepsis ([address] ICD-10-CM: A41.9  ICD-9-CM: 038.9, 995.91  2/18/2020 Unknown                Review of Systems:   A comprehensive review of systems was negative except for that written in the HPI. Vital Signs:    Last 24hrs VS reviewed since prior progress note. Most recent are:  Visit Vitals  /61 (BP 1 Location: Right arm, BP Patient Position: At rest)   Pulse 76   Temp 98.6 °F (37 °C)   Resp 18   Ht 6' (1.829 m)   Wt 91.8 kg (202 lb 4.8 oz)   SpO2 94%   BMI 27.44 kg/m²       No intake or output data in the 24 hours ending 02/19/20 1540     Physical Examination:             Constitutional:  No acute distress, cooperative, pleasant    ENT:  Oral mucosa moist, oropharynx benign. Resp:  CTA bilaterally. No wheezing/rhonchi/rales. No accessory muscle use   CV:  Regular rhythm, normal rate, no murmurs, gallops, rubs    GI:  Soft, non distended, non tender. normoactive bowel sounds, no hepatosplenomegaly     Musculoskeletal:  Soft tissue swelling of the L hand, and extending up the arm. Erythema and bloody/purulent discharge at the I and D site. Neurologic:  Moves all extremities. AAOx3, CN II-XII reviewed     Psych:  Good insight, Not anxious nor agitated.        Data Review:    Review and/or order of clinical lab test  Review and/or order of tests in the radiology section of CPT  Review and/or order of tests in the medicine section of CPT      Labs:     Recent Labs     02/19/20  0339 02/18/20  0024   WBC 10.0 10.3   HGB 10.9* 12.4   HCT 33.6* 38.4    233     Recent Labs     02/19/20  0339 02/18/20  0024    138   K 4.0 4.4    98   CO2 26 28   BUN 10 9   CREA 1.02 1.33*   GLU 96 93   CA 8.0* 8.9   MG 1.7  --    PHOS 2.2*  --      Recent Labs     02/18/20  0024   SGOT 14*   ALT 19   AP 89   TBILI 0.3   TP 7.6   ALB 3.3*   GLOB 4.3*     No results for input(s): INR, PTP, APTT, INREXT in the last 72 hours. No results for input(s): FE, TIBC, PSAT, FERR in the last 72 hours. Lab Results   Component Value Date/Time    Folate >20.0 01/12/2017 11:36 AM      No results for input(s): PH, PCO2, PO2 in the last 72 hours. No results for input(s): CPK, CKNDX, TROIQ in the last 72 hours.     No lab exists for component: CPKMB  Lab Results   Component Value Date/Time    Cholesterol, total 180 04/12/2016 01:49 PM    HDL Cholesterol 47 04/12/2016 01:49 PM    LDL, calculated 106 (H) 04/12/2016 01:49 PM    Triglyceride 134 04/12/2016 01:49 PM    CHOL/HDL Ratio 4.1 05/09/2014 03:57 AM     Lab Results   Component Value Date/Time    Glucose (POC) 92 11/24/2019 03:14 AM    Glucose (POC) 122 (H) 10/18/2015 08:36 AM    Glucose (POC) 147 (H) 08/31/2015 10:32 AM    Glucose (POC) 94 08/04/2015 05:38 PM    Glucose (POC) 91 09/06/2014 08:28 AM    Glucose (POC) 95 05/08/2014 03:12 PM    Glucose (POC) 120 (H) 03/27/2012 08:02 PM     Lab Results   Component Value Date/Time    Color YELLOW/STRAW 02/18/2020 04:35 AM    Appearance CLEAR 02/18/2020 04:35 AM    Specific gravity 1.005 02/18/2020 04:35 AM    Specific gravity 1.025 11/24/2019 02:53 AM    pH (UA) 6.5 02/18/2020 04:35 AM    Protein NEGATIVE  02/18/2020 04:35 AM    Glucose NEGATIVE  02/18/2020 04:35 AM    Ketone NEGATIVE  02/18/2020 04:35 AM    Bilirubin NEGATIVE  02/18/2020 04:35 AM    Urobilinogen 0.2 02/18/2020 04:35 AM    Nitrites NEGATIVE  02/18/2020 04:35 AM    Leukocyte Esterase NEGATIVE  02/18/2020 04:35 AM    Epithelial cells FEW 02/18/2020 04:35 AM    Bacteria NEGATIVE  02/18/2020 04:35 AM    WBC 0-4 02/18/2020 04:35 AM    RBC 0-5 02/18/2020 04:35 AM         Medications Reviewed:     Current Facility-Administered Medications   Medication Dose Route Frequency    sodium chloride (NS) flush 5-10 mL  5-10 mL IntraVENous PRN    sodium chloride (NS) flush 5-40 mL  5-40 mL IntraVENous Q8H    sodium chloride (NS) flush 5-40 mL  5-40 mL IntraVENous PRN    acetaminophen (TYLENOL) tablet 650 mg  650 mg Oral Q4H PRN    ondansetron (ZOFRAN) injection 4 mg  4 mg IntraVENous Q4H PRN    0.9% sodium chloride infusion  100 mL/hr IntraVENous CONTINUOUS    vancomycin (VANCOCIN) 1250 mg in  ml infusion  1,250 mg IntraVENous Q16H    Vancomycin - Pharmacy to Dose   Other Rx Dosing/Monitoring    atorvastatin (LIPITOR) tablet 40 mg  40 mg Oral DAILY    butalbital-acetaminophen-caffeine (FIORICET, ESGIC) -40 mg per tablet 1 Tab  1 Tab Oral Q6H PRN    cyclobenzaprine (FLEXERIL) tablet 5 mg  5 mg Oral TID PRN    lamoTRIgine (LaMICtal) tablet 200 mg  200 mg Oral DAILY    OLANZapine (ZyPREXA) tablet 5 mg  5 mg Oral QHS    propranolol LA (INDERAL LA) capsule 80 mg  80 mg Oral DAILY    methadone (DOLOPHINE) tablet 35 mg  35 mg Oral DAILY    cefepime (MAXIPIME) 2 g in 0.9% sodium chloride (MBP/ADV) 100 mL  2 g IntraVENous Q8H    metroNIDAZOLE (FLAGYL) tablet 500 mg  500 mg Oral Q12H    nicotine (NICODERM CQ) 21 mg/24 hr patch 1 Patch  1 Patch TransDERmal DAILY    benzonatate (TESSALON) capsule 100 mg  100 mg Oral TID PRN     ______________________________________________________________________  EXPECTED LENGTH OF STAY: 3d 16h  ACTUAL LENGTH OF STAY:          1                 Monica Brewer MD

## 2020-02-19 NOTE — PROGRESS NOTES
Bedside and Verbal shift change report given to Dl Nuñez RN and Joseph Piper RN (oncoming nurse) by German Sethi RN (offgoing nurse). Report included the following information SBAR, Kardex, Intake/Output, MAR and Recent Results.

## 2020-02-19 NOTE — PROGRESS NOTES
ORTHO PROGRESS NOTE    2020  Admit Date:   2020    Post Op day: * No surgery date entered *    Subjective:    Denilson Other states that his hand feels somewhat better after Abx have started. MRI completed last night showing abscess. No new issues. Is NPO now.   Denies N/V/SOB or CP     PT/OT:   Gait:                    Vital Signs:    Patient Vitals for the past 8 hrs:   BP Temp Pulse Resp SpO2 Weight   20 0748 126/85 98.5 °F (36.9 °C) 93 18 97 %    20 0726      91.8 kg (202 lb 4.8 oz)   20 0258 100/64 98.9 °F (37.2 °C) 88 18 97 %      Temp (24hrs), Av °F (37.2 °C), Min:98.5 °F (36.9 °C), Max:100 °F (37.8 °C)      Pain Control:   Pain Assessment  Pain Scale 1: Numeric (0 - 10)  Pain Intensity 1: 8  Pain Onset 1: 1 week  Pain Location 1: Hand  Pain Orientation 1: Left  Pain Description 1: Aching  Pain Intervention(s) 1: Meditation    Meds:    Current Facility-Administered Medications   Medication Dose Route Frequency    sodium chloride (NS) flush 5-10 mL  5-10 mL IntraVENous PRN    sodium chloride (NS) flush 5-40 mL  5-40 mL IntraVENous Q8H    sodium chloride (NS) flush 5-40 mL  5-40 mL IntraVENous PRN    acetaminophen (TYLENOL) tablet 650 mg  650 mg Oral Q4H PRN    ondansetron (ZOFRAN) injection 4 mg  4 mg IntraVENous Q4H PRN    0.9% sodium chloride infusion  100 mL/hr IntraVENous CONTINUOUS    vancomycin (VANCOCIN) 1250 mg in  ml infusion  1,250 mg IntraVENous Q16H    Vancomycin - Pharmacy to Dose   Other Rx Dosing/Monitoring    atorvastatin (LIPITOR) tablet 40 mg  40 mg Oral DAILY    butalbital-acetaminophen-caffeine (FIORICET, ESGIC) -40 mg per tablet 1 Tab  1 Tab Oral Q6H PRN    cyclobenzaprine (FLEXERIL) tablet 5 mg  5 mg Oral TID PRN    lamoTRIgine (LaMICtal) tablet 200 mg  200 mg Oral DAILY    OLANZapine (ZyPREXA) tablet 5 mg  5 mg Oral QHS    propranolol LA (INDERAL LA) capsule 80 mg  80 mg Oral DAILY    methadone (DOLOPHINE) tablet 35 mg  35 mg Oral DAILY    cefepime (MAXIPIME) 2 g in 0.9% sodium chloride (MBP/ADV) 100 mL  2 g IntraVENous Q8H    metroNIDAZOLE (FLAGYL) tablet 500 mg  500 mg Oral Q12H    nicotine (NICODERM CQ) 21 mg/24 hr patch 1 Patch  1 Patch TransDERmal DAILY    benzonatate (TESSALON) capsule 100 mg  100 mg Oral TID PRN       LAB:    Recent Labs     02/19/20  0339   HCT 33.6*   HGB 10.9*       Transfuse PRBC's:      Assessment & Physician's Comment:  Right hand and forearm swelling ongoing with some erythema  Moves and and forearm to command  Dressing is with some serous drainage noted on the dorsal side  Neurovascular checks within normal limits  Orientation:  Oriented    Active Problems:    Sepsis ([address] (2/18/2020)        Plan:    Discussed with patient need for washout who is in agreement  Will keep NPO for now  Consent for I&D left hand  Determining OR timing  Dr Hardy Gonzalez has spoken to Dr Giovanna Taylor regarding his involvement  To OR later today vs tomorrow    Dr Carlos Jennings aware of patient and in agreement with current plan      Kavon Maloney PA-C   Orthopedic Trauma Service  [facility]    Addendum:  Have spoken with Dr Giovanna Taylor regarding washout who states that caqse has been posted for 11:30am Thursday for washout  Can eat for now  NPO after midnight  Remain on Abx per ID/primary team  Discussed timing with nurse Kinsman

## 2020-02-19 NOTE — PROGRESS NOTES
Infectious Disease Progress  Note      HPI:    Mr. Sandra Mazariegos seen   Complains of L hand pain   Denied fever, chills nausea vomiting diarrhea   Tolerating antibiotics       Current Facility-Administered Medications:     sodium chloride (NS) flush 5-10 mL, 5-10 mL, IntraVENous, PRN, Lisseth Flynn MD    sodium chloride (NS) flush 5-40 mL, 5-40 mL, IntraVENous, Q8H, Kylah De La Vega MD, 10 mL at 02/19/20 1418    sodium chloride (NS) flush 5-40 mL, 5-40 mL, IntraVENous, PRN, Kylah De La Vega MD    acetaminophen (TYLENOL) tablet 650 mg, 650 mg, Oral, Q4H PRN, Lily Hope MD, 650 mg at 02/19/20 1417    ondansetron (ZOFRAN) injection 4 mg, 4 mg, IntraVENous, Q4H PRN, Kylah De La Vega MD    0.9% sodium chloride infusion, 100 mL/hr, IntraVENous, CONTINUOUS, Kylah De La Vega MD, Last Rate: 100 mL/hr at 02/19/20 0627, 100 mL/hr at 02/19/20 1487    vancomycin (VANCOCIN) 1250 mg in  ml infusion, 1,250 mg, IntraVENous, Q16H, Loyda JOHNSON MD, Last Rate: 125 mL/hr at 02/19/20 1041, 1,250 mg at 02/19/20 1041    Vancomycin - Pharmacy to Dose, , Other, Rx Dosing/Monitoring, Ivania Ralph MD    atorvastatin (LIPITOR) tablet 40 mg, 40 mg, Oral, DAILY, Leeanne Ralph MD, 40 mg at 02/19/20 7631    butalbital-acetaminophen-caffeine (FIORICET, ESGIC) -40 mg per tablet 1 Tab, 1 Tab, Oral, Q6H PRN, Ivania Ralph MD, 1 Tab at 02/18/20 2038    cyclobenzaprine (FLEXERIL) tablet 5 mg, 5 mg, Oral, TID PRN, Ivania Ralph MD    lamoTRIgine (LaMICtal) tablet 200 mg, 200 mg, Oral, DAILY, Leeanne Ralph MD, 200 mg at 02/19/20 1939    OLANZapine (ZyPREXA) tablet 5 mg, 5 mg, Oral, QHS, Leeanne Ralph MD, 5 mg at 02/18/20 2039    propranolol LA (INDERAL LA) capsule 80 mg, 80 mg, Oral, DAILY, Loyda JOHNSON MD, 80 mg at 02/19/20 4600    methadone (DOLOPHINE) tablet 35 mg, 35 mg, Oral, DAILY, Ivania Ralph MD, 35 mg at 02/19/20 0918    cefepime (MAXIPIME) 2 g in 0.9% sodium chloride (MBP/ADV) 100 mL, 2 g, IntraVENous, Q8H, Gomadam, Syl R, DO, Last Rate: 200 mL/hr at 02/19/20 0918, 2 g at 02/19/20 0918    metroNIDAZOLE (FLAGYL) tablet 500 mg, 500 mg, Oral, Q12H, Gomadam, Syl R, DO, 500 mg at 02/19/20 0918    nicotine (NICODERM CQ) 21 mg/24 hr patch 1 Patch, 1 Patch, TransDERmal, DAILY, White, Skip S, NP, 1 Patch at 02/18/20 2037    benzonatate (TESSALON) capsule 100 mg, 100 mg, Oral, TID PRN, Rainer Dominique, NP, 100 mg at 02/19/20 6992    Physical Exam:    Vitals:   Patient Vitals for the past 24 hrs:   Temp Pulse Resp BP SpO2   02/19/20 1401 98.6 °F (37 °C) 76 18 105/61 94 %   02/19/20 0748 98.5 °F (36.9 °C) 93 18 126/85 97 %   02/19/20 0258 98.9 °F (37.2 °C) 88 18 100/64 97 %   02/18/20 2014 98.5 °F (36.9 °C) 97 18 136/77 96 %     · GEN: NAD  · HEENT no scleral icterus, no thrush  · CV: S1, S2 heard regularly,   · Lungs: Clear to auscultation bilaterally  · Abdomen: soft, non distended, non tender  · Extremities: no edema  · Skin: no rash  Musculoskeletal left hand dressing  Labs:   Recent Results (from the past 24 hour(s))   RESPIRATORY PANEL,PCR,NASOPHARYNGEAL    Collection Time: 02/18/20  6:06 PM   Result Value Ref Range    Adenovirus NOT DETECTED NOTD      Coronavirus 229E NOT DETECTED NOTD      Coronavirus HKU1 NOT DETECTED NOTD      Coronavirus CVNL63 NOT DETECTED NOTD      Coronavirus OC43 NOT DETECTED NOTD      Metapneumovirus NOT DETECTED NOTD      Rhinovirus and Enterovirus NOT DETECTED NOTD      Influenza A NOT DETECTED NOTD      Influenza A, subtype H1 NOT DETECTED NOTD      Influenza A, subtype H3 NOT DETECTED NOTD      INFLUENZA A H1N1 PCR NOT DETECTED NOTD      Influenza B NOT DETECTED NOTD      Parainfluenza 1 NOT DETECTED NOTD      Parainfluenza 2 NOT DETECTED NOTD      Parainfluenza 3 NOT DETECTED NOTD      Parainfluenza virus 4 NOT DETECTED NOTD      RSV by PCR NOT DETECTED NOTD      B. parapertussis, PCR NOT DETECTED NOTD      Bordetella pertussis - PCR NOT DETECTED NOTD      Chlamydophila pneumoniae DNA, QL, PCR NOT DETECTED NOTD      Mycoplasma pneumoniae DNA, QL, PCR NOT DETECTED NOTD     HIV 1/2 AG/AB, 4TH GENERATION,W RFLX CONFIRM    Collection Time: 02/19/20  3:39 AM   Result Value Ref Range    HIV 1/2 Interpretation NONREACTIVE NR      HIV 1/2 result comment SEE NOTE     MAGNESIUM    Collection Time: 02/19/20  3:39 AM   Result Value Ref Range    Magnesium 1.7 1.6 - 2.4 mg/dL   METABOLIC PANEL, BASIC    Collection Time: 02/19/20  3:39 AM   Result Value Ref Range    Sodium 137 136 - 145 mmol/L    Potassium 4.0 3.5 - 5.1 mmol/L    Chloride 106 97 - 108 mmol/L    CO2 26 21 - 32 mmol/L    Anion gap 5 5 - 15 mmol/L    Glucose 96 65 - 100 mg/dL    BUN 10 6 - 20 MG/DL    Creatinine 1.02 0.70 - 1.30 MG/DL    BUN/Creatinine ratio 10 (L) 12 - 20      GFR est AA >60 >60 ml/min/1.73m2    GFR est non-AA >60 >60 ml/min/1.73m2    Calcium 8.0 (L) 8.5 - 10.1 MG/DL   PHOSPHORUS    Collection Time: 02/19/20  3:39 AM   Result Value Ref Range    Phosphorus 2.2 (L) 2.6 - 4.7 MG/DL   CBC WITH AUTOMATED DIFF    Collection Time: 02/19/20  3:39 AM   Result Value Ref Range    WBC 10.0 4.1 - 11.1 K/uL    RBC 3.43 (L) 4.10 - 5.70 M/uL    HGB 10.9 (L) 12.1 - 17.0 g/dL    HCT 33.6 (L) 36.6 - 50.3 %    MCV 98.0 80.0 - 99.0 FL    MCH 31.8 26.0 - 34.0 PG    MCHC 32.4 30.0 - 36.5 g/dL    RDW 12.5 11.5 - 14.5 %    PLATELET 199 288 - 706 K/uL    MPV 11.0 8.9 - 12.9 FL    NRBC 0.0 0  WBC    ABSOLUTE NRBC 0.00 0.00 - 0.01 K/uL    NEUTROPHILS 62 32 - 75 %    LYMPHOCYTES 25 12 - 49 %    MONOCYTES 10 5 - 13 %    EOSINOPHILS 2 0 - 7 %    BASOPHILS 0 0 - 1 %    IMMATURE GRANULOCYTES 1 (H) 0.0 - 0.5 %    ABS. NEUTROPHILS 6.3 1.8 - 8.0 K/UL    ABS. LYMPHOCYTES 2.5 0.8 - 3.5 K/UL    ABS. MONOCYTES 1.0 0.0 - 1.0 K/UL    ABS. EOSINOPHILS 0.2 0.0 - 0.4 K/UL    ABS. BASOPHILS 0.0 0.0 - 0.1 K/UL    ABS. IMM.  GRANS. 0.1 (H) 0.00 - 0.04 K/UL    DF AUTOMATED         Microbiology Data:       Blood: 2/18/20       Component Value Ref Range & Units Status   Special Requests: NO SPECIAL REQUESTS    Preliminary   Culture result: NO GROWTH AFTER 22 HOURS    Preliminary   Result History          Wound   Specimen Information: Abscess; Wound        Component Value Ref Range & Units Status   Special Requests: NO SPECIAL REQUESTS    Preliminary   GRAM STAIN RARE WBCS SEEN    Preliminary   GRAM STAIN    Preliminary   OCCASIONAL GRAM POSITIVE COCCI IN CHAINS    Culture result: Abnormal     Preliminary   LIGHT PROBABLE STAPHYLOCOCCUS AUREUS    Culture result: Abnormal     Preliminary   CHECKING FOR POSSIBLE [address]    Culture result: Abnormal     Preliminary   CHECKING FOR POSSIBLE HEAVY HAEMOPHILUS SPECIES BETA LACTAMASE POSITIVE    Result History                Pathology Results:  2015  FINAL PATHOLOGIC DIAGNOSIS  Stomach, biopsy:  Mild chronic gastritis    2014  Clinical History  Hepatitis C  FINAL PATHOLOGIC DIAGNOSIS  Liver, biopsy  Chronic hepatitis, hepatitis C by history, grade 2-3, stage 1-2  See microscopic description    Imaging:   L hand X ray      FINDINGS: Three views of the left hand demonstrate no fracture, dislocation or  other acute osseous or articular abnormality. There is soft tissue swelling over  the fifth MCP joint and dorsum of the hand.     IMPRESSION  IMPRESSION: No acute osseous or articular abnormality. Soft tissue swelling over  the fifth MCP joint and dorsum of the hand. MRI L hand  FINDINGS: There is a soft tissue defect shown dorsomedially in the hand at the  level of the fifth metacarpal head with underlying skin devitalization extending  to the superficial fascia. There is diffuse dorsal subcutaneous edema-like  signal and enhancement.  Rim-enhancing fluid is demonstrated within the  subcutaneous fat dorsal to the extensor digitorum tendon sheath at the level of  the proximal metacarpals and distal carpal row, consistent with abscess,  measuring 4.3 x 0.5 cm transverse and at least 3.5 cm craniocaudal. There is  fluid like signal following the course of the extensor digitorum tendons,  consistent with septic tenosynovitis. The flexor tendons appear normal. There is  mild-moderate hyper thenar muscle edema-like signal and enhancement which is  greater dorsally. Bone signal is normal. There is no demonstration of  substantial joint effusion or substantial arthrosis.     IMPRESSION  IMPRESSION: Dorsomedial soft tissue defect with sinus tract extending to  superficial fascia at level of fifth metacarpal head. Diffuse dorsal  subcutaneous inflammatory changes with septic tenosynovitis and partial  demonstration of subcutaneous abscess at level of metacarpal bases and distal  carpal row measuring 4.3 x 0.5 cm transverse and at least 3.5 cm craniocaudal.    Assessment / Plan:     Mr. Nellie Mendoza is a 51-year-old gentleman with hepatitis C, intravenous substance abuse, lumbar laminectomy with left hand abscess. He reports using intravenous drugs to the left hand about a week to 2 ago.     1) left hand abscess/cellulitis  Status post I&D in the emergency room wt cx + for possible Eikenella, S aureus, Hemophilus   Eikenella usually seen in oral skinny/human bites  MRI with sinus tract and abscess   Plan for surgery noted 2/20  Please get intra operative cultures for gram stain, bacteria  ( aerobic, anaerobic,) , fungal and AFB smear and culture   Continue renally dosed Vancomycin, Cefepime and Flagyl   Increase flagyl to Q 8 hour dosing instead of Q 12 hour dosing   Pain control per primary team       2) hepatitis C  Patient reports previous treatment with cure  Discussed that successful treatment does not mean immunity  With ongoing substance abuse can reacquire hepatitis C  Check hep C RNA    3) substance abuse IV  Discussed the need for cessation  Monitoring for withdrawal per primary team  Noticed he is on methadone    4)  screening  Check HIV and hepatitis C  HIV nonreactive   Await Hep C RNA    5) history of lumbar laminectomy    6) DVT prophylaxis      Thank for the opportunity to participate in the care of this patient. Please contact with questions or concerns.          Sailaja Perez, DO  3:53 PM

## 2020-02-19 NOTE — PROGRESS NOTES
Bedside and Verbal shift change report given to [address] [place] [address],  [address], RN and Marium Nina RN (oncoming nurse) by Brook Carrel, RN (offgoing nurse). Report included the following information SBAR, Kardex, Intake/Output, MAR and Recent Results.

## 2020-02-20 ENCOUNTER — ANESTHESIA EVENT (OUTPATIENT)
Dept: SURGERY | Age: 45
DRG: 710 | End: 2020-02-20
Payer: MEDICAID

## 2020-02-20 ENCOUNTER — ANESTHESIA (OUTPATIENT)
Dept: SURGERY | Age: 45
DRG: 710 | End: 2020-02-20
Payer: MEDICAID

## 2020-02-20 PROBLEM — L02.512 ABSCESS OF DORSUM OF LEFT HAND: Status: ACTIVE | Noted: 2020-02-20

## 2020-02-20 LAB
ANION GAP SERPL CALC-SCNC: 4 MMOL/L (ref 5–15)
BASOPHILS # BLD: 0 K/UL (ref 0–0.1)
BASOPHILS NFR BLD: 0 % (ref 0–1)
BUN SERPL-MCNC: 10 MG/DL (ref 6–20)
BUN/CREAT SERPL: 9 (ref 12–20)
CALCIUM SERPL-MCNC: 8.8 MG/DL (ref 8.5–10.1)
CHLORIDE SERPL-SCNC: 109 MMOL/L (ref 97–108)
CO2 SERPL-SCNC: 25 MMOL/L (ref 21–32)
CREAT SERPL-MCNC: 1.09 MG/DL (ref 0.7–1.3)
DIFFERENTIAL METHOD BLD: ABNORMAL
EOSINOPHIL # BLD: 0.2 K/UL (ref 0–0.4)
EOSINOPHIL NFR BLD: 3 % (ref 0–7)
ERYTHROCYTE [DISTWIDTH] IN BLOOD BY AUTOMATED COUNT: 12.5 % (ref 11.5–14.5)
GLUCOSE SERPL-MCNC: 82 MG/DL (ref 65–100)
HCT VFR BLD AUTO: 37.3 % (ref 36.6–50.3)
HGB BLD-MCNC: 12 G/DL (ref 12.1–17)
IMM GRANULOCYTES # BLD AUTO: 0 K/UL (ref 0–0.04)
IMM GRANULOCYTES NFR BLD AUTO: 0 % (ref 0–0.5)
LYMPHOCYTES # BLD: 2.8 K/UL (ref 0.8–3.5)
LYMPHOCYTES NFR BLD: 30 % (ref 12–49)
MAGNESIUM SERPL-MCNC: 2 MG/DL (ref 1.6–2.4)
MCH RBC QN AUTO: 31.8 PG (ref 26–34)
MCHC RBC AUTO-ENTMCNC: 32.2 G/DL (ref 30–36.5)
MCV RBC AUTO: 98.9 FL (ref 80–99)
MONOCYTES # BLD: 0.7 K/UL (ref 0–1)
MONOCYTES NFR BLD: 8 % (ref 5–13)
NEUTS SEG # BLD: 5.5 K/UL (ref 1.8–8)
NEUTS SEG NFR BLD: 59 % (ref 32–75)
NRBC # BLD: 0 K/UL (ref 0–0.01)
NRBC BLD-RTO: 0 PER 100 WBC
PHOSPHATE SERPL-MCNC: 2.8 MG/DL (ref 2.6–4.7)
PLATELET # BLD AUTO: 218 K/UL (ref 150–400)
PMV BLD AUTO: 10.6 FL (ref 8.9–12.9)
POTASSIUM SERPL-SCNC: 3.9 MMOL/L (ref 3.5–5.1)
RBC # BLD AUTO: 3.77 M/UL (ref 4.1–5.7)
SODIUM SERPL-SCNC: 138 MMOL/L (ref 136–145)
WBC # BLD AUTO: 9.3 K/UL (ref 4.1–11.1)

## 2020-02-20 PROCEDURE — 77030040356 HC CORD BPLR FRCP COVD -A: Performed by: ORTHOPAEDIC SURGERY

## 2020-02-20 PROCEDURE — 74011250636 HC RX REV CODE- 250/636: Performed by: ORTHOPAEDIC SURGERY

## 2020-02-20 PROCEDURE — 65270000032 HC RM SEMIPRIVATE

## 2020-02-20 PROCEDURE — 74011250636 HC RX REV CODE- 250/636: Performed by: NURSE ANESTHETIST, CERTIFIED REGISTERED

## 2020-02-20 PROCEDURE — 77030010509 HC AIRWY LMA MSK TELE -A: Performed by: NURSE ANESTHETIST, CERTIFIED REGISTERED

## 2020-02-20 PROCEDURE — 74011250637 HC RX REV CODE- 250/637: Performed by: ORTHOPAEDIC SURGERY

## 2020-02-20 PROCEDURE — 77030018836 HC SOL IRR NACL ICUM -A: Performed by: ORTHOPAEDIC SURGERY

## 2020-02-20 PROCEDURE — 74011250636 HC RX REV CODE- 250/636: Performed by: INTERNAL MEDICINE

## 2020-02-20 PROCEDURE — 87205 SMEAR GRAM STAIN: CPT

## 2020-02-20 PROCEDURE — 76210000006 HC OR PH I REC 0.5 TO 1 HR: Performed by: ORTHOPAEDIC SURGERY

## 2020-02-20 PROCEDURE — 74011000250 HC RX REV CODE- 250: Performed by: NURSE ANESTHETIST, CERTIFIED REGISTERED

## 2020-02-20 PROCEDURE — 74011250637 HC RX REV CODE- 250/637: Performed by: INTERNAL MEDICINE

## 2020-02-20 PROCEDURE — 87075 CULTR BACTERIA EXCEPT BLOOD: CPT

## 2020-02-20 PROCEDURE — 84100 ASSAY OF PHOSPHORUS: CPT

## 2020-02-20 PROCEDURE — 83735 ASSAY OF MAGNESIUM: CPT

## 2020-02-20 PROCEDURE — 76010000138 HC OR TIME 0.5 TO 1 HR: Performed by: ORTHOPAEDIC SURGERY

## 2020-02-20 PROCEDURE — 74011250636 HC RX REV CODE- 250/636: Performed by: HOSPITALIST

## 2020-02-20 PROCEDURE — 85025 COMPLETE CBC W/AUTO DIFF WBC: CPT

## 2020-02-20 PROCEDURE — 74011000258 HC RX REV CODE- 258: Performed by: INTERNAL MEDICINE

## 2020-02-20 PROCEDURE — 80048 BASIC METABOLIC PNL TOTAL CA: CPT

## 2020-02-20 PROCEDURE — 74011000272 HC RX REV CODE- 272: Performed by: ORTHOPAEDIC SURGERY

## 2020-02-20 PROCEDURE — 77030011640 HC PAD GRND REM COVD -A: Performed by: ORTHOPAEDIC SURGERY

## 2020-02-20 PROCEDURE — 77030019895 HC PCKNG STRP IODO -A: Performed by: ORTHOPAEDIC SURGERY

## 2020-02-20 PROCEDURE — 74011000250 HC RX REV CODE- 250: Performed by: ORTHOPAEDIC SURGERY

## 2020-02-20 PROCEDURE — 74011000258 HC RX REV CODE- 258: Performed by: ORTHOPAEDIC SURGERY

## 2020-02-20 PROCEDURE — 74011250637 HC RX REV CODE- 250/637: Performed by: HOSPITALIST

## 2020-02-20 PROCEDURE — 36415 COLL VENOUS BLD VENIPUNCTURE: CPT

## 2020-02-20 PROCEDURE — 76060000033 HC ANESTHESIA 1 TO 1.5 HR: Performed by: ORTHOPAEDIC SURGERY

## 2020-02-20 PROCEDURE — 0LB80ZZ EXCISION OF LEFT HAND TENDON, OPEN APPROACH: ICD-10-PCS | Performed by: ORTHOPAEDIC SURGERY

## 2020-02-20 RX ORDER — LIDOCAINE HYDROCHLORIDE 20 MG/ML
INJECTION, SOLUTION EPIDURAL; INFILTRATION; INTRACAUDAL; PERINEURAL AS NEEDED
Status: DISCONTINUED | OUTPATIENT
Start: 2020-02-20 | End: 2020-02-20 | Stop reason: HOSPADM

## 2020-02-20 RX ORDER — HYDROMORPHONE HYDROCHLORIDE 2 MG/ML
INJECTION, SOLUTION INTRAMUSCULAR; INTRAVENOUS; SUBCUTANEOUS AS NEEDED
Status: DISCONTINUED | OUTPATIENT
Start: 2020-02-20 | End: 2020-02-20 | Stop reason: HOSPADM

## 2020-02-20 RX ORDER — OXYCODONE HYDROCHLORIDE 5 MG/1
7.5 TABLET ORAL
Status: DISCONTINUED | OUTPATIENT
Start: 2020-02-20 | End: 2020-02-25 | Stop reason: HOSPADM

## 2020-02-20 RX ORDER — NALOXONE HYDROCHLORIDE 1 MG/ML
0.4 INJECTION INTRAMUSCULAR; INTRAVENOUS; SUBCUTANEOUS AS NEEDED
Status: DISCONTINUED | OUTPATIENT
Start: 2020-02-20 | End: 2020-02-25 | Stop reason: HOSPADM

## 2020-02-20 RX ORDER — KETOROLAC TROMETHAMINE 30 MG/ML
15 INJECTION, SOLUTION INTRAMUSCULAR; INTRAVENOUS EVERY 6 HOURS
Status: DISCONTINUED | OUTPATIENT
Start: 2020-02-20 | End: 2020-02-21

## 2020-02-20 RX ORDER — BACITRACIN 500 [USP'U]/G
OINTMENT TOPICAL AS NEEDED
Status: DISCONTINUED | OUTPATIENT
Start: 2020-02-20 | End: 2020-02-20 | Stop reason: HOSPADM

## 2020-02-20 RX ORDER — VANCOMYCIN HYDROCHLORIDE
1250 EVERY 12 HOURS
Status: DISCONTINUED | OUTPATIENT
Start: 2020-02-20 | End: 2020-02-24

## 2020-02-20 RX ORDER — PROPOFOL 10 MG/ML
INJECTION, EMULSION INTRAVENOUS AS NEEDED
Status: DISCONTINUED | OUTPATIENT
Start: 2020-02-20 | End: 2020-02-20 | Stop reason: HOSPADM

## 2020-02-20 RX ORDER — SODIUM CHLORIDE, SODIUM LACTATE, POTASSIUM CHLORIDE, CALCIUM CHLORIDE 600; 310; 30; 20 MG/100ML; MG/100ML; MG/100ML; MG/100ML
INJECTION, SOLUTION INTRAVENOUS
Status: DISCONTINUED | OUTPATIENT
Start: 2020-02-20 | End: 2020-02-20 | Stop reason: HOSPADM

## 2020-02-20 RX ORDER — ONDANSETRON 2 MG/ML
INJECTION INTRAMUSCULAR; INTRAVENOUS AS NEEDED
Status: DISCONTINUED | OUTPATIENT
Start: 2020-02-20 | End: 2020-02-20 | Stop reason: HOSPADM

## 2020-02-20 RX ORDER — DEXMEDETOMIDINE HYDROCHLORIDE 100 UG/ML
INJECTION, SOLUTION INTRAVENOUS AS NEEDED
Status: DISCONTINUED | OUTPATIENT
Start: 2020-02-20 | End: 2020-02-20 | Stop reason: HOSPADM

## 2020-02-20 RX ORDER — FENTANYL CITRATE 50 UG/ML
INJECTION, SOLUTION INTRAMUSCULAR; INTRAVENOUS AS NEEDED
Status: DISCONTINUED | OUTPATIENT
Start: 2020-02-20 | End: 2020-02-20 | Stop reason: HOSPADM

## 2020-02-20 RX ORDER — ESMOLOL HYDROCHLORIDE 10 MG/ML
INJECTION INTRAVENOUS AS NEEDED
Status: DISCONTINUED | OUTPATIENT
Start: 2020-02-20 | End: 2020-02-20 | Stop reason: HOSPADM

## 2020-02-20 RX ADMIN — METRONIDAZOLE 500 MG: 250 TABLET ORAL at 05:47

## 2020-02-20 RX ADMIN — BUTALBITAL, ACETAMINOPHEN AND CAFFEINE 1 TABLET: 50; 325; 40 TABLET ORAL at 19:18

## 2020-02-20 RX ADMIN — OXYCODONE 7.5 MG: 5 TABLET ORAL at 20:28

## 2020-02-20 RX ADMIN — LIDOCAINE HYDROCHLORIDE 100 MG: 20 INJECTION, SOLUTION EPIDURAL; INFILTRATION; INTRACAUDAL; PERINEURAL at 11:54

## 2020-02-20 RX ADMIN — METHADONE HYDROCHLORIDE 35 MG: 10 TABLET ORAL at 10:33

## 2020-02-20 RX ADMIN — HYDROMORPHONE HYDROCHLORIDE 1 MG: 2 INJECTION, SOLUTION INTRAMUSCULAR; INTRAVENOUS; SUBCUTANEOUS at 12:28

## 2020-02-20 RX ADMIN — ATORVASTATIN CALCIUM 40 MG: 40 TABLET, FILM COATED ORAL at 10:32

## 2020-02-20 RX ADMIN — PROPOFOL 270 MG: 10 INJECTION, EMULSION INTRAVENOUS at 11:54

## 2020-02-20 RX ADMIN — METRONIDAZOLE 500 MG: 250 TABLET ORAL at 14:46

## 2020-02-20 RX ADMIN — CEFEPIME HYDROCHLORIDE 2 G: 2 INJECTION, POWDER, FOR SOLUTION INTRAVENOUS at 10:32

## 2020-02-20 RX ADMIN — CEFEPIME HYDROCHLORIDE 2 G: 2 INJECTION, POWDER, FOR SOLUTION INTRAVENOUS at 17:41

## 2020-02-20 RX ADMIN — DEXMEDETOMIDINE HYDROCHLORIDE 10 MCG: 100 INJECTION, SOLUTION, CONCENTRATE INTRAVENOUS at 12:14

## 2020-02-20 RX ADMIN — SODIUM CHLORIDE, POTASSIUM CHLORIDE, SODIUM LACTATE AND CALCIUM CHLORIDE: 600; 310; 30; 20 INJECTION, SOLUTION INTRAVENOUS at 11:49

## 2020-02-20 RX ADMIN — KETOROLAC TROMETHAMINE 15 MG: 30 INJECTION, SOLUTION INTRAMUSCULAR at 17:38

## 2020-02-20 RX ADMIN — LAMOTRIGINE 200 MG: 100 TABLET ORAL at 10:32

## 2020-02-20 RX ADMIN — VANCOMYCIN HYDROCHLORIDE 1250 MG: 10 INJECTION, POWDER, LYOPHILIZED, FOR SOLUTION INTRAVENOUS at 02:58

## 2020-02-20 RX ADMIN — OXYCODONE 5 MG: 5 TABLET ORAL at 04:05

## 2020-02-20 RX ADMIN — DEXMEDETOMIDINE HYDROCHLORIDE 10 MCG: 100 INJECTION, SOLUTION, CONCENTRATE INTRAVENOUS at 11:49

## 2020-02-20 RX ADMIN — VANCOMYCIN HYDROCHLORIDE 1250 MG: 10 INJECTION, POWDER, LYOPHILIZED, FOR SOLUTION INTRAVENOUS at 14:46

## 2020-02-20 RX ADMIN — ACETAMINOPHEN 650 MG: 325 TABLET ORAL at 05:49

## 2020-02-20 RX ADMIN — Medication 10 ML: at 14:47

## 2020-02-20 RX ADMIN — OLANZAPINE 5 MG: 5 TABLET, FILM COATED ORAL at 22:03

## 2020-02-20 RX ADMIN — DEXMEDETOMIDINE HYDROCHLORIDE 25 MCG: 100 INJECTION, SOLUTION, CONCENTRATE INTRAVENOUS at 12:48

## 2020-02-20 RX ADMIN — Medication 10 ML: at 22:04

## 2020-02-20 RX ADMIN — OXYCODONE 5 MG: 5 TABLET ORAL at 14:46

## 2020-02-20 RX ADMIN — PROPRANOLOL HYDROCHLORIDE 80 MG: 80 CAPSULE, EXTENDED RELEASE ORAL at 09:00

## 2020-02-20 RX ADMIN — SODIUM CHLORIDE 100 ML/HR: 900 INJECTION, SOLUTION INTRAVENOUS at 05:46

## 2020-02-20 RX ADMIN — SODIUM CHLORIDE 100 ML/HR: 900 INJECTION, SOLUTION INTRAVENOUS at 14:45

## 2020-02-20 RX ADMIN — ONDANSETRON HYDROCHLORIDE 4 MG: 2 INJECTION, SOLUTION INTRAMUSCULAR; INTRAVENOUS at 12:02

## 2020-02-20 RX ADMIN — DEXMEDETOMIDINE HYDROCHLORIDE 10 MCG: 100 INJECTION, SOLUTION, CONCENTRATE INTRAVENOUS at 11:57

## 2020-02-20 RX ADMIN — METRONIDAZOLE 500 MG: 250 TABLET ORAL at 22:03

## 2020-02-20 RX ADMIN — HYDROMORPHONE HYDROCHLORIDE 1 MG: 2 INJECTION, SOLUTION INTRAMUSCULAR; INTRAVENOUS; SUBCUTANEOUS at 12:21

## 2020-02-20 RX ADMIN — ESMOLOL HYDROCHLORIDE 20 MG: 10 INJECTION, SOLUTION INTRAVENOUS at 11:52

## 2020-02-20 RX ADMIN — Medication 10 ML: at 05:47

## 2020-02-20 RX ADMIN — CEFEPIME HYDROCHLORIDE 2 G: 2 INJECTION, POWDER, FOR SOLUTION INTRAVENOUS at 01:58

## 2020-02-20 RX ADMIN — FENTANYL CITRATE 100 MCG: 50 INJECTION, SOLUTION INTRAMUSCULAR; INTRAVENOUS at 12:02

## 2020-02-20 NOTE — ANESTHESIA PROCEDURE NOTES
Epidural Block    Performed by: Perlita Rizvi MD  Authorized by: Jasmyn Sanchez MD     Pre-Procedure

## 2020-02-20 NOTE — PROGRESS NOTES
Bedside and Verbal shift change report given to Betito Arteaga (oncoming nurse) by Yobani Ramírez (offgoing nurse). Report included the following information SBAR, Kardex, Intake/Output, MAR and Recent Results.

## 2020-02-20 NOTE — ANESTHESIA PROCEDURE NOTES
Peripheral Block    Performed by: Edie Abel MD  Authorized by: Chelsie Vick MD       Block Type:     Assessment:    Injection Assessment:

## 2020-02-20 NOTE — ANESTHESIA PREPROCEDURE EVALUATION
Relevant Problems   No relevant active problems       Anesthetic History   No history of anesthetic complications            Review of Systems / Medical History  Patient summary reviewed, nursing notes reviewed and pertinent labs reviewed    Pulmonary  Within defined limits      Sleep apnea           Neuro/Psych   Within defined limits      Psychiatric history     Cardiovascular  Within defined limits  Hypertension        Dysrhythmias       Exercise tolerance: >4 METS     GI/Hepatic/Renal  Within defined limits   GERD      Liver disease     Endo/Other  Within defined limits      Arthritis     Other Findings   Comments: ch pain           Physical Exam    Airway  Mallampati: II  TM Distance: > 6 cm  Neck ROM: normal range of motion   Mouth opening: Normal     Cardiovascular  Regular rate and rhythm,  S1 and S2 normal,  no murmur, click, rub, or gallop             Dental  No notable dental hx       Pulmonary  Breath sounds clear to auscultation               Abdominal  GI exam deferred       Other Findings            Anesthetic Plan    ASA: 2  Anesthesia type: general          Induction: Intravenous  Anesthetic plan and risks discussed with: Patient

## 2020-02-20 NOTE — ANESTHESIA PROCEDURE NOTES
Peripheral Block    Performed by: Bandar Shepard MD  Authorized by: Donaldo Coffey MD       Block Type:     Assessment:    Injection Assessment:

## 2020-02-20 NOTE — PROGRESS NOTES
Bedside and Verbal shift change report given to Michelle (oncoming nurse) by Yasmin Celaya (offgoing nurse). Report included the following information SBAR, Kardex and MAR.

## 2020-02-20 NOTE — PROGRESS NOTES
ID follow up    Patient in OR     Will see 2/21/20    Continue current antibiotics, Vancomycin, Cefepime and Flagyl     Syl Islas DO  11:36 AM

## 2020-02-20 NOTE — PROGRESS NOTES
Problem: Falls - Risk of  Goal: *Absence of Falls  Description  Document Guillermina Brooks Fall Risk and appropriate interventions in the flowsheet.   Outcome: Progressing Towards Goal  Note: Fall Risk Interventions:            Medication Interventions: Evaluate medications/consider consulting pharmacy, Patient to call before getting OOB, Teach patient to arise slowly

## 2020-02-20 NOTE — ANESTHESIA POSTPROCEDURE EVALUATION
Post-Anesthesia Evaluation and Assessment    Patient: Andrew Marte MRN: [id number]  SSN: [id number]    YOB: 1975  Age: 40 y.o. Sex: male      I have evaluated the patient and they are stable and ready for discharge from the PACU. Cardiovascular Function/Vital Signs  Visit Vitals  /84   Pulse 76   Temp 36.8 °C (98.2 °F)   Resp 19   Ht 6' (1.829 m)   Wt 88.4 kg (194 lb 12.8 oz)   SpO2 95%   BMI 26.42 kg/m²       Patient is status post General anesthesia for Procedure(s):  INCISION AND DRAINAGE OF LEFT HAND. Nausea/Vomiting: None    Postoperative hydration reviewed and adequate. Pain:  Pain Scale 1: Numeric (0 - 10) (02/20/20 1249)  Pain Intensity 1: 0 (02/20/20 1249)   Managed    Neurological Status:   Neuro (WDL): Within Defined Limits (02/20/20 1249)  Neuro  Neurologic State: Alert (02/19/20 2033)  Orientation Level: Oriented X4 (02/19/20 2033)  Cognition: Follows commands (02/19/20 2033)  Speech: Clear (02/19/20 2033)  LUE Motor Response: Purposeful;Weak (02/19/20 2033)  LLE Motor Response: Purposeful (02/19/20 2033)  RUE Motor Response: Purposeful (02/19/20 2033)  RLE Motor Response: Purposeful (02/19/20 2033)   At baseline    Mental Status, Level of Consciousness: Alert and  oriented to person, place, and time    Pulmonary Status:   O2 Device: Room air (02/20/20 1249)   Adequate oxygenation and airway patent    Complications related to anesthesia: None    Post-anesthesia assessment completed. No concerns    Signed By: Lyubov Estrada MD     February 20, 2020              Procedure(s):  INCISION AND DRAINAGE OF LEFT HAND. general    Anesthesia Post Evaluation        Patient location during evaluation: PACU  Note status: Adequate.   Level of consciousness: responsive to verbal stimuli and sleepy but conscious  Pain management: satisfactory to patient  Airway patency: patent  Anesthetic complications: no  Cardiovascular status: acceptable  Respiratory status: acceptable  Hydration status: acceptable  Comments: +Post-Anesthesia Evaluation and Assessment    Patient: Kwame Yadav MRN: [id number]  SSN: [id number]   YOB: 1975  Age: 40 y.o. Sex: male          Cardiovascular Function/Vital Signs    /70 (BP 1 Location: Right arm)   Pulse 78   Temp 36.9 °C (98.4 °F)   Resp 18   Ht 6' (1.829 m)   Wt 88.5 kg (195 lb 1.7 oz)   SpO2 95%   BMI 26.46 kg/m²     Patient is status post Procedure(s):  INCISION AND DRAINAGE OF LEFT HAND. Nausea/Vomiting: Controlled. Postoperative hydration reviewed and adequate. Pain:  Pain Scale 1: Numeric (0 - 10) (02/21/20 0458)  Pain Intensity 1: 4 (02/21/20 0458)   Managed. Neurological Status:   Neuro (WDL): Within Defined Limits (02/20/20 1249)   At baseline. Mental Status and Level of Consciousness: Arousable. Pulmonary Status:   O2 Device: Room air (02/20/20 1553)   Adequate oxygenation and airway patent. Complications related to anesthesia: None    Post-anesthesia assessment completed. No concerns. I have evaluated the patient and the patient is stable and ready to be discharged from PACU . Signed By: Ching Ji MD    2/21/2020        Vitals Value Taken Time   /84 2/20/2020  1:00 PM   Temp 36.8 °C (98.2 °F) 2/20/2020 12:49 PM   Pulse 75 2/20/2020  1:11 PM   Resp 21 2/20/2020  1:11 PM   SpO2 94 % 2/20/2020  1:11 PM   Vitals shown include unvalidated device data.

## 2020-02-20 NOTE — BRIEF OP NOTE
BRIEF OPERATIVE NOTE    Date of Procedure: 2/20/2020   Preoperative Diagnosis: LEFT HAND ABSCESS  Postoperative Diagnosis: LEFT HAND ABSCESS    Procedure(s):  INCISION AND DRAINAGE OF LEFT HAND with extensor tenosynovectomy and open packing  Surgeon(s) and Role:     * Christoph Jackson MD - Primary         Surgical Assistant: none    Surgical Staff:  Circ-1: Kobi Khalil RN  Circ-Relief: Rufus Johnson RN  Scrub Tech-1: Brandi Traylor  Scrub RN-Relief: Eleanor Melendez  Event Time In Time Out   Incision Start 02/20/2020 1211    Incision Close 02/20/2020 1237      Anesthesia: General   Estimated Blood Loss: <5cc  Specimens:   ID Type Source Tests Collected by Time Destination   1 : Deep left hand abscess Wound Hand Dotty August, CULTURE, ANAEROBIC Christoph Jackson MD 2/20/2020 1228 Microbiology   2 : superficial left hand abscess Wound Hand CULTURE, ANAEROBIC, Sasha Ring MD 2/20/2020 1228 Microbiology      Findings: purulent pocket deep to 4th EDC tendons   Complications: none  Implants: * No implants in log *

## 2020-02-20 NOTE — PROGRESS NOTES
Day #3 of Vancomycin  Indication: Cellulitis left hand, s/p abscess drainage  -IVDU  Current regimen: 1250 mg Q16h  Abx regimen:  vancomycin + cefepime + metronidazole  ID Following ?: yes  Frequency of BMP?: daily through   Concomitant nephrotoxic drugs (requires more frequent monitoring): None    Recent Labs     20  0404 20  0339 20  0024   WBC 9.3 10.0 10.3   CREA 1.09 1.02 1.33*   BUN 10 10 9     Temp (24hrs), Av.5 °F (36.9 °C), Min:98.2 °F (36.8 °C), Max:98.8 °F (37.1 °C)    Est CrCl: >100 ml/min; UO:  ml/kg/hr   Cultures:    blood - NGTD x 2 days prelim   wound, abscess- light probable staph aureus, possible eikenella, heavy haemophilus species   resp panel - negative    Goal trough = 10 - 15 mcg/mL    Recent trough history: none     Plan: Renal function improved, anticipate current regimen will result in subtherapeutic level. Increase to 1250 mg Q12H and plan to order level once steady state achieved.

## 2020-02-20 NOTE — OP NOTES
[facility]  OPERATIVE REPORT    Name:  Lima Han  MR#:  [id number]  :  1975  ACCOUNT #:  [de-identified]  DATE OF SERVICE:  2020    PREOPERATIVE DIAGNOSIS:  Left dorsal hand abscess. POSTOPERATIVE DIAGNOSIS:  Left dorsal hand abscess. PROCEDURE PERFORMED:  Incision and drainage of left dorsal hand abscess and 4th compartment tendon sheath including extensor tenosynovectomy with open packing. SURGEON:  Nikky Farley MD    ASSISTANT:  No surgical assistant. ANESTHESIA:  General anesthetic. COMPLICATIONS:  There were no complications. SPECIMENS REMOVED:  Fluid from superficial and deep abscess sent to microbiology for culture analysis. IMPLANTS:  There were no implants. ESTIMATED BLOOD LOSS:  Less than 5 mL. DRAINS:  There were no drains placed, but packings were placed. INDICATIONS:  The patient is a 51-year-old right-hand-dominant gentleman, who has a history of intravenous drug abuse and admitted to IV heroin injection to the left hand 1-2 weeks ago that brought on the rapid onset of cellulitis and abscess formation in his hand. He was seen in the ER on  and had an incision and drainage with packing performed for an abscess that was near the fifth metacarpal neck region. He still complained of pain, swelling, and discomfort in the wrist and hand region despite IV antibiotic therapy and elevation. An MRI study obtained yesterday confirmed the dorsal hand abscess just about at the metacarpal base level at least 4 cm in size. He was taken to the operating room for an incision and drainage procedure of the left hand abscess. REPORT OF OPERATION:  The patient was identified, taken into the operating room, placed supine on the operating room table. He received an LMA and general anesthesia. His left upper extremity was prepped and draped sterilely.   An Esmarch tourniquet was applied to the left proximal forearm carefully for tourniquet like control. The patient had a packing that was removed from the fifth metacarpal neck level just prior to the prep and drape. This had been opened in the ER setting and then drained purulent fluid. It was theorized that this was more likely not the site of initial puncture for IV drug abuse injection therapy. The infection then seemed to follow the course of the extensor tendon as a second incision was then made over the mid dorsal aspect of the [facility] region of the hand. The fourth compartment tendon sheath was opened and some purulence was encountered, but even deeper to this compartment was further purulence and the pocket identified and the MRI was truly discovered. A copious volume of purulent fluid was encountered. It was cultured aerobically and anaerobically and sent to microbiology for analysis. This was determined as a deep abscess. The more superficial abscess was the original site that had slightly different color, but mosquito hemostat could connect from the ulnar wound created in the ER to the dorsal wound now created in the OR. Rongeur was utilized to debride any loose areolar and necrotic-appearing tissue. Of course, even the extensor tendon was intact. This area was flushed with 1000 mL of saline containing bacitracin and extensor tenosynovectomy was performed of the fourth compartment to remove as much of the diseased tenosynovial tissue as possible. Once this was accomplished, then a final irrigation performed that allowed passage of fluid from one incision site to the other back and forth. The wounds were dried and then packed open with 0.25 inch iodoform gauze such that one was exiting through the ulnar wound created in the ER and the second exiting from the mid dorsal wound created in the OR. Bacitracin ointment and soft dressings were then applied. The Esmarch tourniquet was removed. The hand was pink and good refill.   The dressings were complete with an Ace bandage and he was transported into the recovery room postoperatively in good condition.       MD LIVE Brian/[email or web address]  D:  02/20/2020 12:46  T:  02/20/2020 14:38  JOB #:  [id number]

## 2020-02-20 NOTE — PROGRESS NOTES
[facility]  Hospitalist Group                                                                                          Hospitalist Progress Note  Jonathan Shaver MD  Answering service: [phone] OR 36 from in house phone        Date of Service:  2020  NAME:  Vickie Bustamante  :  1975  MRN:  [id number]      Admission Summary:   Vickie Bustamante is a 40 y.o. male with pmh of bipolar disorder, IVDU (heroin, last use 1.5week ago), hepatitis C, chronic migraines and chronic pain presented to [facility] ED with hand swelling. Patient claims he injected heroin into his hand about 1.5weeks ago, and over the last 2 days had increased swelling and redness prompting ED visit. He denies any fevers, chills or other systemic symptoms. Still able to move his fingers, although is having pain. He denies any fatigue, diaphoresis, N/V/D/C, no abdominal pain. His swelling started extending up his arm and therefore he decided to come to the ED for further evaluation. Interval history / Subjective:   Pt seen for FU of hand abscess  Patient seen and examined by the bedside, Labs, images and notes reviewed  Complains of pain, and swelling left arm, afebrile, Nausea reported without vomiting, tolerating abx,   Denies diarrhea, ready for OR today  Discussed with nursing staff, no acute issues overnight, orders reviewed. Assessment & Plan:     Sepsis - (Fever, HR, POA) secondary to cellulitis and hand abscess from old IVDU site  Sepsis improving   - cont IV vancomycin, cefepime and flagyl q8. Pharmacy to dose Vanc  - wound cx after I and D in ED: possible Eikenella, S aureus and Hemophilus  - MRI shows sinus tract and abscess  - Tylenol PRN for pain, oxycodone for pain   - Orthopedics following, plan for OR debridement   - ID recs appreciated.     Polysubstance abuse, heroin abuse with recent IVDU  - Confirmed with methadone clinic, started 1 week ago, on 35mg methadone, has been ordered. - QTc reviewed and normal   - If he would require long term IV antibiotics, he cannot go home with PICC  - HIV non reactive     H/o Migraines - PRN fiorocet, propanolol  Bipolar - continue home lamictal and olanzapine  H/o HepC- RNA orderd    Code status: FULL  DVT prophylaxis: Start lovenox post op    Care Plan discussed with: Patient/Family  Anticipated Disposition: Home w/Family  Anticipated Discharge: Greater than 48 hours     Hospital Problems  Date Reviewed: 1/12/2017          Codes Class Noted POA    Sepsis ([address] ICD-10-CM: A41.9  ICD-9-CM: 038.9, 995.91  2/18/2020 Unknown                Review of Systems:   A comprehensive review of systems was negative except for that written in the HPI. Vital Signs:    Last 24hrs VS reviewed since prior progress note. Most recent are:  Visit Vitals  /85 (BP 1 Location: Right arm, BP Patient Position: At rest)   Pulse 81   Temp 97.7 °F (36.5 °C)   Resp 18   Ht 6' (1.829 m)   Wt 88.4 kg (194 lb 12.8 oz)   SpO2 97%   BMI 26.42 kg/m²       No intake or output data in the 24 hours ending 02/20/20 0929     Physical Examination:             Constitutional:  No acute distress, cooperative, pleasant    ENT:  Oral mucosa moist, oropharynx benign. Resp:  CTA bilaterally. No wheezing/rhonchi/rales   CV:  Regular rhythm, normal rate, NM    GI:  soft, NT    Musculoskeletal:  Soft tissue swelling of the left arm, hand is dressed, no ongoign oozing or bleeding     Neurologic:  Moves all extremities. AAOx3     Psych:  Good insight, Not anxious nor agitated.        Data Review:    Review and/or order of clinical lab test  Review and/or order of tests in the radiology section of CPT  Review and/or order of tests in the medicine section of CPT      Labs:     Recent Labs     02/20/20  0404 02/19/20  0339   WBC 9.3 10.0   HGB 12.0* 10.9*   HCT 37.3 33.6*    195     Recent Labs     02/20/20  0404 02/19/20  0339 02/18/20  0024    137 138   K 3.9 4.0 4.4   * 106 98   CO2 25 26 28   BUN 10 10 9   CREA 1.09 1.02 1.33*   GLU 82 96 93   CA 8.8 8.0* 8.9   MG 2.0 1.7  --    PHOS 2.8 2.2*  --      Recent Labs     02/18/20  0024   SGOT 14*   ALT 19   AP 89   TBILI 0.3   TP 7.6   ALB 3.3*   GLOB 4.3*     No results for input(s): INR, PTP, APTT, INREXT, INREXT in the last 72 hours. No results for input(s): FE, TIBC, PSAT, FERR in the last 72 hours. Lab Results   Component Value Date/Time    Folate >20.0 01/12/2017 11:36 AM      No results for input(s): PH, PCO2, PO2 in the last 72 hours. No results for input(s): CPK, CKNDX, TROIQ in the last 72 hours.     No lab exists for component: CPKMB  Lab Results   Component Value Date/Time    Cholesterol, total 180 04/12/2016 01:49 PM    HDL Cholesterol 47 04/12/2016 01:49 PM    LDL, calculated 106 (H) 04/12/2016 01:49 PM    Triglyceride 134 04/12/2016 01:49 PM    CHOL/HDL Ratio 4.1 05/09/2014 03:57 AM     Lab Results   Component Value Date/Time    Glucose (POC) 92 11/24/2019 03:14 AM    Glucose (POC) 122 (H) 10/18/2015 08:36 AM    Glucose (POC) 147 (H) 08/31/2015 10:32 AM    Glucose (POC) 94 08/04/2015 05:38 PM    Glucose (POC) 91 09/06/2014 08:28 AM    Glucose (POC) 95 05/08/2014 03:12 PM    Glucose (POC) 120 (H) 03/27/2012 08:02 PM     Lab Results   Component Value Date/Time    Color YELLOW/STRAW 02/18/2020 04:35 AM    Appearance CLEAR 02/18/2020 04:35 AM    Specific gravity 1.005 02/18/2020 04:35 AM    Specific gravity 1.025 11/24/2019 02:53 AM    pH (UA) 6.5 02/18/2020 04:35 AM    Protein NEGATIVE  02/18/2020 04:35 AM    Glucose NEGATIVE  02/18/2020 04:35 AM    Ketone NEGATIVE  02/18/2020 04:35 AM    Bilirubin NEGATIVE  02/18/2020 04:35 AM    Urobilinogen 0.2 02/18/2020 04:35 AM    Nitrites NEGATIVE  02/18/2020 04:35 AM    Leukocyte Esterase NEGATIVE  02/18/2020 04:35 AM    Epithelial cells FEW 02/18/2020 04:35 AM    Bacteria NEGATIVE  02/18/2020 04:35 AM    WBC 0-4 02/18/2020 04:35 AM    RBC 0-5 02/18/2020 04:35 AM Medications Reviewed:     Current Facility-Administered Medications   Medication Dose Route Frequency    vancomycin (VANCOCIN) 1250 mg in  ml infusion  1,250 mg IntraVENous Q12H    metroNIDAZOLE (FLAGYL) tablet 500 mg  500 mg Oral Q8H    oxyCODONE IR (ROXICODONE) tablet 5 mg  5 mg Oral Q4H PRN    sodium chloride (NS) flush 5-10 mL  5-10 mL IntraVENous PRN    sodium chloride (NS) flush 5-40 mL  5-40 mL IntraVENous Q8H    sodium chloride (NS) flush 5-40 mL  5-40 mL IntraVENous PRN    acetaminophen (TYLENOL) tablet 650 mg  650 mg Oral Q4H PRN    ondansetron (ZOFRAN) injection 4 mg  4 mg IntraVENous Q4H PRN    0.9% sodium chloride infusion  100 mL/hr IntraVENous CONTINUOUS    Vancomycin - Pharmacy to Dose   Other Rx Dosing/Monitoring    atorvastatin (LIPITOR) tablet 40 mg  40 mg Oral DAILY    butalbital-acetaminophen-caffeine (FIORICET, ESGIC) -40 mg per tablet 1 Tab  1 Tab Oral Q6H PRN    cyclobenzaprine (FLEXERIL) tablet 5 mg  5 mg Oral TID PRN    lamoTRIgine (LaMICtal) tablet 200 mg  200 mg Oral DAILY    OLANZapine (ZyPREXA) tablet 5 mg  5 mg Oral QHS    propranolol LA (INDERAL LA) capsule 80 mg  80 mg Oral DAILY    methadone (DOLOPHINE) tablet 35 mg  35 mg Oral DAILY    cefepime (MAXIPIME) 2 g in 0.9% sodium chloride (MBP/ADV) 100 mL  2 g IntraVENous Q8H    nicotine (NICODERM CQ) 21 mg/24 hr patch 1 Patch  1 Patch TransDERmal DAILY    benzonatate (TESSALON) capsule 100 mg  100 mg Oral TID PRN     ______________________________________________________________________  EXPECTED LENGTH OF STAY: 3d 16h  ACTUAL LENGTH OF STAY:          2                 Raudel Patel MD

## 2020-02-20 NOTE — CONSULTS
ORTHO HAND CONSULT NOTE    Subjective:     Date of Consultation:  February 20, 2020  Referring Physician:  Dr. 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 male who is being seen for left hand. Patient admitted on 2/18/20 with several day history of increasing left dorsal hand pain and swelling. He admitted to IV heroin injection 1-2 weeks prior to admission. He has been on IV antibiotic therapy. MRI has documented a dorsal abscess at level of metacarpal bases. He will  be taken to the OR for surgical Incision and drainage.       Patient Active Problem List    Diagnosis Date Noted    Sepsis ([facility] 02/18/2020    Syncope 11/24/2019    HSV-2 seropositive 07/29/2016    Essential hypertension with goal blood pressure less than 140/90 07/05/2016    History of hepatitis C 07/05/2016    Chronic bilateral low back pain with right-sided sciatica 06/06/2016    Lumbar degenerative disc disease 06/06/2016    Headache, chronic daily 05/02/2016    Chronic midline low back pain 05/02/2016    Chronic migraine 12/08/2015    Altered mental status 08/31/2015    Psychosis ([facility] [address] 06/27/2015    Substance-induced psychotic disorder with delusions ([facility] 06/10/2015    Fall 09/02/2014    Substance induced mood disorder ([facility] 09/02/2014    Drug overdose 09/01/2014    Polysubstance dependence including opioid type drug, continuous use ([facility] 09/01/2014    Overdose 05/08/2014    Previous back surgery 12/26/2013    Bipolar disorder ([facility] 07/17/2013     Family History   Problem Relation Age of Onset    Heart Disease Maternal Grandfather       Social History     Tobacco Use    Smoking status: Current Every Day Smoker     Packs/day: 1.00     Years: 22.00     Pack years: 22.00     Types: Cigarettes    Smokeless tobacco: Former User    Tobacco comment: cigarettes   Substance Use Topics    Alcohol use: No     Alcohol/week: 0.0 standard drinks     Comment: no longer drinks     Past Medical History:   Diagnosis Date    Arrhythmia PCV's    Arthritis     back    Back pain     Chronic pain     6 herniated discs in back/pinched nerve in back and neck    GERD (gastroesophageal reflux disease)     Hepatitis C     HSV-2 seropositive 7/29/2016    Hypertension     IV drug abuse ([facility] [address]     Kidney stone     Liver disease     elevated liver enzymes/hep C    Neuralgia     Other ill-defined conditions(799.89)     chronic bronchitis    Polysubstance dependence (HCC)     stimulants, MJ, tob, barbs, benzos, opiates    Psychiatric disorder     Bipolar, Anxiety    Unspecified adverse effect of anesthesia     \"was told he woke up during back surgery\" and during nerve root injection    Unspecified sleep apnea     mild - does not use CPAP      Past Surgical History:   Procedure Laterality Date    HX LUMBAR LAMINECTOMY      HX ORTHOPAEDIC Bilateral     back surgery - laminectomy/discectomy    HX ORTHOPAEDIC      nerve root injection in back    HX OTHER SURGICAL      testicular surgery- varicocelectomy, i &d of abscess on right elbow      Prior to Admission medications    Medication Sig Start Date End Date Taking? Authorizing Provider   ondansetron (ZOFRAN ODT) 4 mg disintegrating tablet Take 1 Tab by mouth every eight (8) hours as needed for Nausea. 11/16/19   Davon Simons MD   candesartan cilexetil (CANDESARTAN PO) Take  by mouth. Luis F Yadav MD   atorvastatin (LIPITOR) 40 mg tablet Take 40 mg by mouth daily. Luis F Yadav MD   GABAPENTIN, BULK, by Does Not Apply route. Luis F Yadav MD   propranolol LA (INDERAL LA) 80 mg SR capsule Take 1 Cap by mouth daily. BP med to help reduce headache frequency. Stop taking Atenolol 12/20/17   Enriqueta Montes MD   butalbital-acetaminophen-caffeine (FIORICET, ESGIC) -40 mg per tablet 1 tab at onset of migraine; can 1 tab in repeat in 4 hours (one time) if headache remains. Limit: 2 tabs per day, max 2 days a week.   90 day Rx. 4/6/17   Enriqueta Montes MD   lamoTRIgine (LAMICTAL) 200 mg tablet Take  by mouth daily. Provider, Historical   cyclobenzaprine (FLEXERIL) 5 mg tablet Take 2 Tabs by mouth three (3) times daily (with meals). 7/8/16   Pieter Olmstead MD   OLANZapine (ZYPREXA) 10 mg tablet Take 5 mg by mouth nightly. Provider, Historical     Current Facility-Administered Medications   Medication Dose Route Frequency    vancomycin (VANCOCIN) 1250 mg in  ml infusion  1,250 mg IntraVENous Q12H    metroNIDAZOLE (FLAGYL) tablet 500 mg  500 mg Oral Q8H    oxyCODONE IR (ROXICODONE) tablet 5 mg  5 mg Oral Q4H PRN    sodium chloride (NS) flush 5-10 mL  5-10 mL IntraVENous PRN    sodium chloride (NS) flush 5-40 mL  5-40 mL IntraVENous Q8H    sodium chloride (NS) flush 5-40 mL  5-40 mL IntraVENous PRN    acetaminophen (TYLENOL) tablet 650 mg  650 mg Oral Q4H PRN    ondansetron (ZOFRAN) injection 4 mg  4 mg IntraVENous Q4H PRN    0.9% sodium chloride infusion  100 mL/hr IntraVENous CONTINUOUS    Vancomycin - Pharmacy to Dose   Other Rx Dosing/Monitoring    atorvastatin (LIPITOR) tablet 40 mg  40 mg Oral DAILY    butalbital-acetaminophen-caffeine (FIORICET, ESGIC) -40 mg per tablet 1 Tab  1 Tab Oral Q6H PRN    cyclobenzaprine (FLEXERIL) tablet 5 mg  5 mg Oral TID PRN    lamoTRIgine (LaMICtal) tablet 200 mg  200 mg Oral DAILY    OLANZapine (ZyPREXA) tablet 5 mg  5 mg Oral QHS    propranolol LA (INDERAL LA) capsule 80 mg  80 mg Oral DAILY    methadone (DOLOPHINE) tablet 35 mg  35 mg Oral DAILY    cefepime (MAXIPIME) 2 g in 0.9% sodium chloride (MBP/ADV) 100 mL  2 g IntraVENous Q8H    nicotine (NICODERM CQ) 21 mg/24 hr patch 1 Patch  1 Patch TransDERmal DAILY    benzonatate (TESSALON) capsule 100 mg  100 mg Oral TID PRN      Allergies   Allergen Reactions    Barium Sulfate Hives    Demerol [Meperidine] Hives    Iodinated Contrast Media Other (comments)     Hives, swelling of throat with IVP/has had MRI with contrast without problems.     Neurontin [Gabapentin] Drowsiness     Patient reports no allergy to this medication        Review of Systems:  A comprehensive review of systems was negative except for that written in the HPI. Objective:     Patient Vitals for the past 8 hrs:   BP Temp Pulse Resp SpO2 Weight   20 1053 140/86 97.8 °F (36.6 °C) 76 14 96 %    20 0752 134/85 97.7 °F (36.5 °C) 81 18 97 %    20 0648      88.4 kg (194 lb 12.8 oz)   20 0432 (!) 160/97 98.2 °F (36.8 °C) 74 18 98 % 92.1 kg (203 lb 0.7 oz)     Temp (24hrs), Av.2 °F (36.8 °C), Min:97.7 °F (36.5 °C), Max:98.8 °F (37.1 °C)      EXAM:  Swollen, erythematous dorsum left hand with MRI documented abscess. Able to wiggle fingers.   Good cap refill and sensation    Imaging Review: MRI showed dorsal left hand abscess level base metacarpals 4-5cm in size    Labs:   Recent Results (from the past 24 hour(s))   METABOLIC PANEL, BASIC    Collection Time: 20  4:04 AM   Result Value Ref Range    Sodium 138 136 - 145 mmol/L    Potassium 3.9 3.5 - 5.1 mmol/L    Chloride 109 (H) 97 - 108 mmol/L    CO2 25 21 - 32 mmol/L    Anion gap 4 (L) 5 - 15 mmol/L    Glucose 82 65 - 100 mg/dL    BUN 10 6 - 20 MG/DL    Creatinine 1.09 0.70 - 1.30 MG/DL    BUN/Creatinine ratio 9 (L) 12 - 20      GFR est AA >60 >60 ml/min/1.73m2    GFR est non-AA >60 >60 ml/min/1.73m2    Calcium 8.8 8.5 - 10.1 MG/DL   MAGNESIUM    Collection Time: 20  4:04 AM   Result Value Ref Range    Magnesium 2.0 1.6 - 2.4 mg/dL   PHOSPHORUS    Collection Time: 20  4:04 AM   Result Value Ref Range    Phosphorus 2.8 2.6 - 4.7 MG/DL   CBC WITH AUTOMATED DIFF    Collection Time: 02/20/20  4:04 AM   Result Value Ref Range    WBC 9.3 4.1 - 11.1 K/uL    RBC 3.77 (L) 4.10 - 5.70 M/uL    HGB 12.0 (L) 12.1 - 17.0 g/dL    HCT 37.3 36.6 - 50.3 %    MCV 98.9 80.0 - 99.0 FL    MCH 31.8 26.0 - 34.0 PG    MCHC 32.2 30.0 - 36.5 g/dL    RDW 12.5 11.5 - 14.5 %    PLATELET 140 783 - 549 K/uL    MPV 10.6 8.9 - 12.9 FL NRBC 0.0 0  WBC    ABSOLUTE NRBC 0.00 0.00 - 0.01 K/uL    NEUTROPHILS 59 32 - 75 %    LYMPHOCYTES 30 12 - 49 %    MONOCYTES 8 5 - 13 %    EOSINOPHILS 3 0 - 7 %    BASOPHILS 0 0 - 1 %    IMMATURE GRANULOCYTES 0 0.0 - 0.5 %    ABS. NEUTROPHILS 5.5 1.8 - 8.0 K/UL    ABS. LYMPHOCYTES 2.8 0.8 - 3.5 K/UL    ABS. MONOCYTES 0.7 0.0 - 1.0 K/UL    ABS. EOSINOPHILS 0.2 0.0 - 0.4 K/UL    ABS. BASOPHILS 0.0 0.0 - 0.1 K/UL    ABS. IMM. GRANS. 0.0 0.00 - 0.04 K/UL    DF AUTOMATED           Impression:     Active Problems:    Sepsis ([address] (2/18/2020)      Left Hand Abscess    Plan:     Patient has been on IV antibiotic therapy for left hand abscess which has helped to improve the surrounding cellulitis. He is taken to the OR at this time for surgical incision and drainage of dorsal hand abscess.       Carina Templeton MD

## 2020-02-21 LAB
ANION GAP SERPL CALC-SCNC: 5 MMOL/L (ref 5–15)
BACTERIA SPEC CULT: ABNORMAL
BASOPHILS # BLD: 0 K/UL (ref 0–0.1)
BASOPHILS NFR BLD: 0 % (ref 0–1)
BUN SERPL-MCNC: 11 MG/DL (ref 6–20)
BUN/CREAT SERPL: 11 (ref 12–20)
CALCIUM SERPL-MCNC: 8.2 MG/DL (ref 8.5–10.1)
CHLORIDE SERPL-SCNC: 108 MMOL/L (ref 97–108)
CO2 SERPL-SCNC: 25 MMOL/L (ref 21–32)
CREAT SERPL-MCNC: 1.03 MG/DL (ref 0.7–1.3)
DIFFERENTIAL METHOD BLD: ABNORMAL
EOSINOPHIL # BLD: 0.2 K/UL (ref 0–0.4)
EOSINOPHIL NFR BLD: 2 % (ref 0–7)
ERYTHROCYTE [DISTWIDTH] IN BLOOD BY AUTOMATED COUNT: 12.7 % (ref 11.5–14.5)
GLUCOSE SERPL-MCNC: 122 MG/DL (ref 65–100)
GRAM STN SPEC: ABNORMAL
GRAM STN SPEC: ABNORMAL
HCT VFR BLD AUTO: 32.6 % (ref 36.6–50.3)
HGB BLD-MCNC: 10.5 G/DL (ref 12.1–17)
IMM GRANULOCYTES # BLD AUTO: 0 K/UL (ref 0–0.04)
IMM GRANULOCYTES NFR BLD AUTO: 0 % (ref 0–0.5)
LYMPHOCYTES # BLD: 2.3 K/UL (ref 0.8–3.5)
LYMPHOCYTES NFR BLD: 32 % (ref 12–49)
MCH RBC QN AUTO: 31.4 PG (ref 26–34)
MCHC RBC AUTO-ENTMCNC: 32.2 G/DL (ref 30–36.5)
MCV RBC AUTO: 97.6 FL (ref 80–99)
MONOCYTES # BLD: 0.6 K/UL (ref 0–1)
MONOCYTES NFR BLD: 9 % (ref 5–13)
NEUTS SEG # BLD: 4.2 K/UL (ref 1.8–8)
NEUTS SEG NFR BLD: 57 % (ref 32–75)
NRBC # BLD: 0 K/UL (ref 0–0.01)
NRBC BLD-RTO: 0 PER 100 WBC
PLATELET # BLD AUTO: 219 K/UL (ref 150–400)
PMV BLD AUTO: 10.7 FL (ref 8.9–12.9)
POTASSIUM SERPL-SCNC: 4 MMOL/L (ref 3.5–5.1)
RBC # BLD AUTO: 3.34 M/UL (ref 4.1–5.7)
SERVICE CMNT-IMP: ABNORMAL
SODIUM SERPL-SCNC: 138 MMOL/L (ref 136–145)
WBC # BLD AUTO: 7.3 K/UL (ref 4.1–11.1)

## 2020-02-21 PROCEDURE — 74011250637 HC RX REV CODE- 250/637: Performed by: INTERNAL MEDICINE

## 2020-02-21 PROCEDURE — 74011250636 HC RX REV CODE- 250/636: Performed by: ORTHOPAEDIC SURGERY

## 2020-02-21 PROCEDURE — 36415 COLL VENOUS BLD VENIPUNCTURE: CPT

## 2020-02-21 PROCEDURE — 65270000032 HC RM SEMIPRIVATE

## 2020-02-21 PROCEDURE — 85025 COMPLETE CBC W/AUTO DIFF WBC: CPT

## 2020-02-21 PROCEDURE — 74011250637 HC RX REV CODE- 250/637: Performed by: ORTHOPAEDIC SURGERY

## 2020-02-21 PROCEDURE — 74011250636 HC RX REV CODE- 250/636: Performed by: INTERNAL MEDICINE

## 2020-02-21 PROCEDURE — 74011000258 HC RX REV CODE- 258: Performed by: ORTHOPAEDIC SURGERY

## 2020-02-21 PROCEDURE — 80048 BASIC METABOLIC PNL TOTAL CA: CPT

## 2020-02-21 RX ORDER — KETOROLAC TROMETHAMINE 30 MG/ML
15 INJECTION, SOLUTION INTRAMUSCULAR; INTRAVENOUS EVERY 6 HOURS
Status: DISPENSED | OUTPATIENT
Start: 2020-02-21 | End: 2020-02-22

## 2020-02-21 RX ORDER — ENOXAPARIN SODIUM 100 MG/ML
40 INJECTION SUBCUTANEOUS EVERY 24 HOURS
Status: DISCONTINUED | OUTPATIENT
Start: 2020-02-21 | End: 2020-02-25 | Stop reason: HOSPADM

## 2020-02-21 RX ADMIN — METRONIDAZOLE 500 MG: 250 TABLET ORAL at 14:51

## 2020-02-21 RX ADMIN — SODIUM CHLORIDE 100 ML/HR: 900 INJECTION, SOLUTION INTRAVENOUS at 03:53

## 2020-02-21 RX ADMIN — Medication 10 ML: at 06:10

## 2020-02-21 RX ADMIN — PROPRANOLOL HYDROCHLORIDE 80 MG: 80 CAPSULE, EXTENDED RELEASE ORAL at 09:23

## 2020-02-21 RX ADMIN — CEFEPIME HYDROCHLORIDE 2 G: 2 INJECTION, POWDER, FOR SOLUTION INTRAVENOUS at 16:37

## 2020-02-21 RX ADMIN — METHADONE HYDROCHLORIDE 35 MG: 10 TABLET ORAL at 09:23

## 2020-02-21 RX ADMIN — LAMOTRIGINE 200 MG: 100 TABLET ORAL at 09:23

## 2020-02-21 RX ADMIN — OXYCODONE 7.5 MG: 5 TABLET ORAL at 04:01

## 2020-02-21 RX ADMIN — KETOROLAC TROMETHAMINE 15 MG: 30 INJECTION, SOLUTION INTRAMUSCULAR at 17:44

## 2020-02-21 RX ADMIN — KETOROLAC TROMETHAMINE 15 MG: 30 INJECTION, SOLUTION INTRAMUSCULAR at 01:11

## 2020-02-21 RX ADMIN — OXYCODONE 7.5 MG: 5 TABLET ORAL at 19:51

## 2020-02-21 RX ADMIN — CYCLOBENZAPRINE 5 MG: 10 TABLET, FILM COATED ORAL at 11:22

## 2020-02-21 RX ADMIN — VANCOMYCIN HYDROCHLORIDE 1250 MG: 10 INJECTION, POWDER, LYOPHILIZED, FOR SOLUTION INTRAVENOUS at 16:37

## 2020-02-21 RX ADMIN — METRONIDAZOLE 500 MG: 250 TABLET ORAL at 06:10

## 2020-02-21 RX ADMIN — VANCOMYCIN HYDROCHLORIDE 1250 MG: 10 INJECTION, POWDER, LYOPHILIZED, FOR SOLUTION INTRAVENOUS at 03:53

## 2020-02-21 RX ADMIN — OXYCODONE 7.5 MG: 5 TABLET ORAL at 11:22

## 2020-02-21 RX ADMIN — OLANZAPINE 5 MG: 5 TABLET, FILM COATED ORAL at 21:33

## 2020-02-21 RX ADMIN — KETOROLAC TROMETHAMINE 15 MG: 30 INJECTION, SOLUTION INTRAMUSCULAR at 06:10

## 2020-02-21 RX ADMIN — Medication 10 ML: at 21:34

## 2020-02-21 RX ADMIN — ONDANSETRON 4 MG: 2 INJECTION INTRAMUSCULAR; INTRAVENOUS at 17:44

## 2020-02-21 RX ADMIN — ENOXAPARIN SODIUM 40 MG: 40 INJECTION SUBCUTANEOUS at 10:41

## 2020-02-21 RX ADMIN — METRONIDAZOLE 500 MG: 250 TABLET ORAL at 21:33

## 2020-02-21 RX ADMIN — CEFEPIME HYDROCHLORIDE 2 G: 2 INJECTION, POWDER, FOR SOLUTION INTRAVENOUS at 01:11

## 2020-02-21 RX ADMIN — ATORVASTATIN CALCIUM 40 MG: 40 TABLET, FILM COATED ORAL at 09:24

## 2020-02-21 NOTE — PROGRESS NOTES
ORTHO PROGRESS NOTE      SUBJECTIVE:  Vickie Bustamante states his hand is feeling better. OBJECTIVE:  Patient Vitals for the past 24 hrs:   Temp Pulse BP   02/21/20 0812 97.7 °F (36.5 °C) 72 133/87   02/21/20 0402 98.4 °F (36.9 °C) 78 116/70   02/20/20 2045 98 °F (36.7 °C) 77 112/70   02/20/20 1553 97.7 °F (36.5 °C) 83 148/88   02/20/20 1315  74 123/85   02/20/20 1310  76    02/20/20 1305  78    02/20/20 1300  82 137/84   02/20/20 1255  80 138/78   02/20/20 1250  86    02/20/20 1249 98.2 °F (36.8 °C) (!) 112 (!) 139/99   02/20/20 1053 97.8 °F (36.6 °C) 76 140/86       Alert, no distress. Lying in bed. No family present. Respirations unlabored. Dressing CDI. Moves digits OK, some limitation of extension. SILT. CR < 2 secs. Recent Labs     02/21/20  0130   HGB 10.5*   HCT 32.6*      BUN 11   CREA 1.03   GFRAA >60   GFRNA >60     ASSESSMENT:  Procedure: Procedure(s):  INCISION AND DRAINAGE OF LEFT HAND  Post Op day: 1 Day Post-Op    PLAN:  Watch cultures. IV abx. Daily dressing change, packing as ordered. Elevation.       VICKI Morton  Orthopedic Trauma Service  [facility]

## 2020-02-21 NOTE — PROGRESS NOTES
Jeffrey kaba refused PICC placement. 2 Peripheral IV started gauge #22 right mid arm and right mid cephalic placed for IV access. Dr. Liza Mcwilliams ok with 2 peripheral IV for now.

## 2020-02-21 NOTE — PROGRESS NOTES
[id number] [facility]  Hospitalist Group                                                                                          Hospitalist Progress Note  Hortencia Hightower MD  Answering service: [phone] from in house phone        Date of Service:  2020  NAME:  Rick Wade  :  1975  MRN:  [id number]      Admission Summary:   Rick Wade is a 40 y.o. male with pmh of bipolar disorder, IVDU (heroin, last use 1.5week ago), hepatitis C, chronic migraines and chronic pain presented to [facility] ED with hand swelling. Patient claims he injected heroin into his hand about 1.5weeks ago, and over the last 2 days had increased swelling and redness prompting ED visit. He denies any fevers, chills or other systemic symptoms. Still able to move his fingers, although is having pain. He denies any fatigue, diaphoresis, N/V/D/C, no abdominal pain. His swelling started extending up his arm and therefore he decided to come to the ED for further evaluation. Interval history / Subjective:   Pt seen for FU of hand abscess  Patient seen and examined by the bedside, Labs, images and notes reviewed  Pt is doing better, pain is better controlled on Toradol RTC and prn oxy along with methadone. Risk of abuse and overdose reviewed with the patient. No nvd,afebrile. Assessment & Plan:     Sepsis - (Fever, HR, POA) secondary to cellulitis and hand abscess from old IVDU site  Sepsis resolved  - MRI shows sinus tract and abscess  S/P INCISION AND DRAINAGE OF LEFT HAND with extensor tenosynovectomy and open packing 20  - cont IV vancomycin, cefepime and flagyl q8.  Pharmacy to dose Vanc  - wound cx after I and D in ED: possible Eikenella, S aureus and Hemophilus  - follow intra-op wound cx  - Tylenol PRN for pain, oxycodone for pain, toradol RTC for 2 days  - Orthopedics following, plan for OR debridement   - ID recs appreciated.     Polysubstance abuse, heroin abuse with recent IVDU  - Confirmed with methadone clinic, started 1 week ago, on 35mg methadone, has been ordered. - QTc reviewed and normal   - If he would require long term IV antibiotics, he cannot go home with PICC  - HIV non reactive     H/o Migraines - PRN fiorocet, propanolol  Bipolar - continue home lamictal and olanzapine  H/o HepC- RNA orderd    Code status: FULL  DVT prophylaxis: Lovenox    Care Plan discussed with: Patient/Family  Anticipated Disposition: Home w/Family  Anticipated Discharge: Greater than 48 hours     Hospital Problems  Date Reviewed: 2/20/2020          Codes Class Noted POA    * (Principal) Abscess of dorsum of left hand ICD-10-CM: L02.512  ICD-9-CM: 682.4  2/20/2020 Yes        Sepsis ([facility] [address] ICD-10-CM: A41.9  ICD-9-CM: 038.9, 995.91  2/18/2020 Unknown                Review of Systems:   A comprehensive review of systems was negative except for that written in the HPI. Vital Signs:    Last 24hrs VS reviewed since prior progress note. Most recent are:  Visit Vitals  /87 (BP 1 Location: Right arm, BP Patient Position: At rest)   Pulse 72   Temp 97.7 °F (36.5 °C)   Resp 18   Ht 6' (1.829 m)   Wt 88.5 kg (195 lb 1.7 oz)   SpO2 95%   BMI 26.46 kg/m²         Intake/Output Summary (Last 24 hours) at 2/21/2020 0943  Last data filed at 2/21/2020 0940  Gross per 24 hour   Intake 790 ml   Output    Net 790 ml        Physical Examination:             Constitutional:  No acute distress, cooperative, pleasant    ENT:  Oral mucosa moist, oropharynx benign. Resp:  CTA bilaterally. No wheezing/rhonchi/rales   CV:  Regular rhythm, normal rate, NM    GI:  soft, NT    Musculoskeletal:  left hand is dressed    Neurologic:  Moves all extremities. AAOx3     Psych:  Good insight, Not anxious nor agitated.        Data Review:    Review and/or order of clinical lab test  Review and/or order of tests in the radiology section of CPT  Review and/or order of tests in the medicine section of CPT      Labs:     Recent Labs     02/21/20  0130 02/20/20  0404   WBC 7.3 9.3   HGB 10.5* 12.0*   HCT 32.6* 37.3    218     Recent Labs     02/21/20  0130 02/20/20  0404 02/19/20  0339    138 137   K 4.0 3.9 4.0    109* 106   CO2 25 25 26   BUN 11 10 10   CREA 1.03 1.09 1.02   * 82 96   CA 8.2* 8.8 8.0*   MG  --  2.0 1.7   PHOS  --  2.8 2.2*     No results for input(s): SGOT, GPT, ALT, AP, TBIL, TBILI, TP, ALB, GLOB, GGT, AML, LPSE in the last 72 hours. No lab exists for component: AMYP, HLPSE  No results for input(s): INR, PTP, APTT, INREXT, INREXT in the last 72 hours. No results for input(s): FE, TIBC, PSAT, FERR in the last 72 hours. Lab Results   Component Value Date/Time    Folate >20.0 01/12/2017 11:36 AM      No results for input(s): PH, PCO2, PO2 in the last 72 hours. No results for input(s): CPK, CKNDX, TROIQ in the last 72 hours.     No lab exists for component: CPKMB  Lab Results   Component Value Date/Time    Cholesterol, total 180 04/12/2016 01:49 PM    HDL Cholesterol 47 04/12/2016 01:49 PM    LDL, calculated 106 (H) 04/12/2016 01:49 PM    Triglyceride 134 04/12/2016 01:49 PM    CHOL/HDL Ratio 4.1 05/09/2014 03:57 AM     Lab Results   Component Value Date/Time    Glucose (POC) 92 11/24/2019 03:14 AM    Glucose (POC) 122 (H) 10/18/2015 08:36 AM    Glucose (POC) 147 (H) 08/31/2015 10:32 AM    Glucose (POC) 94 08/04/2015 05:38 PM    Glucose (POC) 91 09/06/2014 08:28 AM    Glucose (POC) 95 05/08/2014 03:12 PM    Glucose (POC) 120 (H) 03/27/2012 08:02 PM     Lab Results   Component Value Date/Time    Color YELLOW/STRAW 02/18/2020 04:35 AM    Appearance CLEAR 02/18/2020 04:35 AM    Specific gravity 1.005 02/18/2020 04:35 AM    Specific gravity 1.025 11/24/2019 02:53 AM    pH (UA) 6.5 02/18/2020 04:35 AM    Protein NEGATIVE  02/18/2020 04:35 AM    Glucose NEGATIVE  02/18/2020 04:35 AM    Ketone NEGATIVE  02/18/2020 04:35 AM    Bilirubin NEGATIVE  02/18/2020 04:35 AM    Urobilinogen 0.2 02/18/2020 04:35 AM    Nitrites NEGATIVE  02/18/2020 04:35 AM    Leukocyte Esterase NEGATIVE  02/18/2020 04:35 AM    Epithelial cells FEW 02/18/2020 04:35 AM    Bacteria NEGATIVE  02/18/2020 04:35 AM    WBC 0-4 02/18/2020 04:35 AM    RBC 0-5 02/18/2020 04:35 AM         Medications Reviewed:     Current Facility-Administered Medications   Medication Dose Route Frequency    [START ON 2/22/2020] VANCOMYCIN TROUGH saturday 2/22 just prior 0400 dose   Other ONCE    vancomycin (VANCOCIN) 1250 mg in  ml infusion  1,250 mg IntraVENous Q12H    naloxone (NARCAN) injection 0.4 mg  0.4 mg IntraVENous PRN    oxyCODONE IR (ROXICODONE) tablet 7.5 mg  7.5 mg Oral Q4H PRN    ketorolac (TORADOL) injection 15 mg  15 mg IntraVENous Q6H    metroNIDAZOLE (FLAGYL) tablet 500 mg  500 mg Oral Q8H    sodium chloride (NS) flush 5-10 mL  5-10 mL IntraVENous PRN    sodium chloride (NS) flush 5-40 mL  5-40 mL IntraVENous Q8H    sodium chloride (NS) flush 5-40 mL  5-40 mL IntraVENous PRN    acetaminophen (TYLENOL) tablet 650 mg  650 mg Oral Q4H PRN    ondansetron (ZOFRAN) injection 4 mg  4 mg IntraVENous Q4H PRN    0.9% sodium chloride infusion  100 mL/hr IntraVENous CONTINUOUS    Vancomycin - Pharmacy to Dose   Other Rx Dosing/Monitoring    atorvastatin (LIPITOR) tablet 40 mg  40 mg Oral DAILY    butalbital-acetaminophen-caffeine (FIORICET, ESGIC) -40 mg per tablet 1 Tab  1 Tab Oral Q6H PRN    cyclobenzaprine (FLEXERIL) tablet 5 mg  5 mg Oral TID PRN    lamoTRIgine (LaMICtal) tablet 200 mg  200 mg Oral DAILY    OLANZapine (ZyPREXA) tablet 5 mg  5 mg Oral QHS    propranolol LA (INDERAL LA) capsule 80 mg  80 mg Oral DAILY    methadone (DOLOPHINE) tablet 35 mg  35 mg Oral DAILY    cefepime (MAXIPIME) 2 g in 0.9% sodium chloride (MBP/ADV) 100 mL  2 g IntraVENous Q8H    nicotine (NICODERM CQ) 21 mg/24 hr patch 1 Patch  1 Patch TransDERmal DAILY    benzonatate (TESSALON) capsule 100 mg  100 mg Oral TID PRN ______________________________________________________________________  EXPECTED LENGTH OF STAY: 3d 16h  ACTUAL LENGTH OF STAY:          3                 Destin Barboza MD

## 2020-02-21 NOTE — PROGRESS NOTES
POD 1 Day Post-Op    Procedure:  Procedure(s):  INCISION AND DRAINAGE OF LEFT HAND    Subjective:     Patient is seen today with the following complaints: More comfortable. Objective:     Vitals:  Blood pressure 133/87, pulse 72, temperature 97.7 °F (36.5 °C), resp. rate 18, height 6' (1.829 m), weight 88.5 kg (195 lb 1.7 oz), SpO2 95 %. Tmax:  Temp (24hrs), Av °F (36.7 °C), Min:97.7 °F (36.5 °C), Max:98.4 °F (36.9 °C)      Physical Exam:  Examination of the left hand reveals packings removed; being re-packed gently by wound care. Still with swelling but improving including erythema. Sensation is intact to light touch. + Brisk capillary refill. Labs:   Recent Results (from the past 24 hour(s))   CBC WITH AUTOMATED DIFF    Collection Time: 20  1:30 AM   Result Value Ref Range    WBC 7.3 4.1 - 11.1 K/uL    RBC 3.34 (L) 4.10 - 5.70 M/uL    HGB 10.5 (L) 12.1 - 17.0 g/dL    HCT 32.6 (L) 36.6 - 50.3 %    MCV 97.6 80.0 - 99.0 FL    MCH 31.4 26.0 - 34.0 PG    MCHC 32.2 30.0 - 36.5 g/dL    RDW 12.7 11.5 - 14.5 %    PLATELET 750 054 - 987 K/uL    MPV 10.7 8.9 - 12.9 FL    NRBC 0.0 0  WBC    ABSOLUTE NRBC 0.00 0.00 - 0.01 K/uL    NEUTROPHILS 57 32 - 75 %    LYMPHOCYTES 32 12 - 49 %    MONOCYTES 9 5 - 13 %    EOSINOPHILS 2 0 - 7 %    BASOPHILS 0 0 - 1 %    IMMATURE GRANULOCYTES 0 0.0 - 0.5 %    ABS. NEUTROPHILS 4.2 1.8 - 8.0 K/UL    ABS. LYMPHOCYTES 2.3 0.8 - 3.5 K/UL    ABS. MONOCYTES 0.6 0.0 - 1.0 K/UL    ABS. EOSINOPHILS 0.2 0.0 - 0.4 K/UL    ABS. BASOPHILS 0.0 0.0 - 0.1 K/UL    ABS. IMM.  GRANS. 0.0 0.00 - 0.04 K/UL    DF AUTOMATED     METABOLIC PANEL, BASIC    Collection Time: 20  1:30 AM   Result Value Ref Range    Sodium 138 136 - 145 mmol/L    Potassium 4.0 3.5 - 5.1 mmol/L    Chloride 108 97 - 108 mmol/L    CO2 25 21 - 32 mmol/L    Anion gap 5 5 - 15 mmol/L    Glucose 122 (H) 65 - 100 mg/dL    BUN 11 6 - 20 MG/DL    Creatinine 1.03 0.70 - 1.30 MG/DL    BUN/Creatinine ratio 11 (L) 12 - 20      GFR est AA >60 >60 ml/min/1.73m2    GFR est non-AA >60 >60 ml/min/1.73m2    Calcium 8.2 (L) 8.5 - 10.1 MG/DL             Assessment:     Principal Problem:    Abscess of dorsum of left hand (2/20/2020)    Active Problems:    Sepsis ([facility] [address] (2/18/2020)        Plan/Recommendations/Medical Decision Making:     S/P I&D left hand abscess 2/20/20  Packings have been removed; culture results from yesterday thus far no growth  Continue daily dressing changes; packings; ice and elevation, encourage motion  Continues on IV antibiotics      Gutierrez Slater MD

## 2020-02-21 NOTE — WOUND CARE
Wound Care Note: Follow-up visit for left hand dressing change, Dr. Anabel Garay and VICKI White at bedside during assessment. Chart shows: 
Admitted for abscess of dorsum of left hand s/p  I & D left dorsal hand abscess and 4th compartment tendon sheath including extensor tenosynovectomy with open packing. Past Medical History:  
Diagnosis Date  Arrhythmia PCV's  Arthritis   
 back  Back pain  Chronic pain   
 6 herniated discs in back/pinched nerve in back and neck  GERD (gastroesophageal reflux disease)  Hepatitis C   
 HSV-2 seropositive 7/29/2016  Hypertension  IV drug abuse ([facility] [address]  Kidney stone  Liver disease   
 elevated liver enzymes/hep C  
 Neuralgia  Other ill-defined conditions(799.89) chronic bronchitis  Polysubstance dependence ([facility] [address] stimulants, MJ, tob, barbs, benzos, opiates  Psychiatric disorder Bipolar, Anxiety  Unspecified adverse effect of anesthesia \"was told he woke up during back surgery\" and during nerve root injection  Unspecified sleep apnea   
 mild - does not use CPAP Wound Culture obtained on 2/20/20 with pending results 2+ WBCs seen WBC = 7.3 on 2/21/20 Admitted from home Assessment:  
Patient is A&O x 4, communicative, continent with no assistance needed in repositioning. Bed: [place] Diet: Regular Patient pre-medicated for pain by RN. 1. Left proximal dorsal hand measures 2 cm x 0.8 cm x 1.3 cm, visible wound bed is pink, moderate sero/sang drainage, wound edges are open, nithya-wound with edema and light erythema, wound edges are open. Patient unable to tolerate a lot of probing, tunnel noted at 3 o'clock and is approximaetly 2 cm. Dr. Anabel Garay advised wounds communicate but patient unable to tolerate that much probing. Mesalt with width cut in half, 4 x 4 , roll gauze and ACE wrap applied. 2.  Left distal dorsal hand wound measures 2 cm x 1.7 cm x 0.8 cm, wound bed is pink, some white tissue noted at wound edges, wound includes some partial thickness breakdown around incision, moderate amount of sero/sang drainage, wound edges are open. Again, this wound was unable to be probed a lot, tunnel at 2 o'clock approximately 2 cm. Mesalt with width cut in half, 4 x 4 , roll gauze and ACE wrap applied. Wound care orders obtained from Dr. Ridge Hackett. Patient sitting on side of bed to eat lunch. Recommendations:   
Left dorsal hand wounds- Premedicate for pain. Daily cleanse with normal saline, wipe wound bed clean and dry, cut a long piece of Mesalt (located on 5E back [facility] room- 3 packages were left in room on 2/21/20) in half width so that it is narrower, loosely fill wounds with Mesalt, cover with 4 x 4's and secure with roll gauze and ACE wrap. NOTE:  Patient will need to do this at home and wants to assist with dressing changes. After discharge: 
Left dorsal hand wounds- Every other day cleanse with normal saline, wipe wound beds clean and dry, cut a piece of 1/4 inch packing strip and apply silver wound gel to packing strip, loosely fill wounds, cover with 4 x 4's and secure with roll gauze and tape. Skin Care & Pressure Prevention: 
Minimize layers of linen/pads under patient to optimize support surface. Turn/reposition approximately every 2 hours and offload heels. Manage incontinence / promote continence Nourishing Skin Cream to dry skin Discussed above plan with patient & Paul Paulino RN Transition of Care: Plan to follow as needed while admitted to hospital. 
 
MARI UnderwoodN, RN, [facility]. 
office [phone] 
pager [phone] or call  to page

## 2020-02-21 NOTE — PROGRESS NOTES
Bedside shift change report given to Jefe Rhoades (oncoming nurse) by Caitlin Topete RN (offgoing nurse). Report included the following information SBAR, Kardex, MAR and Recent Results.

## 2020-02-21 NOTE — PROGRESS NOTES
Problem: Falls - Risk of  Goal: *Absence of Falls  Description  Document Rj Izquierdo Fall Risk and appropriate interventions in the flowsheet. Outcome: Progressing Towards Goal  Note: Fall Risk Interventions:            Medication Interventions: Evaluate medications/consider consulting pharmacy, Teach patient to arise slowly                   Problem: Pressure Injury - Risk of  Goal: *Prevention of pressure injury  Description  Document Leland Scale and appropriate interventions in the flowsheet.     Offload heels  Turn approximately every 2 hours   Outcome: Progressing Towards Goal  Note: Pressure Injury Interventions:  Sensory Interventions: Assess changes in LOC, Minimize linen layers                   Nutrition Interventions: Document food/fluid/supplement intake

## 2020-02-22 LAB
ANION GAP SERPL CALC-SCNC: 4 MMOL/L (ref 5–15)
BACTERIA SPEC CULT: NORMAL
BACTERIA SPEC CULT: NORMAL
BASOPHILS # BLD: 0 K/UL (ref 0–0.1)
BASOPHILS NFR BLD: 0 % (ref 0–1)
BUN SERPL-MCNC: 11 MG/DL (ref 6–20)
BUN/CREAT SERPL: 11 (ref 12–20)
CALCIUM SERPL-MCNC: 8.7 MG/DL (ref 8.5–10.1)
CHLORIDE SERPL-SCNC: 110 MMOL/L (ref 97–108)
CO2 SERPL-SCNC: 24 MMOL/L (ref 21–32)
CREAT SERPL-MCNC: 0.98 MG/DL (ref 0.7–1.3)
DATE LAST DOSE: ABNORMAL
DIFFERENTIAL METHOD BLD: ABNORMAL
EOSINOPHIL # BLD: 0.2 K/UL (ref 0–0.4)
EOSINOPHIL NFR BLD: 3 % (ref 0–7)
ERYTHROCYTE [DISTWIDTH] IN BLOOD BY AUTOMATED COUNT: 12.6 % (ref 11.5–14.5)
GLUCOSE SERPL-MCNC: 115 MG/DL (ref 65–100)
HCT VFR BLD AUTO: 33.7 % (ref 36.6–50.3)
HGB BLD-MCNC: 10.6 G/DL (ref 12.1–17)
IMM GRANULOCYTES # BLD AUTO: 0 K/UL (ref 0–0.04)
IMM GRANULOCYTES NFR BLD AUTO: 0 % (ref 0–0.5)
LYMPHOCYTES # BLD: 2.2 K/UL (ref 0.8–3.5)
LYMPHOCYTES NFR BLD: 43 % (ref 12–49)
MCH RBC QN AUTO: 31.8 PG (ref 26–34)
MCHC RBC AUTO-ENTMCNC: 31.5 G/DL (ref 30–36.5)
MCV RBC AUTO: 101.2 FL (ref 80–99)
MONOCYTES # BLD: 0.5 K/UL (ref 0–1)
MONOCYTES NFR BLD: 9 % (ref 5–13)
NEUTS SEG # BLD: 2.3 K/UL (ref 1.8–8)
NEUTS SEG NFR BLD: 45 % (ref 32–75)
NRBC # BLD: 0 K/UL (ref 0–0.01)
NRBC BLD-RTO: 0 PER 100 WBC
PLATELET # BLD AUTO: 191 K/UL (ref 150–400)
PMV BLD AUTO: 11.1 FL (ref 8.9–12.9)
POTASSIUM SERPL-SCNC: 3.8 MMOL/L (ref 3.5–5.1)
RBC # BLD AUTO: 3.33 M/UL (ref 4.1–5.7)
REPORTED DOSE,DOSE: ABNORMAL UNITS
REPORTED DOSE/TIME,TMG: ABNORMAL
SERVICE CMNT-IMP: NORMAL
SERVICE CMNT-IMP: NORMAL
SODIUM SERPL-SCNC: 138 MMOL/L (ref 136–145)
VANCOMYCIN TROUGH SERPL-MCNC: 14.8 UG/ML (ref 5–10)
WBC # BLD AUTO: 5.2 K/UL (ref 4.1–11.1)

## 2020-02-22 PROCEDURE — 36415 COLL VENOUS BLD VENIPUNCTURE: CPT

## 2020-02-22 PROCEDURE — 80048 BASIC METABOLIC PNL TOTAL CA: CPT

## 2020-02-22 PROCEDURE — 74011250637 HC RX REV CODE- 250/637: Performed by: ORTHOPAEDIC SURGERY

## 2020-02-22 PROCEDURE — 80202 ASSAY OF VANCOMYCIN: CPT

## 2020-02-22 PROCEDURE — 74011250636 HC RX REV CODE- 250/636: Performed by: ORTHOPAEDIC SURGERY

## 2020-02-22 PROCEDURE — 74011250637 HC RX REV CODE- 250/637: Performed by: INTERNAL MEDICINE

## 2020-02-22 PROCEDURE — 74011000258 HC RX REV CODE- 258: Performed by: ORTHOPAEDIC SURGERY

## 2020-02-22 PROCEDURE — 74011250636 HC RX REV CODE- 250/636: Performed by: INTERNAL MEDICINE

## 2020-02-22 PROCEDURE — 85025 COMPLETE CBC W/AUTO DIFF WBC: CPT

## 2020-02-22 PROCEDURE — 65270000032 HC RM SEMIPRIVATE

## 2020-02-22 RX ADMIN — CEFEPIME HYDROCHLORIDE 2 G: 2 INJECTION, POWDER, FOR SOLUTION INTRAVENOUS at 17:33

## 2020-02-22 RX ADMIN — Medication 10 ML: at 09:11

## 2020-02-22 RX ADMIN — OXYCODONE 7.5 MG: 5 TABLET ORAL at 02:56

## 2020-02-22 RX ADMIN — OLANZAPINE 5 MG: 5 TABLET, FILM COATED ORAL at 21:06

## 2020-02-22 RX ADMIN — METRONIDAZOLE 500 MG: 250 TABLET ORAL at 21:06

## 2020-02-22 RX ADMIN — LAMOTRIGINE 200 MG: 100 TABLET ORAL at 09:11

## 2020-02-22 RX ADMIN — ENOXAPARIN SODIUM 40 MG: 40 INJECTION SUBCUTANEOUS at 09:10

## 2020-02-22 RX ADMIN — Medication 10 ML: at 13:08

## 2020-02-22 RX ADMIN — VANCOMYCIN HYDROCHLORIDE 1250 MG: 10 INJECTION, POWDER, LYOPHILIZED, FOR SOLUTION INTRAVENOUS at 04:12

## 2020-02-22 RX ADMIN — Medication 10 ML: at 06:40

## 2020-02-22 RX ADMIN — VANCOMYCIN HYDROCHLORIDE 1250 MG: 10 INJECTION, POWDER, LYOPHILIZED, FOR SOLUTION INTRAVENOUS at 15:37

## 2020-02-22 RX ADMIN — KETOROLAC TROMETHAMINE 15 MG: 30 INJECTION, SOLUTION INTRAMUSCULAR at 00:47

## 2020-02-22 RX ADMIN — ATORVASTATIN CALCIUM 40 MG: 40 TABLET, FILM COATED ORAL at 09:11

## 2020-02-22 RX ADMIN — METRONIDAZOLE 500 MG: 250 TABLET ORAL at 13:08

## 2020-02-22 RX ADMIN — CEFEPIME HYDROCHLORIDE 2 G: 2 INJECTION, POWDER, FOR SOLUTION INTRAVENOUS at 09:10

## 2020-02-22 RX ADMIN — KETOROLAC TROMETHAMINE 15 MG: 30 INJECTION, SOLUTION INTRAMUSCULAR at 13:08

## 2020-02-22 RX ADMIN — CEFEPIME HYDROCHLORIDE 2 G: 2 INJECTION, POWDER, FOR SOLUTION INTRAVENOUS at 00:47

## 2020-02-22 RX ADMIN — ONDANSETRON 4 MG: 2 INJECTION INTRAMUSCULAR; INTRAVENOUS at 06:45

## 2020-02-22 RX ADMIN — OXYCODONE 7.5 MG: 5 TABLET ORAL at 21:06

## 2020-02-22 RX ADMIN — BUTALBITAL, ACETAMINOPHEN AND CAFFEINE 1 TABLET: 50; 325; 40 TABLET ORAL at 23:48

## 2020-02-22 RX ADMIN — METRONIDAZOLE 500 MG: 250 TABLET ORAL at 06:40

## 2020-02-22 RX ADMIN — Medication 10 ML: at 21:14

## 2020-02-22 RX ADMIN — PROPRANOLOL HYDROCHLORIDE 80 MG: 80 CAPSULE, EXTENDED RELEASE ORAL at 09:11

## 2020-02-22 RX ADMIN — METHADONE HYDROCHLORIDE 35 MG: 10 TABLET ORAL at 09:10

## 2020-02-22 RX ADMIN — OXYCODONE 7.5 MG: 5 TABLET ORAL at 14:00

## 2020-02-22 RX ADMIN — KETOROLAC TROMETHAMINE 15 MG: 30 INJECTION, SOLUTION INTRAMUSCULAR at 06:40

## 2020-02-22 NOTE — PROGRESS NOTES
Bedside shift change report given to Fortino Bolanos (oncoming nurse) by Rita Browning RN (offgoing nurse). Report included the following information SBAR, Kardex, MAR and Recent Results.

## 2020-02-22 NOTE — PROGRESS NOTES
ORTHO PROGRESS NOTE      SUBJECTIVE:  Maddison sEcalera thinks his hand continues to improve with less edema/redness and improving function. OBJECTIVE:  Patient Vitals for the past 24 hrs:   Temp Pulse BP   02/22/20 0856 97.7 °F (36.5 °C) 61 (!) 153/92   02/22/20 0320 97.9 °F (36.6 °C) 60 113/73   02/21/20 2043 97.7 °F (36.5 °C) 76 117/72   02/21/20 1406 98 °F (36.7 °C) 75 110/56       Alert, no distress. Lying in bed. No family present. Respirations unlabored. Dressing CDI. Moving digits better but still limited fist.  CR brisk. SILT. Recent Labs     02/22/20  0411   HGB 10.6*   HCT 33.7*      BUN 11   CREA 0.98   GFRAA >60   GFRNA >60     WBC 5.2. OR cultures NGTD. ER cultures poly orgs      ASSESSMENT:  Procedure: Procedure(s):  INCISION AND DRAINAGE OF LEFT HAND  Post Op day: 2 Days Post-Op    PLAN:  No further surgery planned at this time. Cont daily wound care as ordered. Nursing to change today and I will change Sunday. He needs to be pre-medicated for packing. Elevation. Encourage digit AROM. Abx per ID.     VICKI Crawford  Orthopedic Trauma Service  [address] [facility]

## 2020-02-22 NOTE — PROGRESS NOTES
[address] [facility] Adult  Hospitalist Group                                                                                          Hospitalist Progress Note  Darius Head MD  Answering service: [phone] from in house phone        Date of Service:  2020  NAME:  Ricardo Spears  :  1975  MRN:  [id number]      Admission Summary:   Ricardo Spears is a 40 y.o. male with pmh of bipolar disorder, IVDU (heroin, last use 1.5week ago), hepatitis C, chronic migraines and chronic pain presented to [place] ED with hand swelling. Patient claims he injected heroin into his hand about 1.5weeks ago, and over the last 2 days had increased swelling and redness prompting ED visit. He denies any fevers, chills or other systemic symptoms. Still able to move his fingers, although is having pain. He denies any fatigue, diaphoresis, N/V/D/C, no abdominal pain. His swelling started extending up his arm and therefore he decided to come to the ED for further evaluation. Interval history / Subjective:   Pt seen for FU of hand abscess  Patient seen and examined by the bedside, Labs, images and notes reviewed  Pain is better controlled, diarrhea, mild nausea but no vomiting. No headache. Afebrile. Reports improving ability to move left hand and fingers. Discussed with nursing staff, no acute issues overnight, orders reviewed. Assessment & Plan:     Sepsis - (Fever, HR, POA) secondary to cellulitis and hand abscess from old IVDU site  Sepsis resolved  - MRI shows sinus tract and abscess  S/P INCISION AND DRAINAGE OF LEFT HAND with extensor tenosynovectomy and open packing 20  - cont IV vancomycin, cefepime and flagyl q8.  Pharmacy to dose Vanc  - wound cx after I and D in ED: possible Eikenella, S aureus and Hemophilus  - follow intra-op wound cx: NGTD  - Tylenol PRN for pain, oxycodone for pain  - ID recs appreciated.     Polysubstance abuse, heroin abuse with recent IVDU  - Confirmed with methadone clinic, started 1 week PTA, on 35mg methadone, has been ordered. - QTc reviewed and normal   - If he would require long term IV antibiotics, he cannot go home with PICC  - HIV non reactive     H/o Migraines - PRN fiorocet, propanolol  Bipolar - continue home lamictal and olanzapine  H/o HepC- RNA orderd    Code status: FULL  DVT prophylaxis: Lovenox    Care Plan discussed with: Patient/Family  Anticipated Disposition: Home w/Family  Anticipated Discharge: Greater than 48 hours     Hospital Problems  Date Reviewed: 2/20/2020          Codes Class Noted POA    * (Principal) Abscess of dorsum of left hand ICD-10-CM: L02.512  ICD-9-CM: 682.4  2/20/2020 Yes        Sepsis ([facility] [address] ICD-10-CM: A41.9  ICD-9-CM: 038.9, 995.91  2/18/2020 Unknown                Review of Systems:   A comprehensive review of systems was negative except for that written in the HPI. Vital Signs:    Last 24hrs VS reviewed since prior progress note. Most recent are:  Visit Vitals  /73 (BP 1 Location: Right arm, BP Patient Position: At rest)   Pulse 60   Temp 97.9 °F (36.6 °C)   Resp 16   Ht 6' (1.829 m)   Wt 88.9 kg (196 lb)   SpO2 96%   BMI 26.58 kg/m²         Intake/Output Summary (Last 24 hours) at 2/22/2020 2085  Last data filed at 2/21/2020 1341  Gross per 24 hour   Intake 480 ml   Output    Net 480 ml        Physical Examination:     General appearance: alert, cooperative, no distress, appears stated age  Head: Normocephalic, without obvious abnormality, atraumatic  Lungs: clear to auscultation bilaterally  Heart: RRR,S1S2  Abdomen: soft, NT  Extremities: left hand is dressed  Neurologic: Alert and oriented X 3, normal strength and tone.     Data Review:    Review and/or order of clinical lab test  Review and/or order of tests in the radiology section of CPT  Review and/or order of tests in the medicine section of CPT      Labs:     Recent Labs     02/22/20  0411 02/21/20  0130   WBC 5.2 7.3   HGB 10.6* 10.5*   HCT 33.7* 32.6*    219     Recent Labs     02/22/20  0411 02/21/20  0130 02/20/20  0404    138 138   K 3.8 4.0 3.9   * 108 109*   CO2 24 25 25   BUN 11 11 10   CREA 0.98 1.03 1.09   * 122* 82   CA 8.7 8.2* 8.8   MG  --   --  2.0   PHOS  --   --  2.8     No results for input(s): SGOT, GPT, ALT, AP, TBIL, TBILI, TP, ALB, GLOB, GGT, AML, LPSE in the last 72 hours. No lab exists for component: AMYP, HLPSE  No results for input(s): INR, PTP, APTT, INREXT, INREXT in the last 72 hours. No results for input(s): FE, TIBC, PSAT, FERR in the last 72 hours. Lab Results   Component Value Date/Time    Folate >20.0 01/12/2017 11:36 AM      No results for input(s): PH, PCO2, PO2 in the last 72 hours. No results for input(s): CPK, CKNDX, TROIQ in the last 72 hours.     No lab exists for component: CPKMB  Lab Results   Component Value Date/Time    Cholesterol, total 180 04/12/2016 01:49 PM    HDL Cholesterol 47 04/12/2016 01:49 PM    LDL, calculated 106 (H) 04/12/2016 01:49 PM    Triglyceride 134 04/12/2016 01:49 PM    CHOL/HDL Ratio 4.1 05/09/2014 03:57 AM     Lab Results   Component Value Date/Time    Glucose (POC) 92 11/24/2019 03:14 AM    Glucose (POC) 122 (H) 10/18/2015 08:36 AM    Glucose (POC) 147 (H) 08/31/2015 10:32 AM    Glucose (POC) 94 08/04/2015 05:38 PM    Glucose (POC) 91 09/06/2014 08:28 AM    Glucose (POC) 95 05/08/2014 03:12 PM    Glucose (POC) 120 (H) 03/27/2012 08:02 PM     Lab Results   Component Value Date/Time    Color YELLOW/STRAW 02/18/2020 04:35 AM    Appearance CLEAR 02/18/2020 04:35 AM    Specific gravity 1.005 02/18/2020 04:35 AM    Specific gravity 1.025 11/24/2019 02:53 AM    pH (UA) 6.5 02/18/2020 04:35 AM    Protein NEGATIVE  02/18/2020 04:35 AM    Glucose NEGATIVE  02/18/2020 04:35 AM    Ketone NEGATIVE  02/18/2020 04:35 AM    Bilirubin NEGATIVE  02/18/2020 04:35 AM    Urobilinogen 0.2 02/18/2020 04:35 AM    Nitrites NEGATIVE  02/18/2020 04:35 AM    Leukocyte Esterase NEGATIVE 02/18/2020 04:35 AM    Epithelial cells FEW 02/18/2020 04:35 AM    Bacteria NEGATIVE  02/18/2020 04:35 AM    WBC 0-4 02/18/2020 04:35 AM    RBC 0-5 02/18/2020 04:35 AM         Medications Reviewed:     Current Facility-Administered Medications   Medication Dose Route Frequency    enoxaparin (LOVENOX) injection 40 mg  40 mg SubCUTAneous Q24H    ketorolac (TORADOL) injection 15 mg  15 mg IntraVENous Q6H    vancomycin (VANCOCIN) 1250 mg in  ml infusion  1,250 mg IntraVENous Q12H    naloxone (NARCAN) injection 0.4 mg  0.4 mg IntraVENous PRN    oxyCODONE IR (ROXICODONE) tablet 7.5 mg  7.5 mg Oral Q4H PRN    metroNIDAZOLE (FLAGYL) tablet 500 mg  500 mg Oral Q8H    sodium chloride (NS) flush 5-10 mL  5-10 mL IntraVENous PRN    sodium chloride (NS) flush 5-40 mL  5-40 mL IntraVENous Q8H    sodium chloride (NS) flush 5-40 mL  5-40 mL IntraVENous PRN    acetaminophen (TYLENOL) tablet 650 mg  650 mg Oral Q4H PRN    ondansetron (ZOFRAN) injection 4 mg  4 mg IntraVENous Q4H PRN    Vancomycin - Pharmacy to Dose   Other Rx Dosing/Monitoring    atorvastatin (LIPITOR) tablet 40 mg  40 mg Oral DAILY    butalbital-acetaminophen-caffeine (FIORICET, ESGIC) -40 mg per tablet 1 Tab  1 Tab Oral Q6H PRN    cyclobenzaprine (FLEXERIL) tablet 5 mg  5 mg Oral TID PRN    lamoTRIgine (LaMICtal) tablet 200 mg  200 mg Oral DAILY    OLANZapine (ZyPREXA) tablet 5 mg  5 mg Oral QHS    propranolol LA (INDERAL LA) capsule 80 mg  80 mg Oral DAILY    methadone (DOLOPHINE) tablet 35 mg  35 mg Oral DAILY    cefepime (MAXIPIME) 2 g in 0.9% sodium chloride (MBP/ADV) 100 mL  2 g IntraVENous Q8H    nicotine (NICODERM CQ) 21 mg/24 hr patch 1 Patch  1 Patch TransDERmal DAILY    benzonatate (TESSALON) capsule 100 mg  100 mg Oral TID PRN     ______________________________________________________________________  EXPECTED LENGTH OF STAY: 5d 16h  ACTUAL LENGTH OF STAY:          4                 Mahi Diaz, MD

## 2020-02-22 NOTE — PROGRESS NOTES
Day #5 of Vancomycin  Indication: Cellulitis left hand, s/p abscess drainage  -IVDU  Current regimen: 1250 mg Q12h  Abx regimen:  vancomycin + cefepime + metronidazole  ID Following ?: yes  Frequency of BMP?: daily through   Concomitant nephrotoxic drugs (requires more frequent monitoring): None    Recent Labs     20  0411 20  0130 20  0404   WBC 5.2 7.3 9.3   CREA 0.98 1.03 1.09   BUN 11 11 10     Temp (24hrs), Av.9 °F (36.6 °C), Min:97.7 °F (36.5 °C), Max:98 °F (36.7 °C)    Est CrCl: >100 ml/min; UO:  Being recorded as unmeasured occurrences. Cultures:    blood - NGTD x 2 days prelim   wound, abscess- light probable staph aureus, possible eikenella, heavy haemophilus influenzae beta lactamase negative- prelim   resp panel - negative  - superficial left hand abscess, gram stain with few GPC, pending  - deep left hand abscess, pending    Goal trough = 10 - 15 mcg/mL    Recent trough history:    @ 04:11 = 14.8mcg/mL (~11.5 hour level). True trough extrapolated to be ~ 14mcg/mL (within the goal range). Plan: Continue current regimen.

## 2020-02-22 NOTE — PROGRESS NOTES
Bedside shift change report given to chris (oncoming nurse) by Dean Levi (offgoing nurse). Report included the following information SBAR, Kardex and Intake/Output.

## 2020-02-22 NOTE — PROGRESS NOTES
02/22/20 1434   Vital Signs   Temp 97.6 °F (36.4 °C)   Temp Source Oral   Pulse (Heart Rate) (!) 54   Heart Rate Source Monitor   Resp Rate 16   O2 Sat (%) 98 %   Level of Consciousness Alert   /73   MAP (Calculated) 88   BP 1 Method Automatic   BP 1 Location Right arm   BP Patient Position At rest   MEWS Score 1

## 2020-02-23 LAB
ANION GAP SERPL CALC-SCNC: 3 MMOL/L (ref 5–15)
BACTERIA SPEC CULT: NORMAL
BASOPHILS # BLD: 0 K/UL (ref 0–0.1)
BASOPHILS NFR BLD: 0 % (ref 0–1)
BUN SERPL-MCNC: 13 MG/DL (ref 6–20)
BUN/CREAT SERPL: 13 (ref 12–20)
CALCIUM SERPL-MCNC: 8.3 MG/DL (ref 8.5–10.1)
CHLORIDE SERPL-SCNC: 112 MMOL/L (ref 97–108)
CO2 SERPL-SCNC: 25 MMOL/L (ref 21–32)
CREAT SERPL-MCNC: 1.02 MG/DL (ref 0.7–1.3)
DIFFERENTIAL METHOD BLD: ABNORMAL
EOSINOPHIL # BLD: 0.2 K/UL (ref 0–0.4)
EOSINOPHIL NFR BLD: 3 % (ref 0–7)
ERYTHROCYTE [DISTWIDTH] IN BLOOD BY AUTOMATED COUNT: 12.5 % (ref 11.5–14.5)
GLUCOSE SERPL-MCNC: 97 MG/DL (ref 65–100)
HCT VFR BLD AUTO: 33 % (ref 36.6–50.3)
HGB BLD-MCNC: 10.6 G/DL (ref 12.1–17)
IMM GRANULOCYTES # BLD AUTO: 0 K/UL (ref 0–0.04)
IMM GRANULOCYTES NFR BLD AUTO: 0 % (ref 0–0.5)
LYMPHOCYTES # BLD: 2.1 K/UL (ref 0.8–3.5)
LYMPHOCYTES NFR BLD: 42 % (ref 12–49)
MCH RBC QN AUTO: 31.5 PG (ref 26–34)
MCHC RBC AUTO-ENTMCNC: 32.1 G/DL (ref 30–36.5)
MCV RBC AUTO: 97.9 FL (ref 80–99)
MONOCYTES # BLD: 0.4 K/UL (ref 0–1)
MONOCYTES NFR BLD: 8 % (ref 5–13)
NEUTS SEG # BLD: 2.3 K/UL (ref 1.8–8)
NEUTS SEG NFR BLD: 47 % (ref 32–75)
NRBC # BLD: 0 K/UL (ref 0–0.01)
NRBC BLD-RTO: 0 PER 100 WBC
PLATELET # BLD AUTO: 243 K/UL (ref 150–400)
PMV BLD AUTO: 10.6 FL (ref 8.9–12.9)
POTASSIUM SERPL-SCNC: 4.2 MMOL/L (ref 3.5–5.1)
RBC # BLD AUTO: 3.37 M/UL (ref 4.1–5.7)
SERVICE CMNT-IMP: NORMAL
SODIUM SERPL-SCNC: 140 MMOL/L (ref 136–145)
WBC # BLD AUTO: 5.1 K/UL (ref 4.1–11.1)

## 2020-02-23 PROCEDURE — 74011250636 HC RX REV CODE- 250/636: Performed by: ORTHOPAEDIC SURGERY

## 2020-02-23 PROCEDURE — 80048 BASIC METABOLIC PNL TOTAL CA: CPT

## 2020-02-23 PROCEDURE — 74011250637 HC RX REV CODE- 250/637: Performed by: ORTHOPAEDIC SURGERY

## 2020-02-23 PROCEDURE — 85025 COMPLETE CBC W/AUTO DIFF WBC: CPT

## 2020-02-23 PROCEDURE — 74011250636 HC RX REV CODE- 250/636: Performed by: INTERNAL MEDICINE

## 2020-02-23 PROCEDURE — 74011000258 HC RX REV CODE- 258: Performed by: ORTHOPAEDIC SURGERY

## 2020-02-23 PROCEDURE — 65270000032 HC RM SEMIPRIVATE

## 2020-02-23 PROCEDURE — 74011000250 HC RX REV CODE- 250: Performed by: INTERNAL MEDICINE

## 2020-02-23 PROCEDURE — 36415 COLL VENOUS BLD VENIPUNCTURE: CPT

## 2020-02-23 PROCEDURE — 74011250637 HC RX REV CODE- 250/637: Performed by: INTERNAL MEDICINE

## 2020-02-23 RX ADMIN — OXYCODONE 7.5 MG: 5 TABLET ORAL at 11:26

## 2020-02-23 RX ADMIN — Medication 10 ML: at 15:12

## 2020-02-23 RX ADMIN — ENOXAPARIN SODIUM 40 MG: 40 INJECTION SUBCUTANEOUS at 08:46

## 2020-02-23 RX ADMIN — CEFEPIME HYDROCHLORIDE 2 G: 2 INJECTION, POWDER, FOR SOLUTION INTRAVENOUS at 08:46

## 2020-02-23 RX ADMIN — SODIUM CHLORIDE 5 MG: 9 INJECTION INTRAMUSCULAR; INTRAVENOUS; SUBCUTANEOUS at 16:11

## 2020-02-23 RX ADMIN — OLANZAPINE 5 MG: 5 TABLET, FILM COATED ORAL at 21:22

## 2020-02-23 RX ADMIN — BUTALBITAL, ACETAMINOPHEN AND CAFFEINE 1 TABLET: 50; 325; 40 TABLET ORAL at 20:08

## 2020-02-23 RX ADMIN — METRONIDAZOLE 500 MG: 250 TABLET ORAL at 15:11

## 2020-02-23 RX ADMIN — LAMOTRIGINE 200 MG: 100 TABLET ORAL at 08:47

## 2020-02-23 RX ADMIN — Medication 10 ML: at 21:23

## 2020-02-23 RX ADMIN — Medication 10 ML: at 11:27

## 2020-02-23 RX ADMIN — CEFEPIME HYDROCHLORIDE 2 G: 2 INJECTION, POWDER, FOR SOLUTION INTRAVENOUS at 01:18

## 2020-02-23 RX ADMIN — METHADONE HYDROCHLORIDE 35 MG: 10 TABLET ORAL at 08:47

## 2020-02-23 RX ADMIN — VANCOMYCIN HYDROCHLORIDE 1250 MG: 10 INJECTION, POWDER, LYOPHILIZED, FOR SOLUTION INTRAVENOUS at 16:12

## 2020-02-23 RX ADMIN — SODIUM CHLORIDE 5 MG: 9 INJECTION INTRAMUSCULAR; INTRAVENOUS; SUBCUTANEOUS at 08:57

## 2020-02-23 RX ADMIN — METRONIDAZOLE 500 MG: 250 TABLET ORAL at 05:00

## 2020-02-23 RX ADMIN — OXYCODONE 7.5 MG: 5 TABLET ORAL at 20:08

## 2020-02-23 RX ADMIN — OXYCODONE 7.5 MG: 5 TABLET ORAL at 05:00

## 2020-02-23 RX ADMIN — METRONIDAZOLE 500 MG: 250 TABLET ORAL at 21:22

## 2020-02-23 RX ADMIN — CEFEPIME HYDROCHLORIDE 2 G: 2 INJECTION, POWDER, FOR SOLUTION INTRAVENOUS at 16:40

## 2020-02-23 RX ADMIN — VANCOMYCIN HYDROCHLORIDE 1250 MG: 10 INJECTION, POWDER, LYOPHILIZED, FOR SOLUTION INTRAVENOUS at 04:45

## 2020-02-23 RX ADMIN — ATORVASTATIN CALCIUM 40 MG: 40 TABLET, FILM COATED ORAL at 08:47

## 2020-02-23 NOTE — PROGRESS NOTES
[address] [facility] Adult  Hospitalist Group                                                                                          Hospitalist Progress Note  Dimas Hargrove MD  Answering service: [phone] OR [phone] from in house phone        Date of Service:  2020  NAME:  Emmanuel Cross  :  1975  MRN:  [id number]      Admission Summary:   Emmanuel Cross is a 40 y.o. male with pmh of bipolar disorder, IVDU (heroin, last use 1.5week ago), hepatitis C, chronic migraines and chronic pain presented to [place] ED with hand swelling. Patient claims he injected heroin into his hand about 1.5weeks ago, and over the last 2 days had increased swelling and redness prompting ED visit. He denies any fevers, chills or other systemic symptoms. Still able to move his fingers, although is having pain. He denies any fatigue, diaphoresis, N/V/D/C, no abdominal pain. His swelling started extending up his arm and therefore he decided to come to the ED for further evaluation. Interval history / Subjective:   Pt seen for FU of hand abscess  Patient seen and examined by the bedside, Labs, images and notes reviewed  Complains of nausea with Vanc but otherwise comfortable, Denies any chest pain, abdominal pain, fevers, chills. No diarrhea. Discussed with nursing staff, no acute issues overnight, orders reviewed. Assessment & Plan:     Sepsis - (Fever, HR, POA) secondary to cellulitis and hand abscess from old IVDU site  Sepsis resolved  - MRI shows sinus tract and abscess  S/P INCISION AND DRAINAGE OF LEFT HAND with extensor tenosynovectomy and open packing 20  - cont IV vancomycin, cefepime and flagyl q8. Pharmacy to dose Vanc  - wound cx after I and D in ED: possible Eikenella, S aureus and Hemophilus  - follow intra-op wound cx: possible strep  - Tylenol PRN for pain, oxycodone for pain  - ID recs appreciated.   - Alternate zofran and compazine as needed for nausea     Polysubstance abuse, heroin abuse with recent IVDU  - Confirmed with methadone clinic, started 1 week PTA, on 35mg methadone, has been ordered. - QTc reviewed and normal   - If he would require long term IV antibiotics, he cannot go home with PICC  - HIV non reactive     H/o Migraines - PRN fiorocet, propanolol  Bipolar - continue home lamictal and olanzapine  H/o HepC- RNA orderd    Code status: FULL  DVT prophylaxis: Lovenox    Care Plan discussed with: Patient/Family  Anticipated Disposition: Home w/Family  Anticipated Discharge: Greater than 48 hours     Hospital Problems  Date Reviewed: 2/20/2020          Codes Class Noted POA    * (Principal) Abscess of dorsum of left hand ICD-10-CM: L02.512  ICD-9-CM: 682.4  2/20/2020 Yes        Sepsis ([facility] [address] ICD-10-CM: A41.9  ICD-9-CM: 038.9, 995.91  2/18/2020 Unknown                Review of Systems:   A comprehensive review of systems was negative except for that written in the HPI. Vital Signs:    Last 24hrs VS reviewed since prior progress note. Most recent are:  Visit Vitals  /85 (BP 1 Location: Right arm, BP Patient Position: At rest;Sitting)   Pulse 69   Temp 97.6 °F (36.4 °C)   Resp 14   Ht 6' (1.829 m)   Wt 96.5 kg (212 lb 12.8 oz)   SpO2 97%   BMI 28.86 kg/m²       No intake or output data in the 24 hours ending 02/23/20 0805     Physical Examination:     General appearance: alert, cooperative, no distress, appears stated age  Head: Normocephalic, without obvious abnormality, atraumatic  Lungs: clear to auscultation bilaterally  Heart: RRR,S1S2  Abdomen: soft, NT  Extremities: left hand is dressed  Neurologic: Alert and oriented X 3, normal strength and tone.     Data Review:    Review and/or order of clinical lab test  Review and/or order of tests in the radiology section of CPT  Review and/or order of tests in the medicine section of CPT      Labs:     Recent Labs     02/23/20  0124 02/22/20  0411   WBC 5.1 5.2   HGB 10.6* 10.6*   HCT 33.0* 33.7*    191     Recent Labs     02/23/20  0124 02/22/20  0411 02/21/20  0130    138 138   K 4.2 3.8 4.0   * 110* 108   CO2 25 24 25   BUN 13 11 11   CREA 1.02 0.98 1.03   GLU 97 115* 122*   CA 8.3* 8.7 8.2*     No results for input(s): SGOT, GPT, ALT, AP, TBIL, TBILI, TP, ALB, GLOB, GGT, AML, LPSE in the last 72 hours. No lab exists for component: AMYP, HLPSE  No results for input(s): INR, PTP, APTT, INREXT, INREXT in the last 72 hours. No results for input(s): FE, TIBC, PSAT, FERR in the last 72 hours. Lab Results   Component Value Date/Time    Folate >20.0 01/12/2017 11:36 AM      No results for input(s): PH, PCO2, PO2 in the last 72 hours. No results for input(s): CPK, CKNDX, TROIQ in the last 72 hours.     No lab exists for component: CPKMB  Lab Results   Component Value Date/Time    Cholesterol, total 180 04/12/2016 01:49 PM    HDL Cholesterol 47 04/12/2016 01:49 PM    LDL, calculated 106 (H) 04/12/2016 01:49 PM    Triglyceride 134 04/12/2016 01:49 PM    CHOL/HDL Ratio 4.1 05/09/2014 03:57 AM     Lab Results   Component Value Date/Time    Glucose (POC) 92 11/24/2019 03:14 AM    Glucose (POC) 122 (H) 10/18/2015 08:36 AM    Glucose (POC) 147 (H) 08/31/2015 10:32 AM    Glucose (POC) 94 08/04/2015 05:38 PM    Glucose (POC) 91 09/06/2014 08:28 AM    Glucose (POC) 95 05/08/2014 03:12 PM    Glucose (POC) 120 (H) 03/27/2012 08:02 PM     Lab Results   Component Value Date/Time    Color YELLOW/STRAW 02/18/2020 04:35 AM    Appearance CLEAR 02/18/2020 04:35 AM    Specific gravity 1.005 02/18/2020 04:35 AM    Specific gravity 1.025 11/24/2019 02:53 AM    pH (UA) 6.5 02/18/2020 04:35 AM    Protein NEGATIVE  02/18/2020 04:35 AM    Glucose NEGATIVE  02/18/2020 04:35 AM    Ketone NEGATIVE  02/18/2020 04:35 AM    Bilirubin NEGATIVE  02/18/2020 04:35 AM    Urobilinogen 0.2 02/18/2020 04:35 AM    Nitrites NEGATIVE  02/18/2020 04:35 AM    Leukocyte Esterase NEGATIVE  02/18/2020 04:35 AM    Epithelial cells FEW 02/18/2020 04:35 AM Bacteria NEGATIVE  02/18/2020 04:35 AM    WBC 0-4 02/18/2020 04:35 AM    RBC 0-5 02/18/2020 04:35 AM         Medications Reviewed:     Current Facility-Administered Medications   Medication Dose Route Frequency    prochlorperazine (COMPAZINE) with saline injection 5 mg  5 mg IntraVENous Q6H PRN    enoxaparin (LOVENOX) injection 40 mg  40 mg SubCUTAneous Q24H    vancomycin (VANCOCIN) 1250 mg in  ml infusion  1,250 mg IntraVENous Q12H    naloxone (NARCAN) injection 0.4 mg  0.4 mg IntraVENous PRN    oxyCODONE IR (ROXICODONE) tablet 7.5 mg  7.5 mg Oral Q4H PRN    metroNIDAZOLE (FLAGYL) tablet 500 mg  500 mg Oral Q8H    sodium chloride (NS) flush 5-10 mL  5-10 mL IntraVENous PRN    sodium chloride (NS) flush 5-40 mL  5-40 mL IntraVENous Q8H    sodium chloride (NS) flush 5-40 mL  5-40 mL IntraVENous PRN    acetaminophen (TYLENOL) tablet 650 mg  650 mg Oral Q4H PRN    ondansetron (ZOFRAN) injection 4 mg  4 mg IntraVENous Q4H PRN    Vancomycin - Pharmacy to Dose   Other Rx Dosing/Monitoring    atorvastatin (LIPITOR) tablet 40 mg  40 mg Oral DAILY    butalbital-acetaminophen-caffeine (FIORICET, ESGIC) -40 mg per tablet 1 Tab  1 Tab Oral Q6H PRN    cyclobenzaprine (FLEXERIL) tablet 5 mg  5 mg Oral TID PRN    lamoTRIgine (LaMICtal) tablet 200 mg  200 mg Oral DAILY    OLANZapine (ZyPREXA) tablet 5 mg  5 mg Oral QHS    propranolol LA (INDERAL LA) capsule 80 mg  80 mg Oral DAILY    methadone (DOLOPHINE) tablet 35 mg  35 mg Oral DAILY    cefepime (MAXIPIME) 2 g in 0.9% sodium chloride (MBP/ADV) 100 mL  2 g IntraVENous Q8H    nicotine (NICODERM CQ) 21 mg/24 hr patch 1 Patch  1 Patch TransDERmal DAILY    benzonatate (TESSALON) capsule 100 mg  100 mg Oral TID PRN     ______________________________________________________________________  EXPECTED LENGTH OF STAY: 5d 16h  ACTUAL LENGTH OF STAY:          5                 Jonathan Shaver MD

## 2020-02-23 NOTE — PROGRESS NOTES
02/23/20 0829   Vital Signs   Temp 97.5 °F (36.4 °C)   Temp Source Oral   Pulse (Heart Rate) (!) 53   Heart Rate Source Monitor   Resp Rate 15   O2 Sat (%) 98 %   Level of Consciousness Alert   /84   MAP (Calculated) 103   BP 1 Method Automatic   BP 1 Location Right arm   BP Patient Position At rest   MEWS Score 1   Pain 1   Pain Scale 1 Numeric (0 - 10)   Pain Intensity 1 3   Patient Stated Pain Goal 0   Pain Location 1 Hand   Pain Orientation 1 Left   Pain Description 1 Aching   Pain Intervention(s) 1 Distraction   Oxygen Therapy   O2 Device Room air   Patient Observation   Repositioned Sitting   Ambulate Ambulate to bathroom   Activity Up ad brenden   Mode of Transportation Wheelchair

## 2020-02-23 NOTE — PROGRESS NOTES
ORTHO PROGRESS NOTE      SUBJECTIVE:  Mandeep Gaytan feels his hand pain/swelling/function are improving. Dressing changes are painful for him. I spoke with nurse 30 min ago who was able to pre-medicate for dressing change this am.    OBJECTIVE:  Patient Vitals for the past 24 hrs:   Temp Pulse BP   02/23/20 0223 97.6 °F (36.4 °C) 69 156/85   02/22/20 2053 98 °F (36.7 °C) 68 121/78   02/22/20 1434 97.6 °F (36.4 °C) (!) 54 117/73   02/22/20 0856 97.7 °F (36.5 °C) 61 (!) 153/92       Alert, no distress. Lying in bed. No family present. Pleasant. Respirations unlabored. Dressing has evidence of serosanguinous drainage. Wounds without purulence. Erythema improving. Still some edema. Less TTP. Difficulty making a fist but can extend fingers OK. Some stiffness in wrist.  Moves elbow OK. Recent Labs     02/23/20  0124   HGB 10.6*   HCT 33.0*      BUN 13   CREA 1.02   GFRAA >60   GFRNA >60     WBC 5.3. OR cultures streptococcus with sens pending. ER cultures poly-orgs    ASSESSMENT:  Procedure: Procedure(s):  INCISION AND DRAINAGE OF LEFT HAND  Post Op day: 3 Days Post-Op  Improving    PLAN:  I changed his dressing this am (Mesalt with width cut in half, 4x4s , eduar, and ACE). Daily wound care with packing (he needs PO analgesics for dressing changes). Elevation. Encourage digit/wrist AROM. Abx per ID. Await final cultures/sens.     VICKI Watts  Orthopedic Trauma Service  [facility] [facility]

## 2020-02-23 NOTE — PROGRESS NOTES
Bedside shift change report given to Len Ruelas RN (oncoming nurse) by Lucero Hoover RN (offgoing nurse). Report included the following information SBAR, Kardex, MAR and Recent Results.

## 2020-02-23 NOTE — PROGRESS NOTES
Bedside shift change report given to chris (oncoming nurse) by Sandra Shell (offgoing nurse). Report included the following information SBAR, Kardex and Intake/Output.

## 2020-02-24 LAB
ANION GAP SERPL CALC-SCNC: 2 MMOL/L (ref 5–15)
BACTERIA SPEC CULT: ABNORMAL
BACTERIA SPEC CULT: ABNORMAL
BUN SERPL-MCNC: 13 MG/DL (ref 6–20)
BUN/CREAT SERPL: 11 (ref 12–20)
CALCIUM SERPL-MCNC: 8.9 MG/DL (ref 8.5–10.1)
CHLORIDE SERPL-SCNC: 106 MMOL/L (ref 97–108)
CO2 SERPL-SCNC: 30 MMOL/L (ref 21–32)
CREAT SERPL-MCNC: 1.17 MG/DL (ref 0.7–1.3)
GLUCOSE SERPL-MCNC: 95 MG/DL (ref 65–100)
GRAM STN SPEC: ABNORMAL
POTASSIUM SERPL-SCNC: 3.9 MMOL/L (ref 3.5–5.1)
SERVICE CMNT-IMP: ABNORMAL
SERVICE CMNT-IMP: ABNORMAL
SODIUM SERPL-SCNC: 138 MMOL/L (ref 136–145)

## 2020-02-24 PROCEDURE — 74011000258 HC RX REV CODE- 258: Performed by: ORTHOPAEDIC SURGERY

## 2020-02-24 PROCEDURE — 74011000250 HC RX REV CODE- 250: Performed by: INTERNAL MEDICINE

## 2020-02-24 PROCEDURE — 74011250637 HC RX REV CODE- 250/637: Performed by: ORTHOPAEDIC SURGERY

## 2020-02-24 PROCEDURE — 65270000032 HC RM SEMIPRIVATE

## 2020-02-24 PROCEDURE — 36415 COLL VENOUS BLD VENIPUNCTURE: CPT

## 2020-02-24 PROCEDURE — 74011250636 HC RX REV CODE- 250/636: Performed by: ORTHOPAEDIC SURGERY

## 2020-02-24 PROCEDURE — 74011250636 HC RX REV CODE- 250/636: Performed by: INTERNAL MEDICINE

## 2020-02-24 PROCEDURE — 80048 BASIC METABOLIC PNL TOTAL CA: CPT

## 2020-02-24 PROCEDURE — 74011250637 HC RX REV CODE- 250/637: Performed by: INTERNAL MEDICINE

## 2020-02-24 RX ORDER — AMOXICILLIN AND CLAVULANATE POTASSIUM 875; 125 MG/1; MG/1
1 TABLET, FILM COATED ORAL 2 TIMES DAILY WITH MEALS
Status: DISCONTINUED | OUTPATIENT
Start: 2020-02-24 | End: 2020-02-25 | Stop reason: HOSPADM

## 2020-02-24 RX ADMIN — CEFEPIME HYDROCHLORIDE 2 G: 2 INJECTION, POWDER, FOR SOLUTION INTRAVENOUS at 01:20

## 2020-02-24 RX ADMIN — OXYCODONE 7.5 MG: 5 TABLET ORAL at 22:58

## 2020-02-24 RX ADMIN — Medication 10 ML: at 05:49

## 2020-02-24 RX ADMIN — OXYCODONE 7.5 MG: 5 TABLET ORAL at 08:45

## 2020-02-24 RX ADMIN — CEFEPIME HYDROCHLORIDE 2 G: 2 INJECTION, POWDER, FOR SOLUTION INTRAVENOUS at 08:47

## 2020-02-24 RX ADMIN — METRONIDAZOLE 500 MG: 250 TABLET ORAL at 14:31

## 2020-02-24 RX ADMIN — AMOXICILLIN AND CLAVULANATE POTASSIUM 1 TABLET: 875; 125 TABLET, FILM COATED ORAL at 17:35

## 2020-02-24 RX ADMIN — OLANZAPINE 5 MG: 5 TABLET, FILM COATED ORAL at 22:57

## 2020-02-24 RX ADMIN — OXYCODONE 7.5 MG: 5 TABLET ORAL at 03:17

## 2020-02-24 RX ADMIN — ATORVASTATIN CALCIUM 40 MG: 40 TABLET, FILM COATED ORAL at 08:44

## 2020-02-24 RX ADMIN — Medication 10 ML: at 23:12

## 2020-02-24 RX ADMIN — BUTALBITAL, ACETAMINOPHEN AND CAFFEINE 1 TABLET: 50; 325; 40 TABLET ORAL at 18:40

## 2020-02-24 RX ADMIN — METRONIDAZOLE 500 MG: 250 TABLET ORAL at 05:48

## 2020-02-24 RX ADMIN — VANCOMYCIN HYDROCHLORIDE 1250 MG: 10 INJECTION, POWDER, LYOPHILIZED, FOR SOLUTION INTRAVENOUS at 03:10

## 2020-02-24 RX ADMIN — PROPRANOLOL HYDROCHLORIDE 80 MG: 80 CAPSULE, EXTENDED RELEASE ORAL at 08:44

## 2020-02-24 RX ADMIN — Medication 10 ML: at 15:30

## 2020-02-24 RX ADMIN — OXYCODONE 7.5 MG: 5 TABLET ORAL at 14:31

## 2020-02-24 RX ADMIN — ENOXAPARIN SODIUM 40 MG: 40 INJECTION SUBCUTANEOUS at 10:41

## 2020-02-24 RX ADMIN — METHADONE HYDROCHLORIDE 35 MG: 10 TABLET ORAL at 08:45

## 2020-02-24 RX ADMIN — SODIUM CHLORIDE 5 MG: 9 INJECTION INTRAMUSCULAR; INTRAVENOUS; SUBCUTANEOUS at 03:10

## 2020-02-24 RX ADMIN — BUTALBITAL, ACETAMINOPHEN AND CAFFEINE 1 TABLET: 50; 325; 40 TABLET ORAL at 05:48

## 2020-02-24 RX ADMIN — LAMOTRIGINE 200 MG: 100 TABLET ORAL at 08:44

## 2020-02-24 RX ADMIN — SODIUM CHLORIDE 5 MG: 9 INJECTION INTRAMUSCULAR; INTRAVENOUS; SUBCUTANEOUS at 23:02

## 2020-02-24 RX ADMIN — OXYCODONE 7.5 MG: 5 TABLET ORAL at 18:40

## 2020-02-24 NOTE — PROGRESS NOTES
Pt notice slightly raised redden area on his wrist, it is sore to Pt, administer pain medication and oncoming nurse made aware, ortho to see the patient in the morning.

## 2020-02-24 NOTE — PROGRESS NOTES
NUTRITION  Reason for Rescreen: LOS        RECOMMENDATIONS:   1. Encourage good protein intake with all meals  2. Premedicate with anti-emetics prior to meals as needed  3. Weekly weight on standing scale  4. Add daily MVI    Interventions   - added supplements/snacks: Ensure Enlive daily  - diet order adjusted to: double portions       Information obtained from:   Chart review      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    Hypertension     IV drug abuse ([facility] [address]     Kidney stone     Liver disease     elevated liver enzymes/hep C    Neuralgia     Other ill-defined conditions(799.89)     chronic bronchitis    Polysubstance dependence ([facility])     stimulants, MJ, tob, barbs, benzos, opiates    Psychiatric disorder     Bipolar, Anxiety    Unspecified adverse effect of anesthesia     \"was told he woke up during back surgery\" and during nerve root injection    Unspecified sleep apnea     mild - does not use CPAP     Pt admitted for hand cellulitis s/p IVDU. S/p I&D. Abx continue. D/c pending switch to oral abx. Wt loss prior to admit with N/V on admit in December. Some nausea this admit but attributed to abx and improving. Continue anti-emetics PRN. Predict some wt loss also related to IVDU prior to admit. Appears well nourished. Will add double portions and Ensure Enlive daily (350kcal, 20g protein) to promote healing. Will rescreen per protocol.      Diet:  regular  Supplements: None  Intake: Good  Patient Vitals for the past 100 hrs:   % Diet Eaten   02/24/20 1245 98 %   02/24/20 0840 100 %   02/23/20 0829 50 %   02/22/20 1252 95 %   02/22/20 0923 20 %   02/21/20 1341 100 %   02/21/20 0940 85 %     Weight Changes:   Loss  Wt Readings from Last 10 Encounters:   02/24/20 96.5 kg (212 lb 12.8 oz)   02/18/20 40.9 kg (90 lb 2.7 oz)   11/25/19 104.3 kg (230 lb)   11/24/19 99.8 kg (220 lb)   11/16/19 110 kg (242 lb 8.1 oz)   10/23/19 114.6 kg (252 lb 10.4 oz)   11/05/18 122.4 kg (269 lb 12.8 oz)   01/03/18 104.4 kg (230 lb 3.2 oz)   12/20/17 108.9 kg (240 lb)   11/28/17 111.1 kg (245 lb)     Nutrition-Related Concerns Identified:  Nausea/vomiting - with abx. Improving per MD notes.      Estimated Nutrition Needs:   Kcals/day: 2400 Kcals/day(2400-2688kcal)  Protein: 106 g(106-115g (1.1-1.2g/kg))  Fluid: 2400 ml(1ml/kcal)  Based On: Kcal/kg - specify (Comment)(25-28kcal/kg)  Weight Used: Actual wt(96kg)     Nutrition Diagnosis:   1. (Increased protein needs) related to wound healing as evidenced by hand cellulitis s/p I&D    PLAN:   - Continue current diet and - Check for acceptance of supplements    Rescreen:  []            At Nutrition Risk - will follow          [x]            Rescreen per screening protocol    Kacy Cramer, RD [address], Pager #[phone] or via [address] [place] [address]

## 2020-02-24 NOTE — PROGRESS NOTES
Infectious Disease Progress  Note      HPI:    Mr. Ronal Shankar seen   Says L hand edema and pain markedly better  Denied fever, chills nausea vomiting diarrhea   Tolerating antibiotics       Current Facility-Administered Medications:     [START ON 2/25/2020] Vancomycin trough [email or web address] prior to dose, , Other, ONCE, Lidia Correa MD    prochlorperazine (COMPAZINE) with saline injection 5 mg, 5 mg, IntraVENous, Q6H PRN, Kam Rob MD, 5 mg at 02/24/20 0310    enoxaparin (LOVENOX) injection 40 mg, 40 mg, SubCUTAneous, Q24H, Kam Rob MD, 40 mg at 02/24/20 1041    vancomycin (VANCOCIN) 1250 mg in  ml infusion, 1,250 mg, IntraVENous, Q12H, Lidia Correa MD, Last Rate: 125 mL/hr at 02/24/20 0310, 1,250 mg at 02/24/20 0310    [facility]) injection 0.4 mg, 0.4 mg, IntraVENous, PRN, Kam Rob MD    oxyCODONE IR (ROXICODONE) tablet 7.5 mg, 7.5 mg, Oral, Q4H PRN, Kam Rob MD, 7.5 mg at 02/24/20 1431    metroNIDAZOLE (FLAGYL) tablet 500 mg, 500 mg, Oral, Q8H, Fer Root MD, 500 mg at 02/24/20 1431    sodium chloride (NS) flush 5-10 mL, 5-10 mL, IntraVENous, PRN, Lidia Correa MD    sodium chloride (NS) flush 5-40 mL, 5-40 mL, IntraVENous, Q8H, Fer Root MD, 10 mL at 02/24/20 0549    sodium chloride (NS) flush 5-40 mL, 5-40 mL, IntraVENous, PRN, Lidia Correa MD    acetaminophen (TYLENOL) tablet 650 mg, 650 mg, Oral, Q4H PRN, Lidia Correa MD, 650 mg at 02/20/20 0549    ondansetron (ZOFRAN) injection 4 mg, 4 mg, IntraVENous, Q4H PRN, Lidia Correa MD, 4 mg at 02/22/20 0645    Vancomycin - Pharmacy to Dose, , Other, Rx Dosing/Monitoring, Lidia Correa MD    atorvastatin (LIPITOR) tablet 40 mg, 40 mg, Oral, DAILY, Fer Neff MD, 40 mg at 02/24/20 0844    butalbital-acetaminophen-caffeine (FIORICET, ESGIC) -40 mg per tablet 1 Tab, 1 Tab, Oral, Q6H PRN, Lidia Correa MD, 1 Tab at 02/24/20 0548    cyclobenzaprine (FLEXERIL) tablet 5 mg, 5 mg, Oral, TID PRN, Lidia Correa, MD, 5 mg at 02/21/20 1122    lamoTRIgine (LaMICtal) tablet 200 mg, 200 mg, Oral, DAILY, Pavan MercadoRadha MD, 200 mg at 02/24/20 0844    OLANZapine (ZyPREXA) tablet 5 mg, 5 mg, Oral, QHS, Radha Root MD, 5 mg at 02/23/20 2122    propranolol LA (INDERAL LA) capsule 80 mg, 80 mg, Oral, DAILY, Aung Carpenter MD, 80 mg at 02/24/20 0844    methadone (DOLOPHINE) tablet 35 mg, 35 mg, Oral, DAILY, Aung Carpenter MD, 35 mg at 02/24/20 0845    cefepime (MAXIPIME) 2 g in 0.9% sodium chloride (MBP/ADV) 100 mL, 2 g, IntraVENous, Q8H, Radha Root MD, Last Rate: 200 mL/hr at 02/24/20 0847, 2 g at 02/24/20 0847    nicotine (NICODERM CQ) 21 mg/24 hr patch 1 Patch, 1 Patch, TransDERmal, DAILY, Radha Root MD, 1 Patch at 02/23/20 2130    benzonatate (TESSALON) capsule 100 mg, 100 mg, Oral, TID PRN, Aung Carpenter MD, 100 mg at 02/19/20 2115    Physical Exam:    Vitals:   Patient Vitals for the past 24 hrs:   Temp Pulse Resp BP SpO2   02/24/20 0836 98.2 °F (36.8 °C) 75 16 (!) 150/93 99 %   02/24/20 0234 98.3 °F (36.8 °C) 73 16 138/86 99 %   02/23/20 2015 97.8 °F (36.6 °C) 67 16 (!) 147/93 99 %   02/23/20 1504 97.3 °F (36.3 °C) 67 15 130/63 97 %     · GEN: NAD  · HEENT no scleral icterus, no thrush  · CV: S1, S2 heard regularly,   · Lungs: Clear to auscultation bilaterally  · Abdomen: soft, non distended, non tender  · Extremities: no edema  · Skin: no rash  Musculoskeletal left hand edema improved, + incision sites without much surrounding erythema, edema or discharge , good ROM L hand     Labs:   Recent Results (from the past 24 hour(s))   METABOLIC PANEL, BASIC    Collection Time: 02/24/20  2:35 AM   Result Value Ref Range    Sodium 138 136 - 145 mmol/L    Potassium 3.9 3.5 - 5.1 mmol/L    Chloride 106 97 - 108 mmol/L    CO2 30 21 - 32 mmol/L    Anion gap 2 (L) 5 - 15 mmol/L    Glucose 95 65 - 100 mg/dL    BUN 13 6 - 20 MG/DL    Creatinine 1.17 0.70 - 1.30 MG/DL    BUN/Creatinine ratio 11 (L) 12 - 20      GFR est AA >60 >60 ml/min/1.73m2    GFR est non-AA >60 >60 ml/min/1.73m2    Calcium 8.9 8.5 - 10.1 MG/DL       Microbiology Data:       Wound  2/20/20  Specimen Information: Misc. Wound        Component Value Ref Range & Units Status   Special Requests:   Final   SUPERFICIAL LEFT HAND ABSCESS    GRAM STAIN 4+ WBCS SEEN    Final   GRAM STAIN    Final   OCCASIONAL GRAM POSITIVE COCCI SEEN INTRA AND EXTRACELLULARYLY    Culture result: Abnormal     Final   LIGHT MICROAEROPHILIC STREPTOCOCCUS    Result History     CULTURE, WOUND W GRAM STAIN on 2/24/2020           Blood: 2/18/20   Specimen Information: Blood        Component Value Ref Range & Units Status   Special Requests: NO SPECIAL REQUESTS    Preliminary   Culture result: NO GROWTH 3 DAYS    Preliminary   Result History              Wound 2/18/20    Abscess;  Wound         Component Value Ref Range & Units Status   Special Requests: NO SPECIAL REQUESTS    Final   GRAM STAIN RARE WBCS SEEN    Final   GRAM STAIN    Final   OCCASIONAL GRAM POSITIVE COCCI IN CHAINS    Culture result: Abnormal     Final   LIGHT STAPHYLOCOCCUS AUREUS    Culture result: Abnormal     Final   LIGHT EIKENELLA CORRODENS BETA LACTAMASE NEGATIVE    Culture result: Abnormal     Final   HEAVY HAEMOPHILUS INFLUENZAE BETA LACTAMASE NEGATIVE    Culture result:    Final   LIGHT MIXED SKIN BRENDA ISOLATED    Susceptibility      Staphylococcus aureus     FÁTIMA    Ampicillin ($) <=2 ug/mL R    Ampicillin/sulbactam ($) <=8/4 ug/mL S    Cefazolin ($) <=4 ug/mL S    Ciprofloxacin ($) <=1 ug/mL S    Clindamycin ($) <=0.5 ug/mL R1    Daptomycin ($$$$$) <=0.5 ug/mL S    Erythromycin ($$$$) >4 ug/mL R    Gentamicin ($) <=4 ug/mL S    Linezolid ($$$$$) 2 ug/mL S    Oxacillin <=0.25 ug/mL S    Rifampin ($$$$) <=1 ug/mL S2    Tetracycline <=4 ug/mL S    Trimeth/Sulfa <=0.5/9.5 u... S    Vancomycin ($) 2 ug/mL S          Pathology Results:  2015  FINAL PATHOLOGIC DIAGNOSIS  Stomach, biopsy:  Mild chronic gastritis    2014  Clinical History  Hepatitis C  FINAL PATHOLOGIC DIAGNOSIS  Liver, biopsy  Chronic hepatitis, hepatitis C by history, grade 2-3, stage 1-2  See microscopic description    Imaging:   L hand X ray      FINDINGS: Three views of the left hand demonstrate no fracture, dislocation or  other acute osseous or articular abnormality. There is soft tissue swelling over  the fifth MCP joint and dorsum of the hand.     IMPRESSION  IMPRESSION: No acute osseous or articular abnormality. Soft tissue swelling over  the fifth MCP joint and dorsum of the hand. MRI L hand  FINDINGS: There is a soft tissue defect shown dorsomedially in the hand at the  level of the fifth metacarpal head with underlying skin devitalization extending  to the superficial fascia. There is diffuse dorsal subcutaneous edema-like  signal and enhancement. Rim-enhancing fluid is demonstrated within the  subcutaneous fat dorsal to the extensor digitorum tendon sheath at the level of  the proximal metacarpals and distal carpal row, consistent with abscess,  measuring 4.3 x 0.5 cm transverse and at least 3.5 cm craniocaudal. There is  fluid like signal following the course of the extensor digitorum tendons,  consistent with septic tenosynovitis. The flexor tendons appear normal. There is  mild-moderate hyper thenar muscle edema-like signal and enhancement which is  greater dorsally. Bone signal is normal. There is no demonstration of  substantial joint effusion or substantial arthrosis.     IMPRESSION  IMPRESSION: Dorsomedial soft tissue defect with sinus tract extending to  superficial fascia at level of fifth metacarpal head.  Diffuse dorsal  subcutaneous inflammatory changes with septic tenosynovitis and partial  demonstration of subcutaneous abscess at level of metacarpal bases and distal  carpal row measuring 4.3 x 0.5 cm transverse and at least 3.5 cm craniocaudal.    Assessment / Plan:     Mr. Reece Patel is a 51-year-old gentleman with hepatitis C, intravenous substance abuse, lumbar laminectomy with left hand abscess. He reports using intravenous drugs to the left hand about a week to 2 ago. 1) left hand abscess/cellulitis  Status post I&D in the emergency room wt cx + for possibleEikenella, S aureus, Hemophilus   Eikenella usually seen in oral skinny/human bites  MRI with sinus tract and abscess   I and D wt tenosynovectomy 2/20 , Cx with Streptococcus   I discussed and recommended IV antibiotics ideally given tendon involvement   Mr Keiry Ramírez told me that he would not want to persue IV now and wants to take oral antibiotics and follow up closely with me. If worsening, will need to reassess with imaging and surgery  If no osteomyelitis, plan for 2-4 weeks therapy with Augmentin 875/125 mg renally dosed   Will check lab work on follow up  CBC wt diff, CMP, ESR, CRP and thereafter every 1-2 weeks  F/U with me on 2/27/20 at 3 pm   Office Address  [address], [address]   Phone [phone]  Fax [phone] regarding worsening symptoms/signs of infection , antibiotic side effects, risk for CDiff, renal and hematological complications   Told to call our office if any questions, concerns   Wound care per ortho  Pain control per primary teams       2) hepatitis C  Patient reports previous treatment with cure  Discussed that successful treatment does not mean immunity  With ongoing substance abuse can reacquire hepatitis C  Component Value Flag Ref Range Units Status   HCV RNA by FELECIA, QL Negative              3) substance abuse IV  Discussed the need for cessation  Monitoring for withdrawal per primary team  Noticed he is on methadone  Plan per primary team     4)  screening  HIV nonreactive   Hep C RNA negative     5) history of lumbar laminectomy    6) DVT prophylaxis      Thank for the opportunity to participate in the care of this patient. Please contact with questions or concerns.          Katy Boothe,   3:04 PM

## 2020-02-24 NOTE — PROGRESS NOTES
KATYA:  Pt currently still requiring inpt care including ortho following and IV abx awaiting final cultures and sensitivities. Per previous CM assessment, pt not likely a candidate for IV abx as outpatient due to recent history of IV drug use. CM will follow. Cultures pending final recommendation from ID. Ortho continues to follow also, daily dressing changes requiring PO pain meds.     JUSTA Joyner

## 2020-02-24 NOTE — WOUND CARE
WOUND CARE FOLLOW UP:   
 
Left hand wounds were not assessed; patient changed the dressing earlier today with the staff nurse watching. Patient expressed no concerns with the dressing changes and feels comfortable changing the dressing. Patient inquired about showers. Advised patient to wear a glove while showering then remove dressing/packing after shower, clean and reapply dressing. Patient does not want home health. Wound care supplies provided to patient, all questions answered. Reinforced the importance of increasing protein intake for wound healing; he acknowledged understanding. Recommendations:   
Left dorsal hand wounds- Premedicate for pain. Daily cleanse with normal saline, wipe wound bed clean and dry, cut a long piece of Mesalt (located on 5E back [facility] room- 3 packages were left in room on 2/21/20) in half width so that it is narrower, loosely fill wounds with Mesalt, cover with 4 x 4's and secure with roll gauze and ACE wrap. NOTE:  Patient will need to do this at home and wants to assist with dressing changes. 
  
After discharge: 
Left dorsal hand wounds- Every other day cleanse with normal saline, wipe wound beds clean and dry, cut a piece of 1/4 inch packing strip and apply silver wound gel to packing strip, loosely fill wounds, cover with 4 x 4's and secure with roll gauze and tape. 
  
 
Alysha Sterling \"Deepti\" DAREN Cody, RN, [facility] Office x [phone] Pager x [phone]

## 2020-02-24 NOTE — PROGRESS NOTES
POD 4 Days Post-Op    Procedure:  Procedure(s):  INCISION AND DRAINAGE OF LEFT HAND    Subjective:     Patient is seen today with the following complaints: improved pain relief. Objective:     Vitals:  Blood pressure (!) 150/93, pulse 75, temperature 98.2 °F (36.8 °C), resp. rate 16, height 6' (1.829 m), weight 96.5 kg (212 lb 12.8 oz), SpO2 99 %. Tmax:  Temp (24hrs), Av.9 °F (36.6 °C), Min:97.3 °F (36.3 °C), Max:98.3 °F (36.8 °C)      Physical Exam:  Examination of the left hand reveals improved wounds; slight concern near ulnar head but has excellent wrist and digital motion; no fluctuance. Sensation is intact to light touch. + Brisk capillary refill.      Labs:   Recent Results (from the past 24 hour(s))   METABOLIC PANEL, BASIC    Collection Time: 20  2:35 AM   Result Value Ref Range    Sodium 138 136 - 145 mmol/L    Potassium 3.9 3.5 - 5.1 mmol/L    Chloride 106 97 - 108 mmol/L    CO2 30 21 - 32 mmol/L    Anion gap 2 (L) 5 - 15 mmol/L    Glucose 95 65 - 100 mg/dL    BUN 13 6 - 20 MG/DL    Creatinine 1.17 0.70 - 1.30 MG/DL    BUN/Creatinine ratio 11 (L) 12 - 20      GFR est AA >60 >60 ml/min/1.73m2    GFR est non-AA >60 >60 ml/min/1.73m2    Calcium 8.9 8.5 - 10.1 MG/DL             Assessment:     Principal Problem:    Abscess of dorsum of left hand (2020)    Active Problems:    Sepsis ([address] (2020)        Plan/Recommendations/Medical Decision Making:     Doing better s/p I&D left dorsal hand  Continues on IV antibiotics - awaiting conversion to PO and likely d/c  Slight concern near ulnar head but he demonstrates full digital / wrist motion and no fluctuance      Maximo Funez MD

## 2020-02-24 NOTE — PROGRESS NOTES
Problem: Falls - Risk of  Goal: *Absence of Falls  Description  Document Eduardo Reis Fall Risk and appropriate interventions in the flowsheet.   Outcome: Progressing Towards Goal  Note: Fall Risk Interventions:            Medication Interventions: Teach patient to arise slowly, Evaluate medications/consider consulting pharmacy

## 2020-02-24 NOTE — PROGRESS NOTES
[facility] Adult  Hospitalist Group                                                                                          Hospitalist Progress Note  Hortencia Hightower MD  Answering service: [phone] OR [phone] from in house phone        Date of Service:  2020  NAME:  Rick Wade  :  1975  MRN:  [id number]      Admission Summary:   Rick Wade is a 40 y.o. male with pmh of bipolar disorder, IVDU (heroin, last use 1.5week ago), hepatitis C, chronic migraines and chronic pain presented to [facility] ED with hand swelling. Patient claims he injected heroin into his hand about 1.5weeks ago, and over the last 2 days had increased swelling and redness prompting ED visit. He denies any fevers, chills or other systemic symptoms. Still able to move his fingers, although is having pain. He denies any fatigue, diaphoresis, N/V/D/C, no abdominal pain. His swelling started extending up his arm and therefore he decided to come to the ED for further evaluation. Interval history / Subjective:   Pt seen for FU of hand abscess  Patient seen and examined by the bedside, Labs, images and notes reviewed  Complains of being jittery and having withdrawals but tolerating it well. Reports small, an inch in diameter, left hand raised tender area (? Small abscess) since last evening. It is located ulnar at aspect of the wrist dorsally. No further fevers or chills. Nausea with IV abx is improving. Discussed with nursing staff, no acute issues overnight, orders reviewed. Assessment & Plan:     Sepsis - (Fever, HR, POA) secondary to cellulitis and hand abscess from old IVDU site  Sepsis resolved  - MRI shows sinus tract and abscess  S/P INCISION AND DRAINAGE OF LEFT HAND with extensor tenosynovectomy and open packing 20  - cont IV vancomycin, cefepime and flagyl q8.  Pharmacy to dose Vanc  - wound cx after I and D in ED: possible Eikenella, S aureus and Hemophilus and MSSA  - follow intra-op wound cx: possible strep  - Tylenol PRN for pain, oxycodone for pain  - ID recs appreciated. - Alternate zofran and compazine as needed for nausea  - raised tender area at same wrist? Abscess? Ortho to assess     Polysubstance abuse, heroin abuse with recent IVDU  - Confirmed with methadone clinic, started 1 week PTA, on 35mg methadone, has been ordered. - QTc reviewed and normal   - If he would require long term IV antibiotics, he cannot go home with PICC  - HIV non reactive     H/o Migraines - PRN fiorocet, propanolol  Bipolar - continue home lamictal and olanzapine  H/o HepC- RNA orderd    Code status: FULL  DVT prophylaxis: Lovenox    Care Plan discussed with: Patient/Family  Anticipated Disposition: Home w/Family  Anticipated Discharge: Greater than 48 hours     Hospital Problems  Date Reviewed: 2/20/2020          Codes Class Noted POA    * (Principal) Abscess of dorsum of left hand ICD-10-CM: L02.512  ICD-9-CM: 682.4  2/20/2020 Yes        Sepsis ([facility] [address] ICD-10-CM: A41.9  ICD-9-CM: 038.9, 995.91  2/18/2020 Unknown                Review of Systems:   A comprehensive review of systems was negative except for that written in the HPI. Vital Signs:    Last 24hrs VS reviewed since prior progress note.  Most recent are:  Visit Vitals  /86 (BP 1 Location: Right arm, BP Patient Position: At rest)   Pulse 73   Temp 98.3 °F (36.8 °C)   Resp 16   Ht 6' (1.829 m)   Wt 96.5 kg (212 lb 12.8 oz)   SpO2 99%   BMI 28.86 kg/m²         Intake/Output Summary (Last 24 hours) at 2/24/2020 4670  Last data filed at 2/23/2020 0846  Gross per 24 hour   Intake 520 ml   Output    Net 520 ml        Physical Examination:     General appearance: alert, cooperative, no distress, appears stated age  Head: Normocephalic, without obvious abnormality, atraumatic  Lungs: clear to auscultation bilaterally  Heart: RRR,S1S2  Abdomen: soft, NT  Extremities: left hand is dressed, small, an inch in diameter, left hand raised tender area, located at ulnar aspect of the wrist dorsally. Neurologic: Alert and oriented X 3, normal strength and tone. Data Review:    Review and/or order of clinical lab test  Review and/or order of tests in the radiology section of CPT  Review and/or order of tests in the medicine section of CPT      Labs:     Recent Labs     02/23/20  0124 02/22/20  0411   WBC 5.1 5.2   HGB 10.6* 10.6*   HCT 33.0* 33.7*    191     Recent Labs     02/24/20  0235 02/23/20  0124 02/22/20  0411    140 138   K 3.9 4.2 3.8    112* 110*   CO2 30 25 24   BUN 13 13 11   CREA 1.17 1.02 0.98   GLU 95 97 115*   CA 8.9 8.3* 8.7     No results for input(s): SGOT, GPT, ALT, AP, TBIL, TBILI, TP, ALB, GLOB, GGT, AML, LPSE in the last 72 hours. No lab exists for component: AMYP, HLPSE  No results for input(s): INR, PTP, APTT, INREXT, INREXT in the last 72 hours. No results for input(s): FE, TIBC, PSAT, FERR in the last 72 hours. Lab Results   Component Value Date/Time    Folate >20.0 01/12/2017 11:36 AM      No results for input(s): PH, PCO2, PO2 in the last 72 hours. No results for input(s): CPK, CKNDX, TROIQ in the last 72 hours.     No lab exists for component: CPKMB  Lab Results   Component Value Date/Time    Cholesterol, total 180 04/12/2016 01:49 PM    HDL Cholesterol 47 04/12/2016 01:49 PM    LDL, calculated 106 (H) 04/12/2016 01:49 PM    Triglyceride 134 04/12/2016 01:49 PM    CHOL/HDL Ratio 4.1 05/09/2014 03:57 AM     Lab Results   Component Value Date/Time    Glucose (POC) 92 11/24/2019 03:14 AM    Glucose (POC) 122 (H) 10/18/2015 08:36 AM    Glucose (POC) 147 (H) 08/31/2015 10:32 AM    Glucose (POC) 94 08/04/2015 05:38 PM    Glucose (POC) 91 09/06/2014 08:28 AM    Glucose (POC) 95 05/08/2014 03:12 PM    Glucose (POC) 120 (H) 03/27/2012 08:02 PM     Lab Results   Component Value Date/Time    Color YELLOW/STRAW 02/18/2020 04:35 AM    Appearance CLEAR 02/18/2020 04:35 AM    Specific gravity 1.005 02/18/2020 04:35 AM Specific gravity 1.025 11/24/2019 02:53 AM    pH (UA) 6.5 02/18/2020 04:35 AM    Protein NEGATIVE  02/18/2020 04:35 AM    Glucose NEGATIVE  02/18/2020 04:35 AM    Ketone NEGATIVE  02/18/2020 04:35 AM    Bilirubin NEGATIVE  02/18/2020 04:35 AM    Urobilinogen 0.2 02/18/2020 04:35 AM    Nitrites NEGATIVE  02/18/2020 04:35 AM    Leukocyte Esterase NEGATIVE  02/18/2020 04:35 AM    Epithelial cells FEW 02/18/2020 04:35 AM    Bacteria NEGATIVE  02/18/2020 04:35 AM    WBC 0-4 02/18/2020 04:35 AM    RBC 0-5 02/18/2020 04:35 AM         Medications Reviewed:     Current Facility-Administered Medications   Medication Dose Route Frequency    prochlorperazine (COMPAZINE) with saline injection 5 mg  5 mg IntraVENous Q6H PRN    enoxaparin (LOVENOX) injection 40 mg  40 mg SubCUTAneous Q24H    vancomycin (VANCOCIN) 1250 mg in  ml infusion  1,250 mg IntraVENous Q12H    naloxone (NARCAN) injection 0.4 mg  0.4 mg IntraVENous PRN    oxyCODONE IR (ROXICODONE) tablet 7.5 mg  7.5 mg Oral Q4H PRN    metroNIDAZOLE (FLAGYL) tablet 500 mg  500 mg Oral Q8H    sodium chloride (NS) flush 5-10 mL  5-10 mL IntraVENous PRN    sodium chloride (NS) flush 5-40 mL  5-40 mL IntraVENous Q8H    sodium chloride (NS) flush 5-40 mL  5-40 mL IntraVENous PRN    acetaminophen (TYLENOL) tablet 650 mg  650 mg Oral Q4H PRN    ondansetron (ZOFRAN) injection 4 mg  4 mg IntraVENous Q4H PRN    Vancomycin - Pharmacy to Dose   Other Rx Dosing/Monitoring    atorvastatin (LIPITOR) tablet 40 mg  40 mg Oral DAILY    butalbital-acetaminophen-caffeine (FIORICET, ESGIC) -40 mg per tablet 1 Tab  1 Tab Oral Q6H PRN    cyclobenzaprine (FLEXERIL) tablet 5 mg  5 mg Oral TID PRN    lamoTRIgine (LaMICtal) tablet 200 mg  200 mg Oral DAILY    OLANZapine (ZyPREXA) tablet 5 mg  5 mg Oral QHS    propranolol LA (INDERAL LA) capsule 80 mg  80 mg Oral DAILY    methadone (DOLOPHINE) tablet 35 mg  35 mg Oral DAILY    cefepime (MAXIPIME) 2 g in 0.9% sodium chloride (MBP/ADV) 100 mL  2 g IntraVENous Q8H    nicotine (NICODERM CQ) 21 mg/24 hr patch 1 Patch  1 Patch TransDERmal DAILY    benzonatate (TESSALON) capsule 100 mg  100 mg Oral TID PRN     ______________________________________________________________________  EXPECTED LENGTH OF STAY: 5d 16h  ACTUAL LENGTH OF STAY:          6                 Mark Casanova MD

## 2020-02-24 NOTE — PROGRESS NOTES
Bedside and Verbal shift change report given to 30 Dulce Maria Acosta (oncoming nurse) by Bertrand Veloz (offgoing nurse). Report included the following information SBAR, Kardex, Intake/Output, MAR and Recent Results.

## 2020-02-24 NOTE — PROGRESS NOTES
Bedside and Verbal shift change report given to Michelle (oncoming nurse) by Reg Rivers (offgoing nurse). Report included the following information SBAR, Kardex and MAR.

## 2020-02-25 VITALS
TEMPERATURE: 97.5 F | SYSTOLIC BLOOD PRESSURE: 125 MMHG | BODY MASS INDEX: 28.82 KG/M2 | RESPIRATION RATE: 16 BRPM | HEART RATE: 88 BPM | WEIGHT: 212.8 LBS | DIASTOLIC BLOOD PRESSURE: 81 MMHG | OXYGEN SATURATION: 97 % | HEIGHT: 72 IN

## 2020-02-25 PROBLEM — A41.9 SEPSIS (HCC): Status: RESOLVED | Noted: 2020-02-18 | Resolved: 2020-02-25

## 2020-02-25 LAB
ANION GAP SERPL CALC-SCNC: 5 MMOL/L (ref 5–15)
BUN SERPL-MCNC: 15 MG/DL (ref 6–20)
BUN/CREAT SERPL: 13 (ref 12–20)
CALCIUM SERPL-MCNC: 8.7 MG/DL (ref 8.5–10.1)
CHLORIDE SERPL-SCNC: 106 MMOL/L (ref 97–108)
CO2 SERPL-SCNC: 27 MMOL/L (ref 21–32)
CREAT SERPL-MCNC: 1.14 MG/DL (ref 0.7–1.3)
DATE LAST DOSE: ABNORMAL
GLUCOSE SERPL-MCNC: 100 MG/DL (ref 65–100)
POTASSIUM SERPL-SCNC: 3.9 MMOL/L (ref 3.5–5.1)
REPORTED DOSE,DOSE: ABNORMAL UNITS
REPORTED DOSE/TIME,TMG: ABNORMAL
SODIUM SERPL-SCNC: 138 MMOL/L (ref 136–145)
VANCOMYCIN TROUGH SERPL-MCNC: 11.3 UG/ML (ref 5–10)

## 2020-02-25 PROCEDURE — 36415 COLL VENOUS BLD VENIPUNCTURE: CPT

## 2020-02-25 PROCEDURE — 74011250637 HC RX REV CODE- 250/637: Performed by: INTERNAL MEDICINE

## 2020-02-25 PROCEDURE — 74011250637 HC RX REV CODE- 250/637: Performed by: ORTHOPAEDIC SURGERY

## 2020-02-25 PROCEDURE — 80048 BASIC METABOLIC PNL TOTAL CA: CPT

## 2020-02-25 PROCEDURE — 80202 ASSAY OF VANCOMYCIN: CPT

## 2020-02-25 RX ORDER — NALOXONE HYDROCHLORIDE 4 MG/.1ML
SPRAY NASAL
Qty: 1 EACH | Refills: 0 | Status: SHIPPED | OUTPATIENT
Start: 2020-02-25

## 2020-02-25 RX ORDER — SAME BUTANEDISULFONATE/BETAINE 400-600 MG
250 POWDER IN PACKET (EA) ORAL 2 TIMES DAILY
Qty: 56 CAP | Refills: 0 | Status: SHIPPED | OUTPATIENT
Start: 2020-02-25 | End: 2020-03-24

## 2020-02-25 RX ORDER — METHADONE HYDROCHLORIDE 5 MG/1
35 TABLET ORAL DAILY
Qty: 1 TAB | Refills: 0 | Status: SHIPPED
Start: 2020-02-25 | End: 2020-02-26

## 2020-02-25 RX ORDER — AMOXICILLIN AND CLAVULANATE POTASSIUM 875; 125 MG/1; MG/1
1 TABLET, FILM COATED ORAL 2 TIMES DAILY WITH MEALS
Qty: 56 TAB | Refills: 0 | Status: SHIPPED | OUTPATIENT
Start: 2020-02-25 | End: 2020-03-24

## 2020-02-25 RX ADMIN — METHADONE HYDROCHLORIDE 35 MG: 10 TABLET ORAL at 09:07

## 2020-02-25 RX ADMIN — ATORVASTATIN CALCIUM 40 MG: 40 TABLET, FILM COATED ORAL at 09:07

## 2020-02-25 RX ADMIN — OXYCODONE 7.5 MG: 5 TABLET ORAL at 07:48

## 2020-02-25 RX ADMIN — Medication 10 ML: at 07:09

## 2020-02-25 RX ADMIN — AMOXICILLIN AND CLAVULANATE POTASSIUM 1 TABLET: 875; 125 TABLET, FILM COATED ORAL at 07:09

## 2020-02-25 RX ADMIN — PROPRANOLOL HYDROCHLORIDE 80 MG: 80 CAPSULE, EXTENDED RELEASE ORAL at 09:07

## 2020-02-25 RX ADMIN — LAMOTRIGINE 200 MG: 100 TABLET ORAL at 09:07

## 2020-02-25 NOTE — PROGRESS NOTES
I have reviewed discharge instructions with the patient. The patient verbalized understanding. Current Discharge Medication List      START taking these medications    Details   amoxicillin-clavulanate (AUGMENTIN) 875-125 mg per tablet Take 1 Tab by mouth two (2) times daily (with meals) for 28 days. Qty: 56 Tab, Refills: 0      methadone (DOLOPHINE) 5 mg tablet Take 7 Tabs by mouth daily for 1 day. Max Daily Amount: 35 mg. Indications: dependence on opioid-type drugs, symptoms from stopping treatment with opioid drugs  Qty: 1 Tab, Refills: 0    Comments: It is not a new medicine: patient follows with Methadone clininc  Associated Diagnoses: Polysubstance dependence including opioid type drug, continuous use (HCC)      Saccharomyces boulardii (FLORASTOR) 250 mg capsule Take 1 Cap by mouth two (2) times a day for 28 days. Qty: 56 Cap, Refills: 0      naloxone (NARCAN) 4 mg/actuation nasal spray Use 1 spray intranasally, then discard. Repeat with new spray every 2 min as needed for opioid overdose symptoms, alternating nostrils. Qty: 1 Each, Refills: 0         CONTINUE these medications which have NOT CHANGED    Details   ondansetron (ZOFRAN ODT) 4 mg disintegrating tablet Take 1 Tab by mouth every eight (8) hours as needed for Nausea. Qty: 20 Tab, Refills: 0      atorvastatin (LIPITOR) 40 mg tablet Take 40 mg by mouth daily. GABAPENTIN, BULK, by Does Not Apply route. propranolol LA (INDERAL LA) 80 mg SR capsule Take 1 Cap by mouth daily. BP med to help reduce headache frequency. Stop taking Atenolol  Qty: 30 Cap, Refills: 1    Associated Diagnoses: Headache, chronic daily; Migraine without aura and without status migrainosus, not intractable      butalbital-acetaminophen-caffeine (FIORICET, ESGIC) -40 mg per tablet 1 tab at onset of migraine; can 1 tab in repeat in 4 hours (one time) if headache remains. Limit: 2 tabs per day, max 2 days a week. 90 day Rx.   Qty: 48 Tab, Refills: 0 Associated Diagnoses: Intractable migraine without aura and without status migrainosus      lamoTRIgine (LAMICTAL) 200 mg tablet Take  by mouth daily. cyclobenzaprine (FLEXERIL) 5 mg tablet Take 2 Tabs by mouth three (3) times daily (with meals). Qty: 20 Tab, Refills: 0      OLANZapine (ZYPREXA) 10 mg tablet Take 5 mg by mouth nightly.          STOP taking these medications       candesartan cilexetil (CANDESARTAN PO) Comments:   Reason for Stopping:

## 2020-02-25 NOTE — DISCHARGE INSTRUCTIONS
Discharge Instructions       PATIENT ID: Kia Arboleda  MRN: [id number]   YOB: 1975    DATE OF ADMISSION: 2/17/2020 11:57 PM    DATE OF DISCHARGE: 2/25/2020    PRIMARY CARE PROVIDER: Kinsey Ellis MD     ATTENDING PHYSICIAN: Millie Richard MD  DISCHARGING PROVIDER: Margaux Hernández MD    To contact this individual call [phone] and ask the  to page. If unavailable ask to be transferred the Adult Hospitalist Department. DISCHARGE DIAGNOSES   1) left hand abscess/cellulitis  Status post I&D in the emergency room wt cx + for possibleEikenella, S aureus, Hemophilus   Eikenella usually seen in oral skinny/human bites  MRI with sinus tract and abscess   I and D wt tenosynovectomy 2/20 , Cx with Streptococcus   Dr. Giancarlo Conklin discussed and recommended IV antibiotics ideally given tendon involvement   Mr Keiry Ramírez told me that he would not want to persue IV now and wants to take oral antibiotics and follow up closely with ID.   If worsening, will need to reassess with imaging and surgery  If no osteomyelitis, plan for 2-4 weeks therapy with Augmentin 875/125 mg renally dosed   ID office Will check lab work on follow up  CBC wt diff, CMP, ESR, CRP and thereafter every 1-2 weeks  F/U with Dr. Giancarlo Conklin on 2/27/20 at 3 pm   Office Address  · [address] [place] [address], [address] [facility]   Phone [phone]  Fax [phone] regarding worsening symptoms/signs of infection , antibiotic side effects, risk for CDiff, renal and hematological complications   FU with Ortho and ID as rec        2) hepatitis C  Patient reports previous treatment with cure  Discussed that successful treatment does not mean immunity  With ongoing substance abuse can reacquire hepatitis C  Component Value Flag Ref Range Units Status   HCV RNA by FELECIA, QL Negative                     3) substance abuse IV  Discussed the need for cessation  Monitoring for withdrawal per primary team  Noticed he is on methadone. Patient to follow up with methadone clinic and discuss further care. Will not prescribe opiates on DC, will add Nalaxone to the discharge meds.    4)  screening  HIV nonreactive   Hep C RNA negative      5) history of lumbar laminectomy         CONSULTATIONS: IP CONSULT TO ORTHOPEDIC SURGERY  IP CONSULT TO INFECTIOUS DISEASES    PROCEDURES/SURGERIES: Procedure(s):  INCISION AND DRAINAGE OF LEFT HAND    PENDING TEST RESULTS:   At the time of discharge the following test results are still pending: n/a    FOLLOW UP APPOINTMENTS:   Follow-up Information     Follow up With Specialties Details Why Contact Info    Dorian Ronquillo DO Infectious Diseases On 2/27/2020 for follow Magalys [phone] Yeimi Gunn MD Internal Medicine Schedule an appointment as soon as possible for a visit in 1 week for post-hospitalization follow up, with in 7 days [id number]       Michael Christopher MD Orthopedic Surgery Schedule an appointment as soon as possible for a visit as recommended [address]:   · It is important that you take the medication exactly as they are prescribed. · Keep your medication in the bottles provided by the pharmacist and keep a list of the medication names, dosages, and times to be taken in your wallet. · Do not take other medications without consulting your doctor. · No drinking alcohol or driving car or operating machinery if you are on narcotic pain medications. Donot take sedating mediations if you are sleepy or confused.    · Fall Precautions  · Keep Well Hydrated  · Report to your medical provider if you feel you have  developed allergies to medications  · Follow up with your PCP or Consultant for medication adjustments and refills  · Monitor for signs of fevers,chills,bleeding,chest pain and seek medical attention if you do so. DIET: regular diet    ACTIVITY: Activity as tolerated    WOUND CARE: as per Ortho    EQUIPMENT needed: n/a      DISCHARGE MEDICATIONS:   See Medication Reconciliation Form    · It is important that you take the medication exactly as they are prescribed. · Keep your medication in the bottles provided by the pharmacist and keep a list of the medication names, dosages, and times to be taken in your wallet. · Do not take other medications without consulting your doctor. NOTIFY YOUR PHYSICIAN FOR ANY OF THE FOLLOWING:   Fever over 101 degrees for 24 hours. Chest pain, shortness of breath, fever, chills, nausea, vomiting, diarrhea, change in mentation, falling, weakness, bleeding. Severe pain or pain not relieved by medications. Or, any other signs or symptoms that you may have questions about. DISPOSITION:  x  Home With:   OT  PT  HH  RN       SNF/Inpatient Rehab/LTAC    Independent/assisted living    Hospice    Other:       Information obtained by :   I understand that if any problems occur once I am at home I am to contact my physician. I understand and acknowledge receipt of the instructions indicated above.                                                                                                                                              [de-identified] or R.N.'s Signature                                                                  Date/Time                                                                                                                                              Patient or Representative Signature                                                          Date/Time          Signed:   Jorje Merino MD  2/25/2020  7:56 AM

## 2020-02-25 NOTE — PROGRESS NOTES
Bedside shift change report given to Mahad Acosta (oncoming nurse) by Margaret Méndez RN (offgoing nurse). Report included the following information SBAR, Kardex and MAR.

## 2020-02-25 NOTE — DISCHARGE SUMMARY
Inpatient hospitalist discharge summary                Brief Overview    PATIENT ID: John Rouse    MRN: [id number]     YOB: 1975    Admitting Provider: Kristel Reynolds MD    Discharging Provider: Carla Casillas MD   To contact this individual call [phone] and ask the  to page. If unavailable ask to be transferred the Adult Hospitalist Department. PCP at discharge: Neha Little [phone]   [address] / [address]    Admission date: 2/17/2020  Date of Discharge: 02/25/20    Chief complaint:   Chief Complaint   Patient presents with    Abscess     Patient Active Problem List   Diagnosis Code    Bipolar disorder ([facility] F31.9    Previous back surgery Z98.890    Overdose T50.901A    Drug overdose T50.901A    Polysubstance dependence including opioid type drug, continuous use ([facility] [facility] F11.20, F19.20    Fall W19. 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    Psychosis (HCC) F29    Altered mental status R41.82    Chronic migraine G43.709    Headache, chronic daily R51    Chronic midline low back pain M54.5, G89.29    Chronic bilateral low back pain with right-sided sciatica M54.41, G89.29    Lumbar degenerative disc disease M51.36    Essential hypertension with goal blood pressure less than 140/90 I10    History of hepatitis C Z86.19    HSV-2 seropositive R76.8    Syncope R55    Abscess of dorsum of left hand L02.512         Discharge diagnosis, hospital course/plan:  1) left hand abscess/cellulitis  Status post I&D in the emergency room wt cx + for possibleEikenella, S aureus, Hemophilus   Eikenella usually seen in oral skinny/human bites  MRI with sinus tract and abscess   I and D wt tenosynovectomy 2/20 , Cx with Streptococcus   Dr. Haroon Noble discussed and recommended IV antibiotics ideally given tendon involvement   Mr Israel Duc told me that he would not want to persue IV now and wants to take oral antibiotics and follow up closely with ID. If worsening, will need to reassess with imaging and surgery  If no osteomyelitis, plan for 2-4 weeks therapy with Augmentin 875/125 mg renally dosed   ID office Will check lab work on follow up  CBC wt diff, CMP, ESR, CRP and thereafter every 1-2 weeks  F/U with Dr. Darin Martinez on 2/27/20 at 3 pm   Office Address  · [facility], [address]   Phone [phone]  Fax [phone] regarding worsening symptoms/signs of infection , antibiotic side effects, risk for CDiff, renal and hematological complications   FU with Ortho and ID as rec        2) hepatitis C  Patient reports previous treatment with cure  Discussed that successful treatment does not mean immunity  With ongoing substance abuse can reacquire hepatitis C  Component Value Flag Ref Range Units Status   HCV RNA by FELECIA, QL Negative                     3) substance abuse IV  Discussed the need for cessation  Monitoring for withdrawal per primary team  Noticed he is on methadone. Patient to follow up with methadone clinic and discuss further care. Will not prescribe opiates on DC, will add Nalaxone to the discharge meds.    4)  screening  HIV nonreactive   Hep C RNA negative      5) history of lumbar laminectomy    6) Bipolar Disorder: cont home meds    7) Migraine headache: Cont PRN Fiorecet and porpraolol    On the date of discharge, diagnostic face to face encounter was performed. Patient was hemodynamically stable, offering no new complaints. Denies any shortness of breath at rest, no fevers or chills, no diarrhea or constipation. Patient is agreeable for discharge. Patient understood and verbalized the understanding of the discharge plan. Patient was advised to seek medical help/ care or return to ED, if symptoms recur, worsen or new symptoms develop.       Discharge Disposition:  Home or Self Care    Discharge activity:  Activity as tolerated    Code status at discharge:  Full Code     Active issues requiring follow up:  Left hand infection    Outpatient follow up:      Future appointments-  No future appointments. Follow-up Information     Follow up With Specialties Details Why Contact Oren Castillo DO Infectious Diseases On 2/27/2020 for follow Up [address] [facility]  [address]      Juan F Spencer MD Internal Medicine Schedule an appointment as soon as possible for a visit in 1 week for post-hospitalization follow up, with in 7 days [address] [facility]  [phone]      Kiesha Stewart MD Orthopedic Surgery Schedule an appointment as soon as possible for a visit as recommended [address] [facility]  [phone]            Test results pending upon discharge:  n/a    Operative procedures performed:  Procedure(s):  INCISION AND DRAINAGE OF LEFT HAND    Treatments: IV hydration and antibiotics: vancomycin, metronidazole and cefepime    Consults:  IP CONSULT TO ORTHOPEDIC SURGERY  IP CONSULT TO INFECTIOUS DISEASES    Procedures:    Procedure(s):  INCISION AND DRAINAGE OF LEFT HAND    Diet:  DIET NUTRITIONAL SUPPLEMENTS  DIET REGULAR    Pertinent test results:  Xr Hand Lt Min 3 V    Result Date: 2/18/2020  EXAM: XR HAND LT MIN 3 V INDICATION: Swelling and tenderness. COMPARISON: None. FINDINGS: Three views of the left hand demonstrate no fracture, dislocation or other acute osseous or articular abnormality. There is soft tissue swelling over the fifth MCP joint and dorsum of the hand. IMPRESSION: No acute osseous or articular abnormality. Soft tissue swelling over the fifth MCP joint and dorsum of the hand.     Mri Up Ext Lt W Wo Cont    Result Date: 2/18/2020  INDICATION: left hand cellulitis, eval abscess COMPARISON: Radiographs of left hand 2/18/2020 EXAM: MRI of the left hand with the following sequences: Axial T1, T2 fat-saturated, post IV contrast-enhanced T1 fat saturated; coronal T1, proton density fat-saturated, T2 fat-saturated, post IV contrast-enhanced T1 fat saturated; sagittal T2 fat-saturated, post IV contrast T1 fat-saturated. 16 mL Dotarem was administered for the study. FINDINGS: There is a soft tissue defect shown dorsomedially in the hand at the level of the fifth metacarpal head with underlying skin devitalization extending to the superficial fascia. There is diffuse dorsal subcutaneous edema-like signal and enhancement. Rim-enhancing fluid is demonstrated within the subcutaneous fat dorsal to the extensor digitorum tendon sheath at the level of the proximal metacarpals and distal carpal row, consistent with abscess, measuring 4.3 x 0.5 cm transverse and at least 3.5 cm craniocaudal. There is fluid like signal following the course of the extensor digitorum tendons, consistent with septic tenosynovitis. The flexor tendons appear normal. There is mild-moderate hyper thenar muscle edema-like signal and enhancement which is greater dorsally. Bone signal is normal. There is no demonstration of substantial joint effusion or substantial arthrosis. IMPRESSION: Dorsomedial soft tissue defect with sinus tract extending to superficial fascia at level of fifth metacarpal head.  Diffuse dorsal subcutaneous inflammatory changes with septic tenosynovitis and partial demonstration of subcutaneous abscess at level of metacarpal bases and distal carpal row measuring 4.3 x 0.5 cm transverse and at least 3.5 cm craniocaudal.       Recent Results (from the past 336 hour(s))   CBC WITH AUTOMATED DIFF    Collection Time: 02/18/20 12:24 AM   Result Value Ref Range    WBC 10.3 4.1 - 11.1 K/uL    RBC 3.92 (L) 4.10 - 5.70 M/uL    HGB 12.4 12.1 - 17.0 g/dL    HCT 38.4 36.6 - 50.3 %    MCV 98.0 80.0 - 99.0 FL    MCH 31.6 26.0 - 34.0 PG    MCHC 32.3 30.0 - 36.5 g/dL    RDW 12.7 11.5 - 14.5 %    PLATELET 303 122 - 748 K/uL MPV 10.6 8.9 - 12.9 FL    NRBC 0.0 0  WBC    ABSOLUTE NRBC 0.00 0.00 - 0.01 K/uL    NEUTROPHILS 73 32 - 75 %    LYMPHOCYTES 18 12 - 49 %    MONOCYTES 7 5 - 13 %    EOSINOPHILS 2 0 - 7 %    BASOPHILS 0 0 - 1 %    IMMATURE GRANULOCYTES 0 0.0 - 0.5 %    ABS. NEUTROPHILS 7.5 1.8 - 8.0 K/UL    ABS. LYMPHOCYTES 1.8 0.8 - 3.5 K/UL    ABS. MONOCYTES 0.7 0.0 - 1.0 K/UL    ABS. EOSINOPHILS 0.2 0.0 - 0.4 K/UL    ABS. BASOPHILS 0.0 0.0 - 0.1 K/UL    ABS. IMM. GRANS. 0.0 0.00 - 0.04 K/UL    DF AUTOMATED     METABOLIC PANEL, COMPREHENSIVE    Collection Time: 02/18/20 12:24 AM   Result Value Ref Range    Sodium 138 136 - 145 mmol/L    Potassium 4.4 3.5 - 5.1 mmol/L    Chloride 98 97 - 108 mmol/L    CO2 28 21 - 32 mmol/L    Anion gap 12 5 - 15 mmol/L    Glucose 93 65 - 100 mg/dL    BUN 9 6 - 20 MG/DL    Creatinine 1.33 (H) 0.70 - 1.30 MG/DL    BUN/Creatinine ratio 7 (L) 12 - 20      GFR est AA >60 >60 ml/min/1.73m2    GFR est non-AA 58 (L) >60 ml/min/1.73m2    Calcium 8.9 8.5 - 10.1 MG/DL    Bilirubin, total 0.3 0.2 - 1.0 MG/DL    ALT (SGPT) 19 12 - 78 U/L    AST (SGOT) 14 (L) 15 - 37 U/L    Alk.  phosphatase 89 45 - 117 U/L    Protein, total 7.6 6.4 - 8.2 g/dL    Albumin 3.3 (L) 3.5 - 5.0 g/dL    Globulin 4.3 (H) 2.0 - 4.0 g/dL    A-G Ratio 0.8 (L) 1.1 - 2.2     CULTURE, BLOOD, PAIRED    Collection Time: 02/18/20 12:24 AM   Result Value Ref Range    Special Requests: NO SPECIAL REQUESTS      Culture result: NO GROWTH 5 DAYS     LACTIC ACID    Collection Time: 02/18/20 12:24 AM   Result Value Ref Range    Lactic acid 0.9 0.4 - 2.0 MMOL/L   C REACTIVE PROTEIN, QT    Collection Time: 02/18/20 12:24 AM   Result Value Ref Range    C-Reactive protein 11.77 (H) <0.60 mg/dL   EKG, 12 LEAD, INITIAL    Collection Time: 02/18/20  3:08 AM   Result Value Ref Range    Ventricular Rate 109 BPM    Atrial Rate 109 BPM    P-R Interval 144 ms    QRS Duration 80 ms    Q-T Interval 318 ms    QTC Calculation (Bezet) 428 ms    Calculated P Axis 63 degrees    Calculated R Axis 57 degrees    Calculated T Axis 55 degrees    Diagnosis       Sinus tachycardia  Otherwise normal ECG  When compared with ECG of 24-NOV-2019 02:53,  No significant change was found  Confirmed by Ju Sam M.D., [place] ([id number]) on 2/19/2020 6:59:45 AM     URINALYSIS W/ REFLEX CULTURE    Collection Time: 02/18/20  4:35 AM   Result Value Ref Range    Color YELLOW/STRAW      Appearance CLEAR CLEAR      Specific gravity 1.005 1.003 - 1.030      pH (UA) 6.5 5.0 - 8.0      Protein NEGATIVE  NEG mg/dL    Glucose NEGATIVE  NEG mg/dL    Ketone NEGATIVE  NEG mg/dL    Bilirubin NEGATIVE  NEG      Blood NEGATIVE  NEG      Urobilinogen 0.2 0.2 - 1.0 EU/dL    Nitrites NEGATIVE  NEG      Leukocyte Esterase NEGATIVE  NEG      WBC 0-4 0 - 4 /hpf    RBC 0-5 0 - 5 /hpf    Epithelial cells FEW FEW /lpf    Bacteria NEGATIVE  NEG /hpf    UA:UC IF INDICATED CULTURE NOT INDICATED BY UA RESULT     DRUG SCREEN, URINE    Collection Time: 02/18/20  4:35 AM   Result Value Ref Range    AMPHETAMINES POSITIVE (A) NEG      BARBITURATES POSITIVE (A) NEG      BENZODIAZEPINES NEGATIVE  NEG      COCAINE NEGATIVE  NEG      METHADONE POSITIVE (A) NEG      OPIATES NEGATIVE  NEG      PCP(PHENCYCLIDINE) NEGATIVE  NEG      THC (TH-CANNABINOL) NEGATIVE  NEG      Drug screen comment (NOTE)    INFLUENZA A+B VIRAL AGS    Collection Time: 02/18/20  4:35 AM   Result Value Ref Range    Influenza A Antigen NEGATIVE  NEG      Influenza B Antigen NEGATIVE  NEG     CULTURE, WOUND W GRAM STAIN    Collection Time: 02/18/20  4:35 AM   Result Value Ref Range    Special Requests: NO SPECIAL REQUESTS      GRAM STAIN RARE WBCS SEEN      GRAM STAIN OCCASIONAL GRAM POSITIVE COCCI IN CHAINS      Culture result: LIGHT STAPHYLOCOCCUS AUREUS (A)      Culture result: LIGHT EIKENELLA CORRODENS BETA LACTAMASE NEGATIVE (A)      Culture result: (A)       HEAVY HAEMOPHILUS INFLUENZAE BETA LACTAMASE NEGATIVE    Culture result: LIGHT MIXED SKIN BRENDA ISOLATED Susceptibility    Staphylococcus aureus - FÁTIMA     Ampicillin ($) <=2 Resistant ug/mL     Ampicillin/sulbactam ($) <=8/4 Susceptible ug/mL     Cefazolin ($) <=4 Susceptible ug/mL     Clindamycin ($)* <=0.5 Resistant ug/mL      * This Staph species is presumed resistant based on detection of inducible clindamycin resistance     Daptomycin ($$$$$) <=0.5 Susceptible ug/mL     Erythromycin ($$$$) >4 Resistant ug/mL     Gentamicin ($) <=4 Susceptible ug/mL     Linezolid ($$$$$) 2 Susceptible ug/mL     Oxacillin <=0.25 Susceptible ug/mL     Rifampin ($$$$)* <=1 Susceptible ug/mL      * Rifampin is not to be used for mono-therapy.      Tetracycline <=4 Susceptible ug/mL     Trimeth/Sulfa <=0.5/9.5 Susceptible ug/mL     Vancomycin ($) 2 Susceptible ug/mL     Ciprofloxacin ($) <=1 Susceptible ug/mL   RESPIRATORY PANEL,PCR,NASOPHARYNGEAL    Collection Time: 02/18/20  6:06 PM   Result Value Ref Range    Adenovirus NOT DETECTED NOTD      Coronavirus 229E NOT DETECTED NOTD      Coronavirus HKU1 NOT DETECTED NOTD      Coronavirus CVNL63 NOT DETECTED NOTD      Coronavirus OC43 NOT DETECTED NOTD      Metapneumovirus NOT DETECTED NOTD      Rhinovirus and Enterovirus NOT DETECTED NOTD      Influenza A NOT DETECTED NOTD      Influenza A, subtype H1 NOT DETECTED NOTD      Influenza A, subtype H3 NOT DETECTED NOTD      INFLUENZA A H1N1 PCR NOT DETECTED NOTD      Influenza B NOT DETECTED NOTD      Parainfluenza 1 NOT DETECTED NOTD      Parainfluenza 2 NOT DETECTED NOTD      Parainfluenza 3 NOT DETECTED NOTD      Parainfluenza virus 4 NOT DETECTED NOTD      RSV by PCR NOT DETECTED NOTD      B. parapertussis, PCR NOT DETECTED NOTD      Bordetella pertussis - PCR NOT DETECTED NOTD      Chlamydophila pneumoniae DNA, QL, PCR NOT DETECTED NOTD      Mycoplasma pneumoniae DNA, QL, PCR NOT DETECTED NOTD     HIV 1/2 AG/AB, 4TH GENERATION,W RFLX CONFIRM    Collection Time: 02/19/20  3:39 AM   Result Value Ref Range    HIV 1/2 Interpretation NONREACTIVE NR      HIV 1/2 result comment SEE NOTE     MAGNESIUM    Collection Time: 02/19/20  3:39 AM   Result Value Ref Range    Magnesium 1.7 1.6 - 2.4 mg/dL   METABOLIC PANEL, BASIC    Collection Time: 02/19/20  3:39 AM   Result Value Ref Range    Sodium 137 136 - 145 mmol/L    Potassium 4.0 3.5 - 5.1 mmol/L    Chloride 106 97 - 108 mmol/L    CO2 26 21 - 32 mmol/L    Anion gap 5 5 - 15 mmol/L    Glucose 96 65 - 100 mg/dL    BUN 10 6 - 20 MG/DL    Creatinine 1.02 0.70 - 1.30 MG/DL    BUN/Creatinine ratio 10 (L) 12 - 20      GFR est AA >60 >60 ml/min/1.73m2    GFR est non-AA >60 >60 ml/min/1.73m2    Calcium 8.0 (L) 8.5 - 10.1 MG/DL   PHOSPHORUS    Collection Time: 02/19/20  3:39 AM   Result Value Ref Range    Phosphorus 2.2 (L) 2.6 - 4.7 MG/DL   CBC WITH AUTOMATED DIFF    Collection Time: 02/19/20  3:39 AM   Result Value Ref Range    WBC 10.0 4.1 - 11.1 K/uL    RBC 3.43 (L) 4.10 - 5.70 M/uL    HGB 10.9 (L) 12.1 - 17.0 g/dL    HCT 33.6 (L) 36.6 - 50.3 %    MCV 98.0 80.0 - 99.0 FL    MCH 31.8 26.0 - 34.0 PG    MCHC 32.4 30.0 - 36.5 g/dL    RDW 12.5 11.5 - 14.5 %    PLATELET 467 061 - 611 K/uL    MPV 11.0 8.9 - 12.9 FL    NRBC 0.0 0  WBC    ABSOLUTE NRBC 0.00 0.00 - 0.01 K/uL    NEUTROPHILS 62 32 - 75 %    LYMPHOCYTES 25 12 - 49 %    MONOCYTES 10 5 - 13 %    EOSINOPHILS 2 0 - 7 %    BASOPHILS 0 0 - 1 %    IMMATURE GRANULOCYTES 1 (H) 0.0 - 0.5 %    ABS. NEUTROPHILS 6.3 1.8 - 8.0 K/UL    ABS. LYMPHOCYTES 2.5 0.8 - 3.5 K/UL    ABS. MONOCYTES 1.0 0.0 - 1.0 K/UL    ABS. EOSINOPHILS 0.2 0.0 - 0.4 K/UL    ABS. BASOPHILS 0.0 0.0 - 0.1 K/UL    ABS. IMM.  GRANS. 0.1 (H) 0.00 - 0.04 K/UL    DF AUTOMATED     METABOLIC PANEL, BASIC    Collection Time: 02/20/20  4:04 AM   Result Value Ref Range    Sodium 138 136 - 145 mmol/L    Potassium 3.9 3.5 - 5.1 mmol/L    Chloride 109 (H) 97 - 108 mmol/L    CO2 25 21 - 32 mmol/L    Anion gap 4 (L) 5 - 15 mmol/L    Glucose 82 65 - 100 mg/dL    BUN 10 6 - 20 MG/DL    Creatinine 1.09 0.70 - 1.30 MG/DL    BUN/Creatinine ratio 9 (L) 12 - 20      GFR est AA >60 >60 ml/min/1.73m2    GFR est non-AA >60 >60 ml/min/1.73m2    Calcium 8.8 8.5 - 10.1 MG/DL   MAGNESIUM    Collection Time: 02/20/20  4:04 AM   Result Value Ref Range    Magnesium 2.0 1.6 - 2.4 mg/dL   PHOSPHORUS    Collection Time: 02/20/20  4:04 AM   Result Value Ref Range    Phosphorus 2.8 2.6 - 4.7 MG/DL   CBC WITH AUTOMATED DIFF    Collection Time: 02/20/20  4:04 AM   Result Value Ref Range    WBC 9.3 4.1 - 11.1 K/uL    RBC 3.77 (L) 4.10 - 5.70 M/uL    HGB 12.0 (L) 12.1 - 17.0 g/dL    HCT 37.3 36.6 - 50.3 %    MCV 98.9 80.0 - 99.0 FL    MCH 31.8 26.0 - 34.0 PG    MCHC 32.2 30.0 - 36.5 g/dL    RDW 12.5 11.5 - 14.5 %    PLATELET 951 270 - 528 K/uL    MPV 10.6 8.9 - 12.9 FL    NRBC 0.0 0  WBC    ABSOLUTE NRBC 0.00 0.00 - 0.01 K/uL    NEUTROPHILS 59 32 - 75 %    LYMPHOCYTES 30 12 - 49 %    MONOCYTES 8 5 - 13 %    EOSINOPHILS 3 0 - 7 %    BASOPHILS 0 0 - 1 %    IMMATURE GRANULOCYTES 0 0.0 - 0.5 %    ABS. NEUTROPHILS 5.5 1.8 - 8.0 K/UL    ABS. LYMPHOCYTES 2.8 0.8 - 3.5 K/UL    ABS. MONOCYTES 0.7 0.0 - 1.0 K/UL    ABS. EOSINOPHILS 0.2 0.0 - 0.4 K/UL    ABS. BASOPHILS 0.0 0.0 - 0.1 K/UL    ABS. IMM.  GRANS. 0.0 0.00 - 0.04 K/UL    DF AUTOMATED     CULTURE, ANAEROBIC    Collection Time: 02/20/20 12:28 PM   Result Value Ref Range    Special Requests: DEEP LEFT HAND ABSCESS     Culture result: NO ANAEROBES ISOLATED     CULTURE, WOUND W GRAM STAIN    Collection Time: 02/20/20 12:28 PM   Result Value Ref Range    Special Requests: DEEP LEFT HAND ABSCESS     GRAM STAIN 2+ WBCS SEEN      GRAM STAIN NO DEFINITE ORGANISM SEEN      Culture result: LIGHT MICROAEROPHILIC STREPTOCOCCUS (A)     CULTURE, ANAEROBIC    Collection Time: 02/20/20 12:28 PM   Result Value Ref Range    Special Requests: SUPERFICIAL LEFT HAND ABSCESS     Culture result: NO ANAEROBES ISOLATED     CULTURE, WOUND Zheng Alpers STAIN    Collection Time: 02/20/20 12:28 PM   Result Value Ref Range    Special Requests: SUPERFICIAL LEFT HAND ABSCESS     GRAM STAIN 4+ WBCS SEEN      GRAM STAIN        OCCASIONAL GRAM POSITIVE COCCI SEEN INTRA AND EXTRACELLULARYLY    Culture result: LIGHT MICROAEROPHILIC STREPTOCOCCUS (A)     CBC WITH AUTOMATED DIFF    Collection Time: 02/21/20  1:30 AM   Result Value Ref Range    WBC 7.3 4.1 - 11.1 K/uL    RBC 3.34 (L) 4.10 - 5.70 M/uL    HGB 10.5 (L) 12.1 - 17.0 g/dL    HCT 32.6 (L) 36.6 - 50.3 %    MCV 97.6 80.0 - 99.0 FL    MCH 31.4 26.0 - 34.0 PG    MCHC 32.2 30.0 - 36.5 g/dL    RDW 12.7 11.5 - 14.5 %    PLATELET 646 835 - 705 K/uL    MPV 10.7 8.9 - 12.9 FL    NRBC 0.0 0  WBC    ABSOLUTE NRBC 0.00 0.00 - 0.01 K/uL    NEUTROPHILS 57 32 - 75 %    LYMPHOCYTES 32 12 - 49 %    MONOCYTES 9 5 - 13 %    EOSINOPHILS 2 0 - 7 %    BASOPHILS 0 0 - 1 %    IMMATURE GRANULOCYTES 0 0.0 - 0.5 %    ABS. NEUTROPHILS 4.2 1.8 - 8.0 K/UL    ABS. LYMPHOCYTES 2.3 0.8 - 3.5 K/UL    ABS. MONOCYTES 0.6 0.0 - 1.0 K/UL    ABS. EOSINOPHILS 0.2 0.0 - 0.4 K/UL    ABS. BASOPHILS 0.0 0.0 - 0.1 K/UL    ABS. IMM.  GRANS. 0.0 0.00 - 0.04 K/UL    DF AUTOMATED     METABOLIC PANEL, BASIC    Collection Time: 02/21/20  1:30 AM   Result Value Ref Range    Sodium 138 136 - 145 mmol/L    Potassium 4.0 3.5 - 5.1 mmol/L    Chloride 108 97 - 108 mmol/L    CO2 25 21 - 32 mmol/L    Anion gap 5 5 - 15 mmol/L    Glucose 122 (H) 65 - 100 mg/dL    BUN 11 6 - 20 MG/DL    Creatinine 1.03 0.70 - 1.30 MG/DL    BUN/Creatinine ratio 11 (L) 12 - 20      GFR est AA >60 >60 ml/min/1.73m2    GFR est non-AA >60 >60 ml/min/1.73m2    Calcium 8.2 (L) 8.5 - 10.1 MG/DL   CBC WITH AUTOMATED DIFF    Collection Time: 02/22/20  4:11 AM   Result Value Ref Range    WBC 5.2 4.1 - 11.1 K/uL    RBC 3.33 (L) 4.10 - 5.70 M/uL    HGB 10.6 (L) 12.1 - 17.0 g/dL    HCT 33.7 (L) 36.6 - 50.3 %    .2 (H) 80.0 - 99.0 FL    MCH 31.8 26.0 - 34.0 PG    MCHC 31.5 30.0 - 36.5 g/dL    RDW 12.6 11.5 - 14.5 %    PLATELET 317 245 - 542 K/uL    MPV 11.1 8.9 - 12.9 FL    NRBC 0.0 0  WBC    ABSOLUTE NRBC 0.00 0.00 - 0.01 K/uL    NEUTROPHILS 45 32 - 75 %    LYMPHOCYTES 43 12 - 49 %    MONOCYTES 9 5 - 13 %    EOSINOPHILS 3 0 - 7 %    BASOPHILS 0 0 - 1 %    IMMATURE GRANULOCYTES 0 0.0 - 0.5 %    ABS. NEUTROPHILS 2.3 1.8 - 8.0 K/UL    ABS. LYMPHOCYTES 2.2 0.8 - 3.5 K/UL    ABS. MONOCYTES 0.5 0.0 - 1.0 K/UL    ABS. EOSINOPHILS 0.2 0.0 - 0.4 K/UL    ABS. BASOPHILS 0.0 0.0 - 0.1 K/UL    ABS. IMM. GRANS. 0.0 0.00 - 0.04 K/UL    DF AUTOMATED     METABOLIC PANEL, BASIC    Collection Time: 02/22/20  4:11 AM   Result Value Ref Range    Sodium 138 136 - 145 mmol/L    Potassium 3.8 3.5 - 5.1 mmol/L    Chloride 110 (H) 97 - 108 mmol/L    CO2 24 21 - 32 mmol/L    Anion gap 4 (L) 5 - 15 mmol/L    Glucose 115 (H) 65 - 100 mg/dL    BUN 11 6 - 20 MG/DL    Creatinine 0.98 0.70 - 1.30 MG/DL    BUN/Creatinine ratio 11 (L) 12 - 20      GFR est AA >60 >60 ml/min/1.73m2    GFR est non-AA >60 >60 ml/min/1.73m2    Calcium 8.7 8.5 - 10.1 MG/DL   VANCOMYCIN, TROUGH    Collection Time: 02/22/20  4:11 AM   Result Value Ref Range    Vancomycin,trough 14.8 (H) 5.0 - 10.0 ug/mL    Reported dose date: NOT PROVIDED      Reported dose time: NOT PROVIDED      Reported dose: NOT PROVIDED UNITS   CBC WITH AUTOMATED DIFF    Collection Time: 02/23/20  1:24 AM   Result Value Ref Range    WBC 5.1 4.1 - 11.1 K/uL    RBC 3.37 (L) 4.10 - 5.70 M/uL    HGB 10.6 (L) 12.1 - 17.0 g/dL    HCT 33.0 (L) 36.6 - 50.3 %    MCV 97.9 80.0 - 99.0 FL    MCH 31.5 26.0 - 34.0 PG    MCHC 32.1 30.0 - 36.5 g/dL    RDW 12.5 11.5 - 14.5 %    PLATELET 176 033 - 980 K/uL    MPV 10.6 8.9 - 12.9 FL    NRBC 0.0 0  WBC    ABSOLUTE NRBC 0.00 0.00 - 0.01 K/uL    NEUTROPHILS 47 32 - 75 %    LYMPHOCYTES 42 12 - 49 %    MONOCYTES 8 5 - 13 %    EOSINOPHILS 3 0 - 7 %    BASOPHILS 0 0 - 1 %    IMMATURE GRANULOCYTES 0 0.0 - 0.5 %    ABS. NEUTROPHILS 2.3 1.8 - 8.0 K/UL    ABS. LYMPHOCYTES 2.1 0.8 - 3.5 K/UL    ABS.  MONOCYTES 0.4 0.0 - 1.0 K/UL    ABS. EOSINOPHILS 0.2 0.0 - 0.4 K/UL    ABS. BASOPHILS 0.0 0.0 - 0.1 K/UL    ABS. IMM.  GRANS. 0.0 0.00 - 0.04 K/UL    DF AUTOMATED     METABOLIC PANEL, BASIC    Collection Time: 02/23/20  1:24 AM   Result Value Ref Range    Sodium 140 136 - 145 mmol/L    Potassium 4.2 3.5 - 5.1 mmol/L    Chloride 112 (H) 97 - 108 mmol/L    CO2 25 21 - 32 mmol/L    Anion gap 3 (L) 5 - 15 mmol/L    Glucose 97 65 - 100 mg/dL    BUN 13 6 - 20 MG/DL    Creatinine 1.02 0.70 - 1.30 MG/DL    BUN/Creatinine ratio 13 12 - 20      GFR est AA >60 >60 ml/min/1.73m2    GFR est non-AA >60 >60 ml/min/1.73m2    Calcium 8.3 (L) 8.5 - 88.3 MG/DL   METABOLIC PANEL, BASIC    Collection Time: 02/24/20  2:35 AM   Result Value Ref Range    Sodium 138 136 - 145 mmol/L    Potassium 3.9 3.5 - 5.1 mmol/L    Chloride 106 97 - 108 mmol/L    CO2 30 21 - 32 mmol/L    Anion gap 2 (L) 5 - 15 mmol/L    Glucose 95 65 - 100 mg/dL    BUN 13 6 - 20 MG/DL    Creatinine 1.17 0.70 - 1.30 MG/DL    BUN/Creatinine ratio 11 (L) 12 - 20      GFR est AA >60 >60 ml/min/1.73m2    GFR est non-AA >60 >60 ml/min/1.73m2    Calcium 8.9 8.5 - 10.1 MG/DL   VANCOMYCIN, TROUGH    Collection Time: 02/25/20 12:32 AM   Result Value Ref Range    Vancomycin,trough 11.3 (H) 5.0 - 10.0 ug/mL    Reported dose date: NOT PROVIDED      Reported dose time: NOT PROVIDED      Reported dose: NOT PROVIDED UNITS   METABOLIC PANEL, BASIC    Collection Time: 02/25/20 12:32 AM   Result Value Ref Range    Sodium 138 136 - 145 mmol/L    Potassium 3.9 3.5 - 5.1 mmol/L    Chloride 106 97 - 108 mmol/L    CO2 27 21 - 32 mmol/L    Anion gap 5 5 - 15 mmol/L    Glucose 100 65 - 100 mg/dL    BUN 15 6 - 20 MG/DL    Creatinine 1.14 0.70 - 1.30 MG/DL    BUN/Creatinine ratio 13 12 - 20      GFR est AA >60 >60 ml/min/1.73m2    GFR est non-AA >60 >60 ml/min/1.73m2    Calcium 8.7 8.5 - 10.1 MG/DL           Physical Exam on Discharge:    Discharge condition: good    Vital signs:   Patient Vitals for the past 12 hrs:   Temp Pulse Resp BP SpO2   02/24/20 2043 97.5 °F (36.4 °C) 75 16 142/88 98 %       General appearance: alert, cooperative, no distress, appears stated age  Lungs: clear to auscultation bilaterally    Current Discharge Medication List      START taking these medications    Details   amoxicillin-clavulanate (AUGMENTIN) 875-125 mg per tablet Take 1 Tab by mouth two (2) times daily (with meals) for 28 days. Qty: 56 Tab, Refills: 0      methadone (DOLOPHINE) 5 mg tablet Take 7 Tabs by mouth daily for 1 day. Max Daily Amount: 35 mg. Indications: dependence on opioid-type drugs, symptoms from stopping treatment with opioid drugs  Qty: 1 Tab, Refills: 0    Comments: It is not a new medicine: patient follows with Methadone [facility]  Associated Diagnoses: Polysubstance dependence including opioid type drug, continuous use (HCC)      Saccharomyces boulardii (FLORASTOR) 250 mg capsule Take 1 Cap by mouth two (2) times a day for 28 days. Qty: 56 Cap, Refills: 0      naloxone (NARCAN) 4 mg/actuation nasal spray Use 1 spray intranasally, then discard. Repeat with new spray every 2 min as needed for opioid overdose symptoms, alternating nostrils. Qty: 1 Each, Refills: 0         CONTINUE these medications which have NOT CHANGED    Details   ondansetron (ZOFRAN ODT) 4 mg disintegrating tablet Take 1 Tab by mouth every eight (8) hours as needed for Nausea. Qty: 20 Tab, Refills: 0      atorvastatin (LIPITOR) 40 mg tablet Take 40 mg by mouth daily. GABAPENTIN, BULK, by Does Not Apply route. propranolol LA (INDERAL LA) 80 mg SR capsule Take 1 Cap by mouth daily. BP med to help reduce headache frequency.   Stop taking Atenolol  Qty: 30 Cap, Refills: 1    Associated Diagnoses: Headache, chronic daily; Migraine without aura and without status migrainosus, not intractable      butalbital-acetaminophen-caffeine (FIORICET, ESGIC) -40 mg per tablet 1 tab at onset of migraine; can 1 tab in repeat in 4 hours (one time) if headache remains. Limit: 2 tabs per day, max 2 days a week. 90 day Rx. Qty: 48 Tab, Refills: 0    Associated Diagnoses: Intractable migraine without aura and without status migrainosus      lamoTRIgine (LAMICTAL) 200 mg tablet Take  by mouth daily. cyclobenzaprine (FLEXERIL) 5 mg tablet Take 2 Tabs by mouth three (3) times daily (with meals). Qty: 20 Tab, Refills: 0      OLANZapine (ZYPREXA) 10 mg tablet Take 5 mg by mouth nightly. STOP taking these medications       candesartan cilexetil (CANDESARTAN PO) Comments:   Reason for Stopping:                  Total time spent on discharge planning, counseling and co-ordination of care:   35 minutes    Hanh García MD  02/25/20  7:59 AM

## 2020-02-25 NOTE — PROGRESS NOTES
Hospital follow-up PCP transitional care appointment has been scheduled with Dr. Mark Sheppard for Tuesday, 3/3/20 at 10:15 a.m. Pending patient discharge.   Marijane Nyhan, Care Management Specialist.

## 2020-02-27 ENCOUNTER — OFFICE VISIT (OUTPATIENT)
Dept: FAMILY MEDICINE CLINIC | Age: 45
End: 2020-02-27

## 2020-02-27 VITALS
TEMPERATURE: 97.2 F | HEART RATE: 96 BPM | OXYGEN SATURATION: 96 % | DIASTOLIC BLOOD PRESSURE: 72 MMHG | RESPIRATION RATE: 18 BRPM | SYSTOLIC BLOOD PRESSURE: 112 MMHG | BODY MASS INDEX: 26.41 KG/M2 | HEIGHT: 72 IN | WEIGHT: 195 LBS

## 2020-02-27 DIAGNOSIS — M65.9 TENOSYNOVITIS OF HAND: Primary | ICD-10-CM

## 2020-02-27 NOTE — PROGRESS NOTES
Infectious Disease Clinic Note      HPI:    Mr. Donaldo Atkins seen for follow up of L hand abscess, tenosynovitis   Says L hand edema Jacky  and painbetter  Denied fever, chills nausea vomiting diarrhea   Tolerating antibiotics and taking daily without missing doses per him  Says he has not used IV drugs since discharge   Says taking Methadone   Says he has been doing his dressing changes       Meds    Current Outpatient Medications:     amoxicillin-clavulanate (AUGMENTIN) 875-125 mg per tablet, Take 1 Tab by mouth two (2) times daily (with meals) for 28 days. , Disp: 56 Tab, Rfl: 0    Saccharomyces boulardii (FLORASTOR) 250 mg capsule, Take 1 Cap by mouth two (2) times a day for 28 days. , Disp: 56 Cap, Rfl: 0    naloxone (NARCAN) 4 mg/actuation nasal spray, Use 1 spray intranasally, then discard. Repeat with new spray every 2 min as needed for opioid overdose symptoms, alternating nostrils. , Disp: 1 Each, Rfl: 0    ondansetron (ZOFRAN ODT) 4 mg disintegrating tablet, Take 1 Tab by mouth every eight (8) hours as needed for Nausea., Disp: 20 Tab, Rfl: 0    atorvastatin (LIPITOR) 40 mg tablet, Take 40 mg by mouth daily. , Disp: , Rfl:     GABAPENTIN, BULK,, by Does Not Apply route., Disp: , Rfl:     propranolol LA (INDERAL LA) 80 mg SR capsule, Take 1 Cap by mouth daily. BP med to help reduce headache frequency. Stop taking Atenolol, Disp: 30 Cap, Rfl: 1    butalbital-acetaminophen-caffeine (FIORICET, ESGIC) -40 mg per tablet, 1 tab at onset of migraine; can 1 tab in repeat in 4 hours (one time) if headache remains. Limit: 2 tabs per day, max 2 days a week. 90 day Rx., Disp: 48 Tab, Rfl: 0    lamoTRIgine (LAMICTAL) 200 mg tablet, Take  by mouth daily. , Disp: , Rfl:     cyclobenzaprine (FLEXERIL) 5 mg tablet, Take 2 Tabs by mouth three (3) times daily (with meals). , Disp: 20 Tab, Rfl: 0    OLANZapine (ZYPREXA) 10 mg tablet, Take 5 mg by mouth nightly., Disp: , Rfl:     Physical Exam:    Vitals: reviewed · GEN: NAD  · HEENT no scleral icterus, no thrush  · CV: S1, S2 heard regularly,   · Lungs: Clear to auscultation bilaterally  · Abdomen: soft, non distended, non tender  · Extremities: no edema  · Skin: no rash  Musculoskeletal left hand no erythema increased warmth or much edema seen   incision sites with packing noted, no purulent discharge or odor, tender to palpation, he is able to move his digits, flex wrist with pain       Labs:   Reviewed from 2/23/20, WBC 5.1, Plt 243,   2/25/20 Cr 1.14      Microbiology Data:       Wound  2/20/20  Specimen Information: Misc. Wound        Component Value Ref Range & Units Status   Special Requests:   Final   SUPERFICIAL LEFT HAND ABSCESS    GRAM STAIN 4+ WBCS SEEN    Final   GRAM STAIN    Final   OCCASIONAL GRAM POSITIVE COCCI SEEN INTRA AND EXTRACELLULARYLY    Culture result: Abnormal     Final   LIGHT MICROAEROPHILIC STREPTOCOCCUS    Result History     CULTURE, WOUND W GRAM STAIN on 2/24/2020           Blood: 2/18/20   Specimen Information: Blood        Component Value Ref Range & Units Status   Special Requests: NO SPECIAL REQUESTS    Preliminary   Culture result: NO GROWTH 3 DAYS    Preliminary   Result History              Wound 2/18/20    Abscess;  Wound         Component Value Ref Range & Units Status   Special Requests: NO SPECIAL REQUESTS    Final   GRAM STAIN RARE WBCS SEEN    Final   GRAM STAIN    Final   OCCASIONAL GRAM POSITIVE COCCI IN CHAINS    Culture result: Abnormal     Final   LIGHT STAPHYLOCOCCUS AUREUS    Culture result: Abnormal     Final   LIGHT EIKENELLA CORRODENS BETA LACTAMASE NEGATIVE    Culture result: Abnormal     Final   HEAVY HAEMOPHILUS INFLUENZAE BETA LACTAMASE NEGATIVE    Culture result:    Final   LIGHT MIXED SKIN BRENDA ISOLATED    Susceptibility      Staphylococcus aureus     FÁTIMA    Ampicillin ($) <=2 ug/mL R    Ampicillin/sulbactam ($) <=8/4 ug/mL S    Cefazolin ($) <=4 ug/mL S    Ciprofloxacin ($) <=1 ug/mL S    Clindamycin ($) <=0.5 ug/mL R1    Daptomycin ($$$$$) <=0.5 ug/mL S    Erythromycin ($$$$) >4 ug/mL R    Gentamicin ($) <=4 ug/mL S    Linezolid ($$$$$) 2 ug/mL S    Oxacillin <=0.25 ug/mL S    Rifampin ($$$$) <=1 ug/mL S2    Tetracycline <=4 ug/mL S    Trimeth/Sulfa <=0.5/9.5 u... S    Vancomycin ($) 2 ug/mL S          Pathology Results:  2015  FINAL PATHOLOGIC DIAGNOSIS  Stomach, biopsy:  Mild chronic gastritis    2014  Clinical History  Hepatitis C  FINAL PATHOLOGIC DIAGNOSIS  Liver, biopsy  Chronic hepatitis, hepatitis C by history, grade 2-3, stage 1-2  See microscopic description    Imaging:   L hand X ray      FINDINGS: Three views of the left hand demonstrate no fracture, dislocation or  other acute osseous or articular abnormality. There is soft tissue swelling over  the fifth MCP joint and dorsum of the hand.     IMPRESSION  IMPRESSION: No acute osseous or articular abnormality. Soft tissue swelling over  the fifth MCP joint and dorsum of the hand. MRI L hand  FINDINGS: There is a soft tissue defect shown dorsomedially in the hand at the  level of the fifth metacarpal head with underlying skin devitalization extending  to the superficial fascia. There is diffuse dorsal subcutaneous edema-like  signal and enhancement. Rim-enhancing fluid is demonstrated within the  subcutaneous fat dorsal to the extensor digitorum tendon sheath at the level of  the proximal metacarpals and distal carpal row, consistent with abscess,  measuring 4.3 x 0.5 cm transverse and at least 3.5 cm craniocaudal. There is  fluid like signal following the course of the extensor digitorum tendons,  consistent with septic tenosynovitis. The flexor tendons appear normal. There is  mild-moderate hyper thenar muscle edema-like signal and enhancement which is  greater dorsally.  Bone signal is normal. There is no demonstration of  substantial joint effusion or substantial arthrosis.     IMPRESSION  IMPRESSION: Dorsomedial soft tissue defect with sinus tract extending to  superficial fascia at level of fifth metacarpal head. Diffuse dorsal  subcutaneous inflammatory changes with septic tenosynovitis and partial  demonstration of subcutaneous abscess at level of metacarpal bases and distal  carpal row measuring 4.3 x 0.5 cm transverse and at least 3.5 cm craniocaudal.    Assessment / Plan:     Mr. Cyndie Heller is a 78-year-old gentleman with hepatitis C, intravenous substance abuse, lumbar laminectomy with left hand abscess. He reports using intravenous drugs to the left hand about a week to 2 ago.     1) left hand abscess/cellulitis  Status post I&D in the emergency room wt cx + for possibleEikenella, S aureus, Hemophilus   Eikenella usually seen in oral skinny/human bites  MRI with sinus tract and abscess   I and D wt tenosynovectomy 2/20 , Cx with Streptococcus   I discussed and recommended IV antibiotics in monitired setting while he was admitted to [facility]  which he refused   On oral Augmentin with plan for 4 weeks based on tolerance   If worse, needs to go hospital for IV therapy and he expressed understanding   Says he will come for follow up closely as outpatient as far as he is improving but if worse, will go to the hospital   Denied using IV drugs again and discussed the need to quit IV drugs and its complications   Check ESR, CRP, CMP, CBC wt diff today   F/Uwith ortho  F/U with me 1-2 weeks  Yogurt/ probiotics daily           2) hepatitis C  Patient reports previous treatment with cure  Discussed that successful treatment does not mean immunity  With ongoing substance abuse can reacquire hepatitis C  Component Value Flag Ref Range Units Status   HCV RNA by [facility]LE[facility], QL Negative              3) substance abuse IV  Discussed the need for cessation  On methadone       4)  screening  HIV nonreactive   Hep C RNA negative     5) history of lumbar laminectomy      6) RTC 1-2 weeks

## 2020-02-28 ENCOUNTER — DOCUMENTATION ONLY (OUTPATIENT)
Dept: FAMILY MEDICINE CLINIC | Age: 45
End: 2020-02-28

## 2020-02-28 DIAGNOSIS — M65.9 TENOSYNOVITIS: Primary | ICD-10-CM

## 2020-02-28 DIAGNOSIS — N17.9 AKI (ACUTE KIDNEY INJURY) (HCC): ICD-10-CM

## 2020-02-28 LAB
ALBUMIN SERPL-MCNC: 4.2 G/DL (ref 4–5)
ALBUMIN/GLOB SERPL: 1.6 {RATIO} (ref 1.2–2.2)
ALP SERPL-CCNC: 83 IU/L (ref 39–117)
ALT SERPL-CCNC: 16 IU/L (ref 0–44)
AST SERPL-CCNC: 12 IU/L (ref 0–40)
BASOPHILS # BLD AUTO: 0.1 X10E3/UL (ref 0–0.2)
BASOPHILS NFR BLD AUTO: 1 %
BILIRUB SERPL-MCNC: <0.2 MG/DL (ref 0–1.2)
BUN SERPL-MCNC: 21 MG/DL (ref 6–24)
BUN/CREAT SERPL: 13 (ref 9–20)
CALCIUM SERPL-MCNC: 9.6 MG/DL (ref 8.7–10.2)
CHLORIDE SERPL-SCNC: 99 MMOL/L (ref 96–106)
CO2 SERPL-SCNC: 29 MMOL/L (ref 20–29)
CREAT SERPL-MCNC: 1.61 MG/DL (ref 0.76–1.27)
CRP SERPL-MCNC: 5 MG/L (ref 0–10)
EOSINOPHIL # BLD AUTO: 0.2 X10E3/UL (ref 0–0.4)
EOSINOPHIL NFR BLD AUTO: 2 %
ERYTHROCYTE [DISTWIDTH] IN BLOOD BY AUTOMATED COUNT: 12.5 % (ref 11.6–15.4)
ERYTHROCYTE [SEDIMENTATION RATE] IN BLOOD BY WESTERGREN METHOD: 38 MM/HR (ref 0–15)
GLOBULIN SER CALC-MCNC: 2.7 G/DL (ref 1.5–4.5)
GLUCOSE SERPL-MCNC: 108 MG/DL (ref 65–99)
HCT VFR BLD AUTO: 36.9 % (ref 37.5–51)
HGB BLD-MCNC: 12.9 G/DL (ref 13–17.7)
IMM GRANULOCYTES # BLD AUTO: 0 X10E3/UL (ref 0–0.1)
IMM GRANULOCYTES NFR BLD AUTO: 1 %
INTERPRETATION: NORMAL
LYMPHOCYTES # BLD AUTO: 3.6 X10E3/UL (ref 0.7–3.1)
LYMPHOCYTES NFR BLD AUTO: 41 %
MCH RBC QN AUTO: 32.3 PG (ref 26.6–33)
MCHC RBC AUTO-ENTMCNC: 35 G/DL (ref 31.5–35.7)
MCV RBC AUTO: 92 FL (ref 79–97)
MONOCYTES # BLD AUTO: 0.7 X10E3/UL (ref 0.1–0.9)
MONOCYTES NFR BLD AUTO: 8 %
NEUTROPHILS # BLD AUTO: 4.2 X10E3/UL (ref 1.4–7)
NEUTROPHILS NFR BLD AUTO: 47 %
PLATELET # BLD AUTO: 401 X10E3/UL (ref 150–450)
POTASSIUM SERPL-SCNC: 4.3 MMOL/L (ref 3.5–5.2)
PROT SERPL-MCNC: 6.9 G/DL (ref 6–8.5)
RBC # BLD AUTO: 4 X10E6/UL (ref 4.14–5.8)
SODIUM SERPL-SCNC: 141 MMOL/L (ref 134–144)
WBC # BLD AUTO: 8.8 X10E3/UL (ref 3.4–10.8)

## 2020-02-28 NOTE — PROGRESS NOTES
Called Mr Gus Tobar she is drinking more water now   Cr went up to 1.6  Denied any dysuria or urinary symptoms   Plan tor repeat bmp next week   If cr worsening, need to adjust Augmentin dose and discussed with him

## 2020-03-06 DIAGNOSIS — M65.9 TENOSYNOVITIS: ICD-10-CM

## 2020-03-06 DIAGNOSIS — N17.9 AKI (ACUTE KIDNEY INJURY) (HCC): ICD-10-CM

## 2020-04-20 ENCOUNTER — APPOINTMENT (OUTPATIENT)
Dept: WOUND CARE | Age: 45
End: 2020-04-20

## 2020-09-25 ENCOUNTER — HOSPITAL ENCOUNTER (EMERGENCY)
Age: 45
Discharge: HOME | End: 2020-09-26
Attending: EMERGENCY MEDICINE
Payer: COMMERCIAL

## 2020-09-25 ENCOUNTER — APPOINTMENT (OUTPATIENT)
Dept: GENERAL RADIOLOGY | Age: 45
End: 2020-09-25
Attending: EMERGENCY MEDICINE
Payer: COMMERCIAL

## 2020-09-25 VITALS
DIASTOLIC BLOOD PRESSURE: 78 MMHG | HEART RATE: 83 BPM | SYSTOLIC BLOOD PRESSURE: 123 MMHG | OXYGEN SATURATION: 97 % | RESPIRATION RATE: 16 BRPM | TEMPERATURE: 98 F

## 2020-09-25 DIAGNOSIS — M54.2 NECK PAIN: ICD-10-CM
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
## 2020-09-25 DIAGNOSIS — M54.50 MIDLINE LOW BACK PAIN WITHOUT SCIATICA, UNSPECIFIED CHRONICITY: ICD-10-CM

## 2020-09-25 PROCEDURE — 99282 EMERGENCY DEPT VISIT SF MDM: CPT

## 2020-09-25 PROCEDURE — 72040 X-RAY EXAM NECK SPINE 2-3 VW: CPT

## 2020-09-25 PROCEDURE — 96372 THER/PROPH/DIAG INJ SC/IM: CPT

## 2020-09-25 PROCEDURE — 74011250636 HC RX REV CODE- 250/636: Performed by: EMERGENCY MEDICINE

## 2020-09-25 PROCEDURE — 72100 X-RAY EXAM L-S SPINE 2/3 VWS: CPT

## 2020-09-25 RX ORDER — KETOROLAC TROMETHAMINE 30 MG/ML
30 INJECTION, SOLUTION INTRAMUSCULAR; INTRAVENOUS
Status: COMPLETED | OUTPATIENT
Start: 2020-09-26 | End: 2020-09-25

## 2020-09-25 RX ADMIN — KETOROLAC TROMETHAMINE 30 MG: 30 INJECTION, SOLUTION INTRAMUSCULAR at 23:54
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
## 2020-09-26 NOTE — ED NOTES
Patient given discharge papers and instructions by primary RN. Patient verbalized understanding and stated not having any questions or concerns regarding his care. Patient ambulatory out of ED.
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
## 2020-10-18 ENCOUNTER — HOSPITAL ENCOUNTER (EMERGENCY)
Age: 45
Discharge: HOME | End: 2020-10-18
Attending: EMERGENCY MEDICINE
Payer: COMMERCIAL

## 2020-10-18 ENCOUNTER — APPOINTMENT (OUTPATIENT)
Dept: GENERAL RADIOLOGY | Age: 45
End: 2020-10-18
Attending: EMERGENCY MEDICINE
Payer: COMMERCIAL

## 2020-10-18 VITALS
HEIGHT: 72 IN | WEIGHT: 205 LBS | DIASTOLIC BLOOD PRESSURE: 63 MMHG | HEART RATE: 94 BPM | SYSTOLIC BLOOD PRESSURE: 115 MMHG | OXYGEN SATURATION: 96 % | BODY MASS INDEX: 27.77 KG/M2 | TEMPERATURE: 98.1 F | RESPIRATION RATE: 20 BRPM

## 2020-10-18 DIAGNOSIS — Z20.822 ENCOUNTER FOR LABORATORY TESTING FOR COVID-19 VIRUS: ICD-10-CM

## 2020-10-18 DIAGNOSIS — R06.2 WHEEZE: ICD-10-CM

## 2020-10-18 DIAGNOSIS — R05.9 COUGH: Primary | ICD-10-CM

## 2020-10-18 PROCEDURE — 71045 X-RAY EXAM CHEST 1 VIEW: CPT

## 2020-10-18 PROCEDURE — 74011250637 HC RX REV CODE- 250/637: Performed by: EMERGENCY MEDICINE

## 2020-10-18 PROCEDURE — 99284 EMERGENCY DEPT VISIT MOD MDM: CPT

## 2020-10-18 PROCEDURE — 87635 SARS-COV-2 COVID-19 AMP PRB: CPT

## 2020-10-18 RX ORDER — ACETAMINOPHEN 500 MG
1000 TABLET ORAL
Status: COMPLETED | OUTPATIENT
Start: 2020-10-18 | End: 2020-10-18

## 2020-10-18 RX ORDER — ALBUTEROL SULFATE 90 UG/1
2 AEROSOL, METERED RESPIRATORY (INHALATION)
Qty: 1 INHALER | Refills: 0 | Status: SHIPPED | OUTPATIENT
Start: 2020-10-18

## 2020-10-18 RX ORDER — PREDNISONE 10 MG/1
TABLET ORAL
Qty: 21 TAB | Refills: 0 | Status: SHIPPED | OUTPATIENT
Start: 2020-10-18 | End: 2022-02-04

## 2020-10-18 RX ADMIN — ACETAMINOPHEN 1000 MG: 500 TABLET ORAL at 04:41

## 2020-10-18 NOTE — ED PROVIDER NOTES
This is a 59-year-old gentleman comes emergency room with chief complaint of chills, shortness of breath, cough, and body aches for the past 2 days. Patient states that he lives in a rehab group home. Patient states that he has other residents who work at places that a been close due to positive [place] contacts. Patient states that nobody in his house is tested positive though. Patient denies any true fever. Patient denies any nausea or vomiting. Patient denies any loss of smell or taste. Patient does continue to smoke approximately 2packs/day. The history is provided by the patient. No  was used. Cough   This is a new problem. The current episode started 2 days ago. The problem occurs every few minutes. The problem has been gradually worsening. The cough is non-productive. There has been no fever. Associated symptoms include chills, headaches, myalgias, shortness of breath and wheezing. Pertinent negatives include no chest pain, no ear pain, no rhinorrhea, no nausea, no vomiting and no confusion. He has tried nothing for the symptoms. He is a smoker.         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    Hypertension     IV drug abuse ([facility] [address]     Kidney stone     Liver disease     elevated liver enzymes/hep C    Neuralgia     Other ill-defined conditions(799.89)     chronic bronchitis    Polysubstance dependence (HCC)     stimulants, MJ, tob, barbs, benzos, opiates    Psychiatric disorder     Bipolar, Anxiety    Unspecified adverse effect of anesthesia     \"was told he woke up during back surgery\" and during nerve root injection    Unspecified sleep apnea     mild - does not use CPAP       Past Surgical History:   Procedure Laterality Date    HX LUMBAR LAMINECTOMY      HX ORTHOPAEDIC Bilateral     back surgery - laminectomy/discectomy    HX ORTHOPAEDIC      nerve root injection in back    HX OTHER SURGICAL      testicular surgery- varicocelectomy, i &d of abscess on right elbow         Family History:   Problem Relation Age of Onset    Heart Disease Maternal Grandfather        Social History     Socioeconomic History    Marital status: SINGLE     Spouse name: Not on file    Number of children: Not on file    Years of education: Not on file    Highest education level: Not on file   Occupational History    Not on file   Social Needs    Financial resource strain: Not on file    Food insecurity     Worry: Not on file     Inability: Not on file    Transportation needs     Medical: Not on file     Non-medical: Not on file   Tobacco Use    Smoking status: Current Every Day Smoker     Packs/day: 2.00     Years: 22.00     Pack years: 44.00     Types: Cigarettes    Smokeless tobacco: Former User    Tobacco comment: cigarettes   Substance and Sexual Activity    Alcohol use: No     Alcohol/week: 0.0 standard drinks     Comment: no longer drinks    Drug use: Not Currently     Types: Cocaine     Comment: per patient no methamphetamines    Sexual activity: Not Currently   Lifestyle    Physical activity     Days per week: Not on file     Minutes per session: Not on file    Stress: Not on file   Relationships    Social connections     Talks on phone: Not on file     Gets together: Not on file     Attends Religion service: Not on file     Active member of club or organization: Not on file     Attends meetings of clubs or organizations: Not on file     Relationship status: Not on file    Intimate partner violence     Fear of current or ex partner: Not on file     Emotionally abused: Not on file     Physically abused: Not on file     Forced sexual activity: Not on file   Other Topics Concern    Not on file   Social History Narrative    The patient is . The patient lives his parents. The patient has no children. The patient does not have legal issues pending. The patient's source of income comes from family. patient's greatest support comes from his parents and this person will be involved with the treatment. pt has not been a victim of sexual/physical abuse. The highest grade achieved is College     ALLERGIES: Barium sulfate; Demerol [meperidine]; Iodinated contrast media; and Neurontin [gabapentin]    Review of Systems   Constitutional: Positive for chills. Negative for appetite change, fever and unexpected weight change. HENT: Negative for ear pain, hearing loss, rhinorrhea and trouble swallowing. Eyes: Negative for pain and visual disturbance. Respiratory: Positive for cough, shortness of breath and wheezing. Negative for chest tightness. Cardiovascular: Negative for chest pain and palpitations. Gastrointestinal: Negative for abdominal distention, abdominal pain, blood in stool, nausea and vomiting. Genitourinary: Negative for dysuria, hematuria and urgency. Musculoskeletal: Positive for myalgias. Negative for back pain. Skin: Negative for rash. Neurological: Positive for headaches. Negative for dizziness, syncope, weakness and numbness. Psychiatric/Behavioral: Negative for confusion and suicidal ideas. All other systems reviewed and are negative. Vitals:    10/18/20 0403 10/18/20 0522   BP: 133/86 115/63   Pulse: 94    Resp: 16 20   Temp: 98.3 °F (36.8 °C) 98.1 °F (36.7 °C)   SpO2: 98% 96%   Weight: 93 kg (205 lb)    Height: 6' (1.829 m)             Physical Exam  Vitals signs and nursing note reviewed. Constitutional:       General: He is not in acute distress. Appearance: Normal appearance. He is well-developed. He is not diaphoretic. HENT:      Head: Normocephalic and atraumatic. Right Ear: External ear normal.      Left Ear: External ear normal.   Eyes:      General: No scleral icterus.         Right eye: No discharge. Left eye: No discharge. Extraocular Movements: Extraocular movements intact. Conjunctiva/sclera: Conjunctivae normal.      Pupils: Pupils are equal, round, and reactive to light. Neck:      Musculoskeletal: Normal range of motion and neck supple. Vascular: No JVD. Trachea: No tracheal deviation. Cardiovascular:      Rate and Rhythm: Normal rate and regular rhythm. Heart sounds: Normal heart sounds. No murmur. No friction rub. No gallop. Pulmonary:      Effort: Pulmonary effort is normal. No respiratory distress. Breath sounds: No stridor. Examination of the right-middle field reveals wheezing. Examination of the left-middle field reveals wheezing. Examination of the right-lower field reveals wheezing. Examination of the left-lower field reveals wheezing. Wheezing present. No decreased breath sounds, rhonchi or rales. Chest:      Chest wall: No tenderness. Abdominal:      General: Bowel sounds are normal. There is no distension. Palpations: Abdomen is soft. Tenderness: There is no abdominal tenderness. There is no guarding or rebound. Musculoskeletal: Normal range of motion. General: No tenderness. Skin:     General: Skin is warm and dry. Capillary Refill: Capillary refill takes less than 2 seconds. Coloration: Skin is not pale. Findings: No erythema or rash. Neurological:      General: No focal deficit present. Mental Status: He is alert and oriented to person, place, and time. GCS: GCS eye subscore is 4. GCS verbal subscore is 5. GCS motor subscore is 6. Cranial Nerves: No cranial nerve deficit. Sensory: No sensory deficit. Motor: No weakness or abnormal muscle tone. Coordination: Coordination normal.      Deep Tendon Reflexes: Reflexes are normal and symmetric.  Reflexes normal.   Psychiatric:         Mood and Affect: Mood normal.         Behavior: Behavior normal.         Thought Content: Thought content normal.         Judgment: Judgment normal.          MDM  Number of Diagnoses or Management Options  Cough:   Encounter for laboratory testing for COVID-19 virus:   Wheeze:      Amount and/or Complexity of Data Reviewed  Clinical lab tests: ordered  Tests in the radiology section of CPT®: ordered and reviewed    Risk of Complications, Morbidity, and/or Mortality  Presenting problems: moderate  Diagnostic procedures: low  Management options: moderate    Patient Progress  Patient progress: stable       Procedures    Chief Complaint   Patient presents with    Headache    Cough       The patient's presenting problems have been discussed, and they are in agreement with the care plan formulated and outlined with them. I have encouraged them to ask questions as they arise throughout their visit. MEDICATIONS GIVEN:  Medications   acetaminophen (TYLENOL) tablet 1,000 mg (1,000 mg Oral Given 10/18/20 0441)       LABS REVIEWED:  Recent Results (from the past 24 hour(s))   SARS-COV-2    Collection Time: 10/18/20  5:21 AM   Result Value Ref Range    Specimen source Nasopharyngeal      SARS-CoV-2 PENDING     SARS-CoV-2 PENDING     Specimen source Nasopharyngeal      COVID-19 rapid test PENDING     Specimen type NP Swab      Health status PENDING     COVID-19 PENDING        VITAL SIGNS:  Patient Vitals for the past 24 hrs:   Temp Pulse Resp BP SpO2   10/18/20 0522 98.1 °F (36.7 °C)  20 115/63 96 %   10/18/20 0403 98.3 °F (36.8 °C) 94 16 133/86 98 %       RADIOLOGY RESULTS:  The following have been ordered and reviewed:  Xr Chest Port    Result Date: 10/18/2020  EXAM:  XR CHEST PORT INDICATION:  cough chills, shortness of breath, body aches for 2 days COMPARISON:  1/4/2018 FINDINGS: A portable AP radiograph of the chest was obtained at 4:41 hours. The lungs are clear. The cardiac and mediastinal contours and pulmonary vascularity are normal.  The bones and soft tissues are unremarkable.   There has been no change since the prior study. IMPRESSION: No acute process. PROGRESS NOTES:  Discussed results and plan with patient. Patient will be discharged home with PCP follow up. Patient instructed to return to the emergency room for any worsening symptoms or any other concerns. Tamika Santiago was evaluated in the Emergency Department on 10/18/2020 for the symptoms described in the history of present illness. He/she was evaluated in the context of the global COVID-19 pandemic, which necessitated consideration that the patient might be at risk for infection with the SARS-CoV-2 virus that causes COVID-19. Institutional protocols and algorithms that pertain to the evaluation of patients at risk for COVID-19 are in a state of rapid change based on information released by regulatory bodies including the CDC and federal and state organizations. These policies and algorithms were followed during the patient's care in the ED. Surrogate Decision Maker (Who do you want to make decisions for you in the event you are not able to?): Extended Emergency Contact Information  Primary Emergency Contact: [address] [facility] Phone: [phone]  Mobile Phone: [phone]  Relation: Parent    Ventilation (Do you want to be intubated and mechanically ventilated?):  Yes    CPR (Do you want chest compressions and electricity in an attempt to restart your heart?): Yes      DIAGNOSIS:    1. Cough    2. Wheeze    3.  Encounter for laboratory testing for COVID-19 virus        PLAN:  Follow-up Information     Follow up With Specialties Details Why Contact Info    Kailey Persaud MD Internal Medicine Schedule an appointment as soon as possible for a visit  [address] [place] [address]  [phone]      [address] [facility] [address]  Emergency Medicine  If symptoms worsen [facility]  [phone]        Discharge Medication List as of 10/18/2020  5:17 AM START taking these medications    Details   predniSONE (STERAPRED DS) 10 mg dose pack Take as directed. , Print, Disp-21 Tab,R-0      albuterol (PROVENTIL HFA, VENTOLIN HFA, PROAIR HFA) 90 mcg/actuation inhaler Take 2 Puffs by inhalation every four (4) hours as needed for Wheezing or Shortness of Breath., Print, Disp-1 Inhaler,R-0         CONTINUE these medications which have NOT CHANGED    Details   naloxone (NARCAN) 4 mg/actuation nasal spray Use 1 spray intranasally, then discard. Repeat with new spray every 2 min as needed for opioid overdose symptoms, alternating nostrils. , Print, Disp-1 Each, R-0      ondansetron (ZOFRAN ODT) 4 mg disintegrating tablet Take 1 Tab by mouth every eight (8) hours as needed for Nausea. , Print, Disp-20 Tab, R-0      atorvastatin (LIPITOR) 40 mg tablet Take 40 mg by mouth daily. , Historical Med      GABAPENTIN, BULK, by Does Not Apply route., Historical Med      propranolol LA (INDERAL LA) 80 mg SR capsule Take 1 Cap by mouth daily. BP med to help reduce headache frequency. Stop taking Atenolol, Print, Disp-30 Cap, R-1      butalbital-acetaminophen-caffeine (FIORICET, ESGIC) -40 mg per tablet 1 tab at onset of migraine; can 1 tab in repeat in 4 hours (one time) if headache remains. Limit: 2 tabs per day, max 2 days a week. 90 day Rx., Print, Disp-48 Tab, R-0      lamoTRIgine (LAMICTAL) 200 mg tablet Take  by mouth daily. , Historical Med      cyclobenzaprine (FLEXERIL) 5 mg tablet Take 2 Tabs by mouth three (3) times daily (with meals). , Print, Disp-20 Tab, R-0      OLANZapine (ZYPREXA) 10 mg tablet Take 5 mg by mouth nightly., Historical Med             ED COURSE: The patient's hospital course has been uncomplicated. Please note that this dictation was completed with airpim, the [employer] voice recognition software. Quite often unanticipated grammatical, syntax, homophones, and other interpretive errors are inadvertently transcribed by the computer software.   Please disregard these errors. Please excuse any errors that have escaped final proofreading.

## 2020-10-18 NOTE — ED NOTES
Pt provided d.c info and prescriptions. NAD, vitals and pain stable, A+Ox4. Ambulatory with even and steady gait independently. Aware to follow up with pcp. Aware to quarantine self until COVID results received.

## 2020-10-18 NOTE — ED TRIAGE NOTES
Arrives from home, ambulatory through triage, reporting chills, SOB, cough, body aches x2 days. Pt denies sick contact, NVD, sore throat.

## 2020-10-18 NOTE — DISCHARGE INSTRUCTIONS
We hope that we have addressed all of your medical concerns. The examination and treatment you received in the Emergency Department were for an emergent problem and were not intended as complete care. It is important that you follow up with your healthcare provider(s) for ongoing care. If your symptoms worsen or do not improve as expected, and you are unable to reach your usual health care provider(s), you should return to the Emergency Department. Today's healthcare is undergoing tremendous change, and patient satisfaction surveys are one of the many tools to assess the quality of medical care. You may receive a survey from the [employer] regarding your experience in the Emergency Department. I hope that your experience has been completely positive, particularly the medical care that I provided. As such, please participate in the survey; anything less than excellent does not meet my expectations or intentions. [facility] and [facility] participate in nationally recognized quality of care measures. If your blood pressure is greater than 120/80, as reported below, we urge that you seek medical care to address the potential of high blood pressure, commonly known as hypertension. Hypertension can be hereditary or can be caused by certain medical conditions, pain, stress, or \"white coat syndrome. \"       Please make an appointment with your health care provider(s) for follow up of your Emergency Department visit. VITALS:   Patient Vitals for the past 8 hrs:   Temp Pulse Resp BP SpO2   10/18/20 0403 98.3 °F (36.8 °C) 94 16 133/86 98 %          Thank you for allowing us to provide you with medical care today. We realize that you have many choices for your emergency care needs. Please choose us in the future for any continued health care needs. Colletta Pale Alyne Glimpse, [address]. Office: [phone]            No results found for this or any previous visit (from the past 24 hour(s)). Xr Chest Port    Result Date: 10/18/2020  EXAM:  XR CHEST PORT INDICATION:  cough chills, shortness of breath, body aches for 2 days COMPARISON:  1/4/2018 FINDINGS: A portable AP radiograph of the chest was obtained at 4:41 hours. The lungs are clear. The cardiac and mediastinal contours and pulmonary vascularity are normal.  The bones and soft tissues are unremarkable. There has been no change since the prior study. IMPRESSION: No acute process. Patient Education        Cough: Care Instructions  Your Care Instructions     A cough is your body's response to something that bothers your throat or airways. Many things can cause a cough. You might cough because of a cold or the flu, bronchitis, or asthma. Smoking, postnasal drip, allergies, and stomach acid that backs up into your throat also can cause coughs. A cough is a symptom, not a disease. Most coughs stop when the cause, such as a cold, goes away. You can take a few steps at home to cough less and feel better. Follow-up care is a key part of your treatment and safety. Be sure to make and go to all appointments, and call your doctor if you are having problems. It's also a good idea to know your test results and keep a list of the medicines you take. How can you care for yourself at home? · Drink lots of water and other fluids. This helps thin the mucus and soothes a dry or sore throat. Honey or lemon juice in hot water or tea may ease a dry cough. · Take cough medicine as directed by your doctor. · Prop up your head on pillows to help you breathe and ease a dry cough. · Try cough drops to soothe a dry or sore throat. Cough drops don't stop a cough. Medicine-flavored cough drops are no better than candy-flavored drops or hard candy. · Do not smoke. Avoid secondhand smoke.  If you need help quitting, talk to your doctor about stop-smoking programs and medicines. These can increase your chances of quitting for good. When should you call for help? Call 911 anytime you think you may need emergency care. For example, call if:    · You have severe trouble breathing. Call your doctor now or seek immediate medical care if:    · You cough up blood.     · You have new or worse trouble breathing.     · You have a new or higher fever.     · You have a new rash. Watch closely for changes in your health, and be sure to contact your doctor if:    · You cough more deeply or more often, especially if you notice more mucus or a change in the color of your mucus.     · You have new symptoms, such as a sore throat, an earache, or sinus pain.     · You do not get better as expected. Where can you learn more? Go to http://www.gray.com/  Enter D279 in the search box to learn more about \"Cough: Care Instructions. \"  Current as of: February 24, 2020               Content Version: 12.6  © 2006-2020 [employer]. Care instructions adapted under license by [employer] (which disclaims liability or warranty for this information). If you have questions about a medical condition or this instruction, always ask your healthcare professional. [place] [address] any warranty or liability for your use of this information. Patient Education        Wheezing or Bronchoconstriction: Care Instructions  Your Care Instructions  Wheezing is a whistling noise made during breathing. It occurs when the small airways, or bronchial tubes, that lead to your lungs swell or contract (spasm) and become narrow. This narrowing is called bronchoconstriction. When your airways constrict, it is hard for air to pass through and this makes it hard for you to breathe. Wheezing and bronchoconstriction can be caused by many problems, including:  · An infection such as the flu or a cold.   · Allergies such as hay fever.  · Diseases such as asthma or chronic obstructive pulmonary disease. · Smoking. Treatment for your wheezing depends on what is causing the problem. Your wheezing may get better without treatment. But you may need to pay attention to things that cause your wheezing and avoid them. Or you may need medicine to help treat the wheezing and to reduce the swelling or to relieve spasms in your lungs. Follow-up care is a key part of your treatment and safety. Be sure to make and go to all appointments, and call your doctor if you are having problems. It is also a good idea to know your test results and keep a list of the medicines you take. How can you care for yourself at home? · Take your medicine exactly as prescribed. Call your doctor if you think you are having a problem with your medicine. You will get more details on the specific medicine your doctor prescribes. · If your doctor prescribed antibiotics, take them as directed. Do not stop taking them just because you feel better. You need to take the full course of antibiotics. · Breathe moist air from a humidifier, hot shower, or sink filled with hot water. This may help ease your symptoms and make it easier for you to breathe. · If you have congestion in your nose and throat, drinking plenty of fluids, especially hot fluids, may help relieve your symptoms. If you have kidney, heart, or liver disease and have to limit fluids, talk with your doctor before you increase the amount of fluids you drink. · If you have mucus in your airways, it may help to breathe deeply and cough. · Do not smoke or allow others to smoke around you. Smoking can make your wheezing worse. If you need help quitting, talk to your doctor about stop-smoking programs and medicines. These can increase your chances of quitting for good. · Avoid things that may cause your wheezing.  These may include colds, smoke, air pollution, dust, pollen, pets, cockroaches, stress, and cold air.  When should you call for help? Call 911 anytime you think you may need emergency care. For example, call if:    · You have severe trouble breathing.     · You passed out (lost consciousness). Call your doctor now or seek immediate medical care if:    · You cough up yellow, dark brown, or bloody mucus (sputum).     · You have new or worse shortness of breath.     · Your wheezing is not getting better or it gets worse after you start taking your medicine. Watch closely for changes in your health, and be sure to contact your doctor if:    · You do not get better as expected. Where can you learn more? Go to http://www.gray.com/  Enter V454 in the search box to learn more about \"Wheezing or Bronchoconstriction: Care Instructions. \"  Current as of: February 24, 2020               Content Version: 12.6  © 3126-5221 [employer]. Care instructions adapted under license by [employer] (which disclaims liability or warranty for this information). If you have questions about a medical condition or this instruction, always ask your healthcare professional. [place] [address] any warranty or liability for your use of this information. Patient Education        Learning About Coronavirus [phone])  Coronavirus [phone]): Overview  What is coronavirus ([id number])? The coronavirus disease (COVID-19) is caused by a virus. It is an illness that was first found in December 2019. It has since spread worldwide. The virus can cause fever, cough, and trouble breathing. In severe cases, it can cause pneumonia and make it hard to breathe without help. It can cause death. This virus spreads person-to-person through droplets from coughing and sneezing. It can also spread when you are close to someone who is infected. And it can spread when you touch something that has the virus on it, such as a doorknob or a tabletop.   Coronaviruses are a large group of viruses. They cause the common cold. They also cause more serious illnesses like Middle East respiratory syndrome (MERS) and severe acute respiratory syndrome (SARS). COVID-19 is caused by a novel coronavirus. That means it's a new type that has not been seen in people before. How is COVID-19 treated? Mild illness can be treated at home, but more serious illness needs to be treated in the hospital. Treatment may include medicines to reduce symptoms, plus breathing support such as oxygen therapy or a ventilator. Other treatments, such as antiviral medicines, may help people who have COVID-19. What can you do to protect yourself from COVID-19? The best way to protect yourself from getting sick is to:  · Avoid areas where there is an outbreak. · Avoid contact with people who may be infected. · Avoid crowds and try to stay at least 6 feet away from other people. · Wash your hands often, especially after you cough or sneeze. Use soap and water, and scrub for at least 20 seconds. If soap and water aren't available, use an alcohol-based hand . · Avoid touching your mouth, nose, and eyes. What can you do to avoid spreading the virus to others? To help avoid spreading the virus to others:  · Wash your hands often with soap or alcohol-based hand sanitizers. · Cover your mouth with a tissue when you cough or sneeze. Then throw the tissue in the trash. · Use a disinfectant to clean things that you touch often. These include doorknobs, remote controls, phones, and handles on your refrigerator and microwave. And don't forget countertops, tabletops, bathrooms, and computer keyboards. · Wear a cloth face cover if you have to go to public areas. If you know or suspect that you have COVID-19:  · Stay home. Don't go to school, work, or public areas. And don't use public transportation, ride-shares, or taxis unless you have no choice. · Leave your home only if you need to get medical care or testing.  But call the doctor's office first so they know you're coming. And wear a face cover. · Limit contact with people in your home. If possible, stay in a separate bedroom and use a separate bathroom. · Wear a face cover whenever you're around other people. It can help stop the spread of the virus when you cough or sneeze. · Clean and disinfect your home every day. Use household  and disinfectant wipes or sprays. Take special care to clean things that you grab with your hands. · Self-isolate until it's safe to be around others again. ? If you have symptoms, it's safe when you haven't had a fever for 3 days and your symptoms have improved and it's been at least 10 days since your symptoms started. ? If you were exposed to the virus but don't have symptoms, it's safe to be around others 14 days after exposure. ? Talk to your doctor about whether you also need testing, especially if you have a weakened immune system. When to call for help  Call 911 anytime you think you may need emergency care. For example, call if:  · You have severe trouble breathing. (You can't talk at all.)  · You have constant chest pain or pressure. · You are severely dizzy or lightheaded. · You are confused or can't think clearly. · Your face and lips have a blue color. · You passed out (lost consciousness) or are very hard to wake up. Call your doctor now if you develop symptoms such as:  · Shortness of breath. · Fever. · Cough. If you need to get care, call ahead to the doctor's office for instructions before you go. Make sure you wear a face cover to prevent exposing other people to the virus. Where can you get the latest information? The following health organizations are tracking and studying this virus. Their websites contain the most up-to-date information. Gamal Stauffer also learn what to do if you think you may have been exposed to the virus. · U.S. Centers for Disease Control and Prevention (CDC):  The CDC provides updated news about the disease and travel advice. The website also tells you how to prevent the spread of infection. www.cdc.gov  · World Health Organization [facility]): WHO offers information about the virus outbreaks. WHO also has travel advice. www.who.int  Current as of: July 10, 2020               Content Version: 12.6  © 2006-2020 [employer]. Care instructions adapted under license by [employer] (which disclaims liability or warranty for this information). If you have questions about a medical condition or this instruction, always ask your healthcare professional. [address] any warranty or liability for your use of this information. Patient Education        Coronavirus ([id number]): Care Instructions  Overview  The coronavirus disease (COVID-19) is caused by a virus. Symptoms may include a fever, a cough, and shortness of breath. It mainly spreads person-to-person through droplets from coughing and sneezing. The virus also can spread when people are in close contact with someone who is infected. Most people have mild symptoms and can take care of themselves at home. If their symptoms get worse, they may need care in a hospital. Treatment may include medicines to reduce symptoms, plus breathing support such as oxygen therapy or a ventilator. It's important to not spread the virus to others. If you have COVID-19, wear a face cover anytime you are around other people. You need to isolate yourself while you are sick. Leave your home only if you need to get medical care or testing. Follow-up care is a key part of your treatment and safety. Be sure to make and go to all appointments, and call your doctor if you are having problems. It's also a good idea to know your test results and keep a list of the medicines you take. How can you care for yourself at home? · Get extra rest. It can help you feel better. · Drink plenty of fluids.  This helps replace fluids lost from fever. Fluids also help ease a scratchy throat. Water, soup, fruit juice, and hot tea with lemon are good choices. · Take acetaminophen (such as Tylenol) to reduce a fever. It may also help with muscle aches. Read and follow all instructions on the label. · Use petroleum jelly on sore skin. This can help if the skin around your nose and lips becomes sore from rubbing a lot with tissues. Tips for self-isolation  · Limit contact with people in your home. If possible, stay in a separate bedroom and use a separate bathroom. · Wear a cloth face cover when you are around other people. It can help stop the spread of the virus when you cough or sneeze. · If you have to leave home, avoid crowds and try to stay at least 6 feet away from other people. · Avoid contact with pets and other animals. · Cover your mouth and nose with a tissue when you cough or sneeze. Then throw it in the trash right away. · Wash your hands often, especially after you cough or sneeze. Use soap and water, and scrub for at least 20 seconds. If soap and water aren't available, use an alcohol-based hand . · Don't share personal household items. These include bedding, towels, cups and glasses, and eating utensils. · [address] in the warmest water allowed for the fabric type, and dry it completely. It's okay to wash other people's laundry with yours. · Clean and disinfect your home every day. Use household  and disinfectant wipes or sprays. Take special care to clean things that you grab with your hands. These include doorknobs, remote controls, phones, and handles on your refrigerator and microwave. And don't forget countertops, tabletops, bathrooms, and computer keyboards. When you can end self-isolation  · If you know or suspect that you have COVID-19, stay in self-isolation until:  ? You haven't had a fever for 3 days, and  ? Your symptoms have improved, and  ?  It's been at least 10 days since your symptoms started. · Talk to your doctor about whether you also need testing, especially if you have a weakened immune system. When should you call for help? Call 911 anytime you think you may need emergency care. For example, call if you have life-threatening symptoms, such as:    · You have severe trouble breathing. (You can't talk at all.)     · You have constant chest pain or pressure.     · You are severely dizzy or lightheaded.     · You are confused or can't think clearly.     · Your face and lips have a blue color.     · You pass out (lose consciousness) or are very hard to wake up. Call your doctor now or seek immediate medical care if:    · You have moderate trouble breathing. (You can't speak a full sentence.)     · You are coughing up blood (more than about 1 teaspoon).     · You have signs of low blood pressure. These include feeling lightheaded; being too weak to stand; and having cold, pale, clammy skin. Watch closely for changes in your health, and be sure to contact your doctor if:    · Your symptoms get worse.     · You are not getting better as expected. Call before you go to the doctor's office. Follow their instructions. And wear a cloth face cover. Current as of: July 10, 2020               Content Version: 12.6  © 2825-6598 [employer]. Care instructions adapted under license by "[employer] (which disclaims liability or warranty for this information). If you have questions about a medical condition or this instruction, always ask your healthcare professional. [place] [address] any warranty or liability for your use of this information.

## 2020-10-19 LAB
COVID-19, XGCOVT: NOT DETECTED
HEALTH STATUS, XMCV2T: NORMAL
SOURCE, COVRS: NORMAL
SPECIMEN SOURCE, FCOV2M: NORMAL
SPECIMEN TYPE, XMCV1T: NORMAL

## 2020-12-18 ENCOUNTER — APPOINTMENT (OUTPATIENT)
Dept: GENERAL RADIOLOGY | Age: 45
End: 2020-12-18
Attending: EMERGENCY MEDICINE
Payer: COMMERCIAL

## 2020-12-18 ENCOUNTER — HOSPITAL ENCOUNTER (EMERGENCY)
Age: 45
Discharge: HOME | End: 2020-12-18
Attending: EMERGENCY MEDICINE
Payer: COMMERCIAL

## 2020-12-18 VITALS
WEIGHT: 241.84 LBS | HEART RATE: 88 BPM | DIASTOLIC BLOOD PRESSURE: 86 MMHG | OXYGEN SATURATION: 96 % | HEIGHT: 72 IN | TEMPERATURE: 97.9 F | BODY MASS INDEX: 32.76 KG/M2 | RESPIRATION RATE: 16 BRPM | SYSTOLIC BLOOD PRESSURE: 148 MMHG

## 2020-12-18 DIAGNOSIS — M47.812 CERVICAL SPONDYLOSIS: Primary | ICD-10-CM

## 2020-12-18 PROCEDURE — 99282 EMERGENCY DEPT VISIT SF MDM: CPT

## 2020-12-18 PROCEDURE — 72050 X-RAY EXAM NECK SPINE 4/5VWS: CPT
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
## 2021-10-12 ENCOUNTER — HOSPITAL ENCOUNTER (EMERGENCY)
Age: 46
Discharge: LEFT BEFORE DISCHARGE | End: 2021-10-12
Attending: EMERGENCY MEDICINE
Payer: COMMERCIAL

## 2021-10-12 VITALS
SYSTOLIC BLOOD PRESSURE: 151 MMHG | DIASTOLIC BLOOD PRESSURE: 99 MMHG | RESPIRATION RATE: 16 BRPM | HEART RATE: 108 BPM | TEMPERATURE: 98 F | OXYGEN SATURATION: 98 %

## 2021-10-12 DIAGNOSIS — G44.89 OTHER HEADACHE SYNDROME: Primary | ICD-10-CM

## 2021-10-12 PROCEDURE — 99281 EMR DPT VST MAYX REQ PHY/QHP: CPT

## 2021-10-12 RX ORDER — DIPHENHYDRAMINE HYDROCHLORIDE 50 MG/ML
25 INJECTION, SOLUTION INTRAMUSCULAR; INTRAVENOUS
Status: DISCONTINUED | OUTPATIENT
Start: 2021-10-12 | End: 2021-10-12 | Stop reason: HOSPADM

## 2021-10-12 RX ORDER — KETOROLAC TROMETHAMINE 30 MG/ML
30 INJECTION, SOLUTION INTRAMUSCULAR; INTRAVENOUS
Status: DISCONTINUED | OUTPATIENT
Start: 2021-10-12 | End: 2021-10-12 | Stop reason: HOSPADM

## 2021-10-12 RX ORDER — DEXAMETHASONE SODIUM PHOSPHATE 10 MG/ML
10 INJECTION INTRAMUSCULAR; INTRAVENOUS ONCE
Status: DISCONTINUED | OUTPATIENT
Start: 2021-10-12 | End: 2021-10-12 | Stop reason: HOSPADM

## 2021-10-12 NOTE — ED PROVIDER NOTES
Patient is a 55year old male with PMH of chronic pain, GERD, hepatitis C, HSV-2, IVDU, substance abuse, bipolar, anxiety and migraines who presents to ED complaining of headache that started earlier today. States headache onset gradually and feels like his typical migraine described as pain behind both eyes and states \"knives going to his eyes. Patient also reports nausea. He has tried drinking caffeine without improvement of pain. States he does not have his rescue medications on him. Patient reports he is currently in recovery for IV drug use. He denies any fever, chills, visual changes, facial asymmetry, numbness, weakness, confusion, neck pain, chest pain, shortness of breath, back pain and syncope. Neurologist is at [facility].             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    Hypertension     IV drug abuse ([facility] [address]     Kidney stone     Liver disease     elevated liver enzymes/hep C    Migraine     Neuralgia     Other ill-defined conditions(799.89)     chronic bronchitis    Polysubstance dependence (HCC)     stimulants, MJ, tob, barbs, benzos, opiates    Psychiatric disorder     Bipolar, Anxiety    Unspecified adverse effect of anesthesia     \"was told he woke up during back surgery\" and during nerve root injection    Unspecified sleep apnea     mild - does not use CPAP       Past Surgical History:   Procedure Laterality Date    HX LUMBAR LAMINECTOMY      HX ORTHOPAEDIC Bilateral     back surgery - laminectomy/discectomy    HX ORTHOPAEDIC      nerve root injection in back    HX OTHER SURGICAL      testicular surgery- varicocelectomy, i &d of abscess on right elbow         Family History:   Problem Relation Age of Onset    Heart Disease Maternal Grandfather Social History     Socioeconomic History    Marital status: SINGLE     Spouse name: Not on file    Number of children: Not on file    Years of education: Not on file    Highest education level: Not on file   Occupational History    Not on file   Tobacco Use    Smoking status: Current Every Day Smoker     Packs/day: 2.00     Years: 22.00     Pack years: 44.00     Types: Cigarettes    Smokeless tobacco: Former User    Tobacco comment: cigarettes   Substance and Sexual Activity    Alcohol use: No     Alcohol/week: 0.0 standard drinks     Comment: no longer drinks    Drug use: Not Currently     Types: Cocaine     Comment: per patient no methamphetamines    Sexual activity: Not Currently   Other Topics Concern    Not on file   Social History Narrative    The patient is . The patient lives his parents. The patient has no children. The patient does not have legal issues pending. The patient's source of income comes from family. patient's greatest support comes from his parents and this person will be involved with the treatment. pt has not been a victim of sexual/physical abuse. The highest grade achieved is [employer]     Social Determinants of Health     Financial Resource Strain:     Difficulty of Paying Living Expenses:    Food Insecurity:     Worried About [address] in the Last Year:     [address] in the Last Year:    Transportation Needs:     Lack of Transportation (Medical):      Lack of Transportation (Non-Medical):    Physical Activity:     Days of Exercise per Week:     Minutes of Exercise per Session:    Stress:     Feeling of Stress :    Social Connections:     Frequency of Communication with Friends and Family:     Frequency of Social Gatherings with Friends and Family:     Attends Yazidism Services:     Active Member of Clubs or Organizations:     Attends Club or Organization Meetings:     Marital Status:    Intimate Partner Violence:     Fear of Current or Ex-Partner:     Emotionally Abused:     Physically Abused:     Sexually Abused: ALLERGIES: Barium sulfate, Demerol [meperidine], Iodinated contrast media, and Neurontin [gabapentin]    Review of Systems   Constitutional: Negative for activity change, appetite change, chills and fever. HENT: Negative for congestion and sore throat. Eyes: Positive for pain. Negative for visual disturbance. Respiratory: Negative for cough and shortness of breath. Cardiovascular: Negative for chest pain, palpitations and leg swelling. Gastrointestinal: Negative for abdominal distention, abdominal pain, constipation, diarrhea, nausea and vomiting. Genitourinary: Negative for decreased urine volume, dysuria, flank pain, frequency and urgency. Musculoskeletal: Negative for back pain and neck pain. Skin: Negative for rash and wound. Allergic/Immunologic: Negative for immunocompromised state. Neurological: Positive for headaches. Negative for dizziness, tremors, seizures, syncope, facial asymmetry, speech difficulty, weakness, light-headedness and numbness. Psychiatric/Behavioral: Negative for confusion. All other systems reviewed and are negative. Vitals:    10/12/21 1754   BP: (!) 151/99   Pulse: (!) 108   Resp: 16   Temp: 98 °F (36.7 °C)   SpO2: 98%            Physical Exam  Vitals and nursing note reviewed. Constitutional:       General: He is not in acute distress. Appearance: Normal appearance. He is well-developed. He is not toxic-appearing. HENT:      Head: Normocephalic and atraumatic. Nose: Nose normal.      Mouth/Throat:      Mouth: Mucous membranes are moist.   Eyes:      General: Lids are normal.      Extraocular Movements: Extraocular movements intact. Conjunctiva/sclera: Conjunctivae normal.      Pupils: Pupils are equal, round, and reactive to light. Cardiovascular:      Rate and Rhythm: Normal rate and regular rhythm. Pulses: Normal pulses.       Heart sounds: Normal heart sounds, S1 normal and S2 normal.   Pulmonary:      Effort: Pulmonary effort is normal. No accessory muscle usage. Breath sounds: Normal breath sounds. Abdominal:      Palpations: Abdomen is soft. Musculoskeletal:         General: Normal range of motion. Cervical back: Normal range of motion and neck supple. Skin:     General: Skin is warm and dry. Capillary Refill: Capillary refill takes less than 2 seconds. Neurological:      General: No focal deficit present. Mental Status: He is alert and oriented to person, place, and time. Mental status is at baseline. Cranial Nerves: Cranial nerves are intact. No cranial nerve deficit. Sensory: Sensation is intact. No sensory deficit. Motor: Motor function is intact. No weakness. Coordination: Coordination is intact. Coordination normal. Heel to Shin Test normal.      Gait: Gait is intact. Gait normal.   Psychiatric:         Attention and Perception: Attention normal.         Mood and Affect: Mood and affect normal.         Speech: Speech normal.         Behavior: Behavior is cooperative. Thought Content: Thought content normal.         Cognition and Memory: Cognition normal.         Judgment: Judgment normal.          MDM  Number of Diagnoses or Management Options  Diagnosis management comments: Prior to labs being drawn or patient being medicated he eloped from the ED.        Amount and/or Complexity of Data Reviewed  Clinical lab tests: reviewed  Discuss the patient with other providers: yes (Dr. Davon Diaz, ED attending )           Procedures

## 2021-10-12 NOTE — ED TRIAGE NOTES
He reports a migraine all day. He does not have his rescue medications. He has nausea without vomiting.  He rates the pain 9/10

## 2021-11-16 ENCOUNTER — APPOINTMENT (OUTPATIENT)
Dept: GENERAL RADIOLOGY | Age: 46
End: 2021-11-16
Attending: EMERGENCY MEDICINE
Payer: COMMERCIAL

## 2021-11-16 ENCOUNTER — HOSPITAL ENCOUNTER (EMERGENCY)
Age: 46
Discharge: HOME | End: 2021-11-16
Attending: EMERGENCY MEDICINE
Payer: COMMERCIAL

## 2021-11-16 VITALS
RESPIRATION RATE: 14 BRPM | HEART RATE: 95 BPM | TEMPERATURE: 97.7 F | SYSTOLIC BLOOD PRESSURE: 116 MMHG | OXYGEN SATURATION: 95 % | DIASTOLIC BLOOD PRESSURE: 74 MMHG

## 2021-11-16 DIAGNOSIS — B34.9 VIRAL ILLNESS: Primary | ICD-10-CM

## 2021-11-16 DIAGNOSIS — J06.9 UPPER RESPIRATORY TRACT INFECTION, UNSPECIFIED TYPE: ICD-10-CM

## 2021-11-16 LAB
FLUAV AG NPH QL IA: NEGATIVE
FLUBV AG NOSE QL IA: NEGATIVE
SARS-COV-2, COV2: NORMAL

## 2021-11-16 PROCEDURE — U0005 INFEC AGEN DETEC AMPLI PROBE: HCPCS

## 2021-11-16 PROCEDURE — 87804 INFLUENZA ASSAY W/OPTIC: CPT

## 2021-11-16 PROCEDURE — 93005 ELECTROCARDIOGRAM TRACING: CPT

## 2021-11-16 PROCEDURE — 99284 EMERGENCY DEPT VISIT MOD MDM: CPT

## 2021-11-16 PROCEDURE — 71045 X-RAY EXAM CHEST 1 VIEW: CPT

## 2021-11-16 NOTE — ED PROVIDER NOTES
59-year-old male presents with headache, sore throat, chest tightness, dry cough, subjective fevers for the past 2 days. He is Covid vaccinated. He has not received his flu shot yet. He rates his pain 4 out of 10 in his head. 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    Hypertension     IV drug abuse ([facility] [address]     Kidney stone     Liver disease     elevated liver enzymes/hep C    Migraine     Neuralgia     Other ill-defined conditions(799.89)     chronic bronchitis    Polysubstance dependence (HCC)     stimulants, MJ, tob, barbs, benzos, opiates    Psychiatric disorder     Bipolar, Anxiety    Unspecified adverse effect of anesthesia     \"was told he woke up during back surgery\" and during nerve root injection    Unspecified sleep apnea     mild - does not use CPAP       Past Surgical History:   Procedure Laterality Date    HX LUMBAR LAMINECTOMY      HX ORTHOPAEDIC Bilateral     back surgery - laminectomy/discectomy    HX ORTHOPAEDIC      nerve root injection in back    HX OTHER SURGICAL      testicular surgery- varicocelectomy, i &d of abscess on right elbow         Family History:   Problem Relation Age of Onset    Heart Disease Maternal Grandfather        Social History     Socioeconomic History    Marital status: SINGLE     Spouse name: Not on file    Number of children: Not on file    Years of education: Not on file    Highest education level: Not on file   Occupational History    Not on file   Tobacco Use    Smoking status: Current Every Day Smoker     Packs/day: 1.00     Years: 22.00     Pack years: 22.00     Types: Cigarettes    Smokeless tobacco: Former User    Tobacco comment: cigarettes   Substance and Sexual Activity    Alcohol use:  No Alcohol/week: 0.0 standard drinks     Comment: no longer drinks    Drug use: Not Currently     Types: Cocaine     Comment: per patient no methamphetamines    Sexual activity: Not Currently   Other Topics Concern    Not on file   Social History Narrative    The patient is . The patient lives his parents. The patient has no children. The patient does not have legal issues pending. The patient's source of income comes from family. patient's greatest support comes from his parents and this person will be involved with the treatment. pt has not been a victim of sexual/physical abuse. The highest grade achieved is [employer]     Social Determinants of Health     Financial Resource Strain:     Difficulty of Paying Living Expenses: Not on file   Food Insecurity:     Worried About [address] [facility] in the Last Year: Not on file    Sheri [employer] in the Last Year: Not on file   Transportation Needs:     Lack of Transportation (Medical): Not on file    Lack of Transportation (Non-Medical):  Not on file   Physical Activity:     Days of Exercise per Week: Not on file    Minutes of Exercise per Session: Not on file   Stress:     Feeling of Stress : Not on file   Social Connections:     Frequency of Communication with Friends and Family: Not on file    Frequency of Social Gatherings with Friends and Family: Not on file    Attends Mandaeism Services: Not on file    Active Member of [address] [place] [address] or Organizations: Not on file    Attends Club or Organization Meetings: Not on file    Marital Status: Not on file   Intimate Partner Violence:     Fear of Current or Ex-Partner: Not on file    Emotionally Abused: Not on file    Physically Abused: Not on file    Sexually Abused: Not on file   Housing Stability:     Unable to Pay for Housing in the Last Year: Not on file    Number of [place] in the Last Year: Not on file    Unstable Housing in the Last Year: Not on file         ALLERGIES: Barium sulfate, Demerol [meperidine], Iodinated contrast media, and Neurontin [gabapentin]    Review of Systems   All other systems reviewed and are negative. Vitals:    11/16/21 0519   BP: (!) 159/95   Pulse: (!) 107   Resp: 16   Temp: 98 °F (36.7 °C)   SpO2: 97%            Physical Exam  Vitals and nursing note reviewed. Constitutional:       General: He is not in acute distress. HENT:      Head: Normocephalic and atraumatic. Eyes:      General: No scleral icterus. Conjunctiva/sclera: Conjunctivae normal.      Pupils: Pupils are equal, round, and reactive to light. Neck:      Trachea: No tracheal deviation. Cardiovascular:      Rate and Rhythm: Normal rate and regular rhythm. Comments: Heart rate in the 80s  Pulmonary:      Effort: Pulmonary effort is normal. No respiratory distress. Breath sounds: Normal breath sounds. No stridor. Abdominal:      General: There is no distension. Palpations: Abdomen is soft. Tenderness: There is no abdominal tenderness. Genitourinary:     Comments: deferred  Musculoskeletal:         General: No deformity. Cervical back: No rigidity. Skin:     General: Skin is warm and dry. Neurological:      General: No focal deficit present. Mental Status: He is alert and oriented to person, place, and time. Psychiatric:         Mood and Affect: Mood normal.         Behavior: Behavior normal.          [facility]  ED Course as of 11/16/21 0658   Tue Nov 16, 2021   0528 EKG shows sinus tachycardia at rate of 104, normal intervals, normal axis, no acute ischemia or infarction. [TT]      ED Course User Index  [TT] Cecille Rae MD       Procedures          6:58 AM  Signed out to Dr. Feliz Becker pending flu swab and portable chest x-ray. Mikie Ornelas.  Greer Abreu MD

## 2021-11-16 NOTE — ED NOTES
ambulated patient with pulse ox. Patient ambulatory independently with a steady gait. O2 sats remained greater than 96% on room air for duration of walk. Respiratory rate increased to 22 breaths/min. Heart rate increased to 120bpm while ambulating, decreased to 115-110bpm while at rest. Respirations remained unlabored. Dr. Shaheen Steel notified. No new orders at this time.

## 2021-11-16 NOTE — LETTER
[address]  [address] [facility] [address]  [phone]    Work/School Note    Date: 11/16/2021     To Whom It May concern:    Herson Sr was evaluated by the following provider(s):  Attending Provider: Vinny Brown virus is suspected. Per the CDC guidelines we recommend home isolation until the following conditions are all met:    1. At least 10 days have passed since symptoms first appeared and  2. At least 24 hours have passed since last fever without the use of fever-reducing medications and  3.  Symptoms (e.g., cough, shortness of breath) have improved    [unfilled]  Sincerely,          Win Lester

## 2021-11-16 NOTE — ED TRIAGE NOTES
Patient arrives ambulatory from home with CC of chest tightness, cough, sore throat, chills, headache x 2-3 days. COVID vaccinated.

## 2021-11-17 ENCOUNTER — PATIENT OUTREACH (OUTPATIENT)
Dept: CASE MANAGEMENT | Age: 46
End: 2021-11-17

## 2021-11-17 LAB
SARS-COV-2, XPLCVT: NOT DETECTED
SOURCE, COVRS: NORMAL

## 2021-11-17 NOTE — PROGRESS NOTES
Patient contacted regarding COVID-19 risk. Discussed COVID-19 related testing which was available at this time. Test results were negative. Patient informed of results, if available? yes. Ambulatory Care Manager contacted the patient by telephone to perform post discharge assessment. Call within 2 business days of discharge: Yes Verified name and  with patient as identifiers. Provided introduction to self, and explanation of the CTN/ACM role, and reason for call due to risk factors for infection and/or exposure to COVID-19. Symptoms reviewed with patient who verbalized the following symptoms: cough, shortness of breath and congestion      Due to no new or worsening symptoms encounter was not routed to provider for escalation. Discussed follow-up appointments. If no appointment was previously scheduled, appointment scheduling offered:  no. [facility] follow up appointment(s): No future appointments. Non-[facility] follow up appointment(s): none reported    Interventions to address risk factors: Education of patient/family/caregiver/guardian to support self-management-covid 19. Advance Care Planning:   Does patient have an Advance Directive: not on file. Educated patient about risk for severe COVID-19 due to risk factors according to CDC guidelines. ACM reviewed discharge instructions, medical action plan and red flag symptoms with the patient who verbalized understanding. Discussed COVID vaccination status: yes. Education provided on COVID-19 vaccination as appropriate. Discussed exposure protocols and quarantine with CDC Guidelines. Patient was given an opportunity to verbalize any questions and concerns and agrees to contact ACM or health care provider for questions related to their healthcare. Patient reports he is vaccinated for covid 19. Reviewed and educated patient on any new and changed medications related to discharge diagnosis     Was patient discharged with a pulse oximeter?  no     ACM provided contact information. Plan for follow-up call in 5-7 days based on severity of symptoms and risk factors. [facility]    11/17/21 ACM attempted to reach patient at numbers provided- voicemail full- cannot leave message, did speak with patient's father who will give patient message to call back.  SASHAB

## 2021-11-18 LAB
ATRIAL RATE: 104 BPM
CALCULATED P AXIS, ECG09: 83 DEGREES
CALCULATED R AXIS, ECG10: 87 DEGREES
CALCULATED T AXIS, ECG11: 84 DEGREES
DIAGNOSIS, 93000: NORMAL
P-R INTERVAL, ECG05: 154 MS
Q-T INTERVAL, ECG07: 316 MS
QRS DURATION, ECG06: 72 MS
QTC CALCULATION (BEZET), ECG08: 415 MS
VENTRICULAR RATE, ECG03: 104 BPM

## 2021-11-24 ENCOUNTER — PATIENT OUTREACH (OUTPATIENT)
Dept: CASE MANAGEMENT | Age: 46
End: 2021-11-24

## 2021-11-24 NOTE — PROGRESS NOTES
Patient resolved from [address] episode on 11/24/21. Discussed COVID-19 related testing which was available at this time. Test results were negative. Patient informed of results, if available? yes     Patient/family has been provided the following resources and education related to COVID-19:                         Signs, symptoms and red flags related to COVID-19            CDC exposure and quarantine guidelines            [employer] exposure contact - [phone]            Contact for their local Department of Health                 Patient currently reports that the following symptoms have improved:  cough, shortness of breath and congestion. Patient did reports some symptoms still lingering- he stated he will follow up with PCP Dr Dianna Spencer. No further outreach scheduled with this ACM. Episode of Care resolved. Patient has this ACM contact information if future needs arise.  VERENICE

## 2022-02-04 ENCOUNTER — HOSPITAL ENCOUNTER (EMERGENCY)
Age: 47
Discharge: HOME | End: 2022-02-04
Attending: EMERGENCY MEDICINE
Payer: COMMERCIAL

## 2022-02-04 ENCOUNTER — APPOINTMENT (OUTPATIENT)
Dept: GENERAL RADIOLOGY | Age: 47
End: 2022-02-04
Attending: EMERGENCY MEDICINE
Payer: COMMERCIAL

## 2022-02-04 VITALS
BODY MASS INDEX: 33.8 KG/M2 | DIASTOLIC BLOOD PRESSURE: 87 MMHG | TEMPERATURE: 98.1 F | OXYGEN SATURATION: 96 % | HEIGHT: 72 IN | RESPIRATION RATE: 16 BRPM | SYSTOLIC BLOOD PRESSURE: 123 MMHG | WEIGHT: 249.56 LBS | HEART RATE: 88 BPM

## 2022-02-04 DIAGNOSIS — Z20.822 SUSPECTED COVID-19 VIRUS INFECTION: ICD-10-CM

## 2022-02-04 DIAGNOSIS — J06.9 UPPER RESPIRATORY TRACT INFECTION, UNSPECIFIED TYPE: Primary | ICD-10-CM

## 2022-02-04 LAB
ALBUMIN SERPL-MCNC: 3.9 G/DL (ref 3.5–5)
ALBUMIN/GLOB SERPL: 1 {RATIO} (ref 1.1–2.2)
ALP SERPL-CCNC: 84 U/L (ref 45–117)
ALT SERPL-CCNC: 44 U/L (ref 12–78)
ANION GAP SERPL CALC-SCNC: 14 MMOL/L (ref 5–15)
AST SERPL-CCNC: 19 U/L (ref 15–37)
BASOPHILS # BLD: 0 K/UL (ref 0–0.1)
BASOPHILS NFR BLD: 0 % (ref 0–1)
BILIRUB SERPL-MCNC: 0.7 MG/DL (ref 0.2–1)
BUN SERPL-MCNC: 17 MG/DL (ref 6–20)
BUN/CREAT SERPL: 12 (ref 12–20)
CALCIUM SERPL-MCNC: 8.7 MG/DL (ref 8.5–10.1)
CHLORIDE SERPL-SCNC: 104 MMOL/L (ref 97–108)
CO2 SERPL-SCNC: 22 MMOL/L (ref 21–32)
CREAT SERPL-MCNC: 1.45 MG/DL (ref 0.7–1.3)
DIFFERENTIAL METHOD BLD: NORMAL
EOSINOPHIL # BLD: 0.1 K/UL (ref 0–0.4)
EOSINOPHIL NFR BLD: 2 % (ref 0–7)
ERYTHROCYTE [DISTWIDTH] IN BLOOD BY AUTOMATED COUNT: 12.4 % (ref 11.5–14.5)
GLOBULIN SER CALC-MCNC: 4 G/DL (ref 2–4)
GLUCOSE SERPL-MCNC: 92 MG/DL (ref 65–100)
HCT VFR BLD AUTO: 47.6 % (ref 36.6–50.3)
HGB BLD-MCNC: 16.8 G/DL (ref 12.1–17)
IMM GRANULOCYTES # BLD AUTO: 0 K/UL (ref 0–0.04)
IMM GRANULOCYTES NFR BLD AUTO: 0 % (ref 0–0.5)
LYMPHOCYTES # BLD: 2.2 K/UL (ref 0.8–3.5)
LYMPHOCYTES NFR BLD: 30 % (ref 12–49)
MCH RBC QN AUTO: 33.5 PG (ref 26–34)
MCHC RBC AUTO-ENTMCNC: 35.3 G/DL (ref 30–36.5)
MCV RBC AUTO: 94.8 FL (ref 80–99)
MONOCYTES # BLD: 0.4 K/UL (ref 0–1)
MONOCYTES NFR BLD: 5 % (ref 5–13)
NEUTS SEG # BLD: 4.5 K/UL (ref 1.8–8)
NEUTS SEG NFR BLD: 63 % (ref 32–75)
NRBC # BLD: 0 K/UL (ref 0–0.01)
NRBC BLD-RTO: 0 PER 100 WBC
PLATELET # BLD AUTO: 190 K/UL (ref 150–400)
PMV BLD AUTO: 11.2 FL (ref 8.9–12.9)
POTASSIUM SERPL-SCNC: 4.1 MMOL/L (ref 3.5–5.1)
PROT SERPL-MCNC: 7.9 G/DL (ref 6.4–8.2)
RBC # BLD AUTO: 5.02 M/UL (ref 4.1–5.7)
SODIUM SERPL-SCNC: 140 MMOL/L (ref 136–145)
WBC # BLD AUTO: 7.3 K/UL (ref 4.1–11.1)

## 2022-02-04 PROCEDURE — 71045 X-RAY EXAM CHEST 1 VIEW: CPT

## 2022-02-04 PROCEDURE — 80053 COMPREHEN METABOLIC PANEL: CPT

## 2022-02-04 PROCEDURE — 36415 COLL VENOUS BLD VENIPUNCTURE: CPT

## 2022-02-04 PROCEDURE — U0005 INFEC AGEN DETEC AMPLI PROBE: HCPCS

## 2022-02-04 PROCEDURE — 74011250636 HC RX REV CODE- 250/636: Performed by: EMERGENCY MEDICINE

## 2022-02-04 PROCEDURE — 99282 EMERGENCY DEPT VISIT SF MDM: CPT

## 2022-02-04 PROCEDURE — 85025 COMPLETE CBC W/AUTO DIFF WBC: CPT

## 2022-02-04 RX ORDER — CLONIDINE HYDROCHLORIDE 0.1 MG/1
TABLET ORAL 2 TIMES DAILY
COMMUNITY

## 2022-02-04 RX ORDER — METHYLPREDNISOLONE 4 MG/1
TABLET ORAL
Qty: 1 DOSE PACK | Refills: 0 | Status: SHIPPED | OUTPATIENT
Start: 2022-02-04

## 2022-02-04 RX ORDER — AZITHROMYCIN 250 MG/1
TABLET, FILM COATED ORAL
Qty: 6 TABLET | Refills: 0 | Status: SHIPPED | OUTPATIENT
Start: 2022-02-04

## 2022-02-04 RX ADMIN — SODIUM CHLORIDE 1000 ML: 9 INJECTION, SOLUTION INTRAVENOUS at 12:40

## 2022-02-04 NOTE — ED TRIAGE NOTES
Patient arrives ambulatory to ed with complaints of chest congestion and nausea for the past ten days. Patient has been taking albuterol inhaler, mucinex, promethazine and zofran.

## 2022-02-04 NOTE — ED NOTES
Pt ambbulated out of ED with discharge instructions and prescriptions in hand given by Dr. Ramon Siddiqui; pt verbalized understanding of discharge paperwork and time allotted for questions. VSS. Pt alert and oriented.  self

## 2022-02-05 LAB
SARS-COV-2, XPLCVT: DETECTED
SOURCE, COVRS: ABNORMAL

## 2022-02-06 NOTE — PROGRESS NOTES
Per chart review, the patient has seen the positive covid result in the results section of MyChart. 1 person assist

## 2022-02-07 ENCOUNTER — PATIENT OUTREACH (OUTPATIENT)
Dept: CASE MANAGEMENT | Age: 47
End: 2022-02-07

## 2022-02-07 NOTE — PROGRESS NOTES
Patient contacted regarding COVID-19 risk, exposure, diagnosis, pulse oximeter ordered at discharge, monoclonal antibody infusion follow up. Discussed COVID-19 related testing which was available at this time. Test results were positive. Patient informed of results, if available? yes. LPN Care Coordinator contacted the patient by telephone to perform post discharge assessment. Call within 2 business days of discharge: Yes Verified name and  with patient as identifiers. Provided introduction to self, and explanation of the CTN/ACM role, and reason for call due to risk factors for infection and/or exposure to COVID-19. Symptoms reviewed with patient who verbalized the following symptoms: no new symptoms and no worsening symptoms      Due to no new or worsening symptoms encounter was not routed to provider for escalation. Discussed follow-up appointments. If no appointment was previously scheduled, appointment scheduling offered:  no. [facility] follow up appointment(s): No future appointments. Non-[facility] follow up appointment(s): NA         Advance Care Planning:   Does patient have an Advance Directive: decision makers updated. Educated patient about risk for severe COVID-19 due to risk factors according to CDC guidelines. LPN CC reviewed discharge instructions, medical action plan and red flag symptoms with the patient who verbalized understanding. Discussed COVID vaccination status: no. Education provided on COVID-19 vaccination as appropriate. Discussed exposure protocols and quarantine with CDC Guidelines. Patient was given an opportunity to verbalize any questions and concerns and agrees to contact LPN CC or health care provider for questions related to their healthcare. Reviewed and educated patient on any new and changed medications related to discharge diagnosis     Was patient discharged with a pulse oximeter?  no Discussed and confirmed pulse oximeter discharge instructions and when to notify provider or seek emergency care. LPN CC provided contact information. No further follow-up call identified based on severity of symptoms and risk factors.

## 2022-02-08 LAB — SARS-COV-2, COV2: NORMAL

## 2022-03-18 PROBLEM — R55 SYNCOPE: Status: ACTIVE | Noted: 2019-11-24

## 2022-03-20 PROBLEM — L02.512 ABSCESS OF DORSUM OF LEFT HAND: Status: ACTIVE | Noted: 2020-02-20

## 2022-05-07 ENCOUNTER — APPOINTMENT (OUTPATIENT)
Dept: CT IMAGING | Age: 47
End: 2022-05-07
Attending: EMERGENCY MEDICINE
Payer: COMMERCIAL

## 2022-05-07 ENCOUNTER — HOSPITAL ENCOUNTER (EMERGENCY)
Age: 47
Discharge: HOME | End: 2022-05-07
Attending: EMERGENCY MEDICINE
Payer: COMMERCIAL

## 2022-05-07 ENCOUNTER — HOSPITAL ENCOUNTER (EMERGENCY)
Age: 47
Discharge: STILL ADMITTED OR ADMINISTRATIVE | End: 2022-05-07

## 2022-05-07 VITALS
SYSTOLIC BLOOD PRESSURE: 135 MMHG | OXYGEN SATURATION: 98 % | RESPIRATION RATE: 16 BRPM | DIASTOLIC BLOOD PRESSURE: 71 MMHG | TEMPERATURE: 98.7 F | HEART RATE: 71 BPM

## 2022-05-07 DIAGNOSIS — S09.90XA TRAUMATIC INJURY OF HEAD, INITIAL ENCOUNTER: ICD-10-CM

## 2022-05-07 DIAGNOSIS — R51.9 ACUTE NONINTRACTABLE HEADACHE, UNSPECIFIED HEADACHE TYPE: Primary | ICD-10-CM

## 2022-05-07 LAB
ALBUMIN SERPL-MCNC: 4.2 G/DL (ref 3.5–5)
ALBUMIN/GLOB SERPL: 1.1 {RATIO} (ref 1.1–2.2)
ALP SERPL-CCNC: 83 U/L (ref 45–117)
ALT SERPL-CCNC: 18 U/L (ref 12–78)
ANION GAP SERPL CALC-SCNC: 4 MMOL/L (ref 5–15)
AST SERPL-CCNC: 12 U/L (ref 15–37)
BASOPHILS # BLD: 0 K/UL (ref 0–0.1)
BASOPHILS NFR BLD: 1 % (ref 0–1)
BILIRUB SERPL-MCNC: 0.6 MG/DL (ref 0.2–1)
BUN SERPL-MCNC: 11 MG/DL (ref 6–20)
BUN/CREAT SERPL: 8 (ref 12–20)
CALCIUM SERPL-MCNC: 8.9 MG/DL (ref 8.5–10.1)
CHLORIDE SERPL-SCNC: 106 MMOL/L (ref 97–108)
CO2 SERPL-SCNC: 28 MMOL/L (ref 21–32)
COMMENT, HOLDF: NORMAL
CREAT SERPL-MCNC: 1.41 MG/DL (ref 0.7–1.3)
DIFFERENTIAL METHOD BLD: NORMAL
EOSINOPHIL # BLD: 0.1 K/UL (ref 0–0.4)
EOSINOPHIL NFR BLD: 1 % (ref 0–7)
ERYTHROCYTE [DISTWIDTH] IN BLOOD BY AUTOMATED COUNT: 12.3 % (ref 11.5–14.5)
GLOBULIN SER CALC-MCNC: 3.9 G/DL (ref 2–4)
GLUCOSE SERPL-MCNC: 84 MG/DL (ref 65–100)
HCT VFR BLD AUTO: 46.5 % (ref 36.6–50.3)
HGB BLD-MCNC: 16.2 G/DL (ref 12.1–17)
IMM GRANULOCYTES # BLD AUTO: 0 K/UL (ref 0–0.04)
IMM GRANULOCYTES NFR BLD AUTO: 0 % (ref 0–0.5)
LYMPHOCYTES # BLD: 2.3 K/UL (ref 0.8–3.5)
LYMPHOCYTES NFR BLD: 31 % (ref 12–49)
MCH RBC QN AUTO: 33.3 PG (ref 26–34)
MCHC RBC AUTO-ENTMCNC: 34.8 G/DL (ref 30–36.5)
MCV RBC AUTO: 95.7 FL (ref 80–99)
MONOCYTES # BLD: 0.5 K/UL (ref 0–1)
MONOCYTES NFR BLD: 7 % (ref 5–13)
NEUTS SEG # BLD: 4.6 K/UL (ref 1.8–8)
NEUTS SEG NFR BLD: 60 % (ref 32–75)
NRBC # BLD: 0 K/UL (ref 0–0.01)
NRBC BLD-RTO: 0 PER 100 WBC
PLATELET # BLD AUTO: 172 K/UL (ref 150–400)
PMV BLD AUTO: 11 FL (ref 8.9–12.9)
POTASSIUM SERPL-SCNC: 3.6 MMOL/L (ref 3.5–5.1)
PROT SERPL-MCNC: 8.1 G/DL (ref 6.4–8.2)
RBC # BLD AUTO: 4.86 M/UL (ref 4.1–5.7)
SAMPLES BEING HELD,HOLD: NORMAL
SODIUM SERPL-SCNC: 138 MMOL/L (ref 136–145)
WBC # BLD AUTO: 7.6 K/UL (ref 4.1–11.1)

## 2022-05-07 PROCEDURE — 96374 THER/PROPH/DIAG INJ IV PUSH: CPT

## 2022-05-07 PROCEDURE — 80053 COMPREHEN METABOLIC PANEL: CPT

## 2022-05-07 PROCEDURE — 96375 TX/PRO/DX INJ NEW DRUG ADDON: CPT

## 2022-05-07 PROCEDURE — 70450 CT HEAD/BRAIN W/O DYE: CPT

## 2022-05-07 PROCEDURE — 74011250636 HC RX REV CODE- 250/636: Performed by: EMERGENCY MEDICINE

## 2022-05-07 PROCEDURE — 36415 COLL VENOUS BLD VENIPUNCTURE: CPT

## 2022-05-07 PROCEDURE — 99284 EMERGENCY DEPT VISIT MOD MDM: CPT

## 2022-05-07 PROCEDURE — 85025 COMPLETE CBC W/AUTO DIFF WBC: CPT

## 2022-05-07 RX ORDER — DIPHENHYDRAMINE HYDROCHLORIDE 50 MG/ML
25 INJECTION, SOLUTION INTRAMUSCULAR; INTRAVENOUS
Status: COMPLETED | OUTPATIENT
Start: 2022-05-07 | End: 2022-05-07

## 2022-05-07 RX ORDER — KETOROLAC TROMETHAMINE 30 MG/ML
30 INJECTION, SOLUTION INTRAMUSCULAR; INTRAVENOUS ONCE
Status: COMPLETED | OUTPATIENT
Start: 2022-05-07 | End: 2022-05-07

## 2022-05-07 RX ORDER — METOCLOPRAMIDE HYDROCHLORIDE 5 MG/ML
10 INJECTION INTRAMUSCULAR; INTRAVENOUS
Status: COMPLETED | OUTPATIENT
Start: 2022-05-07 | End: 2022-05-07

## 2022-05-07 RX ADMIN — DIPHENHYDRAMINE HYDROCHLORIDE 25 MG: 50 INJECTION INTRAMUSCULAR; INTRAVENOUS at 03:11

## 2022-05-07 RX ADMIN — METOCLOPRAMIDE HYDROCHLORIDE 10 MG: 5 INJECTION INTRAMUSCULAR; INTRAVENOUS at 03:11

## 2022-05-07 RX ADMIN — KETOROLAC TROMETHAMINE 30 MG: 30 INJECTION, SOLUTION INTRAMUSCULAR at 03:11

## 2022-05-07 RX ADMIN — SODIUM CHLORIDE 1000 ML: 9 INJECTION, SOLUTION INTRAVENOUS at 03:11

## 2022-05-07 NOTE — ED TRIAGE NOTES
Pt reports he started having a headache this morning. Pt reports he took some Imitrex and reports no relief. Pt reports he is having some sensitivity to light. Pt reports he was ducking under some equipment a few days ago at work and hit his head.

## 2022-05-07 NOTE — ED PROVIDER NOTES
Is a 80-year-old male with history of arrhythmia, chronic back pain, GERD, hep C, IVDA, kidney stones, transaminitis who presents with headache symptoms. Patient reports that this is similar to prior but more intense. Says that 2 days ago he hit his head at work but denies vision changes, nausea, vomiting. He says that his PCP used to prescribe him fiorecet but no more. Asking for toradol for headache.             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    Hypertension     IV drug abuse ([facility] [address]     Kidney stone     Liver disease     elevated liver enzymes/hep C    Migraine     Neuralgia     Other ill-defined conditions(799.89)     chronic bronchitis    Polysubstance dependence (HCC)     stimulants, MJ, tob, barbs, benzos, opiates    Psychiatric disorder     Bipolar, Anxiety    Unspecified adverse effect of anesthesia     \"was told he woke up during back surgery\" and during nerve root injection    Unspecified sleep apnea     mild - does not use CPAP       Past Surgical History:   Procedure Laterality Date    HX LUMBAR LAMINECTOMY      HX ORTHOPAEDIC Bilateral     back surgery - laminectomy/discectomy    HX ORTHOPAEDIC      nerve root injection in back    HX OTHER SURGICAL      testicular surgery- varicocelectomy, i &d of abscess on right elbow         Family History:   Problem Relation Age of Onset    Heart Disease Maternal Grandfather        Social History     Socioeconomic History    Marital status: SINGLE     Spouse name: Not on file    Number of children: Not on file    Years of education: Not on file    Highest education level: Not on file   Occupational History    Not on file   Tobacco Use    Smoking status: Current Every Day Smoker     Packs/day: 1.00 Years: 22.00     Pack years: 22.00     Types: Cigarettes    Smokeless tobacco: Former User    Tobacco comment: cigarettes   Substance and Sexual Activity    Alcohol use: No     Alcohol/week: 0.0 standard drinks     Comment: no longer drinks    Drug use: Yes     Types: Cocaine, Marijuana     Comment: per patient no methamphetamines    Sexual activity: Not Currently   Other Topics Concern    Not on file   Social History Narrative    The patient is . The patient lives his parents. The patient has no children. The patient does not have legal issues pending. The patient's source of income comes from family. patient's greatest support comes from his parents and this person will be involved with the treatment. pt has not been a victim of sexual/physical abuse. The highest grade achieved is [employer]     Social Determinants of Health     Financial Resource Strain:     Difficulty of Paying Living Expenses: Not on file   Food Insecurity:     Worried About [address] in the Last Year: Not on file    Sheri of Food in the Last Year: Not on file   Transportation Needs:     Lack of Transportation (Medical): Not on file    Lack of Transportation (Non-Medical):  Not on file   Physical Activity:     Days of Exercise per Week: Not on file    Minutes of Exercise per Session: Not on file   Stress:     Feeling of Stress : Not on file   Social Connections:     Frequency of Communication with Friends and Family: Not on file    Frequency of Social Gatherings with Friends and Family: Not on file    Attends Taoist Services: Not on file    Active Member of Clubs or Organizations: Not on file    Attends Club or Organization Meetings: Not on file    Marital Status: Not on file   Intimate Partner Violence:     Fear of Current or Ex-Partner: Not on file    Emotionally Abused: Not on file    Physically Abused: Not on file    Sexually Abused: Not on file   Housing Stability:     Unable to Pay for Housing in the Last Year: Not on file    Number of Places Lived in the Last Year: Not on file    Unstable Housing in the Last Year: Not on file         ALLERGIES: Barium sulfate, Demerol [meperidine], Iodinated contrast media, and Neurontin [gabapentin]    Review of Systems    Vitals:    05/07/22 0215   BP: (!) 148/77   Pulse: 72   Resp: 16   Temp: 98.9 °F (37.2 °C)   SpO2: 96%            Physical Exam     MDM  Number of Diagnoses or Management Options  Acute nonintractable headache, unspecified headache type  Traumatic injury of head, initial encounter  Diagnosis management comments: Patient remains nontoxic-appearing. No acute abnormality seen on CT scan. Relief with meds in the ED. Stable for discharge. ED Course as of 05/07/22 0603   Sat May 07, 2022   0500 Pt reports improvement in symptoms. Would like to sleep some more. [AL]      ED Course User Index  [AL] Chato Ross MD       Procedures    Patient's results have been reviewed with them. Patient and/or family have verbally conveyed their understanding and agreement of the patient's signs, symptoms, diagnosis, treatment and prognosis and additionally agree to follow up as recommended or return to the Emergency Room should their condition change prior to follow-up. Discharge instructions have also been provided to the patient with some educational information regarding their diagnosis as well a list of reasons why they would want to return to the ER prior to their follow-up appointment should their condition change.

## 2022-06-23 ENCOUNTER — HOSPITAL ENCOUNTER (EMERGENCY)
Age: 47
Discharge: HOME | End: 2022-06-23
Attending: EMERGENCY MEDICINE
Payer: COMMERCIAL

## 2022-06-23 VITALS
RESPIRATION RATE: 16 BRPM | HEART RATE: 99 BPM | SYSTOLIC BLOOD PRESSURE: 161 MMHG | OXYGEN SATURATION: 98 % | TEMPERATURE: 98.4 F | DIASTOLIC BLOOD PRESSURE: 84 MMHG

## 2022-06-23 DIAGNOSIS — R51.9 ACUTE NONINTRACTABLE HEADACHE, UNSPECIFIED HEADACHE TYPE: Primary | ICD-10-CM

## 2022-06-23 PROCEDURE — 96374 THER/PROPH/DIAG INJ IV PUSH: CPT

## 2022-06-23 PROCEDURE — 99284 EMERGENCY DEPT VISIT MOD MDM: CPT

## 2022-06-23 PROCEDURE — 74011250636 HC RX REV CODE- 250/636: Performed by: EMERGENCY MEDICINE

## 2022-06-23 PROCEDURE — 74011250637 HC RX REV CODE- 250/637: Performed by: EMERGENCY MEDICINE

## 2022-06-23 PROCEDURE — 96375 TX/PRO/DX INJ NEW DRUG ADDON: CPT

## 2022-06-23 RX ORDER — PROCHLORPERAZINE EDISYLATE 5 MG/ML
10 INJECTION INTRAMUSCULAR; INTRAVENOUS ONCE
Status: COMPLETED | OUTPATIENT
Start: 2022-06-23 | End: 2022-06-23

## 2022-06-23 RX ORDER — KETOROLAC TROMETHAMINE 30 MG/ML
15 INJECTION, SOLUTION INTRAMUSCULAR; INTRAVENOUS
Status: COMPLETED | OUTPATIENT
Start: 2022-06-23 | End: 2022-06-23

## 2022-06-23 RX ORDER — DIPHENHYDRAMINE HYDROCHLORIDE 50 MG/ML
25 INJECTION, SOLUTION INTRAMUSCULAR; INTRAVENOUS
Status: COMPLETED | OUTPATIENT
Start: 2022-06-23 | End: 2022-06-23

## 2022-06-23 RX ORDER — BUTALBITAL, ACETAMINOPHEN AND CAFFEINE 50; 325; 40 MG/1; MG/1; MG/1
1 TABLET ORAL ONCE
Status: COMPLETED | OUTPATIENT
Start: 2022-06-23 | End: 2022-06-23

## 2022-06-23 RX ADMIN — DIPHENHYDRAMINE HYDROCHLORIDE 25 MG: 50 INJECTION INTRAMUSCULAR; INTRAVENOUS at 14:08

## 2022-06-23 RX ADMIN — PROCHLORPERAZINE EDISYLATE 10 MG: 5 INJECTION INTRAMUSCULAR; INTRAVENOUS at 14:07

## 2022-06-23 RX ADMIN — SODIUM CHLORIDE 1000 ML: 9 INJECTION, SOLUTION INTRAVENOUS at 14:07

## 2022-06-23 RX ADMIN — BUTALBITAL, ACETAMINOPHEN, AND CAFFEINE 1 TABLET: 50; 325; 40 TABLET ORAL at 14:07

## 2022-06-23 RX ADMIN — KETOROLAC TROMETHAMINE 15 MG: 30 INJECTION, SOLUTION INTRAMUSCULAR at 14:07

## 2022-06-23 NOTE — ED TRIAGE NOTES
Pt arrives from home complaining of headache/ dizziness starting yesterday around 1200. Pt is A&Ox4 and ambulatory. Pt reports that he typically takes Fioricet during previous hospitalizations.

## 2022-06-23 NOTE — ED PROVIDER NOTES
HPI   51-year-old man with past medical history of migraine headaches, hypertension, and prior polysubstance abuse who presents to the emergency department due to headache and dizziness. Symptoms started yesterday around noon. He says he has a pressure behind his eyes that radiates to the top of his head and also a sense of being off balance. He says all of his symptoms are consistent with migraine headaches. He has had photosensitivity and nausea but no vomiting. He denies any trauma. He is not anticoagulated. He says his headache slowly built in intensity and continued to get worse throughout the day yesterday. As such he presented for further evaluation. Denies neck pain or fevers. No abdominal pain. No chest pain or shortness of breath.   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    Hypertension     IV drug abuse ([facility] [address]     Kidney stone     Liver disease     elevated liver enzymes/hep C    Migraine     Neuralgia     Other ill-defined conditions(799.89)     chronic bronchitis    Polysubstance dependence (HCC)     stimulants, MJ, tob, barbs, benzos, opiates    Psychiatric disorder     Bipolar, Anxiety    Unspecified adverse effect of anesthesia     \"was told he woke up during back surgery\" and during nerve root injection    Unspecified sleep apnea     mild - does not use CPAP       Past Surgical History:   Procedure Laterality Date    HX LUMBAR LAMINECTOMY      HX ORTHOPAEDIC Bilateral     back surgery - laminectomy/discectomy    HX ORTHOPAEDIC      nerve root injection in back    HX OTHER SURGICAL      testicular surgery- varicocelectomy, i &d of abscess on right elbow         Family History:   Problem Relation Age of Onset    Heart Disease Maternal Grandfather Social History     Socioeconomic History    Marital status: SINGLE     Spouse name: Not on file    Number of children: Not on file    Years of education: Not on file    Highest education level: Not on file   Occupational History    Not on file   Tobacco Use    Smoking status: Current Every Day Smoker     Packs/day: 1.00     Years: 22.00     Pack years: 22.00     Types: Cigarettes    Smokeless tobacco: Former User    Tobacco comment: cigarettes   Substance and Sexual Activity    Alcohol use: No     Alcohol/week: 0.0 standard drinks     Comment: no longer drinks    Drug use: Yes     Types: Cocaine, Marijuana     Comment: per patient no methamphetamines    Sexual activity: Not Currently   Other Topics Concern    Not on file   Social History Narrative    The patient is . The patient lives his parents. The patient has no children. The patient does not have legal issues pending. The patient's source of income comes from family. patient's greatest support comes from his parents and this person will be involved with the treatment. pt has not been a victim of sexual/physical abuse. The highest grade achieved is [employer]     Social Determinants of Health     Financial Resource Strain:     Difficulty of Paying Living Expenses: Not on file   Food Insecurity:     Worried About [address] in the Last Year: Not on file    Sheri of Food in the Last Year: Not on file   Transportation Needs:     Lack of Transportation (Medical): Not on file    Lack of Transportation (Non-Medical):  Not on file   Physical Activity:     Days of Exercise per Week: Not on file    Minutes of Exercise per Session: Not on file   Stress:     Feeling of Stress : Not on file   Social Connections:     Frequency of Communication with Friends and Family: Not on file    Frequency of Social Gatherings with Friends and Family: Not on file    Attends Pentecostal Services: Not on file   [employer] of Clubs or Organizations: Not on file    Attends Club or Organization Meetings: Not on file    Marital Status: Not on file   Intimate Partner Violence:     Fear of Current or Ex-Partner: Not on file    Emotionally Abused: Not on file    Physically Abused: Not on file    Sexually Abused: Not on file   Housing Stability:     Unable to Pay for Housing in the Last Year: Not on file    Number of [place] in the Last Year: Not on file    Unstable Housing in the Last Year: Not on file         ALLERGIES: Barium sulfate, Demerol [meperidine], Iodinated contrast media, and Neurontin [gabapentin]    Review of Systems   A complete review of systems was performed and all systems reviewed are negative unless otherwise documented in the HPI. Vitals:    06/23/22 1221   BP: (!) 161/84   Pulse: 99   Resp: 16   Temp: 98.4 °F (36.9 °C)   SpO2: 98%            Physical Exam  Constitutional:       Comments: Not acutely distressed or ill-appearing   HENT:      Head:      Comments: No appreciable signs of head trauma     Mouth/Throat:      Comments: Moist mucous membranes  Eyes:      General: No scleral icterus. Extraocular Movements: Extraocular movements intact. Neck:      Comments: Trachea midline  Cardiovascular:      Comments: Regular rate and rhythm. No murmurs. Normal capillary refill  Pulmonary:      Effort: Pulmonary effort is normal. No respiratory distress. Breath sounds: Normal breath sounds. No wheezing or rales. Abdominal:      General: There is no distension. Palpations: Abdomen is soft. Tenderness: There is no abdominal tenderness. Musculoskeletal:         General: No deformity. Normal range of motion. Cervical back: Normal range of motion. Skin:     General: Skin is warm and dry. Neurological:      Comments: Awake and alert. Speech is normal.  GCS 15. No facial droop. No dysmetria. Ambulates with a normal gait with no signs of ataxia.           MDM   66-year-old man who presents with the above chief complaint. Vital stable. He is neurologically intact. He is not acutely distressed. Given his normal exam and prior symptoms of similar headache he will be treated symptomatically with a migraine cocktail. Disposition will be determined on the basis of his clinical improvement. Patient's symptoms improved after migraine cocktail. He was discharged in stable condition.     Procedures

## 2022-11-03 ENCOUNTER — HOSPITAL ENCOUNTER (EMERGENCY)
Age: 47
Discharge: HOME | End: 2022-11-03
Attending: EMERGENCY MEDICINE
Payer: COMMERCIAL

## 2022-11-03 ENCOUNTER — APPOINTMENT (OUTPATIENT)
Dept: GENERAL RADIOLOGY | Age: 47
End: 2022-11-03
Attending: EMERGENCY MEDICINE
Payer: COMMERCIAL

## 2022-11-03 VITALS
HEART RATE: 81 BPM | DIASTOLIC BLOOD PRESSURE: 79 MMHG | SYSTOLIC BLOOD PRESSURE: 117 MMHG | HEIGHT: 72 IN | OXYGEN SATURATION: 95 % | TEMPERATURE: 97.7 F | RESPIRATION RATE: 18 BRPM | BODY MASS INDEX: 33.85 KG/M2

## 2022-11-03 DIAGNOSIS — B34.9 VIRAL SYNDROME: Primary | ICD-10-CM

## 2022-11-03 LAB
ATRIAL RATE: 69 BPM
CALCULATED P AXIS, ECG09: 66 DEGREES
CALCULATED R AXIS, ECG10: 75 DEGREES
CALCULATED T AXIS, ECG11: 63 DEGREES
DIAGNOSIS, 93000: NORMAL
P-R INTERVAL, ECG05: 158 MS
Q-T INTERVAL, ECG07: 380 MS
QRS DURATION, ECG06: 70 MS
QTC CALCULATION (BEZET), ECG08: 407 MS
VENTRICULAR RATE, ECG03: 69 BPM

## 2022-11-03 PROCEDURE — 74011000250 HC RX REV CODE- 250: Performed by: EMERGENCY MEDICINE

## 2022-11-03 PROCEDURE — 71045 X-RAY EXAM CHEST 1 VIEW: CPT

## 2022-11-03 PROCEDURE — 94640 AIRWAY INHALATION TREATMENT: CPT

## 2022-11-03 PROCEDURE — 93005 ELECTROCARDIOGRAM TRACING: CPT

## 2022-11-03 PROCEDURE — 74011250637 HC RX REV CODE- 250/637: Performed by: EMERGENCY MEDICINE

## 2022-11-03 PROCEDURE — 99284 EMERGENCY DEPT VISIT MOD MDM: CPT

## 2022-11-03 RX ORDER — ACETAMINOPHEN 500 MG
1000 TABLET ORAL ONCE
Status: COMPLETED | OUTPATIENT
Start: 2022-11-03 | End: 2022-11-03

## 2022-11-03 RX ORDER — IPRATROPIUM BROMIDE AND ALBUTEROL SULFATE 2.5; .5 MG/3ML; MG/3ML
9 SOLUTION RESPIRATORY (INHALATION)
Status: COMPLETED | OUTPATIENT
Start: 2022-11-03 | End: 2022-11-03

## 2022-11-03 RX ORDER — IBUPROFEN 600 MG/1
600 TABLET ORAL
Status: COMPLETED | OUTPATIENT
Start: 2022-11-03 | End: 2022-11-03

## 2022-11-03 RX ADMIN — IBUPROFEN 600 MG: 600 TABLET ORAL at 02:14

## 2022-11-03 RX ADMIN — ACETAMINOPHEN 1000 MG: 500 TABLET ORAL at 02:14

## 2022-11-03 RX ADMIN — IPRATROPIUM BROMIDE AND ALBUTEROL SULFATE 9 ML: .5; 3 SOLUTION RESPIRATORY (INHALATION) at 02:14

## 2022-11-03 NOTE — ED TRIAGE NOTES
Pt reports being diagnosed with COVID 10/26, and diagnosed with Influenza on 10/31. Now reporting chest tightness, and increased cough.

## 2022-11-03 NOTE — ED PROVIDER NOTES
47M w/ hx HCV, chronic pain, polysubstance abuse, bipolar p/w 9d cough. Pt reports 9d cough, congestion, sore throat, fatigue, body aches and subjective F/C. He reports chest congestion w/ wheezing. No N/V/D. Was recently dx w/ COVID19 (previously vaccinated) and influenza. Lives in a group home where multiple other people are sick. Current smoker.        Past Medical History:   Diagnosis Date    Arrhythmia     PCV's    Arthritis     back    Back pain     Chronic pain     6 herniated discs in back/pinched nerve in back and neck    GERD (gastroesophageal reflux disease)     Hepatitis C     HSV-2 seropositive 7/29/2016    Hypertension     IV drug abuse ([facility] [address]     Kidney stone     Liver disease     elevated liver enzymes/hep C    Migraine     Neuralgia     Other ill-defined conditions(799.89)     chronic bronchitis    Polysubstance dependence (HCC)     stimulants, MJ, tob, barbs, benzos, opiates    Psychiatric disorder     Bipolar, Anxiety    Unspecified adverse effect of anesthesia     \"was told he woke up during back surgery\" and during nerve root injection    Unspecified sleep apnea     mild - does not use CPAP       Past Surgical History:   Procedure Laterality Date    HX LUMBAR LAMINECTOMY      HX ORTHOPAEDIC Bilateral     back surgery - laminectomy/discectomy    HX ORTHOPAEDIC      nerve root injection in back    HX OTHER SURGICAL      testicular surgery- varicocelectomy, i &d of abscess on right elbow         Family History:   Problem Relation Age of Onset    Heart Disease Maternal Grandfather        Social History     Socioeconomic History    Marital status: SINGLE     Spouse name: Not on file    Number of children: Not on file    Years of education: Not on file    Highest education level: Not on file   Occupational History    Not on file   Tobacco Use    Smoking status: Every Day     Packs/day: 1.00     Years: 22.00     Pack years: 22.00     Types: Cigarettes    Smokeless tobacco: Former    Tobacco comments: cigarettes   Substance and Sexual Activity    Alcohol use: No     Alcohol/week: 0.0 standard drinks     Comment: no longer drinks    Drug use: Yes     Types: Marijuana     Comment: per patient no methamphetamines    Sexual activity: Not Currently   Other Topics Concern    Not on file   Social History Narrative    The patient is . The patient lives his parents. The patient has no children. The patient does not have legal issues pending. The patient's source of income comes from family. patient's greatest support comes from his parents and this person will be involved with the treatment. pt has not been a victim of sexual/physical abuse. The highest grade achieved is [employer]     Social Determinants of Health     Financial Resource Strain: Not on file   Food Insecurity: Not on file   Transportation Needs: Not on file   Physical Activity: Not on file   Stress: Not on file   Social Connections: Not on file   Intimate Partner Violence: Not on file   Housing Stability: Not on file         ALLERGIES: Barium sulfate, Demerol [meperidine], Iodinated contrast media, and Neurontin [gabapentin]    Review of Systems   Constitutional:  Negative for chills, diaphoresis and fever. HENT:  Positive for congestion, rhinorrhea and sore throat. Negative for facial swelling, mouth sores, nosebleeds, trouble swallowing and voice change. Eyes:  Negative for pain and visual disturbance. Respiratory:  Positive for cough. Negative for apnea, shortness of breath, wheezing and stridor. Cardiovascular:  Negative for chest pain, palpitations and leg swelling. Gastrointestinal:  Negative for abdominal distention, abdominal pain, blood in stool, diarrhea, nausea and vomiting. Genitourinary:  Negative for difficulty urinating, dysuria, flank pain, hematuria, scrotal swelling and testicular pain. Musculoskeletal:  Negative for joint swelling. Skin:  Negative for color change and rash.    Allergic/Immunologic: Negative for immunocompromised state. Neurological:  Negative for dizziness, syncope and light-headedness. Hematological:  Does not bruise/bleed easily. Psychiatric/Behavioral:  Negative for agitation and behavioral problems. Vitals:    11/03/22 0122   BP: 117/79   Pulse: 81   Resp: 18   Temp: 97.7 °F (36.5 °C)   SpO2: 95%   Height: 6' (1.829 m)            Physical Exam  Vitals and nursing note reviewed. Constitutional:       General: He is not in acute distress. Appearance: Normal appearance. He is not ill-appearing or toxic-appearing. HENT:      Head: Normocephalic and atraumatic. Right Ear: External ear normal.      Left Ear: External ear normal.      Nose: Nose normal.      Mouth/Throat:      Mouth: Mucous membranes are moist.      Pharynx: Oropharynx is clear. No oropharyngeal exudate or posterior oropharyngeal erythema. Eyes:      General: No scleral icterus. Extraocular Movements: Extraocular movements intact. Conjunctiva/sclera: Conjunctivae normal.      Pupils: Pupils are equal, round, and reactive to light. Cardiovascular:      Rate and Rhythm: Normal rate and regular rhythm. Pulses: Normal pulses. Heart sounds: No murmur heard. No friction rub. No gallop. Pulmonary:      Effort: Pulmonary effort is normal. No tachypnea, accessory muscle usage or respiratory distress. Breath sounds: No stridor. Decreased breath sounds present. No wheezing, rhonchi or rales. Abdominal:      General: Bowel sounds are normal. There is no distension. Palpations: Abdomen is soft. Tenderness: There is no abdominal tenderness. There is no guarding or rebound. Musculoskeletal:         General: No deformity. Normal range of motion. Cervical back: Normal range of motion and neck supple. No rigidity. Right lower leg: No edema. Left lower leg: No edema. Skin:     General: Skin is warm. Capillary Refill: Capillary refill takes less than 2 seconds. Coloration: Skin is not jaundiced. Neurological:      General: No focal deficit present. Mental Status: He is alert. Cranial Nerves: No cranial nerve deficit. Sensory: No sensory deficit. Motor: No weakness. Coordination: Coordination normal.   Psychiatric:         Mood and Affect: Mood normal.         Behavior: Behavior normal.         Thought Content: Thought content normal.         Judgment: Judgment normal.        EKG Interpretation   SR, narrow QRS, nl intervals, no TYRONE/STD/TWI  (EKG tracing interpreted by ED physician)        IMAGING RESULTS:  XR CHEST PORT   Final Result      No acute process on portable chest.             MEDICATIONS GIVEN:  Medications   albuterol-ipratropium (DUO-NEB) 2.5 MG-0.5 MG/3 ML (9 mL Nebulization Given 11/3/22 0214)   ibuprofen (MOTRIN) tablet 600 mg (600 mg Oral Given 11/3/22 0214)   acetaminophen (TYLENOL) tablet 1,000 mg (1,000 mg Oral Given 11/3/22 0214)         IMPRESSION:  1. Viral syndrome        PLAN:  - Discharge    Nehemiah Callahan MD      MDM  Number of Diagnoses or Management Options  Viral syndrome  Diagnosis management comments: 47M w/ hx HCV, chronic pain, polysubstance abuse, bipolar p/w 9d cough and congestion. Recent +COVID and Flu. Pt well appearing, afebrile, hemodynamically stable w/o resp distress or hypoxia. CXR clear of edema, infiltrate or pnx. EKG SR w/o ischemic changes. Given duonebs w/ improvement. Low concern for sepsis. Supportive care and symptomatic treatment. This patient was seen during the [place] 19 Pandemic. I suspect that the patient has COVID 19 infection of mild severity based on HPI and physical exam. They are in no need of supplemental oxygenation and do not require further inpatient evaluation/admission at this time.  Patient is aware of return precautions which include severe shortness of breath, chest pain, high fevers or any worsening of symptoms, detailed instructions regarding s/s of COVID 19 instructions included in the dc papers as well. Patient is aware that they must follow-up with the primary care physician ideally via a virtual visit or return to the emergency department only if his symptoms worsen. Isolation precautions and hand hygeine discussed at length. At the time of d/c patient is well appearing, not sickly, toxic, ambulatory without respiratory distress, hypotension or hypoxia.         Amount and/or Complexity of Data Reviewed  Tests in the radiology section of CPT®: ordered and reviewed  Tests in the medicine section of CPT®: ordered and reviewed  Independent visualization of images, tracings, or specimens: yes    Risk of Complications, Morbidity, and/or Mortality  Presenting problems: moderate  Diagnostic procedures: moderate  Management options: moderate           Procedures

## 2023-01-05 ENCOUNTER — HOSPITAL ENCOUNTER (EMERGENCY)
Age: 48
Discharge: HOME | End: 2023-01-05
Attending: STUDENT IN AN ORGANIZED HEALTH CARE EDUCATION/TRAINING PROGRAM
Payer: COMMERCIAL

## 2023-01-05 VITALS
OXYGEN SATURATION: 97 % | DIASTOLIC BLOOD PRESSURE: 88 MMHG | TEMPERATURE: 97.5 F | SYSTOLIC BLOOD PRESSURE: 135 MMHG | BODY MASS INDEX: 33.85 KG/M2 | RESPIRATION RATE: 12 BRPM | WEIGHT: 249.56 LBS | HEART RATE: 71 BPM

## 2023-01-05 DIAGNOSIS — T78.40XA ALLERGIC REACTION, INITIAL ENCOUNTER: Primary | ICD-10-CM

## 2023-01-05 LAB
ATRIAL RATE: 69 BPM
CALCULATED P AXIS, ECG09: 72 DEGREES
CALCULATED R AXIS, ECG10: 64 DEGREES
CALCULATED T AXIS, ECG11: 54 DEGREES
COMMENT, HOLDF: NORMAL
DIAGNOSIS, 93000: NORMAL
P-R INTERVAL, ECG05: 154 MS
Q-T INTERVAL, ECG07: 372 MS
QRS DURATION, ECG06: 70 MS
QTC CALCULATION (BEZET), ECG08: 398 MS
SAMPLES BEING HELD,HOLD: NORMAL
VENTRICULAR RATE, ECG03: 69 BPM

## 2023-01-05 PROCEDURE — 74011250636 HC RX REV CODE- 250/636: Performed by: STUDENT IN AN ORGANIZED HEALTH CARE EDUCATION/TRAINING PROGRAM

## 2023-01-05 PROCEDURE — 96361 HYDRATE IV INFUSION ADD-ON: CPT

## 2023-01-05 PROCEDURE — 96374 THER/PROPH/DIAG INJ IV PUSH: CPT

## 2023-01-05 PROCEDURE — 96375 TX/PRO/DX INJ NEW DRUG ADDON: CPT

## 2023-01-05 PROCEDURE — 74011000250 HC RX REV CODE- 250: Performed by: STUDENT IN AN ORGANIZED HEALTH CARE EDUCATION/TRAINING PROGRAM

## 2023-01-05 PROCEDURE — 99284 EMERGENCY DEPT VISIT MOD MDM: CPT

## 2023-01-05 PROCEDURE — 36415 COLL VENOUS BLD VENIPUNCTURE: CPT

## 2023-01-05 PROCEDURE — 93005 ELECTROCARDIOGRAM TRACING: CPT

## 2023-01-05 RX ORDER — DEXTROAMPHETAMINE SACCHARATE, AMPHETAMINE ASPARTATE, DEXTROAMPHETAMINE SULFATE AND AMPHETAMINE SULFATE 2.5; 2.5; 2.5; 2.5 MG/1; MG/1; MG/1; MG/1
10 TABLET ORAL
COMMUNITY

## 2023-01-05 RX ORDER — EPINEPHRINE 0.3 MG/.3ML
0.3 INJECTION SUBCUTANEOUS
Qty: 1 EACH | Refills: 0 | Status: SHIPPED | OUTPATIENT
Start: 2023-01-05 | End: 2023-01-05

## 2023-01-05 RX ORDER — DIPHENHYDRAMINE HYDROCHLORIDE 50 MG/ML
25 INJECTION, SOLUTION INTRAMUSCULAR; INTRAVENOUS
Status: COMPLETED | OUTPATIENT
Start: 2023-01-05 | End: 2023-01-05

## 2023-01-05 RX ORDER — PREDNISONE 50 MG/1
50 TABLET ORAL DAILY
Qty: 5 TABLET | Refills: 0 | Status: SHIPPED | OUTPATIENT
Start: 2023-01-05 | End: 2023-01-10

## 2023-01-05 RX ADMIN — SODIUM CHLORIDE 1000 ML: 9 INJECTION, SOLUTION INTRAVENOUS at 09:35

## 2023-01-05 RX ADMIN — DIPHENHYDRAMINE HYDROCHLORIDE 25 MG: 50 INJECTION, SOLUTION INTRAMUSCULAR; INTRAVENOUS at 09:35

## 2023-01-05 RX ADMIN — FAMOTIDINE 20 MG: 10 INJECTION INTRAVENOUS at 09:38

## 2023-01-05 RX ADMIN — METHYLPREDNISOLONE SODIUM SUCCINATE 125 MG: 125 INJECTION, POWDER, FOR SOLUTION INTRAMUSCULAR; INTRAVENOUS at 09:33

## 2023-01-05 NOTE — ED TRIAGE NOTES
Patient presents with hives on his arms bilaterally since yesterday, and throat \"tightness\" since this morning. He has taken 3 doses of benedryl, the last being at 0300, with no improvement. In triage, the back of his throat is red and patent. He is unsure of what he is reacting to.

## 2023-01-05 NOTE — ED NOTES
The charge RN reviewed discharge instructions with the patient. The patient verbalized understanding.

## 2023-01-05 NOTE — ED PROVIDER NOTES
77-year-old male presents ED for evaluation of bilateral hives to the flexor surfaces of the arms. Patient woke up this morning with the symptoms and to 2 doses of oral Benadryl. Last was at 3 AM.  Patient has had no improvement of symptoms. She reports scratchy throat. Has not short of breath. Talking full sentences. Patient has multiple medication allergies. Has not tried new medications or exposures. Patient reports this is happened several times in the past.  He works at [employer] where he is exposed to chemicals. Allergic Reaction   Pertinent negatives include no shortness of breath.       Past Medical History:   Diagnosis Date    Arrhythmia     PCV's    Arthritis     back    Back pain     Chronic pain     6 herniated discs in back/pinched nerve in back and neck    GERD (gastroesophageal reflux disease)     Hepatitis C     HSV-2 seropositive 7/29/2016    Hypertension     IV drug abuse ([facility] [address]     Kidney stone     Liver disease     elevated liver enzymes/hep C    Migraine     Neuralgia     Other ill-defined conditions(799.89)     chronic bronchitis    Polysubstance dependence (HCC)     stimulants, MJ, tob, barbs, benzos, opiates    Psychiatric disorder     Bipolar, Anxiety    Unspecified adverse effect of anesthesia     \"was told he woke up during back surgery\" and during nerve root injection    Unspecified sleep apnea     mild - does not use CPAP       Past Surgical History:   Procedure Laterality Date    HX LUMBAR LAMINECTOMY      HX ORTHOPAEDIC Bilateral     back surgery - laminectomy/discectomy    HX ORTHOPAEDIC      nerve root injection in back    HX OTHER SURGICAL      testicular surgery- varicocelectomy, i &d of abscess on right elbow         Family History:   Problem Relation Age of Onset    Heart Disease Maternal Grandfather        Social History     Socioeconomic History    Marital status: SINGLE     Spouse name: Not on file    Number of children: Not on file    Years of education: Not on file    Highest education level: Not on file   Occupational History    Not on file   Tobacco Use    Smoking status: Every Day     Packs/day: 1.00     Years: 22.00     Pack years: 22.00     Types: Cigarettes    Smokeless tobacco: Former    Tobacco comments:     cigarettes   Substance and Sexual Activity    Alcohol use: No     Alcohol/week: 0.0 standard drinks     Comment: no longer drinks    Drug use: Yes     Frequency: 14.0 times per week     Types: Marijuana     Comment: per patient no methamphetamines    Sexual activity: Not Currently   Other Topics Concern    Not on file   Social History Narrative    The patient is . The patient lives his parents. The patient has no children. The patient does not have legal issues pending. The patient's source of income comes from family. patient's greatest support comes from his parents and this person will be involved with the treatment. pt has not been a victim of sexual/physical abuse. The highest grade achieved is [employer]     Social Determinants of Health     Financial Resource Strain: Not on file   Food Insecurity: Not on file   Transportation Needs: Not on file   Physical Activity: Not on file   Stress: Not on file   Social Connections: Not on file   Intimate Partner Violence: Not on file   Housing Stability: Not on file         ALLERGIES: Barium sulfate, Demerol [meperidine], Iodinated contrast media, and Neurontin [gabapentin]    Review of Systems   Constitutional:  Negative for fever. HENT:  Negative for facial swelling. Respiratory:  Negative for choking, shortness of breath and wheezing. Cardiovascular:  Negative for chest pain. Gastrointestinal:  Negative for abdominal pain. Skin:  Positive for rash. Neurological:  Negative for headaches. Vitals:    01/05/23 0915   BP: 126/85   Pulse: 71   Resp: 12   Temp: 97.5 °F (36.4 °C)   SpO2: 98%   Weight: 113.2 kg (249 lb 9 oz)            Physical Exam  Vitals and nursing note reviewed. Constitutional:       General: He is not in acute distress. Appearance: He is normal weight. He is not toxic-appearing. HENT:      Head: Normocephalic and atraumatic. Right Ear: External ear normal.      Left Ear: External ear normal.      Nose: Nose normal.      Mouth/Throat:      Mouth: Mucous membranes are dry. Pharynx: Oropharynx is clear. Comments: Patent airway, uvula midline and non-edematous. Normal appearing tongue  Cardiovascular:      Rate and Rhythm: Normal rate and regular rhythm. Pulses: Normal pulses. Heart sounds: Normal heart sounds. Pulmonary:      Effort: Pulmonary effort is normal.      Breath sounds: Normal breath sounds. No wheezing. Abdominal:      General: Abdomen is flat. Bowel sounds are normal.      Palpations: Abdomen is soft. Musculoskeletal:         General: Normal range of motion. Skin:     Comments: Wheal appearing rash to bilateral arms and feet   Neurological:      General: No focal deficit present. Mental Status: He is alert. Medical Decision Making  49-year-old male presents the ED for evaluation of bilateral hives to both of his arms, and then ankles. Patient is resting comfortably. No acute distress. Posterior pharynx is nonedematous, uvula is midline nonedematous. No ablation of the tongue, tongue is not swollen. Speaking full sentences with normal pros. Is not dyspneic. Respiratory rate of 12. Not hypoxic. Patient has wheels and hives on the flexor surfaces of his arms and on the anterior aspect of his arms on the wrists. And bilateral ankles. No rash of the abdomen or chest or back. 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 Less likely anaphylaxis, no vomiting, no dyspnea. Airways intact. Discussed treatments for allergic reaction.   An IV was established patient was given a liter of normal saline, Solu-Medrol, famotidine and Benadryl. Patient had significant provement of symptoms throughout the ED visit. Discussed outpatient management of his allergic reaction including starting a course of steroids, continue use Benadryl over-the-counter. Patient was provided a prescription for an epinephrine pen and instructions for use. Was provided a work note. Strict return precautions were discussed and agreed upon prior to discharge. Patient leaves ED no acute distress nontoxic appearance. Amount and/or Complexity of Data Reviewed  ECG/medicine tests: ordered. Risk  Prescription drug management.       ED Course as of 01/05/23 1140   Thu Jan 05, 2023   9276 EKG performed at 09 14 shows normal sinus rhythm, rate of 69, no STEMI normal intervals [WG]      ED Course User Index  [WG] Dub Argue, DO       Procedures

## 2023-01-05 NOTE — DISCHARGE INSTRUCTIONS
You may continue using Benadryl for your itchiness. We have also given you a prescription for steroids. Please take as prescribed. I have also provided you an epinephrine pen if you develop symptoms of anaphylaxis which we discussed. We have discussed how to use it. If you do use the EpiPen return to emergency department immediately. Please follow-up with your primary care doctor next all days for reevaluation today symptoms.   Return as needed

## 2023-01-05 NOTE — Clinical Note
[facility]  [address]  [address]  [phone]    Work/School Note    Date: 1/5/2023    To Whom It May concern:    Nita Dunaway was seen and treated today in the emergency room by the following provider(s):  Attending Provider: Blas Ya is excused from work/school on 01/05/23 and 01/06/23. He is medically clear to return to work/school on 1/7/2023.        Sincerely,          José Miguel Barrera, DO

## 2023-05-03 ENCOUNTER — APPOINTMENT (OUTPATIENT)
Dept: CT IMAGING | Age: 48
End: 2023-05-03
Attending: EMERGENCY MEDICINE
Payer: COMMERCIAL

## 2023-05-03 ENCOUNTER — HOSPITAL ENCOUNTER (EMERGENCY)
Age: 48
Discharge: HOME | End: 2023-05-03
Attending: EMERGENCY MEDICINE
Payer: COMMERCIAL

## 2023-05-03 ENCOUNTER — APPOINTMENT (OUTPATIENT)
Dept: ULTRASOUND IMAGING | Age: 48
End: 2023-05-03
Attending: STUDENT IN AN ORGANIZED HEALTH CARE EDUCATION/TRAINING PROGRAM
Payer: COMMERCIAL

## 2023-05-03 VITALS
HEART RATE: 71 BPM | WEIGHT: 175.04 LBS | BODY MASS INDEX: 23.71 KG/M2 | HEIGHT: 72 IN | RESPIRATION RATE: 20 BRPM | OXYGEN SATURATION: 97 % | DIASTOLIC BLOOD PRESSURE: 86 MMHG | TEMPERATURE: 97.7 F | SYSTOLIC BLOOD PRESSURE: 133 MMHG

## 2023-05-03 DIAGNOSIS — N50.812 PAIN IN LEFT TESTICLE: Primary | ICD-10-CM

## 2023-05-03 LAB
ALBUMIN SERPL-MCNC: 4.2 G/DL (ref 3.5–5)
ALBUMIN/GLOB SERPL: 1.3 (ref 1.1–2.2)
ALP SERPL-CCNC: 73 U/L (ref 45–117)
ALT SERPL-CCNC: 21 U/L (ref 12–78)
AMPHET UR QL SCN: POSITIVE
ANION GAP SERPL CALC-SCNC: 0 MMOL/L (ref 5–15)
APPEARANCE UR: CLEAR
AST SERPL-CCNC: 20 U/L (ref 15–37)
ATRIAL RATE: 71 BPM
BACTERIA URNS QL MICRO: NEGATIVE /HPF
BARBITURATES UR QL SCN: NEGATIVE
BASOPHILS # BLD: 0.1 K/UL (ref 0–0.1)
BASOPHILS NFR BLD: 1 % (ref 0–1)
BENZODIAZ UR QL: NEGATIVE
BILIRUB SERPL-MCNC: 0.6 MG/DL (ref 0.2–1)
BILIRUB UR QL: NEGATIVE
BUN SERPL-MCNC: 18 MG/DL (ref 6–20)
BUN/CREAT SERPL: 14 (ref 12–20)
CALCIUM SERPL-MCNC: 9.2 MG/DL (ref 8.5–10.1)
CALCULATED P AXIS, ECG09: 67 DEGREES
CALCULATED R AXIS, ECG10: 85 DEGREES
CALCULATED T AXIS, ECG11: 38 DEGREES
CANNABINOIDS UR QL SCN: POSITIVE
CHLORIDE SERPL-SCNC: 107 MMOL/L (ref 97–108)
CO2 SERPL-SCNC: 31 MMOL/L (ref 21–32)
COCAINE UR QL SCN: NEGATIVE
COLOR UR: ABNORMAL
COMMENT, HOLDF: NORMAL
CREAT SERPL-MCNC: 1.25 MG/DL (ref 0.7–1.3)
DIAGNOSIS, 93000: NORMAL
DIFFERENTIAL METHOD BLD: NORMAL
DRUG SCRN COMMENT,DRGCM: ABNORMAL
EOSINOPHIL # BLD: 0.1 K/UL (ref 0–0.4)
EOSINOPHIL NFR BLD: 2 % (ref 0–7)
EPITH CASTS URNS QL MICRO: ABNORMAL /LPF
ERYTHROCYTE [DISTWIDTH] IN BLOOD BY AUTOMATED COUNT: 12.2 % (ref 11.5–14.5)
GLOBULIN SER CALC-MCNC: 3.2 G/DL (ref 2–4)
GLUCOSE SERPL-MCNC: 121 MG/DL (ref 65–100)
GLUCOSE UR STRIP.AUTO-MCNC: NEGATIVE MG/DL
HCT VFR BLD AUTO: 45.6 % (ref 36.6–50.3)
HGB BLD-MCNC: 15.1 G/DL (ref 12.1–17)
HGB UR QL STRIP: NEGATIVE
HYALINE CASTS URNS QL MICRO: ABNORMAL /LPF (ref 0–5)
IMM GRANULOCYTES # BLD AUTO: 0 K/UL (ref 0–0.04)
IMM GRANULOCYTES NFR BLD AUTO: 0 % (ref 0–0.5)
KETONES UR QL STRIP.AUTO: NEGATIVE MG/DL
LEUKOCYTE ESTERASE UR QL STRIP.AUTO: ABNORMAL
LYMPHOCYTES # BLD: 2.5 K/UL (ref 0.8–3.5)
LYMPHOCYTES NFR BLD: 38 % (ref 12–49)
MAGNESIUM SERPL-MCNC: 2.1 MG/DL (ref 1.6–2.4)
MCH RBC QN AUTO: 32.1 PG (ref 26–34)
MCHC RBC AUTO-ENTMCNC: 33.1 G/DL (ref 30–36.5)
MCV RBC AUTO: 97 FL (ref 80–99)
METHADONE UR QL: NEGATIVE
MONOCYTES # BLD: 0.4 K/UL (ref 0–1)
MONOCYTES NFR BLD: 6 % (ref 5–13)
NEUTS SEG # BLD: 3.6 K/UL (ref 1.8–8)
NEUTS SEG NFR BLD: 53 % (ref 32–75)
NITRITE UR QL STRIP.AUTO: NEGATIVE
NRBC # BLD: 0 K/UL (ref 0–0.01)
NRBC BLD-RTO: 0 PER 100 WBC
OPIATES UR QL: NEGATIVE
P-R INTERVAL, ECG05: 168 MS
PCP UR QL: NEGATIVE
PH UR STRIP: 6 (ref 5–8)
PLATELET # BLD AUTO: 181 K/UL (ref 150–400)
PMV BLD AUTO: 11.2 FL (ref 8.9–12.9)
POTASSIUM SERPL-SCNC: 3.9 MMOL/L (ref 3.5–5.1)
PROT SERPL-MCNC: 7.4 G/DL (ref 6.4–8.2)
PROT UR STRIP-MCNC: 100 MG/DL
Q-T INTERVAL, ECG07: 360 MS
QRS DURATION, ECG06: 72 MS
QTC CALCULATION (BEZET), ECG08: 391 MS
RBC # BLD AUTO: 4.7 M/UL (ref 4.1–5.7)
RBC #/AREA URNS HPF: ABNORMAL /HPF (ref 0–5)
SAMPLES BEING HELD,HOLD: NORMAL
SODIUM SERPL-SCNC: 138 MMOL/L (ref 136–145)
SP GR UR REFRACTOMETRY: 1.02 (ref 1–1.03)
TROPONIN I SERPL HS-MCNC: <3 NG/L (ref 0–57)
UR CULT HOLD, URHOLD: NORMAL
UROBILINOGEN UR QL STRIP.AUTO: 0.2 EU/DL (ref 0.2–1)
VENTRICULAR RATE, ECG03: 71 BPM
WBC # BLD AUTO: 6.8 K/UL (ref 4.1–11.1)
WBC URNS QL MICRO: ABNORMAL /HPF (ref 0–4)

## 2023-05-03 PROCEDURE — 83735 ASSAY OF MAGNESIUM: CPT

## 2023-05-03 PROCEDURE — 76870 US EXAM SCROTUM: CPT

## 2023-05-03 PROCEDURE — 74011250637 HC RX REV CODE- 250/637: Performed by: STUDENT IN AN ORGANIZED HEALTH CARE EDUCATION/TRAINING PROGRAM

## 2023-05-03 PROCEDURE — 93005 ELECTROCARDIOGRAM TRACING: CPT

## 2023-05-03 PROCEDURE — 99284 EMERGENCY DEPT VISIT MOD MDM: CPT

## 2023-05-03 PROCEDURE — 85025 COMPLETE CBC W/AUTO DIFF WBC: CPT

## 2023-05-03 PROCEDURE — 70450 CT HEAD/BRAIN W/O DYE: CPT

## 2023-05-03 PROCEDURE — 87086 URINE CULTURE/COLONY COUNT: CPT

## 2023-05-03 PROCEDURE — 96374 THER/PROPH/DIAG INJ IV PUSH: CPT

## 2023-05-03 PROCEDURE — 80307 DRUG TEST PRSMV CHEM ANLYZR: CPT

## 2023-05-03 PROCEDURE — 74011250636 HC RX REV CODE- 250/636: Performed by: EMERGENCY MEDICINE

## 2023-05-03 PROCEDURE — 74011250636 HC RX REV CODE- 250/636: Performed by: STUDENT IN AN ORGANIZED HEALTH CARE EDUCATION/TRAINING PROGRAM

## 2023-05-03 PROCEDURE — 80053 COMPREHEN METABOLIC PANEL: CPT

## 2023-05-03 PROCEDURE — 87491 CHLMYD TRACH DNA AMP PROBE: CPT

## 2023-05-03 PROCEDURE — 81001 URINALYSIS AUTO W/SCOPE: CPT

## 2023-05-03 PROCEDURE — 84484 ASSAY OF TROPONIN QUANT: CPT

## 2023-05-03 RX ORDER — BUTALBITAL, ACETAMINOPHEN AND CAFFEINE 50; 325; 40 MG/1; MG/1; MG/1
2 TABLET ORAL
Status: COMPLETED | OUTPATIENT
Start: 2023-05-03 | End: 2023-05-03

## 2023-05-03 RX ORDER — LEVOFLOXACIN 750 MG/1
750 TABLET ORAL DAILY
Qty: 10 TABLET | Refills: 0 | Status: SHIPPED | OUTPATIENT
Start: 2023-05-03 | End: 2023-05-13

## 2023-05-03 RX ORDER — KETOROLAC TROMETHAMINE 30 MG/ML
30 INJECTION, SOLUTION INTRAMUSCULAR; INTRAVENOUS
Status: COMPLETED | OUTPATIENT
Start: 2023-05-03 | End: 2023-05-03

## 2023-05-03 RX ADMIN — BUTALBITAL, ACETAMINOPHEN, AND CAFFEINE 2 TABLET: 50; 325; 40 TABLET ORAL at 07:32

## 2023-05-03 RX ADMIN — KETOROLAC TROMETHAMINE 30 MG: 30 INJECTION, SOLUTION INTRAMUSCULAR; INTRAVENOUS at 08:33

## 2023-05-03 RX ADMIN — SODIUM CHLORIDE 1000 ML: 9 INJECTION, SOLUTION INTRAVENOUS at 07:32

## 2023-05-04 ENCOUNTER — HOSPITAL ENCOUNTER (EMERGENCY)
Age: 48
Discharge: HOME | End: 2023-05-04
Attending: EMERGENCY MEDICINE
Payer: COMMERCIAL

## 2023-05-04 ENCOUNTER — APPOINTMENT (OUTPATIENT)
Dept: ULTRASOUND IMAGING | Age: 48
End: 2023-05-04
Attending: EMERGENCY MEDICINE
Payer: COMMERCIAL

## 2023-05-04 ENCOUNTER — APPOINTMENT (OUTPATIENT)
Dept: CT IMAGING | Age: 48
End: 2023-05-04
Attending: EMERGENCY MEDICINE
Payer: COMMERCIAL

## 2023-05-04 ENCOUNTER — HOSPITAL ENCOUNTER (EMERGENCY)
Age: 48
Discharge: LEFT BEFORE DISCHARGE | End: 2023-05-04
Payer: COMMERCIAL

## 2023-05-04 VITALS
DIASTOLIC BLOOD PRESSURE: 103 MMHG | TEMPERATURE: 98.1 F | OXYGEN SATURATION: 99 % | HEART RATE: 72 BPM | WEIGHT: 174.6 LBS | BODY MASS INDEX: 23.65 KG/M2 | RESPIRATION RATE: 17 BRPM | SYSTOLIC BLOOD PRESSURE: 162 MMHG | HEIGHT: 72 IN

## 2023-05-04 VITALS
WEIGHT: 171.74 LBS | BODY MASS INDEX: 23.29 KG/M2 | DIASTOLIC BLOOD PRESSURE: 80 MMHG | SYSTOLIC BLOOD PRESSURE: 132 MMHG | OXYGEN SATURATION: 96 % | TEMPERATURE: 97.8 F | RESPIRATION RATE: 16 BRPM | HEART RATE: 73 BPM

## 2023-05-04 DIAGNOSIS — R51.9 ACUTE NONINTRACTABLE HEADACHE, UNSPECIFIED HEADACHE TYPE: Primary | ICD-10-CM

## 2023-05-04 DIAGNOSIS — N50.812 PAIN IN LEFT TESTICLE: ICD-10-CM

## 2023-05-04 LAB
ANION GAP SERPL CALC-SCNC: 10 MMOL/L (ref 5–15)
BACTERIA SPEC CULT: NORMAL
BASOPHILS # BLD: 0 K/UL (ref 0–0.1)
BASOPHILS NFR BLD: 0 % (ref 0–1)
BUN SERPL-MCNC: 16 MG/DL (ref 6–20)
BUN/CREAT SERPL: 12 (ref 12–20)
CALCIUM SERPL-MCNC: 8.7 MG/DL (ref 8.5–10.1)
CHLORIDE SERPL-SCNC: 103 MMOL/L (ref 97–108)
CO2 SERPL-SCNC: 26 MMOL/L (ref 21–32)
CREAT SERPL-MCNC: 1.32 MG/DL (ref 0.7–1.3)
DIFFERENTIAL METHOD BLD: ABNORMAL
EOSINOPHIL # BLD: 0.2 K/UL (ref 0–0.4)
EOSINOPHIL NFR BLD: 2 % (ref 0–7)
ERYTHROCYTE [DISTWIDTH] IN BLOOD BY AUTOMATED COUNT: 12.4 % (ref 11.5–14.5)
GLUCOSE SERPL-MCNC: 85 MG/DL (ref 65–100)
HCT VFR BLD AUTO: 41.7 % (ref 36.6–50.3)
HGB BLD-MCNC: 14.4 G/DL (ref 12.1–17)
IMM GRANULOCYTES # BLD AUTO: 0.1 K/UL (ref 0–0.04)
IMM GRANULOCYTES NFR BLD AUTO: 1 % (ref 0–0.5)
LYMPHOCYTES # BLD: 2 K/UL (ref 0.8–3.5)
LYMPHOCYTES NFR BLD: 25 % (ref 12–49)
MCH RBC QN AUTO: 32.1 PG (ref 26–34)
MCHC RBC AUTO-ENTMCNC: 34.5 G/DL (ref 30–36.5)
MCV RBC AUTO: 93.1 FL (ref 80–99)
MONOCYTES # BLD: 0.6 K/UL (ref 0–1)
MONOCYTES NFR BLD: 7 % (ref 5–13)
NEUTS SEG # BLD: 5 K/UL (ref 1.8–8)
NEUTS SEG NFR BLD: 65 % (ref 32–75)
NRBC # BLD: 0 K/UL (ref 0–0.01)
NRBC BLD-RTO: 0 PER 100 WBC
PLATELET # BLD AUTO: 166 K/UL (ref 150–400)
PMV BLD AUTO: 11 FL (ref 8.9–12.9)
POTASSIUM SERPL-SCNC: 4.5 MMOL/L (ref 3.5–5.1)
RBC # BLD AUTO: 4.48 M/UL (ref 4.1–5.7)
SERVICE CMNT-IMP: NORMAL
SODIUM SERPL-SCNC: 139 MMOL/L (ref 136–145)
WBC # BLD AUTO: 7.8 K/UL (ref 4.1–11.1)

## 2023-05-04 PROCEDURE — 75810000275 HC EMERGENCY DEPT VISIT NO LEVEL OF CARE

## 2023-05-04 PROCEDURE — 76870 US EXAM SCROTUM: CPT

## 2023-05-04 PROCEDURE — 36415 COLL VENOUS BLD VENIPUNCTURE: CPT

## 2023-05-04 PROCEDURE — 74176 CT ABD & PELVIS W/O CONTRAST: CPT

## 2023-05-04 PROCEDURE — 96361 HYDRATE IV INFUSION ADD-ON: CPT

## 2023-05-04 PROCEDURE — 85025 COMPLETE CBC W/AUTO DIFF WBC: CPT

## 2023-05-04 PROCEDURE — 74011250636 HC RX REV CODE- 250/636: Performed by: EMERGENCY MEDICINE

## 2023-05-04 PROCEDURE — 99284 EMERGENCY DEPT VISIT MOD MDM: CPT

## 2023-05-04 PROCEDURE — 96375 TX/PRO/DX INJ NEW DRUG ADDON: CPT

## 2023-05-04 PROCEDURE — 96374 THER/PROPH/DIAG INJ IV PUSH: CPT

## 2023-05-04 PROCEDURE — 80048 BASIC METABOLIC PNL TOTAL CA: CPT

## 2023-05-04 PROCEDURE — 74011250637 HC RX REV CODE- 250/637: Performed by: EMERGENCY MEDICINE

## 2023-05-04 RX ORDER — METOCLOPRAMIDE HYDROCHLORIDE 5 MG/ML
10 INJECTION INTRAMUSCULAR; INTRAVENOUS
Status: COMPLETED | OUTPATIENT
Start: 2023-05-04 | End: 2023-05-04

## 2023-05-04 RX ORDER — ACETAMINOPHEN 500 MG
1000 TABLET ORAL
Status: COMPLETED | OUTPATIENT
Start: 2023-05-04 | End: 2023-05-04

## 2023-05-04 RX ORDER — DIPHENHYDRAMINE HYDROCHLORIDE 50 MG/ML
25 INJECTION, SOLUTION INTRAMUSCULAR; INTRAVENOUS
Status: COMPLETED | OUTPATIENT
Start: 2023-05-04 | End: 2023-05-04

## 2023-05-04 RX ORDER — KETOROLAC TROMETHAMINE 30 MG/ML
30 INJECTION, SOLUTION INTRAMUSCULAR; INTRAVENOUS
Status: COMPLETED | OUTPATIENT
Start: 2023-05-04 | End: 2023-05-04

## 2023-05-04 RX ADMIN — DIPHENHYDRAMINE HYDROCHLORIDE 25 MG: 50 INJECTION, SOLUTION INTRAMUSCULAR; INTRAVENOUS at 07:10

## 2023-05-04 RX ADMIN — ACETAMINOPHEN 1000 MG: 500 TABLET ORAL at 06:59

## 2023-05-04 RX ADMIN — KETOROLAC TROMETHAMINE 30 MG: 30 INJECTION, SOLUTION INTRAMUSCULAR; INTRAVENOUS at 07:09

## 2023-05-04 RX ADMIN — SODIUM CHLORIDE 1000 ML: 9 INJECTION, SOLUTION INTRAVENOUS at 07:10

## 2023-05-04 RX ADMIN — METOCLOPRAMIDE 10 MG: 5 INJECTION, SOLUTION INTRAMUSCULAR; INTRAVENOUS at 07:10

## 2023-05-05 LAB
C TRACH RRNA SPEC QL NAA+PROBE: NEGATIVE
N GONORRHOEA RRNA SPEC QL NAA+PROBE: NEGATIVE
SPECIMEN SOURCE: NORMAL

## 2023-05-15 ENCOUNTER — HOSPITAL ENCOUNTER (EMERGENCY)
Facility: HOSPITAL | Age: 48
Discharge: HOME | End: 2023-05-15
Attending: EMERGENCY MEDICINE
Payer: COMMERCIAL

## 2023-05-15 VITALS
DIASTOLIC BLOOD PRESSURE: 95 MMHG | SYSTOLIC BLOOD PRESSURE: 150 MMHG | HEART RATE: 95 BPM | TEMPERATURE: 98 F | OXYGEN SATURATION: 98 % | RESPIRATION RATE: 16 BRPM

## 2023-05-15 DIAGNOSIS — H02.89 IRRITATION OF EYELID: Primary | ICD-10-CM

## 2023-05-15 PROCEDURE — 99283 EMERGENCY DEPT VISIT LOW MDM: CPT

## 2023-05-15 RX ORDER — DIAPER,BRIEF,INFANT-TODD,DISP
EACH MISCELLANEOUS 3 TIMES DAILY
Qty: 30 G | Refills: 0 | Status: SHIPPED | OUTPATIENT
Start: 2023-05-15 | End: 2023-05-25

## 2023-05-15 RX ORDER — TETRACAINE HYDROCHLORIDE 5 MG/ML
1 SOLUTION OPHTHALMIC
Status: DISCONTINUED | OUTPATIENT
Start: 2023-05-15 | End: 2023-05-15

## 2023-05-15 RX ORDER — METHYLPREDNISOLONE 4 MG/1
TABLET ORAL
Qty: 1 KIT | Refills: 0 | Status: SHIPPED | OUTPATIENT
Start: 2023-05-15 | End: 2023-05-17 | Stop reason: SDUPTHER

## 2023-05-15 NOTE — ED TRIAGE NOTES
He arrives ambulatory from home for eye irritation that has been ongoing for about a week. He states he has tried taking Benadryl but it hasn't helped. A&OX4.

## 2023-05-16 NOTE — ED PROVIDER NOTES
ED Triage Vitals [05/15/23 0342]   BP Temp Temp Source Pulse Respirations SpO2 Height Weight   (!) 150/95 98 °F (36.7 °C) Temporal 95 16 98 % -- --       There is no height or weight on file to calculate BMI. Physical Exam  Eyes:      Conjunctiva/sclera: Conjunctivae normal.      Pupils: Pupils are equal, round, and reactive to light. Comments: Bilateral upper lids with mild erythema, swelling. Covered in ointment. Skin:     Comments: Scattered areas of circular dermatitis on arms       DIAGNOSTIC RESULTS     EKG: All EKG's are interpreted by the Emergency Department Physician who either signs or Co-signs this chart in the absence of a cardiologist.        RADIOLOGY:   Non-plain film images such as CT, Ultrasound and MRI are read by the radiologist. Plain radiographic images are visualized and preliminarily interpreted by the emergency physician with the below findings:        Interpretation per the Radiologist below, if available at the time of this note:    No orders to display        LABS:  Labs Reviewed - No data to display    All other labs were within normal range or not returned as of this dictation. EMERGENCY DEPARTMENT COURSE and DIFFERENTIAL DIAGNOSIS/MDM:   Vitals:    Vitals:    05/15/23 0342   BP: (!) 150/95   Pulse: 95   Resp: 16   Temp: 98 °F (36.7 °C)   TempSrc: Temporal   SpO2: 98%           Medical Decision Making  50 y.o. male presents with some skin irritation and irritation of eyelids. Does not appear to be due to infection and suspect this is a contact or irritant dermatitis. No imaging indicated. Will start on oral ad topical steroids to help with symptoms. Plan to follow up with PCP as needed and return precautions discussed for worsening or new concerning symptoms. Offered referral to ophthalmology if symptoms persist in case this is early blepharitis.     Problems Addressed:  Irritation of eyelid: acute illness or injury    Risk  Prescription drug

## 2023-05-17 ENCOUNTER — HOSPITAL ENCOUNTER (EMERGENCY)
Facility: HOSPITAL | Age: 48
Discharge: HOME | End: 2023-05-17
Attending: STUDENT IN AN ORGANIZED HEALTH CARE EDUCATION/TRAINING PROGRAM
Payer: COMMERCIAL

## 2023-05-17 VITALS
TEMPERATURE: 97.2 F | HEART RATE: 79 BPM | DIASTOLIC BLOOD PRESSURE: 78 MMHG | OXYGEN SATURATION: 99 % | RESPIRATION RATE: 16 BRPM | SYSTOLIC BLOOD PRESSURE: 118 MMHG

## 2023-05-17 DIAGNOSIS — T78.40XD ALLERGIC REACTION, SUBSEQUENT ENCOUNTER: Primary | ICD-10-CM

## 2023-05-17 PROCEDURE — 99283 EMERGENCY DEPT VISIT LOW MDM: CPT

## 2023-05-17 RX ORDER — METHYLPREDNISOLONE 4 MG/1
TABLET ORAL
Qty: 1 KIT | Refills: 0 | Status: SHIPPED | OUTPATIENT
Start: 2023-05-17

## 2023-05-17 ASSESSMENT — ENCOUNTER SYMPTOMS
EYE REDNESS: 0
EYE DISCHARGE: 0
COUGH: 0
EYE ITCHING: 1

## 2023-05-17 NOTE — ED TRIAGE NOTES
He arrives ambulatory from home for eye irritation. He was seen here 5/15 for this and discharged here with prescriptions. He states he didn't take these because he saw his ophthalmologist who said he was having an allergic reaction. A&OX4.

## 2023-05-17 NOTE — ED PROVIDER NOTES
[facility] EMERGENCY DEP  EMERGENCY DEPARTMENT ENCOUNTER      Pt Name: Libertad Bernstein  MRN: [id number]  Angel[place] 1975  Date of evaluation: 5/17/2023  Provider: Junie Urena DO    CHIEF COMPLAINT       Chief Complaint   Patient presents with    Eye Pain         HISTORY OF PRESENT ILLNESS    HPI    Libertad Bernstein is a 50 y.o. male with a past medical history listed below who presents to the emergency department eyelid swelling. Patient reports eye irritation for the last week. Notes swelling and itching of the eyelids. Denies any actual discomfort of the eyes or redness or drainage. No vision changes. Has also had some scattered rash over the arms which is itchy. He was seen in the emergency department on 5/15 and discharged home with topical steroid prescription as well as a Medrol Dosepak. He did not start either of these medications. Denies any new medications. States he may have started a new soap but otherwise no new contacts or allergens. Nursing Notes were reviewed. REVIEW OF SYSTEMS       Review of Systems   Constitutional:  Negative for fever. HENT:  Negative for congestion. Eyes:  Positive for itching. Negative for discharge and redness. Respiratory:  Negative for cough. Skin:  Positive for rash.        PAST MEDICAL HISTORY     Past Medical History:   Diagnosis Date    Arrhythmia     PCV's    Arthritis     back    Back pain     Chronic pain     6 herniated discs in back/pinched nerve in back and neck    GERD (gastroesophageal reflux disease)     Hepatitis C     HSV-2 seropositive 7/29/2016    Hypertension     IV drug abuse ([facility] [address]     Kidney stone     Liver disease     elevated liver enzymes/hep C    Migraine     Neuralgia     Other ill-defined conditions(799.89)     chronic bronchitis    Polysubstance dependence (HCC)     stimulants, MJ, tob, barbs, benzos, opiates    Psychiatric disorder     Bipolar, Anxiety    Unspecified adverse effect of anesthesia     \"was told

## 2023-05-17 NOTE — ED NOTES
Provider discussed discharge instructions with patient. Patient voiced understanding. Pt left ED ambulatory with discharge instructions in hand.       [address], [place]  20/51/39 2725

## 2024-04-21 NOTE — ED PROVIDER NOTES
Mr. Laura Gurrola is a 53yo male who presents to the ER with complaints of nausea at cough. He said the he has been exposed to somebody who has been exposed to COVID-19. Having seem to vaccinations for COVID-19. His nausea began approximately week ago, he began to have a cough within the last 2 to 3 days. He said that it is mildly productive. No fevers or chills. He feels little bit short of breath during the day. He said that he feels better and less short of breath when he walks around and exerts himself. No swelling in his legs. No chest pain. No new changes with his urine or bowel movements. He said that his symptoms get better when he takes his inhaler and Mucinex. He states that he has not eaten much for the last month. He said that he has lost 40 pounds during this time. He denies any other complaints.            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    Hypertension     IV drug abuse ([facility] [address]     Kidney stone     Liver disease     elevated liver enzymes/hep C    Migraine     Neuralgia     Other ill-defined conditions(799.89)     chronic bronchitis    Polysubstance dependence ([facility])     stimulants, MJ, tob, barbs, benzos, opiates    Psychiatric disorder     Bipolar, Anxiety    Unspecified adverse effect of anesthesia     \"was told he woke up during back surgery\" and during nerve root injection    Unspecified sleep apnea     mild - does not use CPAP       Past Surgical History:   Procedure Laterality Date    HX LUMBAR LAMINECTOMY      HX ORTHOPAEDIC Bilateral     back surgery - laminectomy/discectomy    HX ORTHOPAEDIC      nerve root injection in back    HX OTHER SURGICAL      testicular surgery- varicocelectomy, i &d of abscess on right elbow         Family Previously on Cymbalta and gabapentin, no longer taking  Supportive care   History:   Problem Relation Age of Onset    Heart Disease Maternal Grandfather        Social History     Socioeconomic History    Marital status: SINGLE     Spouse name: Not on file    Number of children: Not on file    Years of education: Not on file    Highest education level: Not on file   Occupational History    Not on file   Tobacco Use    Smoking status: Current Every Day Smoker     Packs/day: 1.00     Years: 22.00     Pack years: 22.00     Types: Cigarettes    Smokeless tobacco: Former User    Tobacco comment: cigarettes   Substance and Sexual Activity    Alcohol use: No     Alcohol/week: 0.0 standard drinks     Comment: no longer drinks    Drug use: Not Currently     Types: Cocaine     Comment: per patient no methamphetamines    Sexual activity: Not Currently   Other Topics Concern    Not on file   Social History Narrative    The patient is . The patient lives his parents. The patient has no children. The patient does not have legal issues pending. The patient's source of income comes from family. patient's greatest support comes from his parents and this person will be involved with the treatment. pt has not been a victim of sexual/physical abuse. The highest grade achieved is [employer]     Social Determinants of Health     Financial Resource Strain:     Difficulty of Paying Living Expenses: Not on file   Food Insecurity:     Worried About [address] in the Last Year: Not on file    Sheri of Food in the Last Year: Not on file   Transportation Needs:     Lack of Transportation (Medical): Not on file    Lack of Transportation (Non-Medical):  Not on file   Physical Activity:     Days of Exercise per Week: Not on file    Minutes of Exercise per Session: Not on file   Stress:     Feeling of Stress : Not on file   Social Connections:     Frequency of Communication with Friends and Family: Not on file    Frequency of Social Gatherings with Friends and Family: Not on file   [facility] Attends Jain Services: Not on file    Active Member of Clubs or Organizations: Not on file    Attends Club or Organization Meetings: Not on file    Marital Status: Not on file   Intimate Partner Violence:     Fear of Current or Ex-Partner: Not on file    Emotionally Abused: Not on file    Physically Abused: Not on file    Sexually Abused: Not on file   Housing Stability:     Unable to Pay for Housing in the Last Year: Not on file    Number of [place] in the Last Year: Not on file    Unstable Housing in the Last Year: Not on file         ALLERGIES: Barium sulfate, Demerol [meperidine], Iodinated contrast media, and Neurontin [gabapentin]    Review of Systems   Constitutional: Negative for chills and fever. HENT: Negative for rhinorrhea and sore throat. Respiratory: Positive for cough and shortness of breath. Cardiovascular: Negative for chest pain. Gastrointestinal: Positive for nausea. Negative for abdominal pain, diarrhea and vomiting. Genitourinary: Negative for dysuria and hematuria. Musculoskeletal: Negative for arthralgias and myalgias. Skin: Negative for pallor and rash. Neurological: Negative for dizziness, weakness and light-headedness. All other systems reviewed and are negative. Vitals:    02/04/22 1117   BP: 123/87   Pulse: 88   Resp: 16   Temp: 98.1 °F (36.7 °C)   SpO2: 96%   Weight: 113.2 kg (249 lb 9 oz)   Height: 6' (1.829 m)            Physical Exam     Vital signs reviewed. Nursing notes reviewed.     Const:  No acute distress, well developed, well nourished  Head:  Atraumatic, normocephalic  Eyes:  PERRL, conjunctiva normal, no scleral icterus  Neck:  Supple, trachea midline  Cardiovascular: Regular rate  Resp:  No resp distress, no increased work of breathing  Abd:  Soft, non-tender, non-distended  MSK:  No pedal edema, normal ROM  Neuro:  Alert and oriented x3, no cranial nerve defect  Skin:  Warm, dry, intact  Psych: normal mood and affect, behavior is normal, judgement and thought content is normal          MDM  Number of Diagnoses or Management Options     Amount and/or Complexity of Data Reviewed  Clinical lab tests: ordered and reviewed  Tests in the radiology section of CPT®: ordered and reviewed  Review and summarize past medical records: yes    Patient Progress  Patient progress: stable          Mr. Riaz Beckett is a 53yo male who presents to the ER with complaints of cough and URI. He is well appearing in the ER. He is in no distress in the ER. No pneumonia on xray. No respiratory distress in the ER. Covid test is pending. He states that he feels better when he takes his albuterol. I will add steroids and a zpak. Pt. To f/u with his PCP or return to the ER with new or worsening sx.       Procedures

## 2024-07-30 ENCOUNTER — HOSPITAL ENCOUNTER (OUTPATIENT)
Facility: HOSPITAL | Age: 49
Discharge: HOME | End: 2024-07-30
Attending: RADIOLOGY | Admitting: RADIOLOGY
Payer: COMMERCIAL

## 2024-07-30 VITALS
OXYGEN SATURATION: 98 % | DIASTOLIC BLOOD PRESSURE: 72 MMHG | HEART RATE: 85 BPM | TEMPERATURE: 98.1 F | RESPIRATION RATE: 25 BRPM | SYSTOLIC BLOOD PRESSURE: 123 MMHG

## 2024-07-30 DIAGNOSIS — I86.1 VARICOCELE: ICD-10-CM

## 2024-07-30 PROCEDURE — 36011 PLACE CATHETER IN VEIN: CPT

## 2024-07-30 PROCEDURE — 6360000002 HC RX W HCPCS: Performed by: RADIOLOGY

## 2024-07-30 PROCEDURE — 37241 VASC EMBOLIZE/OCCLUDE VENOUS: CPT

## 2024-07-30 PROCEDURE — 2580000003 HC RX 258: Performed by: RADIOLOGY

## 2024-07-30 PROCEDURE — 6360000004 HC RX CONTRAST MEDICATION: Performed by: RADIOLOGY

## 2024-07-30 RX ORDER — DIPHENHYDRAMINE HYDROCHLORIDE 50 MG/ML
INJECTION INTRAMUSCULAR; INTRAVENOUS PRN
Status: COMPLETED | OUTPATIENT
Start: 2024-07-30 | End: 2024-07-30

## 2024-07-30 RX ORDER — KETOROLAC TROMETHAMINE 10 MG/1
10 TABLET, FILM COATED ORAL EVERY 6 HOURS
Qty: 16 TABLET | Refills: 0 | Status: SHIPPED | OUTPATIENT
Start: 2024-07-30 | End: 2024-08-03

## 2024-07-30 RX ORDER — MIDAZOLAM HYDROCHLORIDE 2 MG/2ML
INJECTION, SOLUTION INTRAMUSCULAR; INTRAVENOUS PRN
Status: COMPLETED | OUTPATIENT
Start: 2024-07-30 | End: 2024-07-30

## 2024-07-30 RX ORDER — SODIUM CHLORIDE 9 MG/ML
INJECTION, SOLUTION INTRAVENOUS CONTINUOUS PRN
Status: COMPLETED | OUTPATIENT
Start: 2024-07-30 | End: 2024-07-30

## 2024-07-30 RX ORDER — FENTANYL CITRATE 50 UG/ML
INJECTION, SOLUTION INTRAMUSCULAR; INTRAVENOUS PRN
Status: COMPLETED | OUTPATIENT
Start: 2024-07-30 | End: 2024-07-30

## 2024-07-30 RX ORDER — DRONABINOL 10 MG/1
CAPSULE ORAL
COMMUNITY
Start: 2024-04-06

## 2024-07-30 RX ADMIN — FENTANYL CITRATE 50 MCG: 50 INJECTION, SOLUTION INTRAMUSCULAR; INTRAVENOUS at 09:01

## 2024-07-30 RX ADMIN — FENTANYL CITRATE 50 MCG: 50 INJECTION, SOLUTION INTRAMUSCULAR; INTRAVENOUS at 08:54

## 2024-07-30 RX ADMIN — MIDAZOLAM HYDROCHLORIDE 1 MG: 1 INJECTION, SOLUTION INTRAMUSCULAR; INTRAVENOUS at 09:00

## 2024-07-30 RX ADMIN — SODIUM CHLORIDE 100 ML/HR: 9 INJECTION, SOLUTION INTRAVENOUS at 09:05

## 2024-07-30 RX ADMIN — MIDAZOLAM HYDROCHLORIDE 1 MG: 1 INJECTION, SOLUTION INTRAMUSCULAR; INTRAVENOUS at 08:54

## 2024-07-30 RX ADMIN — IOPAMIDOL 27 ML: 755 INJECTION, SOLUTION INTRAVENOUS at 09:25

## 2024-07-30 RX ADMIN — MIDAZOLAM HYDROCHLORIDE 1 MG: 1 INJECTION, SOLUTION INTRAMUSCULAR; INTRAVENOUS at 09:19

## 2024-07-30 RX ADMIN — MIDAZOLAM HYDROCHLORIDE 1 MG: 1 INJECTION, SOLUTION INTRAMUSCULAR; INTRAVENOUS at 09:15

## 2024-07-30 RX ADMIN — MIDAZOLAM HYDROCHLORIDE 1 MG: 1 INJECTION, SOLUTION INTRAMUSCULAR; INTRAVENOUS at 09:05

## 2024-07-30 RX ADMIN — FENTANYL CITRATE 50 MCG: 50 INJECTION, SOLUTION INTRAMUSCULAR; INTRAVENOUS at 09:19

## 2024-07-30 RX ADMIN — WATER 2000 MG: 1 INJECTION INTRAMUSCULAR; INTRAVENOUS; SUBCUTANEOUS at 08:50

## 2024-07-30 RX ADMIN — FENTANYL CITRATE 50 MCG: 50 INJECTION, SOLUTION INTRAMUSCULAR; INTRAVENOUS at 09:15

## 2024-07-30 RX ADMIN — DIPHENHYDRAMINE HYDROCHLORIDE 50 MG: 50 INJECTION, SOLUTION INTRAMUSCULAR; INTRAVENOUS at 09:00

## 2024-07-30 ASSESSMENT — PAIN SCALES - GENERAL: PAINLEVEL_OUTOF10: 0

## 2024-07-30 NOTE — DISCHARGE INSTRUCTIONS
After Varicocele Embolization   A responsible adult  must take you home and stay with you overnight    You should not perform any tasks that require coordination, skill or judgment such as driving a car for 24 hours after your procedure     Do not make any legal or financial decisions for 24 hours after your procedure   Do not have alcohol or sleeping pills for 24 hours after your procedure -  You may resume your normal diet.   You may resume your regular medications.   You will be advised when you can restart your blood thinners if you take them.    Rest for the remainder of the day - The doctor will recommend what to use for pain What to expect when you get home: -   Bleeding from the puncture site when you get home is rare. If there is some bleeding, press firmly on the site for 10-20 minutes. If this does not stop the bleeding then please go to the nearest emergency department. -   You may have a low grade back ache or scrotal pain .You may feel nauseated.   Do not strain while using the toilet or do any activities that increase the pressure in your abdomen. - The veins above your testicle often become more prominent and tender in the first few days after the procedure. This becomes less obvious although the veins may not disappear completely. - If you develop a temperature, increased redness or discharge of pus from the site of the puncture then go to the nearest emergency department.      You have a follow up appointment August 21st at 1245pm at [address]. This is [facility].        You received narcotics and sedation today- if there is any issues, please call our office.    You have received sedation medications today. YOU SHOULD NOT DRIVE FOR 24 HOURS, DO NOT OPERATE HEAVY MACHINERY, DO NOT MAKE ANY LEGAL DECISIONS OR SIGN LEGAL DOCUMENTS FOR 24 HOURS. DO NOT DRINK ALCOHOL, TAKE ANY MEDICATIONS UNLESS PRESCRIBED BY YOUR DOCTOR. IF YOU ARE A CAREGIVER, SOMEONE SHOULD TAKE

## 2024-07-30 NOTE — H&P
Interventional and Vascular Radiology History and Physical    Patient: Henry Cartagena 49 y.o. male       Chief Complaint: No chief complaint on file.      History of Present Illness: left varicocele    History:    Past Medical History:   Diagnosis Date    Arrhythmia     PCV's    Arthritis     back    Back pain     Chronic pain     6 herniated discs in back/pinched nerve in back and neck    GERD (gastroesophageal reflux disease)     Hepatitis C     HSV-2 seropositive 7/29/2016    Hypertension     IV drug abuse (HCC)     Kidney stone     Liver disease     elevated liver enzymes/hep C    Migraine     Neuralgia     Other ill-defined conditions(729.89)     chronic bronchitis    Polysubstance dependence (HCC)     stimulants, MJ, tob, barbs, benzos, opiates    Psychiatric disorder     Bipolar, Anxiety    Unspecified adverse effect of anesthesia     \"was told he woke up during back surgery\" and during nerve root injection    Unspecified sleep apnea     mild - does not use CPAP     Family History   Problem Relation Age of Onset    Heart Disease Maternal Grandfather      Social History     Socioeconomic History    Marital status: Single     Spouse name: Not on file    Number of children: Not on file    Years of education: Not on file    Highest education level: Not on file   Occupational History    Not on file   Tobacco Use    Smoking status: Every Day     Current packs/day: 1.00     Types: Cigarettes    Smokeless tobacco: Former   Substance and Sexual Activity    Alcohol use: No     Alcohol/week: 0.0 standard drinks of alcohol    Drug use: Yes     Frequency: 14.0 times per week     Types: Marijuana (Weed)    Sexual activity: Not on file   Other Topics Concern    Not on file   Social History Narrative    The patient is . The patient lives his parents. The patient has no children. The patient does not have legal issues pending. The patient's source of income comes from family. patient's greatest support comes from

## 2024-07-30 NOTE — PROGRESS NOTES
Pt arrives ambulatory to angio department accompanied by self/ihsan for Left varicocele embo procedure. All assessments completed and consent was reviewed.  Education given was regarding procedure, moderate sedation local anesthetic anesthesia, post-procedure care and  management/follow-up. Opportunity for questions was provided and all questions and concerns were addressed.

## 2024-09-06 ENCOUNTER — APPOINTMENT (OUTPATIENT)
Facility: HOSPITAL | Age: 49
End: 2024-09-06
Payer: COMMERCIAL

## 2024-09-06 ENCOUNTER — HOSPITAL ENCOUNTER (EMERGENCY)
Facility: HOSPITAL | Age: 49
Discharge: HOME | End: 2024-09-06
Attending: EMERGENCY MEDICINE
Payer: COMMERCIAL

## 2024-09-06 VITALS
TEMPERATURE: 98.2 F | OXYGEN SATURATION: 98 % | WEIGHT: 190.04 LBS | HEIGHT: 72 IN | SYSTOLIC BLOOD PRESSURE: 124 MMHG | DIASTOLIC BLOOD PRESSURE: 78 MMHG | BODY MASS INDEX: 25.74 KG/M2 | HEART RATE: 83 BPM | RESPIRATION RATE: 18 BRPM

## 2024-09-06 DIAGNOSIS — N50.812 PAIN IN LEFT TESTICLE: Primary | ICD-10-CM

## 2024-09-06 LAB
APPEARANCE UR: CLEAR
BACTERIA URNS QL MICRO: NEGATIVE /HPF
BILIRUB UR QL: NEGATIVE
COLOR UR: ABNORMAL
EPITH CASTS URNS QL MICRO: ABNORMAL /LPF
GLUCOSE UR STRIP.AUTO-MCNC: NEGATIVE MG/DL
HGB UR QL STRIP: NEGATIVE
HYALINE CASTS URNS QL MICRO: ABNORMAL /LPF (ref 0–5)
KETONES UR QL STRIP.AUTO: NEGATIVE MG/DL
LEUKOCYTE ESTERASE UR QL STRIP.AUTO: ABNORMAL
NITRITE UR QL STRIP.AUTO: NEGATIVE
PH UR STRIP: 6 (ref 5–8)
PROT UR STRIP-MCNC: NEGATIVE MG/DL
RBC #/AREA URNS HPF: ABNORMAL /HPF (ref 0–5)
SP GR UR REFRACTOMETRY: 1.01 (ref 1–1.03)
UROBILINOGEN UR QL STRIP.AUTO: 0.2 EU/DL (ref 0.2–1)
WBC URNS QL MICRO: ABNORMAL /HPF (ref 0–4)

## 2024-09-06 PROCEDURE — 81001 URINALYSIS AUTO W/SCOPE: CPT

## 2024-09-06 PROCEDURE — 99284 EMERGENCY DEPT VISIT MOD MDM: CPT

## 2024-09-06 PROCEDURE — 76870 US EXAM SCROTUM: CPT

## 2024-09-06 PROCEDURE — 6370000000 HC RX 637 (ALT 250 FOR IP): Performed by: EMERGENCY MEDICINE

## 2024-09-06 PROCEDURE — 6360000002 HC RX W HCPCS: Performed by: EMERGENCY MEDICINE

## 2024-09-06 PROCEDURE — 96372 THER/PROPH/DIAG INJ SC/IM: CPT

## 2024-09-06 RX ORDER — ONDANSETRON 4 MG/1
4 TABLET, ORALLY DISINTEGRATING ORAL
Status: COMPLETED | OUTPATIENT
Start: 2024-09-06 | End: 2024-09-06

## 2024-09-06 RX ORDER — KETOROLAC TROMETHAMINE 30 MG/ML
30 INJECTION, SOLUTION INTRAMUSCULAR; INTRAVENOUS
Status: COMPLETED | OUTPATIENT
Start: 2024-09-06 | End: 2024-09-06

## 2024-09-06 RX ORDER — HYDROCODONE BITARTRATE AND ACETAMINOPHEN 10; 325 MG/1; MG/1
1 TABLET ORAL
Status: DISCONTINUED | OUTPATIENT
Start: 2024-09-06 | End: 2024-09-06

## 2024-09-06 RX ADMIN — KETOROLAC TROMETHAMINE 30 MG: 30 INJECTION, SOLUTION INTRAMUSCULAR at 09:42

## 2024-09-06 RX ADMIN — ONDANSETRON 4 MG: 4 TABLET, ORALLY DISINTEGRATING ORAL at 09:45

## 2024-09-06 ASSESSMENT — ENCOUNTER SYMPTOMS
COUGH: 0
SHORTNESS OF BREATH: 0
ABDOMINAL PAIN: 0
BACK PAIN: 0

## 2024-09-06 ASSESSMENT — PAIN DESCRIPTION - ONSET: ONSET: ON-GOING

## 2024-09-06 ASSESSMENT — PAIN DESCRIPTION - DESCRIPTORS
DESCRIPTORS: BURNING;SHARP
DESCRIPTORS: ACHING

## 2024-09-06 ASSESSMENT — PAIN SCALES - GENERAL
PAINLEVEL_OUTOF10: 7
PAINLEVEL_OUTOF10: 7

## 2024-09-06 ASSESSMENT — PAIN DESCRIPTION - FREQUENCY: FREQUENCY: INTERMITTENT

## 2024-09-06 ASSESSMENT — PAIN DESCRIPTION - PAIN TYPE: TYPE: ACUTE PAIN

## 2024-09-06 ASSESSMENT — PAIN DESCRIPTION - ORIENTATION
ORIENTATION: LEFT
ORIENTATION: LEFT

## 2024-09-06 ASSESSMENT — PAIN DESCRIPTION - LOCATION
LOCATION: GROIN
LOCATION: GROIN

## 2024-09-06 ASSESSMENT — PAIN - FUNCTIONAL ASSESSMENT: PAIN_FUNCTIONAL_ASSESSMENT: PREVENTS OR INTERFERES SOME ACTIVE ACTIVITIES AND ADLS

## 2024-09-06 NOTE — ED TRIAGE NOTES
Patient reports left testicle pain and swelling. Worse with walking and sitting. Denies injury and falls. Procedure for left varicocele 3 weeks ago.

## 2024-09-06 NOTE — ED PROVIDER NOTES
Cardiovascular:      Rate and Rhythm: Normal rate and regular rhythm.   Pulmonary:      Effort: Pulmonary effort is normal.      Breath sounds: Normal breath sounds.   Abdominal:      General: Bowel sounds are normal. There is no distension.      Palpations: There is no mass.      Tenderness: There is no abdominal tenderness. There is no right CVA tenderness, left CVA tenderness, guarding or rebound.      Hernia: No hernia is present.   Genitourinary:     Testes: Normal.   Musculoskeletal:         General: Normal range of motion.      Cervical back: Normal range of motion and neck supple.   Skin:     General: Skin is warm and dry.      Findings: No bruising, erythema, lesion or rash.   Neurological:      Mental Status: He is alert and oriented to person, place, and time.         DIAGNOSTIC RESULTS     EKG: All EKG's are interpreted by the Emergency Department Physician who either signs or Co-signs this chart in the absence of a cardiologist.        RADIOLOGY:   Non-plain film images such as CT, Ultrasound and MRI are read by the radiologist. Plain radiographic images are visualized and preliminarily interpreted by the emergency physician with the below findings:        Interpretation per the Radiologist below, if available at the time of this note:    US SCROTUM AND TESTICLES   Final Result   No acute finding. Successful left varicocele embolization.         Electronically signed by JOB ALEXIS           LABS:  Labs Reviewed   URINALYSIS WITH MICROSCOPIC - Abnormal; Notable for the following components:       Result Value    Leukocyte Esterase, Urine SMALL (*)     All other components within normal limits       All other labs were within normal range or not returned as of this dictation.    EMERGENCY DEPARTMENT COURSE and DIFFERENTIAL DIAGNOSIS/MDM:   Vitals:    Vitals:    09/06/24 0815   BP: 124/78   Pulse: 83   Resp: 18   Temp: 98.2 °F (36.8 °C)   TempSrc: Tympanic   SpO2: 98%   Weight: 86.2 kg (190 lb 0.6 oz)

## 2024-09-23 ENCOUNTER — HOSPITAL ENCOUNTER (EMERGENCY)
Facility: HOSPITAL | Age: 49
Discharge: HOME | End: 2024-09-23
Attending: EMERGENCY MEDICINE
Payer: COMMERCIAL

## 2024-09-23 VITALS
SYSTOLIC BLOOD PRESSURE: 139 MMHG | WEIGHT: 183.86 LBS | TEMPERATURE: 97.4 F | HEART RATE: 107 BPM | HEIGHT: 72 IN | OXYGEN SATURATION: 99 % | DIASTOLIC BLOOD PRESSURE: 88 MMHG | RESPIRATION RATE: 16 BRPM | BODY MASS INDEX: 24.9 KG/M2

## 2024-09-23 DIAGNOSIS — S90.822A BLISTER OF PLANTAR ASPECT OF LEFT FOOT, INITIAL ENCOUNTER: ICD-10-CM

## 2024-09-23 DIAGNOSIS — G43.701 CHRONIC MIGRAINE WITHOUT AURA WITH STATUS MIGRAINOSUS, NOT INTRACTABLE: Primary | ICD-10-CM

## 2024-09-23 PROCEDURE — 6370000000 HC RX 637 (ALT 250 FOR IP): Performed by: EMERGENCY MEDICINE

## 2024-09-23 PROCEDURE — 96372 THER/PROPH/DIAG INJ SC/IM: CPT

## 2024-09-23 PROCEDURE — 6360000002 HC RX W HCPCS: Performed by: EMERGENCY MEDICINE

## 2024-09-23 PROCEDURE — 99284 EMERGENCY DEPT VISIT MOD MDM: CPT

## 2024-09-23 RX ORDER — KETOROLAC TROMETHAMINE 30 MG/ML
30 INJECTION, SOLUTION INTRAMUSCULAR; INTRAVENOUS
Status: COMPLETED | OUTPATIENT
Start: 2024-09-23 | End: 2024-09-23

## 2024-09-23 RX ORDER — KETOROLAC TROMETHAMINE 10 MG/1
10 TABLET, FILM COATED ORAL EVERY 6 HOURS PRN
Qty: 20 TABLET | Refills: 0 | Status: SHIPPED | OUTPATIENT
Start: 2024-09-23

## 2024-09-23 RX ORDER — GINSENG 100 MG
CAPSULE ORAL
Status: COMPLETED | OUTPATIENT
Start: 2024-09-23 | End: 2024-09-23

## 2024-09-23 RX ORDER — BUTALBITAL, ACETAMINOPHEN AND CAFFEINE 50; 325; 40 MG/1; MG/1; MG/1
1 TABLET ORAL EVERY 4 HOURS PRN
Qty: 10 TABLET | Refills: 3 | Status: SHIPPED | OUTPATIENT
Start: 2024-09-23

## 2024-09-23 RX ADMIN — KETOROLAC TROMETHAMINE 30 MG: 30 INJECTION, SOLUTION INTRAMUSCULAR at 08:08

## 2024-09-23 RX ADMIN — BACITRACIN 1 EACH: 500 OINTMENT TOPICAL at 08:08

## 2024-09-23 ASSESSMENT — PAIN DESCRIPTION - ORIENTATION: ORIENTATION: ANTERIOR;POSTERIOR

## 2024-09-23 ASSESSMENT — PAIN DESCRIPTION - ONSET: ONSET: GRADUAL

## 2024-09-23 ASSESSMENT — PAIN SCALES - GENERAL: PAINLEVEL_OUTOF10: 8

## 2024-09-23 ASSESSMENT — PAIN - FUNCTIONAL ASSESSMENT
PAIN_FUNCTIONAL_ASSESSMENT: ACTIVITIES ARE NOT PREVENTED
PAIN_FUNCTIONAL_ASSESSMENT: 0-10

## 2024-09-23 ASSESSMENT — PAIN DESCRIPTION - FREQUENCY: FREQUENCY: INTERMITTENT

## 2024-09-23 ASSESSMENT — PAIN DESCRIPTION - PAIN TYPE: TYPE: ACUTE PAIN

## 2024-09-23 ASSESSMENT — PAIN DESCRIPTION - DESCRIPTORS: DESCRIPTORS: ACHING;POUNDING;PRESSURE

## 2024-09-23 ASSESSMENT — PAIN DESCRIPTION - LOCATION: LOCATION: HEAD

## 2024-09-26 ENCOUNTER — HOSPITAL ENCOUNTER (EMERGENCY)
Facility: HOSPITAL | Age: 49
Discharge: HOME | End: 2024-09-26
Attending: EMERGENCY MEDICINE
Payer: COMMERCIAL

## 2024-09-26 VITALS
BODY MASS INDEX: 25.77 KG/M2 | HEIGHT: 72 IN | RESPIRATION RATE: 18 BRPM | TEMPERATURE: 97.6 F | OXYGEN SATURATION: 97 % | SYSTOLIC BLOOD PRESSURE: 124 MMHG | HEART RATE: 90 BPM | WEIGHT: 190.26 LBS | DIASTOLIC BLOOD PRESSURE: 74 MMHG

## 2024-09-26 DIAGNOSIS — B35.3 TINEA PEDIS OF BOTH FEET: Primary | ICD-10-CM

## 2024-09-26 PROCEDURE — 99283 EMERGENCY DEPT VISIT LOW MDM: CPT

## 2024-09-26 RX ORDER — CLOTRIMAZOLE 1 %
CREAM (GRAM) TOPICAL
Qty: 1 EACH | Refills: 1 | Status: SHIPPED | OUTPATIENT
Start: 2024-09-26 | End: 2024-10-03

## 2024-09-26 RX ORDER — CEPHALEXIN 500 MG/1
500 CAPSULE ORAL 2 TIMES DAILY
Qty: 14 CAPSULE | Refills: 0 | Status: SHIPPED | OUTPATIENT
Start: 2024-09-26 | End: 2024-10-03

## 2024-09-26 ASSESSMENT — PAIN DESCRIPTION - DESCRIPTORS: DESCRIPTORS: ACHING

## 2024-09-26 ASSESSMENT — PAIN DESCRIPTION - ONSET: ONSET: SUDDEN

## 2024-09-26 ASSESSMENT — PAIN DESCRIPTION - ORIENTATION: ORIENTATION: LEFT;RIGHT

## 2024-09-26 ASSESSMENT — PAIN DESCRIPTION - LOCATION: LOCATION: FOOT

## 2024-09-26 ASSESSMENT — PAIN - FUNCTIONAL ASSESSMENT
PAIN_FUNCTIONAL_ASSESSMENT: 0-10
PAIN_FUNCTIONAL_ASSESSMENT: PREVENTS OR INTERFERES WITH ALL ACTIVE AND SOME PASSIVE ACTIVITIES

## 2024-09-26 ASSESSMENT — PAIN SCALES - GENERAL: PAINLEVEL_OUTOF10: 10

## 2024-09-26 ASSESSMENT — ENCOUNTER SYMPTOMS: COLOR CHANGE: 1

## 2024-09-26 ASSESSMENT — PAIN DESCRIPTION - PAIN TYPE: TYPE: ACUTE PAIN

## 2024-10-21 ENCOUNTER — APPOINTMENT (OUTPATIENT)
Facility: HOSPITAL | Age: 49
End: 2024-10-21
Payer: COMMERCIAL

## 2024-10-21 ENCOUNTER — HOSPITAL ENCOUNTER (EMERGENCY)
Facility: HOSPITAL | Age: 49
Discharge: HOME | End: 2024-10-21
Attending: STUDENT IN AN ORGANIZED HEALTH CARE EDUCATION/TRAINING PROGRAM
Payer: COMMERCIAL

## 2024-10-21 VITALS
WEIGHT: 193.78 LBS | DIASTOLIC BLOOD PRESSURE: 85 MMHG | TEMPERATURE: 98 F | BODY MASS INDEX: 26.25 KG/M2 | OXYGEN SATURATION: 98 % | SYSTOLIC BLOOD PRESSURE: 113 MMHG | HEIGHT: 72 IN | HEART RATE: 83 BPM | RESPIRATION RATE: 18 BRPM

## 2024-10-21 DIAGNOSIS — L08.9 BLISTERS OF MULTIPLE SITES, INFECTED: Primary | ICD-10-CM

## 2024-10-21 DIAGNOSIS — R23.8 BLISTERS OF MULTIPLE SITES, INFECTED: Primary | ICD-10-CM

## 2024-10-21 PROCEDURE — 99283 EMERGENCY DEPT VISIT LOW MDM: CPT

## 2024-10-21 PROCEDURE — 73610 X-RAY EXAM OF ANKLE: CPT

## 2024-10-21 PROCEDURE — 6370000000 HC RX 637 (ALT 250 FOR IP)

## 2024-10-21 RX ORDER — CEPHALEXIN 500 MG/1
500 CAPSULE ORAL 4 TIMES DAILY
Qty: 28 CAPSULE | Refills: 0 | Status: SHIPPED | OUTPATIENT
Start: 2024-10-21 | End: 2024-10-28

## 2024-10-21 RX ORDER — IBUPROFEN 400 MG/1
800 TABLET, FILM COATED ORAL
Status: COMPLETED | OUTPATIENT
Start: 2024-10-21 | End: 2024-10-21

## 2024-10-21 RX ORDER — GINSENG 100 MG
CAPSULE ORAL
Status: COMPLETED | OUTPATIENT
Start: 2024-10-21 | End: 2024-10-21

## 2024-10-21 RX ADMIN — BACITRACIN 1 EACH: 500 OINTMENT TOPICAL at 13:00

## 2024-10-21 RX ADMIN — IBUPROFEN 800 MG: 400 TABLET, FILM COATED ORAL at 12:05

## 2024-10-21 ASSESSMENT — PAIN - FUNCTIONAL ASSESSMENT: PAIN_FUNCTIONAL_ASSESSMENT: PREVENTS OR INTERFERES WITH MANY ACTIVE NOT PASSIVE ACTIVITIES

## 2024-10-21 ASSESSMENT — PAIN DESCRIPTION - DESCRIPTORS
DESCRIPTORS: BURNING;SORE
DESCRIPTORS: DISCOMFORT

## 2024-10-21 ASSESSMENT — PAIN DESCRIPTION - LOCATION
LOCATION: FOOT
LOCATION: FOOT;ANKLE

## 2024-10-21 ASSESSMENT — PAIN SCALES - GENERAL
PAINLEVEL_OUTOF10: 8
PAINLEVEL_OUTOF10: 9

## 2024-10-21 ASSESSMENT — PAIN DESCRIPTION - ORIENTATION
ORIENTATION: RIGHT;LEFT;LOWER
ORIENTATION: RIGHT;LEFT

## 2024-10-21 ASSESSMENT — PAIN DESCRIPTION - PAIN TYPE: TYPE: ACUTE PAIN

## 2024-10-21 ASSESSMENT — PAIN DESCRIPTION - FREQUENCY: FREQUENCY: CONTINUOUS

## 2024-10-21 ASSESSMENT — PAIN DESCRIPTION - ONSET: ONSET: ON-GOING

## 2024-10-21 NOTE — ED TRIAGE NOTES
TRIAGE: Pt arrives with bilateral foot and ankle pain due to worsening blisters that are oozing. Pt states he walks several miles a day for work. Pt ambulatory into triage bethea.

## 2024-10-21 NOTE — ED PROVIDER NOTES
[facility] EMERGENCY DEP  EMERGENCY DEPARTMENT ENCOUNTER      Pt Name: Henry Cartagena  MRN: [id number]  Birthdate 1975  Date of evaluation: 10/21/2024  Provider: Frederick Gaona PA-C    CHIEF COMPLAINT       Chief Complaint   Patient presents with    Foot Pain    Ankle Pain         HISTORY OF PRESENT ILLNESS   (Location/Symptom, Timing/Onset, Context/Setting, Quality, Duration, Modifying Factors, Severity)  Note limiting factors.   49-year-old male with past medical history as below presents with complaint of bilateral ankle and foot pain.  Patient reports that he walks several miles a day for work daily.  He has numerous blisters that have opened on the bottom of his feet.  Because of that, he has been walking different which is caused his ankle to hurt.  Denies any falls or injuries to the area.  Denies any foot or ankle swelling.  Denies fever or any other complaints at this time.            Review of External Medical Records:     Nursing Notes were reviewed.    REVIEW OF SYSTEMS    (2-9 systems for level 4, 10 or more for level 5)     Review of Systems    Except as noted above the remainder of the review of systems was reviewed and negative.       PAST MEDICAL HISTORY     Past Medical History:   Diagnosis Date    Arrhythmia     PCV's    Arthritis     back    Back pain     Chronic pain     6 herniated discs in back/pinched nerve in back and neck    GERD (gastroesophageal reflux disease)     Hepatitis C     HSV-2 seropositive 7/29/2016    Hypertension     IV drug abuse (HCC)     Kidney stone     Liver disease     elevated liver enzymes/hep C    Migraine     Neuralgia     Other ill-defined conditions(799.89)     chronic bronchitis    Polysubstance dependence (HCC)     stimulants, MJ, tob, barbs, benzos, opiates    Psychiatric disorder     Bipolar, Anxiety    Unspecified adverse effect of anesthesia     \"was told he woke up during back surgery\" and during nerve root injection    Unspecified sleep apnea

## 2024-11-11 ENCOUNTER — APPOINTMENT (OUTPATIENT)
Facility: HOSPITAL | Age: 49
End: 2024-11-11
Payer: COMMERCIAL

## 2024-11-11 ENCOUNTER — HOSPITAL ENCOUNTER (EMERGENCY)
Facility: HOSPITAL | Age: 49
Discharge: HOME | End: 2024-11-11
Attending: EMERGENCY MEDICINE
Payer: COMMERCIAL

## 2024-11-11 VITALS
TEMPERATURE: 98.4 F | DIASTOLIC BLOOD PRESSURE: 83 MMHG | RESPIRATION RATE: 18 BRPM | SYSTOLIC BLOOD PRESSURE: 145 MMHG | BODY MASS INDEX: 25.15 KG/M2 | OXYGEN SATURATION: 100 % | HEART RATE: 97 BPM | WEIGHT: 185.41 LBS

## 2024-11-11 DIAGNOSIS — R10.9 FLANK PAIN: Primary | ICD-10-CM

## 2024-11-11 DIAGNOSIS — R35.0 INCREASED URINARY FREQUENCY: ICD-10-CM

## 2024-11-11 DIAGNOSIS — G43.009 MIGRAINE WITHOUT AURA AND WITHOUT STATUS MIGRAINOSUS, NOT INTRACTABLE: ICD-10-CM

## 2024-11-11 LAB
ALBUMIN SERPL-MCNC: 3.8 G/DL (ref 3.5–5)
ALBUMIN/GLOB SERPL: 1.1 (ref 1.1–2.2)
ALP SERPL-CCNC: 101 U/L (ref 45–117)
ALT SERPL-CCNC: 20 U/L (ref 12–78)
ANION GAP SERPL CALC-SCNC: 1 MMOL/L (ref 2–12)
APPEARANCE UR: CLEAR
AST SERPL-CCNC: 13 U/L (ref 15–37)
BACTERIA URNS QL MICRO: NEGATIVE /HPF
BASOPHILS # BLD: 0.1 K/UL (ref 0–0.1)
BASOPHILS NFR BLD: 1 % (ref 0–1)
BILIRUB SERPL-MCNC: 0.3 MG/DL (ref 0.2–1)
BILIRUB UR QL: NEGATIVE
BUN SERPL-MCNC: 19 MG/DL (ref 6–20)
BUN/CREAT SERPL: 14 (ref 12–20)
CALCIUM SERPL-MCNC: 9.1 MG/DL (ref 8.5–10.1)
CHLORIDE SERPL-SCNC: 107 MMOL/L (ref 97–108)
CO2 SERPL-SCNC: 32 MMOL/L (ref 21–32)
COLOR UR: ABNORMAL
COMMENT:: NORMAL
CREAT SERPL-MCNC: 1.33 MG/DL (ref 0.7–1.3)
DIFFERENTIAL METHOD BLD: ABNORMAL
EOSINOPHIL # BLD: 0.5 K/UL (ref 0–0.4)
EOSINOPHIL NFR BLD: 5 % (ref 0–7)
EPITH CASTS URNS QL MICRO: ABNORMAL /LPF
ERYTHROCYTE [DISTWIDTH] IN BLOOD BY AUTOMATED COUNT: 12.7 % (ref 11.5–14.5)
GLOBULIN SER CALC-MCNC: 3.4 G/DL (ref 2–4)
GLUCOSE SERPL-MCNC: 62 MG/DL (ref 65–100)
GLUCOSE UR STRIP.AUTO-MCNC: NEGATIVE MG/DL
HCT VFR BLD AUTO: 44.2 % (ref 36.6–50.3)
HGB BLD-MCNC: 14.9 G/DL (ref 12.1–17)
HGB UR QL STRIP: NEGATIVE
HYALINE CASTS URNS QL MICRO: ABNORMAL /LPF (ref 0–5)
IMM GRANULOCYTES # BLD AUTO: 0 K/UL (ref 0–0.04)
IMM GRANULOCYTES NFR BLD AUTO: 0 % (ref 0–0.5)
KETONES UR QL STRIP.AUTO: NEGATIVE MG/DL
LEUKOCYTE ESTERASE UR QL STRIP.AUTO: ABNORMAL
LYMPHOCYTES # BLD: 2.5 K/UL (ref 0.8–3.5)
LYMPHOCYTES NFR BLD: 24 % (ref 12–49)
MCH RBC QN AUTO: 33.1 PG (ref 26–34)
MCHC RBC AUTO-ENTMCNC: 33.7 G/DL (ref 30–36.5)
MCV RBC AUTO: 98.2 FL (ref 80–99)
MONOCYTES # BLD: 0.7 K/UL (ref 0–1)
MONOCYTES NFR BLD: 6 % (ref 5–13)
NEUTS SEG # BLD: 6.5 K/UL (ref 1.8–8)
NEUTS SEG NFR BLD: 64 % (ref 32–75)
NITRITE UR QL STRIP.AUTO: NEGATIVE
NRBC # BLD: 0 K/UL (ref 0–0.01)
NRBC BLD-RTO: 0 PER 100 WBC
PH UR STRIP: 5.5 (ref 5–8)
PLATELET # BLD AUTO: 207 K/UL (ref 150–400)
PMV BLD AUTO: 10.9 FL (ref 8.9–12.9)
POTASSIUM SERPL-SCNC: 4.6 MMOL/L (ref 3.5–5.1)
PROT SERPL-MCNC: 7.2 G/DL (ref 6.4–8.2)
PROT UR STRIP-MCNC: ABNORMAL MG/DL
RBC # BLD AUTO: 4.5 M/UL (ref 4.1–5.7)
RBC #/AREA URNS HPF: ABNORMAL /HPF (ref 0–5)
SODIUM SERPL-SCNC: 140 MMOL/L (ref 136–145)
SP GR UR REFRACTOMETRY: 1.01 (ref 1–1.03)
SPECIMEN HOLD: NORMAL
SPECIMEN HOLD: NORMAL
UROBILINOGEN UR QL STRIP.AUTO: 0.2 EU/DL (ref 0.2–1)
WBC # BLD AUTO: 10.2 K/UL (ref 4.1–11.1)
WBC URNS QL MICRO: ABNORMAL /HPF (ref 0–4)

## 2024-11-11 PROCEDURE — 96375 TX/PRO/DX INJ NEW DRUG ADDON: CPT

## 2024-11-11 PROCEDURE — 87086 URINE CULTURE/COLONY COUNT: CPT

## 2024-11-11 PROCEDURE — 85025 COMPLETE CBC W/AUTO DIFF WBC: CPT

## 2024-11-11 PROCEDURE — 81001 URINALYSIS AUTO W/SCOPE: CPT

## 2024-11-11 PROCEDURE — 36415 COLL VENOUS BLD VENIPUNCTURE: CPT

## 2024-11-11 PROCEDURE — 6360000002 HC RX W HCPCS

## 2024-11-11 PROCEDURE — 99284 EMERGENCY DEPT VISIT MOD MDM: CPT

## 2024-11-11 PROCEDURE — 74176 CT ABD & PELVIS W/O CONTRAST: CPT

## 2024-11-11 PROCEDURE — 96374 THER/PROPH/DIAG INJ IV PUSH: CPT

## 2024-11-11 PROCEDURE — 80053 COMPREHEN METABOLIC PANEL: CPT

## 2024-11-11 RX ORDER — BUTALBITAL, ACETAMINOPHEN AND CAFFEINE 300; 40; 50 MG/1; MG/1; MG/1
1 CAPSULE ORAL EVERY 4 HOURS PRN
Qty: 30 CAPSULE | Refills: 0 | Status: SHIPPED | OUTPATIENT
Start: 2024-11-11

## 2024-11-11 RX ORDER — PROCHLORPERAZINE EDISYLATE 5 MG/ML
10 INJECTION INTRAMUSCULAR; INTRAVENOUS ONCE
Status: COMPLETED | OUTPATIENT
Start: 2024-11-11 | End: 2024-11-11

## 2024-11-11 RX ORDER — DIPHENHYDRAMINE HYDROCHLORIDE 50 MG/ML
25 INJECTION INTRAMUSCULAR; INTRAVENOUS
Status: COMPLETED | OUTPATIENT
Start: 2024-11-11 | End: 2024-11-11

## 2024-11-11 RX ORDER — BUTALBITAL, ACETAMINOPHEN AND CAFFEINE 50; 325; 40 MG/1; MG/1; MG/1
1 TABLET ORAL
Status: DISCONTINUED | OUTPATIENT
Start: 2024-11-11 | End: 2024-11-11

## 2024-11-11 RX ORDER — KETOROLAC TROMETHAMINE 30 MG/ML
15 INJECTION, SOLUTION INTRAMUSCULAR; INTRAVENOUS ONCE
Status: COMPLETED | OUTPATIENT
Start: 2024-11-11 | End: 2024-11-11

## 2024-11-11 RX ORDER — KETOROLAC TROMETHAMINE 10 MG/1
10 TABLET, FILM COATED ORAL EVERY 6 HOURS PRN
Qty: 20 TABLET | Refills: 0 | Status: SHIPPED | OUTPATIENT
Start: 2024-11-11

## 2024-11-11 RX ORDER — SULFAMETHOXAZOLE AND TRIMETHOPRIM 800; 160 MG/1; MG/1
1 TABLET ORAL 2 TIMES DAILY
Qty: 20 TABLET | Refills: 0 | Status: SHIPPED | OUTPATIENT
Start: 2024-11-11 | End: 2024-11-21

## 2024-11-11 RX ADMIN — KETOROLAC TROMETHAMINE 15 MG: 30 INJECTION, SOLUTION INTRAMUSCULAR at 23:11

## 2024-11-11 RX ADMIN — PROCHLORPERAZINE EDISYLATE 10 MG: 5 INJECTION INTRAMUSCULAR; INTRAVENOUS at 23:10

## 2024-11-11 RX ADMIN — DIPHENHYDRAMINE HYDROCHLORIDE 25 MG: 50 INJECTION INTRAMUSCULAR; INTRAVENOUS at 23:09

## 2024-11-11 ASSESSMENT — PAIN - FUNCTIONAL ASSESSMENT
PAIN_FUNCTIONAL_ASSESSMENT: 0-10
PAIN_FUNCTIONAL_ASSESSMENT: PREVENTS OR INTERFERES SOME ACTIVE ACTIVITIES AND ADLS
PAIN_FUNCTIONAL_ASSESSMENT: ACTIVITIES ARE NOT PREVENTED

## 2024-11-11 ASSESSMENT — PAIN DESCRIPTION - LOCATION
LOCATION: HEAD;FLANK
LOCATION: HEAD;FLANK

## 2024-11-11 ASSESSMENT — PAIN DESCRIPTION - ORIENTATION
ORIENTATION: RIGHT;LEFT;POSTERIOR
ORIENTATION: RIGHT;LEFT;POSTERIOR

## 2024-11-11 ASSESSMENT — PAIN DESCRIPTION - FREQUENCY: FREQUENCY: CONTINUOUS

## 2024-11-11 ASSESSMENT — PAIN DESCRIPTION - PAIN TYPE: TYPE: ACUTE PAIN

## 2024-11-11 ASSESSMENT — PAIN DESCRIPTION - DESCRIPTORS: DESCRIPTORS: ACHING

## 2024-11-11 ASSESSMENT — PAIN SCALES - GENERAL
PAINLEVEL_OUTOF10: 8
PAINLEVEL_OUTOF10: 7

## 2024-11-11 ASSESSMENT — PAIN DESCRIPTION - ONSET: ONSET: ON-GOING

## 2024-11-12 LAB
BACTERIA SPEC CULT: NORMAL
SERVICE CMNT-IMP: NORMAL

## 2024-11-12 NOTE — ED TRIAGE NOTES
Pt ambulatory to ED from home w/ c/o bilateral flank pain and HA starting this afternoon. +urinary frequency, sensitivity to light and +nausea. Pt states he has a hx of migraines and takes Fioricet but is all out. Denies taking anything OTC for pain PTA. Denies trauma/injury. Denies blood in urine. Pt playing loud music in triage--denies noises making HA worse.

## 2024-11-12 NOTE — ED PROVIDER NOTES
[facility] EMERGENCY DEP  EMERGENCY DEPARTMENT ENCOUNTER      Pt Name: Henry Cartagena  MRN: [id number]  Birthdate 1975  Date of evaluation: 11/11/2024  Provider: Uvaldo Herman PA-C    CHIEF COMPLAINT       Chief Complaint   Patient presents with    Flank Pain    Headache         HISTORY OF PRESENT ILLNESS   (Location/Symptom, Timing/Onset, Context/Setting, Quality, Duration, Modifying Factors, Severity)  Note limiting factors.   49-year-old male with history of polysubstance dependence, migraines, chronic low back pain, reports to ED with bilateral flank pain, increase in urinary frequency, nausea, and headache onset this afternoon.  States migraines are consistent with prior migraines, to include light sensitivity.  Usually takes Toradol and Fioricet at home, but is all out and with his flank pain, elected report to ED for further evaluation.  Denies pain with urination, presence of blood, or abdominal pain.  States he had Left varicocele embolization a few weeks ago and just finished course of Keflex for low-grade foot infection or which he stated is healing well from both.              Review of External Medical Records:     Nursing Notes were reviewed.    REVIEW OF SYSTEMS    (2-9 systems for level 4, 10 or more for level 5)     Review of Systems    Except as noted above the remainder of the review of systems was reviewed and negative.       PAST MEDICAL HISTORY     Past Medical History:   Diagnosis Date    Arrhythmia     PCV's    Arthritis     back    Back pain     Chronic pain     6 herniated discs in back/pinched nerve in back and neck    GERD (gastroesophageal reflux disease)     Hepatitis C     HSV-2 seropositive 7/29/2016    Hypertension     IV drug abuse (HCC)     Kidney stone     Liver disease     elevated liver enzymes/hep C    Migraine     Neuralgia     Other ill-defined conditions(799.89)     chronic bronchitis    Polysubstance dependence (HCC)     stimulants, MJ, tob, barbs, benzos,

## 2024-11-12 NOTE — DISCHARGE INSTRUCTIONS
Your urinalysis shows possibility of urinary tract infection for which we are sending a prescription for Bactrim, an antibiotic, to your pharmacy for.  We are culturing the urine and if this, you will be called and placed on a different antibiotic pending results of the culture.  We are also sending more prescriptions for Fioricet and Toradol for your migraines.  Please take all as prescribed.  The CT of your abdomen and pelvis  did not show any concerning abnormalities as well.  If symptoms worsen or new concerning symptoms arise, please report the nearest emergency department.    Thank you for allowing us to provide you with medical care today.  We realize that you have many choices for your emergency care needs.  We thank you for choosing [facility].  Please choose us in the future for any continued health care needs.     The exam and treatment you received in the Emergency Department were for an emergent problem and are not intended as complete care. It is important that you follow up with a doctor, nurse practitioner, or physician assistant for ongoing care. If your symptoms worsen or you do not improve as expected and you are unable to reach your usual health care provider, you should return to the Emergency Department.  We are available 24 hours a day.     Please make an appointment with your health care provider(s) for follow up of your Emergency Department visit.  Take this sheet with you when you go to your follow-up visit.

## 2024-11-13 ENCOUNTER — HOSPITAL ENCOUNTER (EMERGENCY)
Facility: HOSPITAL | Age: 49
Discharge: HOME | End: 2024-11-13
Attending: STUDENT IN AN ORGANIZED HEALTH CARE EDUCATION/TRAINING PROGRAM
Payer: COMMERCIAL

## 2024-11-13 VITALS
SYSTOLIC BLOOD PRESSURE: 108 MMHG | RESPIRATION RATE: 18 BRPM | BODY MASS INDEX: 25.71 KG/M2 | DIASTOLIC BLOOD PRESSURE: 69 MMHG | HEART RATE: 91 BPM | WEIGHT: 189.82 LBS | HEIGHT: 72 IN | TEMPERATURE: 97.9 F | OXYGEN SATURATION: 95 %

## 2024-11-13 DIAGNOSIS — R39.9 URINARY TRACT INFECTION SYMPTOMS: Primary | ICD-10-CM

## 2024-11-13 LAB
APPEARANCE UR: CLEAR
BACTERIA URNS QL MICRO: NEGATIVE /HPF
BILIRUB UR QL: NEGATIVE
COLOR UR: ABNORMAL
EPITH CASTS URNS QL MICRO: ABNORMAL /LPF
GLUCOSE UR STRIP.AUTO-MCNC: NEGATIVE MG/DL
HGB UR QL STRIP: NEGATIVE
KETONES UR QL STRIP.AUTO: NEGATIVE MG/DL
LEUKOCYTE ESTERASE UR QL STRIP.AUTO: ABNORMAL
NITRITE UR QL STRIP.AUTO: NEGATIVE
PH UR STRIP: 5.5 (ref 5–8)
PROT UR STRIP-MCNC: NEGATIVE MG/DL
RBC #/AREA URNS HPF: ABNORMAL /HPF (ref 0–5)
SP GR UR REFRACTOMETRY: 1.01 (ref 1–1.03)
SPECIMEN HOLD: NORMAL
UROBILINOGEN UR QL STRIP.AUTO: 0.2 EU/DL (ref 0.2–1)
WBC URNS QL MICRO: ABNORMAL /HPF (ref 0–4)

## 2024-11-13 PROCEDURE — 99283 EMERGENCY DEPT VISIT LOW MDM: CPT

## 2024-11-13 PROCEDURE — 81001 URINALYSIS AUTO W/SCOPE: CPT

## 2024-11-13 ASSESSMENT — PAIN - FUNCTIONAL ASSESSMENT
PAIN_FUNCTIONAL_ASSESSMENT: 0-10
PAIN_FUNCTIONAL_ASSESSMENT: ACTIVITIES ARE NOT PREVENTED

## 2024-11-13 ASSESSMENT — PAIN DESCRIPTION - DESCRIPTORS: DESCRIPTORS: ACHING

## 2024-11-13 ASSESSMENT — ENCOUNTER SYMPTOMS
COLOR CHANGE: 0
VOMITING: 0
COUGH: 0
TROUBLE SWALLOWING: 0
SHORTNESS OF BREATH: 0
EYE PAIN: 0
NAUSEA: 1
ABDOMINAL DISTENTION: 0
SINUS PRESSURE: 0
EYE REDNESS: 0
SINUS PAIN: 0
ABDOMINAL PAIN: 0

## 2024-11-13 ASSESSMENT — PAIN SCALES - GENERAL: PAINLEVEL_OUTOF10: 5

## 2024-11-13 ASSESSMENT — PAIN DESCRIPTION - ONSET: ONSET: ON-GOING

## 2024-11-13 ASSESSMENT — PAIN DESCRIPTION - ORIENTATION: ORIENTATION: RIGHT;LEFT

## 2024-11-13 ASSESSMENT — PAIN DESCRIPTION - FREQUENCY: FREQUENCY: CONTINUOUS

## 2024-11-13 ASSESSMENT — PAIN DESCRIPTION - PAIN TYPE: TYPE: ACUTE PAIN

## 2024-11-13 ASSESSMENT — PAIN DESCRIPTION - LOCATION: LOCATION: FLANK

## 2024-11-13 NOTE — ED PROVIDER NOTES
ill-appearing.   HENT:      Head: Normocephalic and atraumatic.      Right Ear: External ear normal.      Left Ear: External ear normal.      Nose: Nose normal. No congestion.      Mouth/Throat:      Mouth: Mucous membranes are moist.   Eyes:      General:         Right eye: No discharge.         Left eye: No discharge.      Conjunctiva/sclera: Conjunctivae normal.      Pupils: Pupils are equal, round, and reactive to light.   Cardiovascular:      Rate and Rhythm: Normal rate.      Pulses: Normal pulses.   Pulmonary:      Effort: Pulmonary effort is normal. No respiratory distress.   Chest:      Chest wall: No tenderness.   Abdominal:      General: Bowel sounds are normal. There is no distension.      Palpations: Abdomen is soft.      Tenderness: There is no abdominal tenderness. There is no guarding.   Musculoskeletal:         General: No swelling or tenderness. Normal range of motion.      Cervical back: Normal range of motion and neck supple. No tenderness.   Skin:     General: Skin is warm and dry.      Capillary Refill: Capillary refill takes less than 2 seconds.   Neurological:      General: No focal deficit present.      Mental Status: He is alert and oriented to person, place, and time.      Sensory: No sensory deficit.      Motor: No weakness.   Psychiatric:         Mood and Affect: Mood normal.         Behavior: Behavior normal.         Thought Content: Thought content normal.         Judgment: Judgment normal.             EMERGENCY DEPARTMENT COURSE and DIFFERENTIAL DIAGNOSIS/MDM:   Vitals:    Vitals:    11/13/24 1302   BP: 124/76   Pulse: 91   Resp: 18   Temp: 98 °F (36.7 °C)   TempSrc: Oral   SpO2: 98%   Weight: 86.1 kg (189 lb 13.1 oz)   Height: 1.829 m (6')         Medical Decision Making  Differential diagnosis includes UTI, pyelonephritis,  viral syndrome and others.     The patient is a 49-year-old male who presents with a complaint of increased nausea, without vomiting, for the past 2 days. He was

## 2024-11-13 NOTE — ED TRIAGE NOTES
Patient here worsening bilateral flank pain. Was here on the 11th and diagnosed with possible UTI.

## 2024-12-05 ENCOUNTER — APPOINTMENT (OUTPATIENT)
Facility: HOSPITAL | Age: 49
End: 2024-12-05
Payer: COMMERCIAL

## 2024-12-05 ENCOUNTER — HOSPITAL ENCOUNTER (EMERGENCY)
Facility: HOSPITAL | Age: 49
Discharge: HOME | End: 2024-12-05
Attending: EMERGENCY MEDICINE
Payer: COMMERCIAL

## 2024-12-05 VITALS
DIASTOLIC BLOOD PRESSURE: 88 MMHG | HEIGHT: 72 IN | HEART RATE: 95 BPM | OXYGEN SATURATION: 98 % | BODY MASS INDEX: 27.29 KG/M2 | SYSTOLIC BLOOD PRESSURE: 135 MMHG | RESPIRATION RATE: 18 BRPM | WEIGHT: 201.5 LBS | TEMPERATURE: 98.6 F

## 2024-12-05 DIAGNOSIS — R51.9 ACUTE NONINTRACTABLE HEADACHE, UNSPECIFIED HEADACHE TYPE: Primary | ICD-10-CM

## 2024-12-05 LAB
ALBUMIN SERPL-MCNC: 4.4 G/DL (ref 3.5–5)
ALBUMIN/GLOB SERPL: 1.1 (ref 1.1–2.2)
ALP SERPL-CCNC: 104 U/L (ref 45–117)
ALT SERPL-CCNC: 26 U/L (ref 12–78)
ANION GAP SERPL CALC-SCNC: 5 MMOL/L (ref 2–12)
AST SERPL-CCNC: 11 U/L (ref 15–37)
BASOPHILS # BLD: 0 K/UL (ref 0–0.1)
BASOPHILS NFR BLD: 0 % (ref 0–1)
BILIRUB SERPL-MCNC: 0.3 MG/DL (ref 0.2–1)
BUN SERPL-MCNC: 14 MG/DL (ref 6–20)
BUN/CREAT SERPL: 10 (ref 12–20)
CALCIUM SERPL-MCNC: 10 MG/DL (ref 8.5–10.1)
CHLORIDE SERPL-SCNC: 107 MMOL/L (ref 97–108)
CO2 SERPL-SCNC: 27 MMOL/L (ref 21–32)
CREAT SERPL-MCNC: 1.38 MG/DL (ref 0.7–1.3)
DIFFERENTIAL METHOD BLD: NORMAL
EOSINOPHIL # BLD: 0 K/UL (ref 0–0.4)
EOSINOPHIL NFR BLD: 0 % (ref 0–7)
ERYTHROCYTE [DISTWIDTH] IN BLOOD BY AUTOMATED COUNT: 13.5 % (ref 11.5–14.5)
GLOBULIN SER CALC-MCNC: 3.9 G/DL (ref 2–4)
GLUCOSE SERPL-MCNC: 107 MG/DL (ref 65–100)
HCT VFR BLD AUTO: 45.2 % (ref 36.6–50.3)
HGB BLD-MCNC: 15.2 G/DL (ref 12.1–17)
IMM GRANULOCYTES # BLD AUTO: 0 K/UL (ref 0–0.04)
IMM GRANULOCYTES NFR BLD AUTO: 0 % (ref 0–0.5)
LYMPHOCYTES # BLD: 1.7 K/UL (ref 0.8–3.5)
LYMPHOCYTES NFR BLD: 20 % (ref 12–49)
MCH RBC QN AUTO: 33.1 PG (ref 26–34)
MCHC RBC AUTO-ENTMCNC: 33.6 G/DL (ref 30–36.5)
MCV RBC AUTO: 98.5 FL (ref 80–99)
MONOCYTES # BLD: 0.9 K/UL (ref 0–1)
MONOCYTES NFR BLD: 10 % (ref 5–13)
NEUTS SEG # BLD: 6.2 K/UL (ref 1.8–8)
NEUTS SEG NFR BLD: 70 % (ref 32–75)
NRBC # BLD: 0 K/UL (ref 0–0.01)
NRBC BLD-RTO: 0 PER 100 WBC
PLATELET # BLD AUTO: 205 K/UL (ref 150–400)
PMV BLD AUTO: 11.2 FL (ref 8.9–12.9)
POTASSIUM SERPL-SCNC: 4.1 MMOL/L (ref 3.5–5.1)
PROT SERPL-MCNC: 8.3 G/DL (ref 6.4–8.2)
RBC # BLD AUTO: 4.59 M/UL (ref 4.1–5.7)
SODIUM SERPL-SCNC: 139 MMOL/L (ref 136–145)
WBC # BLD AUTO: 8.8 K/UL (ref 4.1–11.1)

## 2024-12-05 PROCEDURE — 80053 COMPREHEN METABOLIC PANEL: CPT

## 2024-12-05 PROCEDURE — 99284 EMERGENCY DEPT VISIT MOD MDM: CPT

## 2024-12-05 PROCEDURE — 6360000002 HC RX W HCPCS

## 2024-12-05 PROCEDURE — 85025 COMPLETE CBC W/AUTO DIFF WBC: CPT

## 2024-12-05 PROCEDURE — 70450 CT HEAD/BRAIN W/O DYE: CPT

## 2024-12-05 PROCEDURE — 36415 COLL VENOUS BLD VENIPUNCTURE: CPT

## 2024-12-05 PROCEDURE — 96375 TX/PRO/DX INJ NEW DRUG ADDON: CPT

## 2024-12-05 PROCEDURE — 96374 THER/PROPH/DIAG INJ IV PUSH: CPT

## 2024-12-05 RX ORDER — DIPHENHYDRAMINE HYDROCHLORIDE 50 MG/ML
12.5 INJECTION INTRAMUSCULAR; INTRAVENOUS
Status: COMPLETED | OUTPATIENT
Start: 2024-12-05 | End: 2024-12-05

## 2024-12-05 RX ORDER — PROCHLORPERAZINE EDISYLATE 5 MG/ML
10 INJECTION INTRAMUSCULAR; INTRAVENOUS ONCE
Status: COMPLETED | OUTPATIENT
Start: 2024-12-05 | End: 2024-12-05

## 2024-12-05 RX ORDER — DEXAMETHASONE SODIUM PHOSPHATE 10 MG/ML
10 INJECTION, SOLUTION INTRAMUSCULAR; INTRAVENOUS ONCE
Status: COMPLETED | OUTPATIENT
Start: 2024-12-05 | End: 2024-12-05

## 2024-12-05 RX ADMIN — DEXAMETHASONE SODIUM PHOSPHATE 10 MG: 10 INJECTION, SOLUTION INTRAMUSCULAR; INTRAVENOUS at 14:14

## 2024-12-05 RX ADMIN — PROCHLORPERAZINE EDISYLATE 10 MG: 5 INJECTION INTRAMUSCULAR; INTRAVENOUS at 14:14

## 2024-12-05 RX ADMIN — DIPHENHYDRAMINE HYDROCHLORIDE 12.5 MG: 50 INJECTION INTRAMUSCULAR; INTRAVENOUS at 14:14

## 2024-12-05 ASSESSMENT — PAIN DESCRIPTION - ONSET: ONSET: SUDDEN

## 2024-12-05 ASSESSMENT — PAIN DESCRIPTION - ORIENTATION: ORIENTATION: ANTERIOR

## 2024-12-05 ASSESSMENT — PAIN - FUNCTIONAL ASSESSMENT
PAIN_FUNCTIONAL_ASSESSMENT: ACTIVITIES ARE NOT PREVENTED
PAIN_FUNCTIONAL_ASSESSMENT: 0-10

## 2024-12-05 ASSESSMENT — PAIN DESCRIPTION - FREQUENCY: FREQUENCY: CONTINUOUS

## 2024-12-05 ASSESSMENT — PAIN SCALES - GENERAL
PAINLEVEL_OUTOF10: 0
PAINLEVEL_OUTOF10: 9

## 2024-12-05 ASSESSMENT — PAIN DESCRIPTION - DESCRIPTORS: DESCRIPTORS: PRESSURE

## 2024-12-05 ASSESSMENT — PAIN DESCRIPTION - PAIN TYPE: TYPE: ACUTE PAIN

## 2024-12-05 ASSESSMENT — PAIN DESCRIPTION - LOCATION: LOCATION: HEAD

## 2024-12-05 NOTE — ED PROVIDER NOTES
[facility] EMERGENCY DEP  EMERGENCY DEPARTMENT ENCOUNTER      Pt Name: Henry Cartagena  MRN: [id number]  Birthdate 1975  Date of evaluation: 12/5/2024  Provider: Carlton Millan PA-C    CHIEF COMPLAINT       Chief Complaint   Patient presents with    Headache         HISTORY OF PRESENT ILLNESS    Patient is a 49-year-old male with history of PVCs, back pain, GERD, hepatitis C, IV drug use, hypertension, liver disease, migraines, and neuralgias who presents emergency room with reports of a headache that started this morning.  Patient reports that he took Tylenol and Motrin at home with without any relief.  Patient reports that he was told he had metal in his head and neck and thinks that that is is what is causing his headaches.  Patient reports that no new symptoms occurred today, but the pain is a little worse and not getting better like it usually does. Patient denies chest pain, shortness of breath, abdominal pain, urinary symptoms, nausea or vomiting, diarrhea or constipation, dizziness, lightheadedness, fever or chills.  Patient denies alcohol use, former cigarette smoking, and admits to marijuana use.          Nursing Notes were reviewed.    REVIEW OF SYSTEMS       Review of Systems      PAST MEDICAL HISTORY     Past Medical History:   Diagnosis Date    Arrhythmia     PCV's    Arthritis     back    Back pain     Chronic pain     6 herniated discs in back/pinched nerve in back and neck    GERD (gastroesophageal reflux disease)     Hepatitis C     HSV-2 seropositive 7/29/2016    Hypertension     IV drug abuse (HCC)     Kidney stone     Liver disease     elevated liver enzymes/hep C    Migraine     Neuralgia     Other ill-defined conditions(799.89)     chronic bronchitis    Polysubstance dependence (HCC)     stimulants, MJ, tob, barbs, benzos, opiates    Psychiatric disorder     Bipolar, Anxiety    Unspecified adverse effect of anesthesia     \"was told he woke up during back surgery\" and during nerve root injection

## 2024-12-05 NOTE — ED NOTES
Patient left ED in no acute distress, alert and oriented x4. Patient was encouraged to come back if symptoms get worse. Patient was provided with discharge instructions and prescriptions. All questions were answered. Patient left ambulatory.    Patient's IV removed by this nurse

## 2024-12-05 NOTE — ED TRIAGE NOTES
Patient reports having a severe headache that started this morning. Says he has metal in his head and throat which is causing the headache to worsen.

## 2024-12-05 NOTE — DISCHARGE INSTRUCTIONS
Discussed visit today.  Please take your medications as prescribed at home.    Return to the emergency room with any worsening of symptoms.

## 2025-01-13 ENCOUNTER — HOSPITAL ENCOUNTER (EMERGENCY)
Facility: HOSPITAL | Age: 50
Discharge: HOME | End: 2025-01-13
Attending: EMERGENCY MEDICINE
Payer: COMMERCIAL

## 2025-01-13 VITALS
WEIGHT: 194 LBS | DIASTOLIC BLOOD PRESSURE: 80 MMHG | TEMPERATURE: 97.7 F | SYSTOLIC BLOOD PRESSURE: 120 MMHG | RESPIRATION RATE: 16 BRPM | HEART RATE: 83 BPM | BODY MASS INDEX: 26.31 KG/M2 | OXYGEN SATURATION: 97 %

## 2025-01-13 DIAGNOSIS — G43.909 MIGRAINE WITHOUT STATUS MIGRAINOSUS, NOT INTRACTABLE, UNSPECIFIED MIGRAINE TYPE: Primary | ICD-10-CM

## 2025-01-13 LAB
AMPHET UR QL SCN: NEGATIVE
APPEARANCE UR: CLEAR
BACTERIA URNS QL MICRO: NEGATIVE /HPF
BARBITURATES UR QL SCN: POSITIVE
BENZODIAZ UR QL: NEGATIVE
BILIRUB UR QL: NEGATIVE
CANNABINOIDS UR QL SCN: POSITIVE
COCAINE UR QL SCN: NEGATIVE
COLOR UR: ABNORMAL
EPITH CASTS URNS QL MICRO: ABNORMAL /LPF
GLUCOSE UR STRIP.AUTO-MCNC: NEGATIVE MG/DL
HGB UR QL STRIP: NEGATIVE
HYALINE CASTS URNS QL MICRO: ABNORMAL /LPF (ref 0–5)
KETONES UR QL STRIP.AUTO: NEGATIVE MG/DL
LEUKOCYTE ESTERASE UR QL STRIP.AUTO: NEGATIVE
Lab: ABNORMAL
METHADONE UR QL: NEGATIVE
NITRITE UR QL STRIP.AUTO: NEGATIVE
OPIATES UR QL: NEGATIVE
PCP UR QL: NEGATIVE
PH UR STRIP: 5.5 (ref 5–8)
PROT UR STRIP-MCNC: 30 MG/DL
RBC #/AREA URNS HPF: ABNORMAL /HPF (ref 0–5)
SP GR UR REFRACTOMETRY: 1.02 (ref 1–1.03)
UROBILINOGEN UR QL STRIP.AUTO: 0.2 EU/DL (ref 0.2–1)
WBC URNS QL MICRO: ABNORMAL /HPF (ref 0–4)

## 2025-01-13 PROCEDURE — 6360000002 HC RX W HCPCS: Performed by: EMERGENCY MEDICINE

## 2025-01-13 PROCEDURE — 81001 URINALYSIS AUTO W/SCOPE: CPT

## 2025-01-13 PROCEDURE — 99284 EMERGENCY DEPT VISIT MOD MDM: CPT

## 2025-01-13 PROCEDURE — 96375 TX/PRO/DX INJ NEW DRUG ADDON: CPT

## 2025-01-13 PROCEDURE — 2580000003 HC RX 258: Performed by: EMERGENCY MEDICINE

## 2025-01-13 PROCEDURE — 80307 DRUG TEST PRSMV CHEM ANLYZR: CPT

## 2025-01-13 PROCEDURE — 96374 THER/PROPH/DIAG INJ IV PUSH: CPT

## 2025-01-13 RX ORDER — 0.9 % SODIUM CHLORIDE 0.9 %
500 INTRAVENOUS SOLUTION INTRAVENOUS ONCE
Status: COMPLETED | OUTPATIENT
Start: 2025-01-13 | End: 2025-01-13

## 2025-01-13 RX ORDER — BUTALBITAL, ACETAMINOPHEN AND CAFFEINE 50; 325; 40 MG/1; MG/1; MG/1
1 TABLET ORAL EVERY 6 HOURS PRN
Qty: 30 TABLET | Refills: 3 | Status: SHIPPED | OUTPATIENT
Start: 2025-01-13

## 2025-01-13 RX ORDER — DEXAMETHASONE SODIUM PHOSPHATE 10 MG/ML
10 INJECTION, SOLUTION INTRAMUSCULAR; INTRAVENOUS ONCE
Status: COMPLETED | OUTPATIENT
Start: 2025-01-13 | End: 2025-01-13

## 2025-01-13 RX ORDER — PROCHLORPERAZINE EDISYLATE 5 MG/ML
10 INJECTION INTRAMUSCULAR; INTRAVENOUS ONCE
Status: COMPLETED | OUTPATIENT
Start: 2025-01-13 | End: 2025-01-13

## 2025-01-13 RX ORDER — DIPHENHYDRAMINE HYDROCHLORIDE 50 MG/ML
25 INJECTION INTRAMUSCULAR; INTRAVENOUS
Status: COMPLETED | OUTPATIENT
Start: 2025-01-13 | End: 2025-01-13

## 2025-01-13 RX ORDER — KETOROLAC TROMETHAMINE 30 MG/ML
15 INJECTION, SOLUTION INTRAMUSCULAR; INTRAVENOUS
Status: COMPLETED | OUTPATIENT
Start: 2025-01-13 | End: 2025-01-13

## 2025-01-13 RX ADMIN — KETOROLAC TROMETHAMINE 15 MG: 30 INJECTION, SOLUTION INTRAMUSCULAR at 17:28

## 2025-01-13 RX ADMIN — SODIUM CHLORIDE 500 ML: 9 INJECTION, SOLUTION INTRAVENOUS at 17:21

## 2025-01-13 RX ADMIN — PROCHLORPERAZINE EDISYLATE 10 MG: 5 INJECTION INTRAMUSCULAR; INTRAVENOUS at 17:27

## 2025-01-13 RX ADMIN — DIPHENHYDRAMINE HYDROCHLORIDE 25 MG: 50 INJECTION INTRAMUSCULAR; INTRAVENOUS at 17:28

## 2025-01-13 RX ADMIN — DEXAMETHASONE SODIUM PHOSPHATE 10 MG: 10 INJECTION INTRAMUSCULAR; INTRAVENOUS at 17:26

## 2025-01-13 ASSESSMENT — PAIN DESCRIPTION - LOCATION: LOCATION: HEAD

## 2025-01-13 ASSESSMENT — PAIN DESCRIPTION - DESCRIPTORS: DESCRIPTORS: ACHING

## 2025-01-13 ASSESSMENT — PAIN SCALES - GENERAL: PAINLEVEL_OUTOF10: 6

## 2025-01-13 ASSESSMENT — PAIN - FUNCTIONAL ASSESSMENT
PAIN_FUNCTIONAL_ASSESSMENT: 0-10
PAIN_FUNCTIONAL_ASSESSMENT: PREVENTS OR INTERFERES SOME ACTIVE ACTIVITIES AND ADLS

## 2025-01-13 ASSESSMENT — PAIN DESCRIPTION - ORIENTATION: ORIENTATION: OTHER (COMMENT)

## 2025-01-13 NOTE — ED TRIAGE NOTES
Pt stated he has a headache, usually takes Fioricet, did not take any today , started last night, denies fever , dizzy and nausea the other night, tried oral Toradol and benadryl which helps , did not take it today , also with left sided testicular pain , +urinary frequency , had a variocele embolization 6 months ago

## 2025-01-13 NOTE — ED PROVIDER NOTES
[facility] EMERGENCY DEPARTMENT  EMERGENCY DEPARTMENT ENCOUNTER      Pt Name: Henry Cartagena  MRN: [id number]  Birthdate 1975  Date of evaluation: 1/13/2025  Provider: Alexis Maloney DO      HISTORY OF PRESENT ILLNESS      49-year-old male with history of migraines presents to the emergency department complaining of migraine that started last night.  He has associated photophobia and nausea.  He normally takes Fioricet as well as Toradol and Benadryl but has not taken any of those for his symptoms.  Also notes some chronic urinary discomfort for the last 6 months after he had a varicocele embolized and requests urine testing.  Also requests drug screen that she needs for something else.  Denies any head injury or fever or chills or URI symptoms.  No numbness or weakness.  Follows with neurology for his headaches.              Nursing Notes were reviewed.    REVIEW OF SYSTEMS         Review of Systems        PAST MEDICAL HISTORY     Past Medical History:   Diagnosis Date    Arrhythmia     PCV's    Arthritis     back    Back pain     Chronic pain     6 herniated discs in back/pinched nerve in back and neck    GERD (gastroesophageal reflux disease)     Hepatitis C     HSV-2 seropositive 7/29/2016    Hypertension     IV drug abuse (HCC)     Kidney stone     Liver disease     elevated liver enzymes/hep C    Migraine     Neuralgia     Other ill-defined conditions(799.89)     chronic bronchitis    Polysubstance dependence (HCC)     stimulants, MJ, tob, barbs, benzos, opiates    Psychiatric disorder     Bipolar, Anxiety    Unspecified adverse effect of anesthesia     \"was told he woke up during back surgery\" and during nerve root injection    Unspecified sleep apnea     mild - does not use CPAP         SURGICAL HISTORY       Past Surgical History:   Procedure Laterality Date    IR EMBOLIZATION VENOUS  7/30/2024    IR EMBOLIZATION VENOUS 7/30/2024 [facility] RAD ANGIO IR    LUMBAR LAMINECTOMY      ORTHOPEDIC SURGERY

## 2025-02-18 ENCOUNTER — HOSPITAL ENCOUNTER (EMERGENCY)
Facility: HOSPITAL | Age: 50
Discharge: HOME | End: 2025-02-18
Attending: EMERGENCY MEDICINE
Payer: COMMERCIAL

## 2025-02-18 VITALS
TEMPERATURE: 96.7 F | HEIGHT: 72 IN | OXYGEN SATURATION: 99 % | HEART RATE: 77 BPM | SYSTOLIC BLOOD PRESSURE: 150 MMHG | WEIGHT: 186.51 LBS | DIASTOLIC BLOOD PRESSURE: 103 MMHG | RESPIRATION RATE: 18 BRPM | BODY MASS INDEX: 25.26 KG/M2

## 2025-02-18 DIAGNOSIS — G43.909 MIGRAINE WITHOUT STATUS MIGRAINOSUS, NOT INTRACTABLE, UNSPECIFIED MIGRAINE TYPE: Primary | ICD-10-CM

## 2025-02-18 PROCEDURE — 6370000000 HC RX 637 (ALT 250 FOR IP)

## 2025-02-18 PROCEDURE — 96372 THER/PROPH/DIAG INJ SC/IM: CPT

## 2025-02-18 PROCEDURE — 99284 EMERGENCY DEPT VISIT MOD MDM: CPT

## 2025-02-18 PROCEDURE — 6360000002 HC RX W HCPCS

## 2025-02-18 RX ORDER — DEXAMETHASONE 4 MG/1
4 TABLET ORAL ONCE
Status: COMPLETED | OUTPATIENT
Start: 2025-02-18 | End: 2025-02-18

## 2025-02-18 RX ORDER — BUTALBITAL, ACETAMINOPHEN AND CAFFEINE 50; 325; 40 MG/1; MG/1; MG/1
1 TABLET ORAL EVERY 6 HOURS PRN
Qty: 12 TABLET | Refills: 0 | Status: SHIPPED | OUTPATIENT
Start: 2025-02-18 | End: 2025-02-21

## 2025-02-18 RX ORDER — KETOROLAC TROMETHAMINE 30 MG/ML
30 INJECTION, SOLUTION INTRAMUSCULAR; INTRAVENOUS
Status: COMPLETED | OUTPATIENT
Start: 2025-02-18 | End: 2025-02-18

## 2025-02-18 RX ORDER — ONDANSETRON 4 MG/1
4 TABLET, FILM COATED ORAL DAILY PRN
Qty: 30 TABLET | Refills: 0 | Status: SHIPPED | OUTPATIENT
Start: 2025-02-18

## 2025-02-18 RX ORDER — ONDANSETRON 4 MG/1
4 TABLET, ORALLY DISINTEGRATING ORAL ONCE
Status: COMPLETED | OUTPATIENT
Start: 2025-02-18 | End: 2025-02-18

## 2025-02-18 RX ADMIN — KETOROLAC TROMETHAMINE 30 MG: 30 INJECTION, SOLUTION INTRAMUSCULAR; INTRAVENOUS at 11:39

## 2025-02-18 RX ADMIN — DEXAMETHASONE 4 MG: 4 TABLET ORAL at 11:38

## 2025-02-18 RX ADMIN — ONDANSETRON 4 MG: 4 TABLET, ORALLY DISINTEGRATING ORAL at 11:39

## 2025-02-18 ASSESSMENT — PAIN SCALES - GENERAL: PAINLEVEL_OUTOF10: 8

## 2025-02-18 NOTE — ED TRIAGE NOTES
Pt arrives c/o migraine onset 0500 today. Pt reports chronic migraines, he is working with neuro, next apt 2/27. Pt reports he took his Fioricet with some relief but is out of his zofran and he is nauseated. Pt 8/10 pain but nausea is worse. Pt reports Fioricet is nearly out and would like a refill of both zofran and Fioricet to get to his apts. No weakness, CSS neg, no change of vision

## 2025-02-18 NOTE — ED NOTES
Pt discharged in stable condition at this time. MD/EDA reviewed discharge instructions, prescriptions, and follow up with patient at bedside. Pt verbalized understanding and denies any needs or questions at this time.   Pt NAD on DC ambulatory

## 2025-02-18 NOTE — DISCHARGE INSTRUCTIONS
Please keep your upcoming neurology appointment. Your zofran and fioricet was refilled until you are able to see them. Please do not hesitate to return to the ED if symptoms change or worsen.    Private car

## 2025-02-18 NOTE — ED PROVIDER NOTES
[facility] EMERGENCY DEPARTMENT  EMERGENCY DEPARTMENT ENCOUNTER      Pt Name: Henry Cartagena  MRN: [id number]  Birthdate 1975  Date of evaluation: 2/18/2025  Provider: Francie Muniz PA-C    CHIEF COMPLAINT       Chief Complaint   Patient presents with    Medication Refill    Headache         HISTORY OF PRESENT ILLNESS   (Location/Symptom, Timing/Onset, Context/Setting, Quality, Duration, Modifying Factors, Severity)  Note limiting factors.   48 yo male h/o migraines presenting with CC of headache. Pt states his HA began this morning after getting off work around 5am. Says that it is typical to his usual migraines and is having photophobia and nausea. He has run out of his zofran and would like some more until he is able to see neuro on the 27th of this month. Pt denies dizziness, fevers, chills, blurry vision, chest pain, SOB, extremity numbness or weakness.             Review of External Medical Records:     Nursing Notes were reviewed.    REVIEW OF SYSTEMS    (2-9 systems for level 4, 10 or more for level 5)     Review of Systems    Except as noted above the remainder of the review of systems was reviewed and negative.       PAST MEDICAL HISTORY     Past Medical History:   Diagnosis Date    Arrhythmia     PCV's    Arthritis     back    Back pain     Chronic pain     6 herniated discs in back/pinched nerve in back and neck    GERD (gastroesophageal reflux disease)     Hepatitis C     HSV-2 seropositive 7/29/2016    Hypertension     IV drug abuse (HCC)     Kidney stone     Liver disease     elevated liver enzymes/hep C    Migraine     Neuralgia     Other ill-defined conditions(799.89)     chronic bronchitis    Polysubstance dependence (HCC)     stimulants, MJ, tob, barbs, benzos, opiates    Psychiatric disorder     Bipolar, Anxiety    Unspecified adverse effect of anesthesia     \"was told he woke up during back surgery\" and during nerve root injection    Unspecified sleep apnea     mild - does not

## 2025-02-23 ENCOUNTER — APPOINTMENT (OUTPATIENT)
Facility: HOSPITAL | Age: 50
End: 2025-02-23
Payer: COMMERCIAL

## 2025-02-23 PROCEDURE — 87636 SARSCOV2 & INF A&B AMP PRB: CPT

## 2025-02-23 PROCEDURE — 99284 EMERGENCY DEPT VISIT MOD MDM: CPT

## 2025-02-23 PROCEDURE — 71046 X-RAY EXAM CHEST 2 VIEWS: CPT

## 2025-02-23 RX ORDER — ONDANSETRON 2 MG/ML
4 INJECTION INTRAMUSCULAR; INTRAVENOUS ONCE
Status: COMPLETED | OUTPATIENT
Start: 2025-02-23 | End: 2025-02-24

## 2025-02-23 RX ORDER — KETOROLAC TROMETHAMINE 30 MG/ML
15 INJECTION, SOLUTION INTRAMUSCULAR; INTRAVENOUS
Status: COMPLETED | OUTPATIENT
Start: 2025-02-23 | End: 2025-02-24

## 2025-02-23 RX ORDER — DEXAMETHASONE SODIUM PHOSPHATE 10 MG/ML
10 INJECTION, SOLUTION INTRAMUSCULAR; INTRAVENOUS ONCE
Status: COMPLETED | OUTPATIENT
Start: 2025-02-23 | End: 2025-02-24

## 2025-02-23 ASSESSMENT — PAIN DESCRIPTION - LOCATION: LOCATION: HEAD

## 2025-02-23 ASSESSMENT — PAIN SCALES - GENERAL: PAINLEVEL_OUTOF10: 7

## 2025-02-23 ASSESSMENT — PAIN DESCRIPTION - FREQUENCY: FREQUENCY: CONTINUOUS

## 2025-02-23 ASSESSMENT — PAIN - FUNCTIONAL ASSESSMENT
PAIN_FUNCTIONAL_ASSESSMENT: ACTIVITIES ARE NOT PREVENTED
PAIN_FUNCTIONAL_ASSESSMENT: 0-10

## 2025-02-23 ASSESSMENT — PAIN DESCRIPTION - DESCRIPTORS: DESCRIPTORS: ACHING;SHARP

## 2025-02-23 ASSESSMENT — PAIN DESCRIPTION - ONSET: ONSET: SUDDEN

## 2025-02-23 ASSESSMENT — PAIN DESCRIPTION - PAIN TYPE: TYPE: ACUTE PAIN

## 2025-02-24 ENCOUNTER — HOSPITAL ENCOUNTER (EMERGENCY)
Facility: HOSPITAL | Age: 50
Discharge: HOME | End: 2025-02-24
Attending: EMERGENCY MEDICINE
Payer: COMMERCIAL

## 2025-02-24 VITALS
DIASTOLIC BLOOD PRESSURE: 92 MMHG | OXYGEN SATURATION: 98 % | RESPIRATION RATE: 18 BRPM | SYSTOLIC BLOOD PRESSURE: 141 MMHG | TEMPERATURE: 98.1 F | HEART RATE: 70 BPM

## 2025-02-24 DIAGNOSIS — B34.9 ACUTE VIRAL SYNDROME: Primary | ICD-10-CM

## 2025-02-24 DIAGNOSIS — J10.1 INFLUENZA B: ICD-10-CM

## 2025-02-24 DIAGNOSIS — R51.9 NONINTRACTABLE HEADACHE, UNSPECIFIED CHRONICITY PATTERN, UNSPECIFIED HEADACHE TYPE: ICD-10-CM

## 2025-02-24 LAB
FLUAV RNA SPEC QL NAA+PROBE: NOT DETECTED
FLUBV RNA SPEC QL NAA+PROBE: DETECTED
SARS-COV-2 RNA RESP QL NAA+PROBE: NOT DETECTED
SOURCE: ABNORMAL

## 2025-02-24 PROCEDURE — 6360000002 HC RX W HCPCS: Performed by: PHYSICIAN ASSISTANT

## 2025-02-24 PROCEDURE — 96375 TX/PRO/DX INJ NEW DRUG ADDON: CPT

## 2025-02-24 PROCEDURE — 2580000003 HC RX 258: Performed by: PHYSICIAN ASSISTANT

## 2025-02-24 PROCEDURE — 96374 THER/PROPH/DIAG INJ IV PUSH: CPT

## 2025-02-24 RX ORDER — BENZONATATE 100 MG/1
100-200 CAPSULE ORAL 3 TIMES DAILY PRN
Qty: 42 CAPSULE | Refills: 0 | Status: SHIPPED | OUTPATIENT
Start: 2025-02-24 | End: 2025-03-03

## 2025-02-24 RX ORDER — KETOROLAC TROMETHAMINE 10 MG/1
10 TABLET, FILM COATED ORAL EVERY 6 HOURS PRN
Qty: 20 TABLET | Refills: 0 | Status: SHIPPED | OUTPATIENT
Start: 2025-02-24

## 2025-02-24 RX ORDER — OSELTAMIVIR PHOSPHATE 75 MG/1
75 CAPSULE ORAL 2 TIMES DAILY
Qty: 10 CAPSULE | Refills: 0 | Status: SHIPPED | OUTPATIENT
Start: 2025-02-24 | End: 2025-03-01

## 2025-02-24 RX ORDER — 0.9 % SODIUM CHLORIDE 0.9 %
1000 INTRAVENOUS SOLUTION INTRAVENOUS ONCE
Status: COMPLETED | OUTPATIENT
Start: 2025-02-24 | End: 2025-02-24

## 2025-02-24 RX ORDER — ONDANSETRON 4 MG/1
4 TABLET, FILM COATED ORAL 3 TIMES DAILY PRN
Qty: 15 TABLET | Refills: 0 | Status: SHIPPED | OUTPATIENT
Start: 2025-02-24

## 2025-02-24 RX ORDER — ACETAMINOPHEN 500 MG
1000 TABLET ORAL 3 TIMES DAILY PRN
Qty: 180 TABLET | Refills: 0 | Status: SHIPPED | OUTPATIENT
Start: 2025-02-24

## 2025-02-24 RX ADMIN — SODIUM CHLORIDE 1000 ML: 9 INJECTION, SOLUTION INTRAVENOUS at 01:01

## 2025-02-24 RX ADMIN — DEXAMETHASONE SODIUM PHOSPHATE 10 MG: 10 INJECTION, SOLUTION INTRAMUSCULAR; INTRAVENOUS at 00:58

## 2025-02-24 RX ADMIN — KETOROLAC TROMETHAMINE 15 MG: 30 INJECTION, SOLUTION INTRAMUSCULAR; INTRAVENOUS at 00:59

## 2025-02-24 RX ADMIN — ONDANSETRON 4 MG: 2 INJECTION, SOLUTION INTRAMUSCULAR; INTRAVENOUS at 00:57

## 2025-02-24 ASSESSMENT — PAIN DESCRIPTION - DESCRIPTORS: DESCRIPTORS: ACHING

## 2025-02-24 ASSESSMENT — PAIN DESCRIPTION - ORIENTATION: ORIENTATION: MID

## 2025-02-24 ASSESSMENT — PAIN - FUNCTIONAL ASSESSMENT: PAIN_FUNCTIONAL_ASSESSMENT: ACTIVITIES ARE NOT PREVENTED

## 2025-02-24 ASSESSMENT — PAIN DESCRIPTION - LOCATION: LOCATION: HEAD

## 2025-02-24 ASSESSMENT — PAIN SCALES - GENERAL: PAINLEVEL_OUTOF10: 6

## 2025-02-24 NOTE — ED NOTES
Patient discharged by Misbah Mahmood pt sent to the front lobby, with strong and steady gait, no acute distress noted at time of discharge - Discharge information / home RX / and reasons to return to the ED were reviewed by the ED provider.

## 2025-02-24 NOTE — DISCHARGE INSTRUCTIONS
Stay well hydrated with 32 to 64 ounces of water daily.  Include electrolytes such as Pedialyte, Gatorade, propel.  [place] diet such as bananas, rice, applesauce, bread.  You would rotate Tylenol and ibuprofen every 4 hours as needed for pain, fever, discomfort.    Tamiflu can cause abdominal discomfort, vomiting, nausea, diarrhea, if side effects should occur discontinue this medication.  Tamiflu can cut duration of symptoms by 1 day.   Zofran as needed, as prescribed for nausea and vomiting.  Call your primary care provider within 24 hours for close outpatient follow-up.  Take medication as prescribed.  Return immediately if any new or worsening symptoms.  Thank you for allowing us to be a part of your care.

## 2025-02-24 NOTE — ED TRIAGE NOTES
He reports 2 days of headaches, sweating and fever with a cough. He has a hx of migraines. Seen by a provider in triage.

## 2025-02-24 NOTE — ED PROVIDER NOTES
[facility] EMERGENCY DEPARTMENT  EMERGENCY DEPARTMENT ENCOUNTER      Pt Name: Henry Cartagena  MRN: [id number]  Birthdate 1975  Date of evaluation: 2/23/2025  Provider: Black Crawley PA-C    CHIEF COMPLAINT       Chief Complaint   Patient presents with    Fever    Sweats         HISTORY OF PRESENT ILLNESS   (Location/Symptom, Timing/Onset, Context/Setting, Quality, Duration, Modifying Factors, Severity)  Note limiting factors.   49-year-old M with history of migraine headaches presenting to emergency department for migraine headache.  Patient reports associated with photophobia and nausea.  Similar to previous migraine headaches.  Patient is followed by neurologist.  He has upcoming appointment with neurology.  Patient also reports he has had a fever, sweats, cough x 3 days.  No known sick contacts.  Endorses diarrhea.  Denies vomiting.            Review of External Medical Records:     Nursing Notes were reviewed.    REVIEW OF SYSTEMS    (2-9 systems for level 4, 10 or more for level 5)     Review of Systems   Constitutional:  Positive for diaphoresis and fever.   Eyes:  Positive for photophobia.   Neurological:  Positive for headaches.   All other systems reviewed and are negative.      Except as noted above the remainder of the review of systems was reviewed and negative.       PAST MEDICAL HISTORY     Past Medical History:   Diagnosis Date    Arrhythmia     PCV's    Arthritis     back    Back pain     Chronic pain     6 herniated discs in back/pinched nerve in back and neck    GERD (gastroesophageal reflux disease)     Hepatitis C     HSV-2 seropositive 7/29/2016    Hypertension     IV drug abuse (HCC)     Kidney stone     Liver disease     elevated liver enzymes/hep C    Migraine     Neuralgia     Other ill-defined conditions(799.89)     chronic bronchitis    Polysubstance dependence (HCC)     stimulants, MJ, tob, barbs, benzos, opiates    Psychiatric disorder     Bipolar, Anxiety    Unspecified

## 2025-03-18 ENCOUNTER — HOSPITAL ENCOUNTER (EMERGENCY)
Facility: HOSPITAL | Age: 50
Discharge: HOME | End: 2025-03-18
Attending: STUDENT IN AN ORGANIZED HEALTH CARE EDUCATION/TRAINING PROGRAM
Payer: COMMERCIAL

## 2025-03-18 VITALS
BODY MASS INDEX: 23.8 KG/M2 | SYSTOLIC BLOOD PRESSURE: 136 MMHG | HEART RATE: 90 BPM | TEMPERATURE: 98.4 F | OXYGEN SATURATION: 98 % | DIASTOLIC BLOOD PRESSURE: 92 MMHG | RESPIRATION RATE: 18 BRPM | WEIGHT: 175.71 LBS | HEIGHT: 72 IN

## 2025-03-18 DIAGNOSIS — B35.3 TINEA PEDIS OF BOTH FEET: Primary | ICD-10-CM

## 2025-03-18 DIAGNOSIS — G43.809 OTHER MIGRAINE WITHOUT STATUS MIGRAINOSUS, NOT INTRACTABLE: ICD-10-CM

## 2025-03-18 PROCEDURE — 6370000000 HC RX 637 (ALT 250 FOR IP)

## 2025-03-18 PROCEDURE — 6360000002 HC RX W HCPCS

## 2025-03-18 PROCEDURE — 99284 EMERGENCY DEPT VISIT MOD MDM: CPT

## 2025-03-18 PROCEDURE — 96372 THER/PROPH/DIAG INJ SC/IM: CPT

## 2025-03-18 RX ORDER — BUTALBITAL, ACETAMINOPHEN AND CAFFEINE 50; 325; 40 MG/1; MG/1; MG/1
1 TABLET ORAL EVERY 6 HOURS PRN
Qty: 12 TABLET | Refills: 0 | Status: SHIPPED | OUTPATIENT
Start: 2025-03-18 | End: 2025-03-21

## 2025-03-18 RX ORDER — KETOROLAC TROMETHAMINE 30 MG/ML
30 INJECTION, SOLUTION INTRAMUSCULAR; INTRAVENOUS
Status: COMPLETED | OUTPATIENT
Start: 2025-03-18 | End: 2025-03-18

## 2025-03-18 RX ORDER — CEPHALEXIN 500 MG/1
500 CAPSULE ORAL 3 TIMES DAILY
Qty: 21 CAPSULE | Refills: 0 | Status: SHIPPED | OUTPATIENT
Start: 2025-03-18 | End: 2025-03-25

## 2025-03-18 RX ORDER — BUTALBITAL, ACETAMINOPHEN AND CAFFEINE 50; 325; 40 MG/1; MG/1; MG/1
1 TABLET ORAL
Status: COMPLETED | OUTPATIENT
Start: 2025-03-18 | End: 2025-03-18

## 2025-03-18 RX ORDER — KETOROLAC TROMETHAMINE 10 MG/1
10 TABLET, FILM COATED ORAL EVERY 6 HOURS PRN
Qty: 20 TABLET | Refills: 0 | Status: SHIPPED | OUTPATIENT
Start: 2025-03-18

## 2025-03-18 RX ADMIN — BUTALBITAL, ACETAMINOPHEN AND CAFFEINE 1 TABLET: 325; 50; 40 TABLET ORAL at 16:45

## 2025-03-18 RX ADMIN — KETOROLAC TROMETHAMINE 30 MG: 30 INJECTION, SOLUTION INTRAMUSCULAR; INTRAVENOUS at 16:45

## 2025-03-18 ASSESSMENT — PAIN DESCRIPTION - LOCATION: LOCATION: HEAD;FOOT

## 2025-03-18 ASSESSMENT — PAIN SCALES - GENERAL
PAINLEVEL_OUTOF10: 2
PAINLEVEL_OUTOF10: 5

## 2025-03-18 ASSESSMENT — PAIN DESCRIPTION - ONSET: ONSET: ON-GOING

## 2025-03-18 ASSESSMENT — PAIN DESCRIPTION - ORIENTATION: ORIENTATION: LEFT;RIGHT

## 2025-03-18 ASSESSMENT — PAIN DESCRIPTION - PAIN TYPE: TYPE: ACUTE PAIN

## 2025-03-18 ASSESSMENT — PAIN DESCRIPTION - DESCRIPTORS: DESCRIPTORS: ACHING;DISCOMFORT

## 2025-03-18 NOTE — DISCHARGE INSTRUCTIONS
Discussed visit today.  You were seen in the emergency room for migraine and worsening of foot pain with open wounds.  Please try taking the Keflex.  Please continue to do wound care at home.  Please take Fioricet as needed for migraines as well as the Toradol tablets.    Return to the emergency room with any worsening of symptoms.

## 2025-03-18 NOTE — ED TRIAGE NOTES
Pt reports open sores on both feet that has been on and off and sees podiatrist. Pt reports the sores are open now. Pt also reports chronic migraines and headache that started over the last day and no sleep. Pt reports +photophobia.

## 2025-03-18 NOTE — ED PROVIDER NOTES
[facility] EMERGENCY DEPARTMENT  EMERGENCY DEPARTMENT ENCOUNTER      Pt Name: Henry Cartagena  MRN: [id number]  Birthdate 1975  Date of evaluation: 3/18/2025  Provider: Carlton Millan PA-C    CHIEF COMPLAINT       Chief Complaint   Patient presents with    Headache    Foot Pain         HISTORY OF PRESENT ILLNESS    Patient is a 49-year-old male with history of PVCs, GERD, hepatitis C, hypertension, IV drug use, migraines, polysubstance dependence, and bipolar who presents emergency room with reports of open sores of both of his feet that are getting worse.  Patient reports that it is that a few days now until he gets bad and he needs an antibiotic.  Patient reports that cream is not helping at home.  Patient reports he does have a podiatrist.  Patient also reports a migraine that started last night.  Patient ports he was unable to sleep.  Patient reports photophobia.  Patient reports no new symptoms with these migraines.  Patient reports he ran out of his Fioricet at home.  Patient reports he has a few tablets of Toradol left.  Patient denies chest pain, shortness of breath, abdominal pain, urinary symptoms, nausea or vomiting, diarrhea or constipation, headache, dizziness, lightheadedness, fever or chills.           Nursing Notes were reviewed.    REVIEW OF SYSTEMS       Review of Systems      PAST MEDICAL HISTORY     Past Medical History:   Diagnosis Date    Arrhythmia     PCV's    Arthritis     back    Back pain     Chronic pain     6 herniated discs in back/pinched nerve in back and neck    GERD (gastroesophageal reflux disease)     Hepatitis C     HSV-2 seropositive 7/29/2016    Hypertension     IV drug abuse (HCC)     Kidney stone     Liver disease     elevated liver enzymes/hep C    Migraine     Neuralgia     Other ill-defined conditions(799.89)     chronic bronchitis    Polysubstance dependence (HCC)     stimulants, MJ, tob, barbs, benzos, opiates    Psychiatric disorder     Bipolar, Anxiety

## 2025-04-05 ENCOUNTER — APPOINTMENT (OUTPATIENT)
Facility: HOSPITAL | Age: 50
End: 2025-04-05
Payer: COMMERCIAL

## 2025-04-05 ENCOUNTER — HOSPITAL ENCOUNTER (EMERGENCY)
Facility: HOSPITAL | Age: 50
Discharge: HOME | End: 2025-04-05
Attending: EMERGENCY MEDICINE
Payer: COMMERCIAL

## 2025-04-05 VITALS
OXYGEN SATURATION: 97 % | SYSTOLIC BLOOD PRESSURE: 127 MMHG | DIASTOLIC BLOOD PRESSURE: 79 MMHG | HEIGHT: 72 IN | BODY MASS INDEX: 23.8 KG/M2 | RESPIRATION RATE: 16 BRPM | HEART RATE: 94 BPM | TEMPERATURE: 97.9 F | WEIGHT: 175.71 LBS

## 2025-04-05 DIAGNOSIS — R51.9 NONINTRACTABLE HEADACHE, UNSPECIFIED CHRONICITY PATTERN, UNSPECIFIED HEADACHE TYPE: Primary | ICD-10-CM

## 2025-04-05 DIAGNOSIS — N50.812 TESTICULAR PAIN, LEFT: ICD-10-CM

## 2025-04-05 LAB
ALBUMIN SERPL-MCNC: 4.1 G/DL (ref 3.5–5)
ALBUMIN/GLOB SERPL: 1.2 (ref 1.1–2.2)
ALP SERPL-CCNC: 82 U/L (ref 45–117)
ALT SERPL-CCNC: 20 U/L (ref 12–78)
ANION GAP SERPL CALC-SCNC: 3 MMOL/L (ref 2–12)
APPEARANCE UR: CLEAR
AST SERPL-CCNC: 17 U/L (ref 15–37)
BACTERIA URNS QL MICRO: NEGATIVE /HPF
BASOPHILS # BLD: 0 K/UL (ref 0–0.1)
BASOPHILS NFR BLD: 0 % (ref 0–1)
BILIRUB SERPL-MCNC: 0.4 MG/DL (ref 0.2–1)
BILIRUB UR QL: NEGATIVE
BUN SERPL-MCNC: 12 MG/DL (ref 6–20)
BUN/CREAT SERPL: 9 (ref 12–20)
CALCIUM SERPL-MCNC: 9.3 MG/DL (ref 8.5–10.1)
CHLORIDE SERPL-SCNC: 106 MMOL/L (ref 97–108)
CO2 SERPL-SCNC: 30 MMOL/L (ref 21–32)
COLOR UR: ABNORMAL
COMMENT:: NORMAL
CREAT SERPL-MCNC: 1.34 MG/DL (ref 0.7–1.3)
DIFFERENTIAL METHOD BLD: ABNORMAL
EOSINOPHIL # BLD: 0.15 K/UL (ref 0–0.4)
EOSINOPHIL NFR BLD: 2 % (ref 0–7)
EPITH CASTS URNS QL MICRO: ABNORMAL /LPF
ERYTHROCYTE [DISTWIDTH] IN BLOOD BY AUTOMATED COUNT: 13.2 % (ref 11.5–14.5)
GLOBULIN SER CALC-MCNC: 3.3 G/DL (ref 2–4)
GLUCOSE SERPL-MCNC: 86 MG/DL (ref 65–100)
GLUCOSE UR STRIP.AUTO-MCNC: NEGATIVE MG/DL
HCT VFR BLD AUTO: 42.6 % (ref 36.6–50.3)
HGB BLD-MCNC: 14.4 G/DL (ref 12.1–17)
HGB UR QL STRIP: NEGATIVE
HYALINE CASTS URNS QL MICRO: ABNORMAL /LPF (ref 0–5)
IMM GRANULOCYTES # BLD AUTO: 0 K/UL
IMM GRANULOCYTES NFR BLD AUTO: 0 %
KETONES UR QL STRIP.AUTO: NEGATIVE MG/DL
LEUKOCYTE ESTERASE UR QL STRIP.AUTO: NEGATIVE
LYMPHOCYTES # BLD: 2.05 K/UL (ref 0.8–3.5)
LYMPHOCYTES NFR BLD: 27 % (ref 12–49)
MCH RBC QN AUTO: 32.8 PG (ref 26–34)
MCHC RBC AUTO-ENTMCNC: 33.8 G/DL (ref 30–36.5)
MCV RBC AUTO: 97 FL (ref 80–99)
MONOCYTES # BLD: 0.3 K/UL (ref 0–1)
MONOCYTES NFR BLD: 4 % (ref 5–13)
NEUTS SEG # BLD: 5.1 K/UL (ref 1.8–8)
NEUTS SEG NFR BLD: 67 % (ref 32–75)
NITRITE UR QL STRIP.AUTO: NEGATIVE
NRBC # BLD: 0 K/UL (ref 0–0.01)
NRBC BLD-RTO: 0 PER 100 WBC
PH UR STRIP: 5.5 (ref 5–8)
PLATELET # BLD AUTO: 151 K/UL (ref 150–400)
PMV BLD AUTO: 11.2 FL (ref 8.9–12.9)
POTASSIUM SERPL-SCNC: 4.6 MMOL/L (ref 3.5–5.1)
PROT SERPL-MCNC: 7.4 G/DL (ref 6.4–8.2)
PROT UR STRIP-MCNC: ABNORMAL MG/DL
RBC # BLD AUTO: 4.39 M/UL (ref 4.1–5.7)
RBC #/AREA URNS HPF: ABNORMAL /HPF (ref 0–5)
RBC MORPH BLD: ABNORMAL
SODIUM SERPL-SCNC: 139 MMOL/L (ref 136–145)
SP GR UR REFRACTOMETRY: 1.01 (ref 1–1.03)
SPECIMEN HOLD: NORMAL
SPECIMEN HOLD: NORMAL
UROBILINOGEN UR QL STRIP.AUTO: 0.2 EU/DL (ref 0.2–1)
WBC # BLD AUTO: 7.6 K/UL (ref 4.1–11.1)
WBC MORPH BLD: ABNORMAL
WBC URNS QL MICRO: ABNORMAL /HPF (ref 0–4)

## 2025-04-05 PROCEDURE — 6360000002 HC RX W HCPCS: Performed by: EMERGENCY MEDICINE

## 2025-04-05 PROCEDURE — 76870 US EXAM SCROTUM: CPT

## 2025-04-05 PROCEDURE — 81001 URINALYSIS AUTO W/SCOPE: CPT

## 2025-04-05 PROCEDURE — 80053 COMPREHEN METABOLIC PANEL: CPT

## 2025-04-05 PROCEDURE — 96374 THER/PROPH/DIAG INJ IV PUSH: CPT

## 2025-04-05 PROCEDURE — 87491 CHLMYD TRACH DNA AMP PROBE: CPT

## 2025-04-05 PROCEDURE — 87591 N.GONORRHOEAE DNA AMP PROB: CPT

## 2025-04-05 PROCEDURE — 85025 COMPLETE CBC W/AUTO DIFF WBC: CPT

## 2025-04-05 PROCEDURE — 99284 EMERGENCY DEPT VISIT MOD MDM: CPT

## 2025-04-05 RX ORDER — KETOROLAC TROMETHAMINE 30 MG/ML
15 INJECTION, SOLUTION INTRAMUSCULAR; INTRAVENOUS
Status: COMPLETED | OUTPATIENT
Start: 2025-04-05 | End: 2025-04-05

## 2025-04-05 RX ORDER — BUTALBITAL, ACETAMINOPHEN AND CAFFEINE 300; 40; 50 MG/1; MG/1; MG/1
1 CAPSULE ORAL EVERY 4 HOURS PRN
Qty: 24 CAPSULE | Refills: 0 | Status: SHIPPED | OUTPATIENT
Start: 2025-04-05

## 2025-04-05 RX ADMIN — KETOROLAC TROMETHAMINE 15 MG: 30 INJECTION, SOLUTION INTRAMUSCULAR; INTRAVENOUS at 14:44

## 2025-04-05 ASSESSMENT — PAIN DESCRIPTION - ORIENTATION
ORIENTATION: ANTERIOR;LEFT
ORIENTATION: LEFT

## 2025-04-05 ASSESSMENT — PAIN DESCRIPTION - LOCATION
LOCATION: OTHER (COMMENT);HEAD
LOCATION: HEAD;SCROTUM

## 2025-04-05 ASSESSMENT — ENCOUNTER SYMPTOMS
COUGH: 0
DIARRHEA: 0
BACK PAIN: 0
TROUBLE SWALLOWING: 0

## 2025-04-05 ASSESSMENT — PAIN DESCRIPTION - FREQUENCY: FREQUENCY: CONTINUOUS

## 2025-04-05 ASSESSMENT — PAIN DESCRIPTION - PAIN TYPE: TYPE: ACUTE PAIN

## 2025-04-05 ASSESSMENT — PAIN SCALES - GENERAL
PAINLEVEL_OUTOF10: 5
PAINLEVEL_OUTOF10: 6
PAINLEVEL_OUTOF10: 1
PAINLEVEL_OUTOF10: 2

## 2025-04-05 ASSESSMENT — PAIN - FUNCTIONAL ASSESSMENT
PAIN_FUNCTIONAL_ASSESSMENT: PREVENTS OR INTERFERES SOME ACTIVE ACTIVITIES AND ADLS
PAIN_FUNCTIONAL_ASSESSMENT: 0-10

## 2025-04-05 ASSESSMENT — PAIN DESCRIPTION - DESCRIPTORS: DESCRIPTORS: ACHING

## 2025-04-05 ASSESSMENT — PAIN DESCRIPTION - ONSET: ONSET: PROGRESSIVE
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
## 2025-04-07 LAB
C TRACH DNA SPEC QL NAA+PROBE: NEGATIVE
N GONORRHOEA DNA SPEC QL NAA+PROBE: NEGATIVE
SAMPLE TYPE: NORMAL
SERVICE CMNT-IMP: NORMAL
SPECIMEN SOURCE: NORMAL

## 2025-04-27 ENCOUNTER — HOSPITAL ENCOUNTER (EMERGENCY)
Facility: HOSPITAL | Age: 50
Discharge: HOME | End: 2025-04-27
Attending: EMERGENCY MEDICINE
Payer: COMMERCIAL

## 2025-04-27 VITALS
BODY MASS INDEX: 23.32 KG/M2 | TEMPERATURE: 97.8 F | DIASTOLIC BLOOD PRESSURE: 96 MMHG | OXYGEN SATURATION: 96 % | RESPIRATION RATE: 18 BRPM | WEIGHT: 171.96 LBS | HEART RATE: 91 BPM | SYSTOLIC BLOOD PRESSURE: 150 MMHG

## 2025-04-27 DIAGNOSIS — R51.9 CHRONIC INTRACTABLE HEADACHE, UNSPECIFIED HEADACHE TYPE: Primary | ICD-10-CM

## 2025-04-27 DIAGNOSIS — G89.29 CHRONIC INTRACTABLE HEADACHE, UNSPECIFIED HEADACHE TYPE: Primary | ICD-10-CM

## 2025-04-27 PROCEDURE — 99283 EMERGENCY DEPT VISIT LOW MDM: CPT

## 2025-04-27 RX ORDER — BUTALBITAL, ACETAMINOPHEN AND CAFFEINE 300; 40; 50 MG/1; MG/1; MG/1
1 CAPSULE ORAL EVERY 6 HOURS PRN
Qty: 30 CAPSULE | Refills: 0 | Status: SHIPPED | OUTPATIENT
Start: 2025-04-27

## 2025-04-27 RX ORDER — DIPHENHYDRAMINE HCL 25 MG
25 TABLET ORAL EVERY 6 HOURS PRN
Qty: 60 TABLET | Refills: 0 | Status: SHIPPED | OUTPATIENT
Start: 2025-04-27 | End: 2025-05-27

## 2025-04-27 ASSESSMENT — PAIN DESCRIPTION - ORIENTATION: ORIENTATION: MID

## 2025-04-27 ASSESSMENT — PAIN DESCRIPTION - ONSET: ONSET: ON-GOING

## 2025-04-27 ASSESSMENT — PAIN DESCRIPTION - FREQUENCY: FREQUENCY: CONTINUOUS

## 2025-04-27 ASSESSMENT — PAIN SCALES - GENERAL: PAINLEVEL_OUTOF10: 10

## 2025-04-27 ASSESSMENT — PAIN DESCRIPTION - DESCRIPTORS: DESCRIPTORS: ACHING

## 2025-04-27 ASSESSMENT — PAIN DESCRIPTION - PAIN TYPE: TYPE: ACUTE PAIN

## 2025-04-27 ASSESSMENT — PAIN DESCRIPTION - LOCATION: LOCATION: HEAD

## 2025-04-27 NOTE — ED TRIAGE NOTES
Patient arrives ambulatory with complaint of 10/10 HA. Reports he is out of prescribed fioricet. Is not requesting treatment, just refill of prescriptions.

## 2025-04-27 NOTE — FLOWSHEET NOTE
AVS reviewed with patient. Educated about return precautions and follow up care, opportunity for questions provided. Patient in agreement with plan of care and disposition. Patient ambulatory out of ED with strong, steady gait

## 2025-04-27 NOTE — ED PROVIDER NOTES
[facility] EMERGENCY DEPARTMENT  EMERGENCY DEPARTMENT ENCOUNTER      Pt Name: Henry Cartagena  MRN: [id number]  Birthdate 1975  Date of evaluation: 4/27/2025  Provider: Wilver Estrada MD      HISTORY OF PRESENT ILLNESS      50-year-old male with history of GERD, hep C, retention, chronic headaches presents emergency department with request for refill of Fioricet.     The history is provided by the patient and medical records.           Nursing Notes were reviewed.    REVIEW OF SYSTEMS         Review of Systems        PAST MEDICAL HISTORY     Past Medical History:   Diagnosis Date    Arrhythmia     PCV's    Arthritis     back    Back pain     Chronic pain     6 herniated discs in back/pinched nerve in back and neck    GERD (gastroesophageal reflux disease)     Hepatitis C     HSV-2 seropositive 7/29/2016    Hypertension     IV drug abuse (HCC)     Kidney stone     Liver disease     elevated liver enzymes/hep C    Migraine     Neuralgia     Other ill-defined conditions(799.89)     chronic bronchitis    Polysubstance dependence (HCC)     stimulants, MJ, tob, barbs, benzos, opiates    Psychiatric disorder     Bipolar, Anxiety    Unspecified adverse effect of anesthesia     \"was told he woke up during back surgery\" and during nerve root injection    Unspecified sleep apnea     mild - does not use CPAP         SURGICAL HISTORY       Past Surgical History:   Procedure Laterality Date    IR EMBOLIZATION VENOUS  7/30/2024    IR EMBOLIZATION VENOUS 7/30/2024 [facility] RAD ANGIO IR    LUMBAR LAMINECTOMY      ORTHOPEDIC SURGERY Bilateral     back surgery - laminectomy/discectomy    ORTHOPEDIC SURGERY      nerve root injection in back    OTHER SURGICAL HISTORY      testicular surgery- varicocelectomy, i &d of abscess on right elbow         CURRENT MEDICATIONS       Previous Medications    ACETAMINOPHEN (TYLENOL) 500 MG TABLET    Take 2 tablets by mouth 3 times daily as needed for Pain    ALBUTEROL SULFATE HFA  Quality 226: Preventive Care And Screening: Tobacco Use: Screening And Cessation Intervention: Patient screened for tobacco use and is an ex/non-smoker Detail Level: Detailed Quality 110: Preventive Care And Screening: Influenza Immunization: Influenza Immunization Ordered or Recommended, but not Administered due to system reason

## 2025-04-29 ENCOUNTER — HOSPITAL ENCOUNTER (EMERGENCY)
Facility: HOSPITAL | Age: 50
Discharge: HOME | End: 2025-04-29
Attending: STUDENT IN AN ORGANIZED HEALTH CARE EDUCATION/TRAINING PROGRAM
Payer: COMMERCIAL

## 2025-04-29 ENCOUNTER — APPOINTMENT (OUTPATIENT)
Facility: HOSPITAL | Age: 50
End: 2025-04-29
Payer: COMMERCIAL

## 2025-04-29 VITALS
SYSTOLIC BLOOD PRESSURE: 116 MMHG | HEIGHT: 72 IN | HEART RATE: 70 BPM | OXYGEN SATURATION: 97 % | BODY MASS INDEX: 23.89 KG/M2 | WEIGHT: 176.37 LBS | RESPIRATION RATE: 14 BRPM | DIASTOLIC BLOOD PRESSURE: 69 MMHG | TEMPERATURE: 98.3 F

## 2025-04-29 DIAGNOSIS — G43.809 OTHER MIGRAINE WITHOUT STATUS MIGRAINOSUS, NOT INTRACTABLE: Primary | ICD-10-CM

## 2025-04-29 LAB
ALBUMIN SERPL-MCNC: 4.2 G/DL (ref 3.5–5)
ALBUMIN/GLOB SERPL: 1.1 (ref 1.1–2.2)
ALP SERPL-CCNC: 102 U/L (ref 45–117)
ALT SERPL-CCNC: 23 U/L (ref 12–78)
ANION GAP SERPL CALC-SCNC: 7 MMOL/L (ref 2–12)
AST SERPL-CCNC: 13 U/L (ref 15–37)
BASOPHILS # BLD: 0.09 K/UL (ref 0–0.1)
BASOPHILS NFR BLD: 1 % (ref 0–1)
BILIRUB SERPL-MCNC: 0.2 MG/DL (ref 0.2–1)
BUN SERPL-MCNC: 18 MG/DL (ref 6–20)
BUN/CREAT SERPL: 12 (ref 12–20)
CALCIUM SERPL-MCNC: 9.2 MG/DL (ref 8.5–10.1)
CHLORIDE SERPL-SCNC: 106 MMOL/L (ref 97–108)
CO2 SERPL-SCNC: 25 MMOL/L (ref 21–32)
COMMENT:: NORMAL
CREAT SERPL-MCNC: 1.48 MG/DL (ref 0.7–1.3)
DIFFERENTIAL METHOD BLD: NORMAL
EOSINOPHIL # BLD: 0.28 K/UL (ref 0–0.4)
EOSINOPHIL NFR BLD: 3.1 % (ref 0–7)
ERYTHROCYTE [DISTWIDTH] IN BLOOD BY AUTOMATED COUNT: 13.1 % (ref 11.5–14.5)
GLOBULIN SER CALC-MCNC: 3.8 G/DL (ref 2–4)
GLUCOSE SERPL-MCNC: 96 MG/DL (ref 65–100)
HCT VFR BLD AUTO: 45.2 % (ref 36.6–50.3)
HGB BLD-MCNC: 15.5 G/DL (ref 12.1–17)
IMM GRANULOCYTES # BLD AUTO: 0.02 K/UL (ref 0–0.04)
IMM GRANULOCYTES NFR BLD AUTO: 0.2 % (ref 0–0.5)
LYMPHOCYTES # BLD: 3.3 K/UL (ref 0.8–3.5)
LYMPHOCYTES NFR BLD: 37 % (ref 12–49)
MCH RBC QN AUTO: 33.1 PG (ref 26–34)
MCHC RBC AUTO-ENTMCNC: 34.3 G/DL (ref 30–36.5)
MCV RBC AUTO: 96.6 FL (ref 80–99)
MONOCYTES # BLD: 0.58 K/UL (ref 0–1)
MONOCYTES NFR BLD: 6.5 % (ref 5–13)
NEUTS SEG # BLD: 4.66 K/UL (ref 1.8–8)
NEUTS SEG NFR BLD: 52.2 % (ref 32–75)
NRBC # BLD: 0 K/UL (ref 0–0.01)
NRBC BLD-RTO: 0 PER 100 WBC
PLATELET # BLD AUTO: 177 K/UL (ref 150–400)
PMV BLD AUTO: 11.4 FL (ref 8.9–12.9)
POTASSIUM SERPL-SCNC: 3.9 MMOL/L (ref 3.5–5.1)
PROT SERPL-MCNC: 8 G/DL (ref 6.4–8.2)
RBC # BLD AUTO: 4.68 M/UL (ref 4.1–5.7)
SODIUM SERPL-SCNC: 138 MMOL/L (ref 136–145)
SPECIMEN HOLD: NORMAL
WBC # BLD AUTO: 8.9 K/UL (ref 4.1–11.1)

## 2025-04-29 PROCEDURE — 96365 THER/PROPH/DIAG IV INF INIT: CPT

## 2025-04-29 PROCEDURE — 96375 TX/PRO/DX INJ NEW DRUG ADDON: CPT

## 2025-04-29 PROCEDURE — 6360000002 HC RX W HCPCS: Performed by: STUDENT IN AN ORGANIZED HEALTH CARE EDUCATION/TRAINING PROGRAM

## 2025-04-29 PROCEDURE — 99284 EMERGENCY DEPT VISIT MOD MDM: CPT

## 2025-04-29 PROCEDURE — 96366 THER/PROPH/DIAG IV INF ADDON: CPT

## 2025-04-29 PROCEDURE — 70450 CT HEAD/BRAIN W/O DYE: CPT

## 2025-04-29 PROCEDURE — 85025 COMPLETE CBC W/AUTO DIFF WBC: CPT

## 2025-04-29 PROCEDURE — 2580000003 HC RX 258: Performed by: STUDENT IN AN ORGANIZED HEALTH CARE EDUCATION/TRAINING PROGRAM

## 2025-04-29 PROCEDURE — 80053 COMPREHEN METABOLIC PANEL: CPT

## 2025-04-29 RX ORDER — DIPHENHYDRAMINE HYDROCHLORIDE 50 MG/ML
50 INJECTION, SOLUTION INTRAMUSCULAR; INTRAVENOUS
Status: COMPLETED | OUTPATIENT
Start: 2025-04-29 | End: 2025-04-29

## 2025-04-29 RX ORDER — MAGNESIUM SULFATE IN WATER 40 MG/ML
2000 INJECTION, SOLUTION INTRAVENOUS
Status: COMPLETED | OUTPATIENT
Start: 2025-04-29 | End: 2025-04-29

## 2025-04-29 RX ORDER — KETOROLAC TROMETHAMINE 30 MG/ML
15 INJECTION, SOLUTION INTRAMUSCULAR; INTRAVENOUS ONCE
Status: COMPLETED | OUTPATIENT
Start: 2025-04-29 | End: 2025-04-29

## 2025-04-29 RX ORDER — ONDANSETRON 2 MG/ML
4 INJECTION INTRAMUSCULAR; INTRAVENOUS ONCE
Status: COMPLETED | OUTPATIENT
Start: 2025-04-29 | End: 2025-04-29

## 2025-04-29 RX ORDER — PROCHLORPERAZINE EDISYLATE 5 MG/ML
10 INJECTION INTRAMUSCULAR; INTRAVENOUS
Status: DISCONTINUED | OUTPATIENT
Start: 2025-04-29 | End: 2025-04-29

## 2025-04-29 RX ORDER — 0.9 % SODIUM CHLORIDE 0.9 %
1000 INTRAVENOUS SOLUTION INTRAVENOUS ONCE
Status: COMPLETED | OUTPATIENT
Start: 2025-04-29 | End: 2025-04-29

## 2025-04-29 RX ADMIN — DIPHENHYDRAMINE HYDROCHLORIDE 50 MG: 50 INJECTION INTRAMUSCULAR; INTRAVENOUS at 06:30

## 2025-04-29 RX ADMIN — SODIUM CHLORIDE 1000 ML: 0.9 INJECTION, SOLUTION INTRAVENOUS at 06:28

## 2025-04-29 RX ADMIN — MAGNESIUM SULFATE HEPTAHYDRATE 2000 MG: 40 INJECTION, SOLUTION INTRAVENOUS at 06:49

## 2025-04-29 RX ADMIN — ONDANSETRON 4 MG: 2 INJECTION, SOLUTION INTRAMUSCULAR; INTRAVENOUS at 06:49

## 2025-04-29 RX ADMIN — KETOROLAC TROMETHAMINE 15 MG: 30 INJECTION, SOLUTION INTRAMUSCULAR at 06:31

## 2025-04-29 ASSESSMENT — PAIN DESCRIPTION - DESCRIPTORS
DESCRIPTORS: ACHING

## 2025-04-29 ASSESSMENT — PAIN DESCRIPTION - ONSET
ONSET: ON-GOING

## 2025-04-29 ASSESSMENT — PAIN DESCRIPTION - ORIENTATION
ORIENTATION: MID

## 2025-04-29 ASSESSMENT — PAIN - FUNCTIONAL ASSESSMENT
PAIN_FUNCTIONAL_ASSESSMENT: 0-10
PAIN_FUNCTIONAL_ASSESSMENT: ACTIVITIES ARE NOT PREVENTED
PAIN_FUNCTIONAL_ASSESSMENT: PREVENTS OR INTERFERES SOME ACTIVE ACTIVITIES AND ADLS
PAIN_FUNCTIONAL_ASSESSMENT: PREVENTS OR INTERFERES SOME ACTIVE ACTIVITIES AND ADLS

## 2025-04-29 ASSESSMENT — PAIN SCALES - GENERAL
PAINLEVEL_OUTOF10: 10
PAINLEVEL_OUTOF10: 7
PAINLEVEL_OUTOF10: 7

## 2025-04-29 ASSESSMENT — PAIN DESCRIPTION - FREQUENCY
FREQUENCY: CONTINUOUS

## 2025-04-29 ASSESSMENT — PAIN DESCRIPTION - PAIN TYPE
TYPE: ACUTE PAIN

## 2025-04-29 ASSESSMENT — PAIN DESCRIPTION - LOCATION
LOCATION: HEAD

## 2025-04-29 NOTE — ED NOTES
Reviewed discharge instructions with patient.Work note given. All questions answered. Verified IV removed.     Patient-Reported Vitals  Vitals:    04/29/25 0558 04/29/25 0646 04/29/25 0800 04/29/25 0830   BP: (!) 110/97 139/85 132/83 116/69   Pulse: 70      Resp: 16   14   Temp: 98.7 °F (37.1 °C)   98.3 °F (36.8 °C)   TempSrc: Oral   Oral   SpO2: 100% 97% 98% 97%   Weight: 80 kg (176 lb 5.9 oz)      Height: 1.829 m (6')

## 2025-04-29 NOTE — ED TRIAGE NOTES
Patient arrives ambulatory with c/o headache since 0500. States he has a \"bunch of different headaches at once. A migraine, tension headache, and stabbing headache\". States he comes here often for them. + nausea and photophobia. States he took benadryl, Fioricet, tylenol, Lamictal and zolmig this morning.

## 2025-04-30 ENCOUNTER — HOSPITAL ENCOUNTER (EMERGENCY)
Facility: HOSPITAL | Age: 50
Discharge: LEFT BEFORE DISCHARGE | End: 2025-04-30
Attending: STUDENT IN AN ORGANIZED HEALTH CARE EDUCATION/TRAINING PROGRAM

## 2025-04-30 VITALS
BODY MASS INDEX: 23.84 KG/M2 | HEART RATE: 68 BPM | RESPIRATION RATE: 16 BRPM | DIASTOLIC BLOOD PRESSURE: 81 MMHG | WEIGHT: 176 LBS | TEMPERATURE: 97.5 F | HEIGHT: 72 IN | OXYGEN SATURATION: 100 % | SYSTOLIC BLOOD PRESSURE: 125 MMHG

## 2025-04-30 PROCEDURE — 4500000002 HC ER NO CHARGE

## 2025-04-30 ASSESSMENT — PAIN SCALES - GENERAL: PAINLEVEL_OUTOF10: 10

## 2025-04-30 ASSESSMENT — PAIN - FUNCTIONAL ASSESSMENT
PAIN_FUNCTIONAL_ASSESSMENT: 0-10
PAIN_FUNCTIONAL_ASSESSMENT: ACTIVITIES ARE NOT PREVENTED

## 2025-04-30 ASSESSMENT — PAIN DESCRIPTION - DESCRIPTORS: DESCRIPTORS: ACHING

## 2025-04-30 ASSESSMENT — PAIN DESCRIPTION - PAIN TYPE: TYPE: ACUTE PAIN

## 2025-04-30 ASSESSMENT — PAIN DESCRIPTION - FREQUENCY: FREQUENCY: CONTINUOUS

## 2025-04-30 ASSESSMENT — PAIN DESCRIPTION - LOCATION: LOCATION: HEAD

## 2025-04-30 ASSESSMENT — PAIN DESCRIPTION - ORIENTATION: ORIENTATION: MID

## 2025-04-30 ASSESSMENT — PAIN DESCRIPTION - ONSET: ONSET: ON-GOING

## 2025-04-30 NOTE — ED TRIAGE NOTES
Pt ambulatory with chief complaint of continuous dull, sharp headache since yesterday. Pt reports that \"it feels like lava in my skull.\" Pt reports photophobia. Pt reports nausea without vomiting. Pt reports that neurology found \"metal in his head\" many years.  Pt was seen for this same headache yesterday here and has gotten no relief.   Pt reports taking Ativan at 6pm with some relief  Pt has hx of headache.

## 2025-05-22 ENCOUNTER — HOSPITAL ENCOUNTER (EMERGENCY)
Facility: HOSPITAL | Age: 50
Discharge: HOME | End: 2025-05-22
Attending: STUDENT IN AN ORGANIZED HEALTH CARE EDUCATION/TRAINING PROGRAM
Payer: COMMERCIAL

## 2025-05-22 VITALS
BODY MASS INDEX: 23.71 KG/M2 | RESPIRATION RATE: 15 BRPM | SYSTOLIC BLOOD PRESSURE: 119 MMHG | OXYGEN SATURATION: 95 % | DIASTOLIC BLOOD PRESSURE: 89 MMHG | TEMPERATURE: 97.7 F | WEIGHT: 174.82 LBS | HEART RATE: 106 BPM

## 2025-05-22 DIAGNOSIS — Z76.0 ENCOUNTER FOR MEDICATION REFILL: Primary | ICD-10-CM

## 2025-05-22 PROCEDURE — 99283 EMERGENCY DEPT VISIT LOW MDM: CPT

## 2025-05-22 RX ORDER — KETOROLAC TROMETHAMINE 10 MG/1
10 TABLET, FILM COATED ORAL EVERY 6 HOURS PRN
Qty: 20 TABLET | Refills: 0 | Status: SHIPPED | OUTPATIENT
Start: 2025-05-22 | End: 2025-06-01

## 2025-05-22 RX ORDER — ONDANSETRON 4 MG/1
4 TABLET, ORALLY DISINTEGRATING ORAL 3 TIMES DAILY PRN
Qty: 21 TABLET | Refills: 0 | Status: SHIPPED | OUTPATIENT
Start: 2025-05-22

## 2025-05-22 ASSESSMENT — PAIN - FUNCTIONAL ASSESSMENT: PAIN_FUNCTIONAL_ASSESSMENT: NONE - DENIES PAIN

## 2025-05-22 NOTE — ED TRIAGE NOTES
States has been seen 3x recently for migraines and is supposed to see neurology. No appointments available until October. Was told to come to ED for \"urgent referral.\" No headache today. Requesting refills on zofran and toradol.

## 2025-06-03 NOTE — ED PROVIDER NOTES
[facility] EMERGENCY DEPARTMENT  EMERGENCY DEPARTMENT ENCOUNTER      Pt Name: Henry Cartagena  MRN: [id number]  Birthdate 1975  Date of evaluation: 5/22/2025  Provider: Francie Muniz PA-C    CHIEF COMPLAINT       Chief Complaint   Patient presents with    Referral - General         HISTORY OF PRESENT ILLNESS   (Location/Symptom, Timing/Onset, Context/Setting, Quality, Duration, Modifying Factors, Severity)  Note limiting factors.   50-year-old male history of frequent headaches presenting with request for a \"urgent referral \".  Patient was told to follow-up with neurology as he likely suffers from migraines however he states that there are no appointments until October.  He was told by a provider to present to the ED for a \"urgent referral \"to see neurology sooner.  Patient denies current headache, dizziness, vision change, photophobia, nausea or any pain at this time.  He is also requesting refills on Zofran and Toradol.            Review of External Medical Records:     Nursing Notes were reviewed.    REVIEW OF SYSTEMS    (2-9 systems for level 4, 10 or more for level 5)     Review of Systems    Except as noted above the remainder of the review of systems was reviewed and negative.       PAST MEDICAL HISTORY     Past Medical History:   Diagnosis Date    Arrhythmia     PCV's    Arthritis     back    Back pain     Chronic pain     6 herniated discs in back/pinched nerve in back and neck    GERD (gastroesophageal reflux disease)     Hepatitis C     HSV-2 seropositive 7/29/2016    Hypertension     IV drug abuse (HCC)     Kidney stone     Liver disease     elevated liver enzymes/hep C    Migraine     Neuralgia     Other ill-defined conditions(799.89)     chronic bronchitis    Polysubstance dependence (HCC)     stimulants, MJ, tob, barbs, benzos, opiates    Psychiatric disorder     Bipolar, Anxiety    Unspecified adverse effect of anesthesia     \"was told he woke up during back surgery\" and during nerve

## 2025-07-03 ENCOUNTER — HOSPITAL ENCOUNTER (EMERGENCY)
Facility: HOSPITAL | Age: 50
Discharge: HOME | End: 2025-07-03
Attending: EMERGENCY MEDICINE
Payer: COMMERCIAL

## 2025-07-03 ENCOUNTER — APPOINTMENT (OUTPATIENT)
Facility: HOSPITAL | Age: 50
End: 2025-07-03
Payer: COMMERCIAL

## 2025-07-03 VITALS
BODY MASS INDEX: 23.53 KG/M2 | TEMPERATURE: 97.7 F | OXYGEN SATURATION: 96 % | DIASTOLIC BLOOD PRESSURE: 92 MMHG | HEART RATE: 84 BPM | HEIGHT: 72 IN | WEIGHT: 173.72 LBS | SYSTOLIC BLOOD PRESSURE: 134 MMHG | RESPIRATION RATE: 16 BRPM

## 2025-07-03 DIAGNOSIS — R51.9 NONINTRACTABLE HEADACHE, UNSPECIFIED CHRONICITY PATTERN, UNSPECIFIED HEADACHE TYPE: Primary | ICD-10-CM

## 2025-07-03 LAB
COMMENT:: NORMAL
SPECIMEN HOLD: NORMAL

## 2025-07-03 PROCEDURE — 96374 THER/PROPH/DIAG INJ IV PUSH: CPT

## 2025-07-03 PROCEDURE — 70450 CT HEAD/BRAIN W/O DYE: CPT

## 2025-07-03 PROCEDURE — 99284 EMERGENCY DEPT VISIT MOD MDM: CPT

## 2025-07-03 PROCEDURE — 2580000003 HC RX 258: Performed by: EMERGENCY MEDICINE

## 2025-07-03 PROCEDURE — 6360000002 HC RX W HCPCS: Performed by: EMERGENCY MEDICINE

## 2025-07-03 PROCEDURE — 96375 TX/PRO/DX INJ NEW DRUG ADDON: CPT

## 2025-07-03 RX ORDER — 0.9 % SODIUM CHLORIDE 0.9 %
1000 INTRAVENOUS SOLUTION INTRAVENOUS ONCE
Status: COMPLETED | OUTPATIENT
Start: 2025-07-03 | End: 2025-07-03

## 2025-07-03 RX ORDER — PROCHLORPERAZINE EDISYLATE 5 MG/ML
10 INJECTION INTRAMUSCULAR; INTRAVENOUS ONCE
Status: COMPLETED | OUTPATIENT
Start: 2025-07-03 | End: 2025-07-03

## 2025-07-03 RX ORDER — DIPHENHYDRAMINE HYDROCHLORIDE 50 MG/ML
25 INJECTION, SOLUTION INTRAMUSCULAR; INTRAVENOUS
Status: COMPLETED | OUTPATIENT
Start: 2025-07-03 | End: 2025-07-03

## 2025-07-03 RX ORDER — KETOROLAC TROMETHAMINE 30 MG/ML
30 INJECTION, SOLUTION INTRAMUSCULAR; INTRAVENOUS
Status: COMPLETED | OUTPATIENT
Start: 2025-07-03 | End: 2025-07-03

## 2025-07-03 RX ORDER — DEXAMETHASONE SODIUM PHOSPHATE 10 MG/ML
5 INJECTION, SOLUTION INTRAMUSCULAR; INTRAVENOUS ONCE
Status: COMPLETED | OUTPATIENT
Start: 2025-07-03 | End: 2025-07-03

## 2025-07-03 RX ADMIN — DEXAMETHASONE SODIUM PHOSPHATE 5 MG: 10 INJECTION INTRAMUSCULAR; INTRAVENOUS at 07:03

## 2025-07-03 RX ADMIN — KETOROLAC TROMETHAMINE 30 MG: 30 INJECTION, SOLUTION INTRAMUSCULAR; INTRAVENOUS at 07:03

## 2025-07-03 RX ADMIN — DIPHENHYDRAMINE HYDROCHLORIDE 25 MG: 50 INJECTION INTRAMUSCULAR; INTRAVENOUS at 07:03

## 2025-07-03 RX ADMIN — SODIUM CHLORIDE 1000 ML: 0.9 INJECTION, SOLUTION INTRAVENOUS at 07:03

## 2025-07-03 RX ADMIN — PROCHLORPERAZINE EDISYLATE 10 MG: 5 INJECTION INTRAMUSCULAR; INTRAVENOUS at 07:03

## 2025-07-03 ASSESSMENT — ENCOUNTER SYMPTOMS
BLOOD IN STOOL: 0
ANAL BLEEDING: 0
VOMITING: 0
ABDOMINAL PAIN: 0
SHORTNESS OF BREATH: 0
WHEEZING: 0
ABDOMINAL DISTENTION: 0
NAUSEA: 0
COUGH: 0
DIARRHEA: 0

## 2025-07-03 ASSESSMENT — PAIN DESCRIPTION - LOCATION: LOCATION: HEAD

## 2025-07-03 ASSESSMENT — PAIN DESCRIPTION - FREQUENCY: FREQUENCY: CONTINUOUS

## 2025-07-03 ASSESSMENT — PAIN SCALES - GENERAL: PAINLEVEL_OUTOF10: 10

## 2025-07-03 ASSESSMENT — PAIN - FUNCTIONAL ASSESSMENT
PAIN_FUNCTIONAL_ASSESSMENT: 0-10
PAIN_FUNCTIONAL_ASSESSMENT: ACTIVITIES ARE NOT PREVENTED

## 2025-07-03 ASSESSMENT — PAIN DESCRIPTION - ORIENTATION: ORIENTATION: RIGHT;LEFT;ANTERIOR;POSTERIOR

## 2025-07-03 ASSESSMENT — PAIN DESCRIPTION - ONSET: ONSET: SUDDEN

## 2025-07-03 ASSESSMENT — PAIN DESCRIPTION - PAIN TYPE: TYPE: ACUTE PAIN

## 2025-07-03 ASSESSMENT — PAIN DESCRIPTION - DESCRIPTORS: DESCRIPTORS: ACHING;THROBBING

## 2025-07-03 NOTE — ED PROVIDER NOTES
takes less than 2 seconds.      Coloration: Skin is not jaundiced or pale.   Neurological:      General: No focal deficit present.      Mental Status: He is alert.      Cranial Nerves: No cranial nerve deficit.      Sensory: No sensory deficit.      Motor: No weakness.      Comments: -GCS15, AAox3  -Normal b/l UE/LE motor/sensory exam  -CN2-12 intact including able to smile/frown, elevate eyebrows symmetrically, close eyes tightly against force, sensation to light touch intact V1-V3 distribution, hearing intact to finger rub b/l, palate/uvula elevate midline/symmetrically, able to shrug shoulders and move head left/right against force, tongue protrudes midline  -EOMI, PERRL, no nystagmus, normal visual fields, nl speech w/o dysarthria/aphasia  -no pronator drift  -cerebellar testing intact including rapid/alternating movements, finger-to-nose/shin-to-heel  -normal gait, no ataxia, negative rhomberg           DIAGNOSTIC RESULTS     EKG: All EKG's are interpreted by the Emergency Department Physician who either signs or Co-signs this chart in the absence of a cardiologist.      RADIOLOGY:   Interpretation per the Radiologist below, if available at the time of this note:    CT HEAD WO CONTRAST   Final Result      No acute process.      Electronically signed by Thiago Escobedo           LABS:  Admission on 07/03/2025, Discharged on 07/03/2025   Component Date Value Ref Range Status    Specimen HOld 07/03/2025 1LAV,2PST,1RED,1BLU   Final    Comment: 07/03/2025 Add-on orders for these samples will be processed based on acceptable specimen integrity and analyte stability, which may vary by analyte.    Final         EMERGENCY DEPARTMENT COURSE and DIFFERENTIAL DIAGNOSIS/MDM:   Vitals:    Vitals:    07/03/25 0531 07/03/25 0812 07/03/25 1005   BP: (!) 146/87 (!) 149/97 (!) 134/92   Pulse: 83 66 84   Resp: 15 16    Temp: 97.7 °F (36.5 °C) 97.7 °F (36.5 °C)    TempSrc: Tympanic     SpO2: 97% 96%    Weight: 78.8 kg (173 lb 11.6 oz)

## 2025-07-03 NOTE — ED TRIAGE NOTES
Pt ambulatory to ED CC \"excruciating headache\" that started approximately 2 hours ago. Patient states \" Im gonna need strong opiates\". Patient endorses sensitivity to Light and sound as well as nausea.     Hx: Migraines (supposed to see neurologist today)   Takes Fioricet, Lamictal, Toradol, and benadryl.

## 2025-07-31 ENCOUNTER — OFFICE VISIT (OUTPATIENT)
Age: 50
End: 2025-07-31
Payer: COMMERCIAL

## 2025-07-31 VITALS
HEIGHT: 72 IN | HEART RATE: 120 BPM | OXYGEN SATURATION: 98 % | SYSTOLIC BLOOD PRESSURE: 118 MMHG | DIASTOLIC BLOOD PRESSURE: 62 MMHG | BODY MASS INDEX: 21.4 KG/M2 | WEIGHT: 158 LBS | RESPIRATION RATE: 16 BRPM

## 2025-07-31 DIAGNOSIS — G43.709 CHRONIC MIGRAINE WITHOUT AURA, NOT INTRACTABLE, WITHOUT STATUS MIGRAINOSUS: Primary | ICD-10-CM

## 2025-07-31 DIAGNOSIS — R51.9 CHRONIC DAILY HEADACHE: ICD-10-CM

## 2025-07-31 PROCEDURE — 3078F DIAST BP <80 MM HG: CPT | Performed by: PSYCHIATRY & NEUROLOGY

## 2025-07-31 PROCEDURE — G8427 DOCREV CUR MEDS BY ELIG CLIN: HCPCS | Performed by: PSYCHIATRY & NEUROLOGY

## 2025-07-31 PROCEDURE — 99205 OFFICE O/P NEW HI 60 MIN: CPT | Performed by: PSYCHIATRY & NEUROLOGY

## 2025-07-31 PROCEDURE — 3017F COLORECTAL CA SCREEN DOC REV: CPT | Performed by: PSYCHIATRY & NEUROLOGY

## 2025-07-31 PROCEDURE — 4004F PT TOBACCO SCREEN RCVD TLK: CPT | Performed by: PSYCHIATRY & NEUROLOGY

## 2025-07-31 PROCEDURE — 3074F SYST BP LT 130 MM HG: CPT | Performed by: PSYCHIATRY & NEUROLOGY

## 2025-07-31 PROCEDURE — G8420 CALC BMI NORM PARAMETERS: HCPCS | Performed by: PSYCHIATRY & NEUROLOGY

## 2025-07-31 RX ORDER — RIMEGEPANT SULFATE 75 MG/75MG
75 TABLET, ORALLY DISINTEGRATING ORAL EVERY OTHER DAY
Qty: 16 TABLET | Refills: 1 | Status: ACTIVE | OUTPATIENT
Start: 2025-07-31

## 2025-07-31 RX ORDER — PROPRANOLOL HYDROCHLORIDE 60 MG/1
60 CAPSULE, EXTENDED RELEASE ORAL DAILY
Qty: 30 CAPSULE | Refills: 3 | Status: SHIPPED | OUTPATIENT
Start: 2025-07-31

## 2025-07-31 RX ORDER — ZOLMITRIPTAN 5 MG/1
5 TABLET, FILM COATED ORAL DAILY
COMMUNITY
Start: 2025-07-03

## 2025-07-31 ASSESSMENT — PATIENT HEALTH QUESTIONNAIRE - PHQ9
SUM OF ALL RESPONSES TO PHQ QUESTIONS 1-9: 0
2. FEELING DOWN, DEPRESSED OR HOPELESS: NOT AT ALL
SUM OF ALL RESPONSES TO PHQ QUESTIONS 1-9: 0
1. LITTLE INTEREST OR PLEASURE IN DOING THINGS: NOT AT ALL

## 2025-07-31 NOTE — PROGRESS NOTES
pain, change in bowel habits, or black or bloody stools  Genito-Urinary ROS: no dysuria, trouble voiding, or hematuria  Musculoskeletal ROS: negative  Dermatological ROS: negative      PHYSICAL EXAMINATION:    Vitals:    07/31/25 0950   BP: 118/62   Pulse: (!) 120   Resp: 16   SpO2: 98%     General:  Well groomed individual in no acute distress.    Neck: Supple, nontender, no bruits, no pain with resistance to active range of motion.    Heart: Regular rate and rhythm.  Normal S1S2.  Lungs:  Equal chest expansion, no cough, no wheeze  Musculoskeletal:  Extremities revealed no edema and had full range of motion of joints.    Psych:  Good mood and bright affect    NEUROLOGICAL EXAMINATION:     Mental Status:   Alert and oriented to person, place, and time with recent and remote memory intact.  Attention span and concentration are normal. Speech is fluent.      Cranial Nerves:    II, III, IV, VI:  Visual acuity grossly intact. Visual fields are normal.    Pupils are equal, round, and reactive to light.    Extra-ocular movements are full and fluid.  Fundoscopic exam was benign, no ptosis or nystagmus.   V-XII: Hearing is grossly intact.  Facial features are symmetric, with normal sensation and strength.  The palate rises symmetrically and the tongue protrudes midline.  Sternocleidomastoids 5/5.      Motor Examination: Normal tone, bulk, and strength. 5/5 muscle strength throughout.  No cogwheel rigidity or clonus present.      Sensory exam:  Normal throughout to pinprick, temperature, and vibration sense.  Normal proprioception.      Coordination:  Finger to nose and rapid arm movement testing was normal.   No resting or intention tremor    Gait and Station:  Steady while walking on toes, heels, and with tandem walking.  Normal arm swing.  No Rhomberg or pronator drift.   No muscle wasting or fasiculations noted.      Reflexes:  DTRs 2+ throughout.  Toes downgoing.        LABS / IMAGING  CT Result (most recent):  CT HEAD

## 2025-09-02 ENCOUNTER — TELEPHONE (OUTPATIENT)
Age: 50
End: 2025-09-02

## (undated) DEVICE — INFECTION CONTROL KIT SYS

## (undated) DEVICE — SYR 10ML LUER LOK 1/5ML GRAD --

## (undated) DEVICE — BANDAGE,GAUZE,CONFORMING,4"X75",STRL,LF: Brand: MEDLINE

## (undated) DEVICE — GAUZE,SPONGE,FLUFF,6"X6.75",STRL,5/TRAY: Brand: MEDLINE

## (undated) DEVICE — PACK,BASIC,SIRUS,V: Brand: MEDLINE

## (undated) DEVICE — SURGICAL PROCEDURE PACK BASIN MAJ SET CUST NO CAUT

## (undated) DEVICE — PAD,ABDOMINAL,5"X9",ST,LF,25/BX: Brand: MEDLINE INDUSTRIES, INC.

## (undated) DEVICE — INTENDED FOR TISSUE SEPARATION, AND OTHER PROCEDURES THAT REQUIRE A SHARP SURGICAL BLADE TO PUNCTURE OR CUT.: Brand: BARD-PARKER ® CARBON RIB-BACK BLADES

## (undated) DEVICE — GAUZE PKG STRP IODOFRM 1/4X5YD --

## (undated) DEVICE — TOWEL SURG W17XL27IN STD BLU COT NONFENESTRATED PREWASHED

## (undated) DEVICE — BNDG ELAS HK LOOP 4X5YD NS -- MATRIX

## (undated) DEVICE — STRAP,POSITIONING,KNEE/BODY,FOAM,4X60": Brand: MEDLINE

## (undated) DEVICE — ROCKER SWITCH PENCIL BLADE ELECTRODE, HOLSTER: Brand: EDGE

## (undated) DEVICE — SOLUTION IV 1000ML 0.9% SOD CHL

## (undated) DEVICE — SPONGE GZ W4XL4IN COT 12 PLY TYP VII WVN C FLD DSGN

## (undated) DEVICE — BIPOLAR FORCEPS CORD: Brand: VALLEYLAB

## (undated) DEVICE — REM POLYHESIVE ADULT PATIENT RETURN ELECTRODE: Brand: VALLEYLAB

## (undated) DEVICE — DRAPE,EXTREMITY,89X128,STERILE: Brand: MEDLINE